# Patient Record
Sex: FEMALE | Race: WHITE | Employment: OTHER | URBAN - METROPOLITAN AREA
[De-identification: names, ages, dates, MRNs, and addresses within clinical notes are randomized per-mention and may not be internally consistent; named-entity substitution may affect disease eponyms.]

---

## 2017-02-15 PROBLEM — C82.13 FOLLICULAR LYMPHOMA GRADE II OF INTRA-ABDOMINAL LYMPH NODES (HCC): Status: ACTIVE | Noted: 2017-02-15

## 2017-08-12 PROBLEM — T17.208A FOREIGN BODY IN PHARYNX: Status: ACTIVE | Noted: 2017-08-12

## 2018-03-21 ENCOUNTER — HOSPITAL ENCOUNTER (OUTPATIENT)
Dept: INFUSION THERAPY | Age: 73
Discharge: HOME | End: 2018-03-21
Payer: COMMERCIAL

## 2018-03-21 ENCOUNTER — OFFICE VISIT (OUTPATIENT)
Dept: ONCOLOGY | Age: 73
End: 2018-03-21
Payer: COMMERCIAL

## 2018-03-21 VITALS
RESPIRATION RATE: 20 BRPM | DIASTOLIC BLOOD PRESSURE: 62 MMHG | HEART RATE: 61 BPM | SYSTOLIC BLOOD PRESSURE: 143 MMHG | HEIGHT: 69 IN | BODY MASS INDEX: 32.75 KG/M2 | WEIGHT: 221.1 LBS | TEMPERATURE: 98.5 F

## 2018-03-21 DIAGNOSIS — C82.09 GRADE I FOLLICULAR LYMPHOMA OF EXTRANODAL SITE EXCLUDING SPLEEN AND OTHER SOLID ORGANS (HCC): ICD-10-CM

## 2018-03-21 DIAGNOSIS — C82.13 FOLLICULAR LYMPHOMA GRADE II OF INTRA-ABDOMINAL LYMPH NODES (HCC): ICD-10-CM

## 2018-03-21 DIAGNOSIS — C82.13 FOLLICULAR LYMPHOMA GRADE II OF INTRA-ABDOMINAL LYMPH NODES (HCC): Primary | ICD-10-CM

## 2018-03-21 LAB
ALBUMIN SERPL-MCNC: 4.1 G/DL (ref 3.5–5.2)
ALP BLD-CCNC: 95 U/L (ref 35–104)
ALT SERPL-CCNC: 11 U/L (ref 0–32)
ANION GAP SERPL CALCULATED.3IONS-SCNC: 11 MMOL/L (ref 7–16)
AST SERPL-CCNC: 13 U/L (ref 0–31)
BASOPHILS ABSOLUTE: 0.03 E9/L (ref 0–0.2)
BASOPHILS RELATIVE PERCENT: 0.5 % (ref 0–2)
BILIRUB SERPL-MCNC: 0.4 MG/DL (ref 0–1.2)
BUN BLDV-MCNC: 20 MG/DL (ref 8–23)
CALCIUM SERPL-MCNC: 9.5 MG/DL (ref 8.6–10.2)
CHLORIDE BLD-SCNC: 100 MMOL/L (ref 98–107)
CO2: 28 MMOL/L (ref 22–29)
CREAT SERPL-MCNC: 1 MG/DL (ref 0.5–1)
EOSINOPHILS ABSOLUTE: 0.31 E9/L (ref 0.05–0.5)
EOSINOPHILS RELATIVE PERCENT: 5.6 % (ref 0–6)
GFR AFRICAN AMERICAN: >60
GFR NON-AFRICAN AMERICAN: 55 ML/MIN/1.73
GLUCOSE BLD-MCNC: 127 MG/DL (ref 74–109)
HCT VFR BLD CALC: 38.7 % (ref 34–48)
HEMOGLOBIN: 13.1 G/DL (ref 11.5–15.5)
LACTATE DEHYDROGENASE: 168 U/L (ref 135–214)
LYMPHOCYTES ABSOLUTE: 0.45 E9/L (ref 1.5–4)
LYMPHOCYTES RELATIVE PERCENT: 8.1 % (ref 20–42)
MCH RBC QN AUTO: 32.3 PG (ref 26–35)
MCHC RBC AUTO-ENTMCNC: 33.9 % (ref 32–34.5)
MCV RBC AUTO: 95.3 FL (ref 80–99.9)
MONOCYTES ABSOLUTE: 0.84 E9/L (ref 0.1–0.95)
MONOCYTES RELATIVE PERCENT: 14.7 % (ref 2–12)
NEUTROPHILS ABSOLUTE: 3.98 E9/L (ref 1.8–7.3)
NEUTROPHILS RELATIVE PERCENT: 71.1 % (ref 43–80)
PDW BLD-RTO: 13.4 FL (ref 11.5–15)
PLATELET # BLD: 189 E9/L (ref 130–450)
PMV BLD AUTO: 9 FL (ref 7–12)
POTASSIUM SERPL-SCNC: 4.2 MMOL/L (ref 3.5–5)
RBC # BLD: 4.06 E12/L (ref 3.5–5.5)
RBC # BLD: NORMAL 10*6/UL
SODIUM BLD-SCNC: 139 MMOL/L (ref 132–146)
TOTAL PROTEIN: 6.9 G/DL (ref 6.4–8.3)
WBC # BLD: 5.6 E9/L (ref 4.5–11.5)

## 2018-03-21 PROCEDURE — 80053 COMPREHEN METABOLIC PANEL: CPT

## 2018-03-21 PROCEDURE — 2580000003 HC RX 258: Performed by: INTERNAL MEDICINE

## 2018-03-21 PROCEDURE — 83615 LACTATE (LD) (LDH) ENZYME: CPT

## 2018-03-21 PROCEDURE — 96401 CHEMO ANTI-NEOPL SQ/IM: CPT

## 2018-03-21 PROCEDURE — 85025 COMPLETE CBC W/AUTO DIFF WBC: CPT

## 2018-03-21 PROCEDURE — 6360000002 HC RX W HCPCS: Performed by: INTERNAL MEDICINE

## 2018-03-21 RX ORDER — ACYCLOVIR 400 MG/1
400 TABLET ORAL 2 TIMES DAILY
Qty: 60 TABLET | Refills: 3 | Status: SHIPPED | OUTPATIENT
Start: 2018-03-21 | End: 2018-08-01 | Stop reason: SDUPTHER

## 2018-03-21 RX ORDER — SODIUM CHLORIDE 9 MG/ML
INJECTION, SOLUTION INTRAVENOUS CONTINUOUS
Status: CANCELLED | OUTPATIENT
Start: 2018-03-21

## 2018-03-21 RX ORDER — DIPHENHYDRAMINE HYDROCHLORIDE 50 MG/ML
50 INJECTION INTRAMUSCULAR; INTRAVENOUS ONCE
Status: CANCELLED | OUTPATIENT
Start: 2018-03-21 | End: 2018-03-21

## 2018-03-21 RX ORDER — SODIUM CHLORIDE 0.9 % (FLUSH) 0.9 %
10 SYRINGE (ML) INJECTION PRN
Status: CANCELLED | OUTPATIENT
Start: 2018-03-21

## 2018-03-21 RX ORDER — DIPHENHYDRAMINE HCL 25 MG
25 TABLET ORAL ONCE
Status: CANCELLED
Start: 2018-03-21 | End: 2018-03-21

## 2018-03-21 RX ORDER — 0.9 % SODIUM CHLORIDE 0.9 %
10 VIAL (ML) INJECTION ONCE
Status: CANCELLED | OUTPATIENT
Start: 2018-03-21 | End: 2018-03-21

## 2018-03-21 RX ORDER — ACETAMINOPHEN 325 MG/1
650 TABLET ORAL ONCE
Status: CANCELLED | OUTPATIENT
Start: 2018-03-21

## 2018-03-21 RX ORDER — SODIUM CHLORIDE 0.9 % (FLUSH) 0.9 %
10 SYRINGE (ML) INJECTION PRN
Status: DISCONTINUED | OUTPATIENT
Start: 2018-03-21 | End: 2018-03-22 | Stop reason: HOSPADM

## 2018-03-21 RX ORDER — HEPARIN SODIUM (PORCINE) LOCK FLUSH IV SOLN 100 UNIT/ML 100 UNIT/ML
500 SOLUTION INTRAVENOUS PRN
Status: CANCELLED | OUTPATIENT
Start: 2018-03-21

## 2018-03-21 RX ORDER — HEPARIN SODIUM (PORCINE) LOCK FLUSH IV SOLN 100 UNIT/ML 100 UNIT/ML
500 SOLUTION INTRAVENOUS PRN
Status: DISCONTINUED | OUTPATIENT
Start: 2018-03-21 | End: 2018-03-22 | Stop reason: HOSPADM

## 2018-03-21 RX ORDER — METHYLPREDNISOLONE SODIUM SUCCINATE 125 MG/2ML
125 INJECTION, POWDER, LYOPHILIZED, FOR SOLUTION INTRAMUSCULAR; INTRAVENOUS ONCE
Status: CANCELLED | OUTPATIENT
Start: 2018-03-21 | End: 2018-03-21

## 2018-03-21 RX ADMIN — Medication 10 ML: at 11:49

## 2018-03-21 RX ADMIN — Medication 500 UNITS: at 11:50

## 2018-03-21 RX ADMIN — RITUXIMAB AND HYALURONIDASE 11.7 ML: 120; 2000 INJECTION, SOLUTION SUBCUTANEOUS at 12:53

## 2018-03-21 NOTE — PROGRESS NOTES
Met with patient and her daughter during her follow up appointment with Dr. Ceci Mendosa for her Rituxan- Hycela treatment for her Lymphoma diagnosis. Patient is familiar with with me. States that she recently had right breast lumpectomy/SLNB surgery on 3/12/18 with Dr. Kiki Monroy. Right breast core biopsy was completed on 2/14/18 at [facility] as well. Patient states that she is scheduled for right breast re-excision on 3/28/18 due to positive margin per her postop appt with Dr. Kiki Monroy yesterday. Will call for records. Provided with written literature including \"Be A Survivor: Your guide to breast cancer treatment\", Patient Resource booklet:  Women's Cancer, Exercises after breast surgery, Information on Lymphedema, and [facility] Sister Support Group information. Provided with my contact information and instructed patient to call me with questions or concerns. Verbalizes understanding. Patient appreciative of visit and information. Will continue to follow for final treatment plans for secondary diagnosis. Spoke with Alessia Tello at Dr. Rupal Godoy regarding patient records. Requested Dr. Rupal Godoy office notes, Mammogram, right breast surgical pathology report, and hormone receptors. Provided office fax number. States that she will send to our office today. Dr. Ceci Mendosa updated. Nani Torres, MERLINW, RN, OCN  Oncology Nurse Navigator

## 2018-03-21 NOTE — PROGRESS NOTES
PORT FLUSH    Patient presents to clinic for Labs/OVPort Flush/tx today. Single  SQ port accessed per policy using 20 G .75 inch Burrell needle for good blood return. Aspirate for waste and specimen sent to lab. Site flushed easily with 10 mL NSS followed by 5 mL Heparin solution 100 units/ml rinse prior to de-access. Dry sterile dressing to area. Tolerated procedure well. Encouraged to schedule port flush every 4 weeks.

## 2018-04-11 ENCOUNTER — OFFICE VISIT (OUTPATIENT)
Dept: ONCOLOGY | Age: 73
End: 2018-04-11
Payer: COMMERCIAL

## 2018-04-11 ENCOUNTER — HOSPITAL ENCOUNTER (OUTPATIENT)
Dept: INFUSION THERAPY | Age: 73
Discharge: HOME | End: 2018-04-11
Payer: COMMERCIAL

## 2018-04-11 VITALS
SYSTOLIC BLOOD PRESSURE: 153 MMHG | WEIGHT: 221.4 LBS | HEIGHT: 69 IN | TEMPERATURE: 97.6 F | BODY MASS INDEX: 32.79 KG/M2 | HEART RATE: 68 BPM | DIASTOLIC BLOOD PRESSURE: 68 MMHG

## 2018-04-11 DIAGNOSIS — C82.13 FOLLICULAR LYMPHOMA GRADE II OF INTRA-ABDOMINAL LYMPH NODES (HCC): Primary | ICD-10-CM

## 2018-04-11 DIAGNOSIS — C82.09 GRADE I FOLLICULAR LYMPHOMA OF EXTRANODAL SITE EXCLUDING SPLEEN AND OTHER SOLID ORGANS (HCC): ICD-10-CM

## 2018-04-11 DIAGNOSIS — Z17.0 MALIGNANT NEOPLASM OF RIGHT BREAST, STAGE 1, ESTROGEN RECEPTOR POSITIVE (HCC): ICD-10-CM

## 2018-04-11 DIAGNOSIS — Z17.0 STAGE 1 BREAST CANCER, ER+, RIGHT (HCC): ICD-10-CM

## 2018-04-11 DIAGNOSIS — C50.911 STAGE 1 BREAST CANCER, ER+, RIGHT (HCC): ICD-10-CM

## 2018-04-11 DIAGNOSIS — C50.911 MALIGNANT NEOPLASM OF RIGHT BREAST, STAGE 1, ESTROGEN RECEPTOR POSITIVE (HCC): ICD-10-CM

## 2018-04-11 LAB
ALBUMIN SERPL-MCNC: 3.5 G/DL (ref 3.5–5.2)
ALP BLD-CCNC: 77 U/L (ref 35–104)
ALT SERPL-CCNC: 10 U/L (ref 0–32)
ANION GAP SERPL CALCULATED.3IONS-SCNC: 14 MMOL/L (ref 7–16)
AST SERPL-CCNC: 12 U/L (ref 0–31)
BASOPHILS ABSOLUTE: 0 E9/L (ref 0–0.2)
BASOPHILS RELATIVE PERCENT: 0.3 % (ref 0–2)
BILIRUB SERPL-MCNC: 0.4 MG/DL (ref 0–1.2)
BUN BLDV-MCNC: 17 MG/DL (ref 8–23)
CALCIUM SERPL-MCNC: 8.1 MG/DL (ref 8.6–10.2)
CHLORIDE BLD-SCNC: 106 MMOL/L (ref 98–107)
CO2: 21 MMOL/L (ref 22–29)
CREAT SERPL-MCNC: 0.8 MG/DL (ref 0.5–1)
EOSINOPHILS ABSOLUTE: 0.3 E9/L (ref 0.05–0.5)
EOSINOPHILS RELATIVE PERCENT: 5.1 % (ref 0–6)
GFR AFRICAN AMERICAN: >60
GFR NON-AFRICAN AMERICAN: >60 ML/MIN/1.73
GLUCOSE BLD-MCNC: 112 MG/DL (ref 74–109)
HCT VFR BLD CALC: 38.9 % (ref 34–48)
HEMOGLOBIN: 13.1 G/DL (ref 11.5–15.5)
LACTATE DEHYDROGENASE: 178 U/L (ref 135–214)
LYMPHOCYTES ABSOLUTE: 0.35 E9/L (ref 1.5–4)
LYMPHOCYTES RELATIVE PERCENT: 5.6 % (ref 20–42)
MCH RBC QN AUTO: 32.6 PG (ref 26–35)
MCHC RBC AUTO-ENTMCNC: 33.7 % (ref 32–34.5)
MCV RBC AUTO: 96.8 FL (ref 80–99.9)
MONOCYTES ABSOLUTE: 0.47 E9/L (ref 0.1–0.95)
MONOCYTES RELATIVE PERCENT: 8.1 % (ref 2–12)
NEUTROPHILS ABSOLUTE: 4.78 E9/L (ref 1.8–7.3)
NEUTROPHILS RELATIVE PERCENT: 81.3 % (ref 43–80)
PDW BLD-RTO: 13.2 FL (ref 11.5–15)
PLATELET # BLD: 190 E9/L (ref 130–450)
PMV BLD AUTO: 9.1 FL (ref 7–12)
POTASSIUM SERPL-SCNC: 3.6 MMOL/L (ref 3.5–5)
RBC # BLD: 4.02 E12/L (ref 3.5–5.5)
RBC # BLD: NORMAL 10*6/UL
SODIUM BLD-SCNC: 141 MMOL/L (ref 132–146)
TOTAL PROTEIN: 5.9 G/DL (ref 6.4–8.3)
WBC # BLD: 5.9 E9/L (ref 4.5–11.5)

## 2018-04-11 PROCEDURE — 85025 COMPLETE CBC W/AUTO DIFF WBC: CPT

## 2018-04-11 PROCEDURE — 2580000003 HC RX 258: Performed by: INTERNAL MEDICINE

## 2018-04-11 PROCEDURE — 83615 LACTATE (LD) (LDH) ENZYME: CPT

## 2018-04-11 PROCEDURE — 80053 COMPREHEN METABOLIC PANEL: CPT

## 2018-04-11 PROCEDURE — 6360000002 HC RX W HCPCS: Performed by: INTERNAL MEDICINE

## 2018-04-11 PROCEDURE — 99214 OFFICE O/P EST MOD 30 MIN: CPT

## 2018-04-11 RX ORDER — SODIUM CHLORIDE 0.9 % (FLUSH) 0.9 %
10 SYRINGE (ML) INJECTION PRN
Status: CANCELLED | OUTPATIENT
Start: 2018-04-11

## 2018-04-11 RX ORDER — HEPARIN SODIUM (PORCINE) LOCK FLUSH IV SOLN 100 UNIT/ML 100 UNIT/ML
500 SOLUTION INTRAVENOUS PRN
Status: CANCELLED | OUTPATIENT
Start: 2018-04-11

## 2018-04-11 RX ORDER — HEPARIN SODIUM (PORCINE) LOCK FLUSH IV SOLN 100 UNIT/ML 100 UNIT/ML
500 SOLUTION INTRAVENOUS PRN
Status: DISCONTINUED | OUTPATIENT
Start: 2018-04-11 | End: 2018-04-12 | Stop reason: HOSPADM

## 2018-04-11 RX ORDER — SODIUM CHLORIDE 0.9 % (FLUSH) 0.9 %
10 SYRINGE (ML) INJECTION PRN
Status: DISCONTINUED | OUTPATIENT
Start: 2018-04-11 | End: 2018-04-12 | Stop reason: HOSPADM

## 2018-04-11 RX ADMIN — Medication 500 UNITS: at 12:17

## 2018-04-11 RX ADMIN — Medication 10 ML: at 12:17

## 2018-04-11 NOTE — PROGRESS NOTES
PORT FLUSH    Patient presents to clinic for [facility] today. single  SQ port accessed per policy using 05I, . 75 inch Burrell needle for good blood return. Aspirate for waste and specimen sent to lab. Site flushed easily with 10 mL NSS followed by 5 mL Heparin solution 100 units/ml rinse prior to de-access. Dry sterile dressing to area. Tolerated procedure well. Encouraged to schedule port flush every 4 weeks.

## 2018-04-11 NOTE — PROGRESS NOTES
Met with patient briefly during her follow up appointment with Dr. Lizzy Tobin after her recent right breast re-excision with Dr. Drew Sanchez at [facility] on 3/28/18. Acquired surgical pathology report with final hormone receptors from [facility] prior and had scanned into patient's chart. Patient's surgical margins were negative and final receptors were ER+/ID+/HER-2/kylah -. Patient appears well and in good spirits. States that she is doing well postoperatively. Dr. Lizzy Tobin requested OncotypeDX testing ordered for patient's cancer recurrence score for final treatment recommendations for her breast cancer. Dr. Lizzy Tobin explained in detail to patient. Patient is agreement to proceed. Ordered OncotypeDX online and requested that the company acquire the pathology specimen from [facility]. Will follow for testing results prior to the patient's follow up appointment with Dr. Lizzy Tobin. GIA John, RN, OCN  Oncology Nurse Navigator

## 2018-05-02 ENCOUNTER — HOSPITAL ENCOUNTER (OUTPATIENT)
Dept: INFUSION THERAPY | Age: 73
Discharge: HOME | End: 2018-05-02
Payer: COMMERCIAL

## 2018-05-02 ENCOUNTER — OFFICE VISIT (OUTPATIENT)
Dept: ONCOLOGY | Age: 73
End: 2018-05-02
Payer: COMMERCIAL

## 2018-05-02 VITALS
SYSTOLIC BLOOD PRESSURE: 135 MMHG | WEIGHT: 221.1 LBS | HEART RATE: 74 BPM | DIASTOLIC BLOOD PRESSURE: 67 MMHG | HEIGHT: 69 IN | TEMPERATURE: 97.2 F | RESPIRATION RATE: 20 BRPM | BODY MASS INDEX: 32.75 KG/M2

## 2018-05-02 DIAGNOSIS — C82.13 FOLLICULAR LYMPHOMA GRADE II OF INTRA-ABDOMINAL LYMPH NODES (HCC): Primary | ICD-10-CM

## 2018-05-02 DIAGNOSIS — Z17.0 MALIGNANT NEOPLASM OF RIGHT BREAST, STAGE 1, ESTROGEN RECEPTOR POSITIVE (HCC): Primary | ICD-10-CM

## 2018-05-02 DIAGNOSIS — C82.09 GRADE I FOLLICULAR LYMPHOMA OF EXTRANODAL SITE EXCLUDING SPLEEN AND OTHER SOLID ORGANS (HCC): ICD-10-CM

## 2018-05-02 DIAGNOSIS — C50.911 MALIGNANT NEOPLASM OF RIGHT BREAST, STAGE 1, ESTROGEN RECEPTOR POSITIVE (HCC): Primary | ICD-10-CM

## 2018-05-02 DIAGNOSIS — Z17.0 STAGE 1 BREAST CANCER, ER+, RIGHT (HCC): ICD-10-CM

## 2018-05-02 DIAGNOSIS — C50.911 STAGE 1 BREAST CANCER, ER+, RIGHT (HCC): ICD-10-CM

## 2018-05-02 LAB
ALBUMIN SERPL-MCNC: 4.2 G/DL (ref 3.5–5.2)
ALP BLD-CCNC: 110 U/L (ref 35–104)
ALT SERPL-CCNC: 11 U/L (ref 0–32)
ANION GAP SERPL CALCULATED.3IONS-SCNC: 14 MMOL/L (ref 7–16)
AST SERPL-CCNC: 14 U/L (ref 0–31)
BASOPHILS ABSOLUTE: 0.02 E9/L (ref 0–0.2)
BASOPHILS RELATIVE PERCENT: 1.5 % (ref 0–2)
BILIRUB SERPL-MCNC: 0.5 MG/DL (ref 0–1.2)
BUN BLDV-MCNC: 17 MG/DL (ref 8–23)
CALCIUM SERPL-MCNC: 9.8 MG/DL (ref 8.6–10.2)
CHLORIDE BLD-SCNC: 97 MMOL/L (ref 98–107)
CO2: 26 MMOL/L (ref 22–29)
CREAT SERPL-MCNC: 1 MG/DL (ref 0.5–1)
EOSINOPHILS ABSOLUTE: 0.13 E9/L (ref 0.05–0.5)
EOSINOPHILS RELATIVE PERCENT: 8.2 % (ref 0–6)
GFR AFRICAN AMERICAN: >60
GFR NON-AFRICAN AMERICAN: 55 ML/MIN/1.73
GLUCOSE BLD-MCNC: 121 MG/DL (ref 74–109)
HCT VFR BLD CALC: 38.7 % (ref 34–48)
HEMOGLOBIN: 13.2 G/DL (ref 11.5–15.5)
LACTATE DEHYDROGENASE: 160 U/L (ref 135–214)
LYMPHOCYTES ABSOLUTE: 0.51 E9/L (ref 1.5–4)
LYMPHOCYTES RELATIVE PERCENT: 32.3 % (ref 20–42)
MCH RBC QN AUTO: 32.3 PG (ref 26–35)
MCHC RBC AUTO-ENTMCNC: 34.1 % (ref 32–34.5)
MCV RBC AUTO: 94.6 FL (ref 80–99.9)
MONOCYTES ABSOLUTE: 0.42 E9/L (ref 0.1–0.95)
MONOCYTES RELATIVE PERCENT: 26.2 % (ref 2–12)
NEUTROPHILS ABSOLUTE: 0.51 E9/L (ref 1.8–7.3)
NEUTROPHILS RELATIVE PERCENT: 31.8 % (ref 43–80)
PDW BLD-RTO: 12.5 FL (ref 11.5–15)
PLATELET # BLD: 185 E9/L (ref 130–450)
PMV BLD AUTO: 8.8 FL (ref 7–12)
POTASSIUM SERPL-SCNC: 4.5 MMOL/L (ref 3.5–5)
RBC # BLD: 4.09 E12/L (ref 3.5–5.5)
RBC # BLD: NORMAL 10*6/UL
SODIUM BLD-SCNC: 137 MMOL/L (ref 132–146)
TOTAL PROTEIN: 7.1 G/DL (ref 6.4–8.3)
WBC # BLD: 1.6 E9/L (ref 4.5–11.5)

## 2018-05-02 PROCEDURE — 85025 COMPLETE CBC W/AUTO DIFF WBC: CPT

## 2018-05-02 PROCEDURE — 99214 OFFICE O/P EST MOD 30 MIN: CPT

## 2018-05-02 PROCEDURE — 80053 COMPREHEN METABOLIC PANEL: CPT

## 2018-05-02 PROCEDURE — 2580000003 HC RX 258: Performed by: INTERNAL MEDICINE

## 2018-05-02 PROCEDURE — 83615 LACTATE (LD) (LDH) ENZYME: CPT

## 2018-05-02 PROCEDURE — 6360000002 HC RX W HCPCS: Performed by: INTERNAL MEDICINE

## 2018-05-02 RX ORDER — SODIUM CHLORIDE 0.9 % (FLUSH) 0.9 %
10 SYRINGE (ML) INJECTION PRN
Status: DISCONTINUED | OUTPATIENT
Start: 2018-05-02 | End: 2018-05-03 | Stop reason: HOSPADM

## 2018-05-02 RX ORDER — HEPARIN SODIUM (PORCINE) LOCK FLUSH IV SOLN 100 UNIT/ML 100 UNIT/ML
500 SOLUTION INTRAVENOUS PRN
Status: CANCELLED | OUTPATIENT
Start: 2018-05-02

## 2018-05-02 RX ORDER — SODIUM CHLORIDE 0.9 % (FLUSH) 0.9 %
10 SYRINGE (ML) INJECTION PRN
Status: CANCELLED | OUTPATIENT
Start: 2018-05-02

## 2018-05-02 RX ORDER — ANASTROZOLE 1 MG/1
1 TABLET ORAL DAILY
Qty: 90 TABLET | Refills: 1 | Status: SHIPPED | OUTPATIENT
Start: 2018-05-02 | End: 2018-11-20 | Stop reason: SDUPTHER

## 2018-05-02 RX ORDER — HEPARIN SODIUM (PORCINE) LOCK FLUSH IV SOLN 100 UNIT/ML 100 UNIT/ML
500 SOLUTION INTRAVENOUS PRN
Status: DISCONTINUED | OUTPATIENT
Start: 2018-05-02 | End: 2018-05-03 | Stop reason: HOSPADM

## 2018-05-02 RX ADMIN — Medication 500 UNITS: at 13:40

## 2018-05-02 RX ADMIN — Medication 10 ML: at 13:40

## 2018-05-03 DIAGNOSIS — C82.13 FOLLICULAR LYMPHOMA GRADE II OF INTRA-ABDOMINAL LYMPH NODES (HCC): ICD-10-CM

## 2018-05-03 DIAGNOSIS — Z17.0 MALIGNANT NEOPLASM OF RIGHT BREAST, STAGE 1, ESTROGEN RECEPTOR POSITIVE (HCC): Primary | ICD-10-CM

## 2018-05-03 DIAGNOSIS — C50.911 MALIGNANT NEOPLASM OF RIGHT BREAST, STAGE 1, ESTROGEN RECEPTOR POSITIVE (HCC): Primary | ICD-10-CM

## 2018-05-04 ENCOUNTER — HOSPITAL ENCOUNTER (OUTPATIENT)
Dept: INFUSION THERAPY | Age: 73
Discharge: HOME | End: 2018-05-04
Payer: COMMERCIAL

## 2018-05-04 DIAGNOSIS — C50.911 MALIGNANT NEOPLASM OF RIGHT BREAST, STAGE 1, ESTROGEN RECEPTOR POSITIVE (HCC): ICD-10-CM

## 2018-05-04 DIAGNOSIS — Z17.0 MALIGNANT NEOPLASM OF RIGHT BREAST, STAGE 1, ESTROGEN RECEPTOR POSITIVE (HCC): ICD-10-CM

## 2018-05-04 DIAGNOSIS — C82.13 FOLLICULAR LYMPHOMA GRADE II OF INTRA-ABDOMINAL LYMPH NODES (HCC): ICD-10-CM

## 2018-05-04 LAB
BASOPHILS ABSOLUTE: 0.03 E9/L (ref 0–0.2)
BASOPHILS RELATIVE PERCENT: 2 % (ref 0–2)
EOSINOPHILS ABSOLUTE: 0.23 E9/L (ref 0.05–0.5)
EOSINOPHILS RELATIVE PERCENT: 13.7 % (ref 0–6)
HCT VFR BLD CALC: 38.8 % (ref 34–48)
HEMOGLOBIN: 13 G/DL (ref 11.5–15.5)
LYMPHOCYTES ABSOLUTE: 0.43 E9/L (ref 1.5–4)
LYMPHOCYTES RELATIVE PERCENT: 25.4 % (ref 20–42)
MCH RBC QN AUTO: 32.2 PG (ref 26–35)
MCHC RBC AUTO-ENTMCNC: 33.5 % (ref 32–34.5)
MCV RBC AUTO: 96 FL (ref 80–99.9)
MONOCYTES ABSOLUTE: 0.44 E9/L (ref 0.1–0.95)
MONOCYTES RELATIVE PERCENT: 26.4 % (ref 2–12)
NEUTROPHILS ABSOLUTE: 0.56 E9/L (ref 1.8–7.3)
NEUTROPHILS RELATIVE PERCENT: 32.5 % (ref 43–80)
PDW BLD-RTO: 12.6 FL (ref 11.5–15)
PLATELET # BLD: 179 E9/L (ref 130–450)
PMV BLD AUTO: 8.7 FL (ref 7–12)
RBC # BLD: 4.04 E12/L (ref 3.5–5.5)
RBC # BLD: NORMAL 10*6/UL
WBC # BLD: 1.7 E9/L (ref 4.5–11.5)

## 2018-05-04 PROCEDURE — 85025 COMPLETE CBC W/AUTO DIFF WBC: CPT

## 2018-05-04 PROCEDURE — 36415 COLL VENOUS BLD VENIPUNCTURE: CPT

## 2018-05-11 ENCOUNTER — HOSPITAL ENCOUNTER (OUTPATIENT)
Dept: GENERAL RADIOLOGY | Age: 73
Discharge: HOME | End: 2018-05-13
Payer: COMMERCIAL

## 2018-05-11 DIAGNOSIS — C82.13 FOLLICULAR LYMPHOMA GRADE II OF INTRA-ABDOMINAL LYMPH NODES (HCC): ICD-10-CM

## 2018-05-11 DIAGNOSIS — C50.911 STAGE 1 BREAST CANCER, ER+, RIGHT (HCC): ICD-10-CM

## 2018-05-11 DIAGNOSIS — Z17.0 STAGE 1 BREAST CANCER, ER+, RIGHT (HCC): ICD-10-CM

## 2018-05-11 PROCEDURE — 77080 DXA BONE DENSITY AXIAL: CPT

## 2018-05-16 ENCOUNTER — HOSPITAL ENCOUNTER (OUTPATIENT)
Dept: RADIATION ONCOLOGY | Age: 73
Discharge: HOME | End: 2018-05-16
Payer: COMMERCIAL

## 2018-05-16 ENCOUNTER — OFFICE VISIT (OUTPATIENT)
Dept: ONCOLOGY | Age: 73
End: 2018-05-16
Payer: COMMERCIAL

## 2018-05-16 ENCOUNTER — HOSPITAL ENCOUNTER (OUTPATIENT)
Dept: INFUSION THERAPY | Age: 73
Discharge: HOME | End: 2018-05-16
Payer: COMMERCIAL

## 2018-05-16 VITALS
SYSTOLIC BLOOD PRESSURE: 155 MMHG | TEMPERATURE: 97.6 F | WEIGHT: 221.4 LBS | BODY MASS INDEX: 33.56 KG/M2 | DIASTOLIC BLOOD PRESSURE: 72 MMHG | HEIGHT: 68 IN | HEART RATE: 66 BPM | RESPIRATION RATE: 18 BRPM

## 2018-05-16 VITALS
DIASTOLIC BLOOD PRESSURE: 76 MMHG | SYSTOLIC BLOOD PRESSURE: 138 MMHG | HEART RATE: 61 BPM | RESPIRATION RATE: 18 BRPM | WEIGHT: 221.4 LBS | BODY MASS INDEX: 33.17 KG/M2 | TEMPERATURE: 98.2 F

## 2018-05-16 DIAGNOSIS — C82.13 FOLLICULAR LYMPHOMA GRADE II OF INTRA-ABDOMINAL LYMPH NODES (HCC): ICD-10-CM

## 2018-05-16 DIAGNOSIS — C82.13 FOLLICULAR LYMPHOMA GRADE II OF INTRA-ABDOMINAL LYMPH NODES (HCC): Primary | ICD-10-CM

## 2018-05-16 DIAGNOSIS — C50.911 STAGE 1 BREAST CANCER, ER+, RIGHT (HCC): ICD-10-CM

## 2018-05-16 DIAGNOSIS — Z17.0 MALIGNANT NEOPLASM OF RIGHT BREAST, STAGE 1, ESTROGEN RECEPTOR POSITIVE (HCC): Primary | ICD-10-CM

## 2018-05-16 DIAGNOSIS — C50.911 MALIGNANT NEOPLASM OF RIGHT BREAST, STAGE 1, ESTROGEN RECEPTOR POSITIVE (HCC): Primary | ICD-10-CM

## 2018-05-16 DIAGNOSIS — Z17.0 STAGE 1 BREAST CANCER, ER+, RIGHT (HCC): ICD-10-CM

## 2018-05-16 LAB
ALBUMIN SERPL-MCNC: 3.9 G/DL (ref 3.5–5.2)
ALP BLD-CCNC: 100 U/L (ref 35–104)
ALT SERPL-CCNC: 11 U/L (ref 0–32)
ANION GAP SERPL CALCULATED.3IONS-SCNC: 13 MMOL/L (ref 7–16)
AST SERPL-CCNC: 13 U/L (ref 0–31)
BASOPHILS ABSOLUTE: 0.03 E9/L (ref 0–0.2)
BASOPHILS RELATIVE PERCENT: 2 % (ref 0–2)
BILIRUB SERPL-MCNC: 0.4 MG/DL (ref 0–1.2)
BUN BLDV-MCNC: 17 MG/DL (ref 8–23)
CALCIUM SERPL-MCNC: 9.9 MG/DL (ref 8.6–10.2)
CHLORIDE BLD-SCNC: 102 MMOL/L (ref 98–107)
CO2: 25 MMOL/L (ref 22–29)
CREAT SERPL-MCNC: 1 MG/DL (ref 0.5–1)
EOSINOPHILS ABSOLUTE: 0.16 E9/L (ref 0.05–0.5)
EOSINOPHILS RELATIVE PERCENT: 12 % (ref 0–6)
GFR AFRICAN AMERICAN: >60
GFR NON-AFRICAN AMERICAN: 55 ML/MIN/1.73
GLUCOSE BLD-MCNC: 128 MG/DL (ref 74–109)
HCT VFR BLD CALC: 38.2 % (ref 34–48)
HEMOGLOBIN: 13 G/DL (ref 11.5–15.5)
LYMPHOCYTES ABSOLUTE: 0.35 E9/L (ref 1.5–4)
LYMPHOCYTES RELATIVE PERCENT: 27 % (ref 20–42)
MCH RBC QN AUTO: 32.2 PG (ref 26–35)
MCHC RBC AUTO-ENTMCNC: 34 % (ref 32–34.5)
MCV RBC AUTO: 94.6 FL (ref 80–99.9)
METAMYELOCYTES RELATIVE PERCENT: 1 % (ref 0–1)
MONOCYTES ABSOLUTE: 0.44 E9/L (ref 0.1–0.95)
MONOCYTES RELATIVE PERCENT: 34 % (ref 2–12)
NEUTROPHILS ABSOLUTE: 0.33 E9/L (ref 1.8–7.3)
NEUTROPHILS RELATIVE PERCENT: 24 % (ref 43–80)
PDW BLD-RTO: 12.3 FL (ref 11.5–15)
PLATELET # BLD: 205 E9/L (ref 130–450)
PMV BLD AUTO: 8.6 FL (ref 7–12)
POTASSIUM SERPL-SCNC: 4.3 MMOL/L (ref 3.5–5)
RBC # BLD: 4.04 E12/L (ref 3.5–5.5)
SODIUM BLD-SCNC: 140 MMOL/L (ref 132–146)
TOTAL PROTEIN: 7.1 G/DL (ref 6.4–8.3)
WBC # BLD: 1.3 E9/L (ref 4.5–11.5)

## 2018-05-16 PROCEDURE — 99205 OFFICE O/P NEW HI 60 MIN: CPT

## 2018-05-16 PROCEDURE — 99212 OFFICE O/P EST SF 10 MIN: CPT

## 2018-05-16 PROCEDURE — 99204 OFFICE O/P NEW MOD 45 MIN: CPT | Performed by: RADIOLOGY

## 2018-05-16 PROCEDURE — 80053 COMPREHEN METABOLIC PANEL: CPT

## 2018-05-16 PROCEDURE — 85025 COMPLETE CBC W/AUTO DIFF WBC: CPT

## 2018-05-16 PROCEDURE — 36415 COLL VENOUS BLD VENIPUNCTURE: CPT | Performed by: INTERNAL MEDICINE

## 2018-05-16 PROCEDURE — 36415 COLL VENOUS BLD VENIPUNCTURE: CPT

## 2018-05-16 RX ORDER — ACYCLOVIR 400 MG/1
400 TABLET ORAL 2 TIMES DAILY
COMMUNITY
End: 2019-06-12 | Stop reason: SDUPTHER

## 2018-05-16 RX ORDER — CIPROFLOXACIN 500 MG/1
500 TABLET, FILM COATED ORAL 2 TIMES DAILY
Qty: 14 TABLET | Refills: 0 | Status: SHIPPED | OUTPATIENT
Start: 2018-05-16 | End: 2018-05-23

## 2018-05-17 ENCOUNTER — TELEPHONE (OUTPATIENT)
Dept: INFUSION THERAPY | Age: 73
End: 2018-05-17

## 2018-05-17 ENCOUNTER — HOSPITAL ENCOUNTER (OUTPATIENT)
Dept: INFUSION THERAPY | Age: 73
Discharge: HOME | End: 2018-05-17
Payer: COMMERCIAL

## 2018-05-17 DIAGNOSIS — C82.13 FOLLICULAR LYMPHOMA GRADE II OF INTRA-ABDOMINAL LYMPH NODES (HCC): ICD-10-CM

## 2018-05-17 PROCEDURE — 96372 THER/PROPH/DIAG INJ SC/IM: CPT

## 2018-05-17 PROCEDURE — 6360000002 HC RX W HCPCS: Performed by: INTERNAL MEDICINE

## 2018-05-17 RX ORDER — SODIUM CHLORIDE 0.9 % (FLUSH) 0.9 %
10 SYRINGE (ML) INJECTION PRN
Status: CANCELLED | OUTPATIENT
Start: 2018-05-17

## 2018-05-17 RX ORDER — HEPARIN SODIUM (PORCINE) LOCK FLUSH IV SOLN 100 UNIT/ML 100 UNIT/ML
500 SOLUTION INTRAVENOUS PRN
Status: CANCELLED | OUTPATIENT
Start: 2018-05-17

## 2018-05-17 RX ADMIN — TBO-FILGRASTIM 480 MCG: 480 INJECTION, SOLUTION SUBCUTANEOUS at 15:09

## 2018-05-18 ENCOUNTER — HOSPITAL ENCOUNTER (OUTPATIENT)
Dept: INFUSION THERAPY | Age: 73
Discharge: HOME | End: 2018-05-18
Payer: COMMERCIAL

## 2018-05-18 DIAGNOSIS — C82.13 FOLLICULAR LYMPHOMA GRADE II OF INTRA-ABDOMINAL LYMPH NODES (HCC): ICD-10-CM

## 2018-05-18 PROCEDURE — 96372 THER/PROPH/DIAG INJ SC/IM: CPT

## 2018-05-18 PROCEDURE — 6360000002 HC RX W HCPCS: Performed by: INTERNAL MEDICINE

## 2018-05-18 RX ORDER — SODIUM CHLORIDE 0.9 % (FLUSH) 0.9 %
10 SYRINGE (ML) INJECTION PRN
Status: CANCELLED | OUTPATIENT
Start: 2018-05-18

## 2018-05-18 RX ORDER — HEPARIN SODIUM (PORCINE) LOCK FLUSH IV SOLN 100 UNIT/ML 100 UNIT/ML
500 SOLUTION INTRAVENOUS PRN
Status: CANCELLED | OUTPATIENT
Start: 2018-05-18

## 2018-05-18 RX ADMIN — TBO-FILGRASTIM 480 MCG: 480 INJECTION, SOLUTION SUBCUTANEOUS at 12:50

## 2018-05-19 ENCOUNTER — HOSPITAL ENCOUNTER (OUTPATIENT)
Dept: INFUSION THERAPY | Age: 73
Setting detail: INFUSION SERIES
Discharge: HOME | End: 2018-05-19
Payer: COMMERCIAL

## 2018-05-19 VITALS
SYSTOLIC BLOOD PRESSURE: 194 MMHG | TEMPERATURE: 97.9 F | RESPIRATION RATE: 14 BRPM | HEART RATE: 82 BPM | DIASTOLIC BLOOD PRESSURE: 78 MMHG

## 2018-05-19 DIAGNOSIS — C82.13 FOLLICULAR LYMPHOMA GRADE II OF INTRA-ABDOMINAL LYMPH NODES (HCC): ICD-10-CM

## 2018-05-19 PROCEDURE — 6360000002 HC RX W HCPCS: Performed by: INTERNAL MEDICINE

## 2018-05-19 PROCEDURE — 96372 THER/PROPH/DIAG INJ SC/IM: CPT

## 2018-05-19 RX ORDER — HEPARIN SODIUM (PORCINE) LOCK FLUSH IV SOLN 100 UNIT/ML 100 UNIT/ML
500 SOLUTION INTRAVENOUS PRN
Status: CANCELLED | OUTPATIENT
Start: 2018-05-19

## 2018-05-19 RX ORDER — SODIUM CHLORIDE 0.9 % (FLUSH) 0.9 %
10 SYRINGE (ML) INJECTION PRN
Status: CANCELLED | OUTPATIENT
Start: 2018-05-19

## 2018-05-19 RX ADMIN — TBO-FILGRASTIM 480 MCG: 480 INJECTION, SOLUTION SUBCUTANEOUS at 10:28

## 2018-05-19 ASSESSMENT — PAIN SCALES - GENERAL: PAINLEVEL_OUTOF10: 0

## 2018-05-23 ENCOUNTER — OFFICE VISIT (OUTPATIENT)
Dept: ONCOLOGY | Age: 73
End: 2018-05-23
Payer: COMMERCIAL

## 2018-05-23 ENCOUNTER — HOSPITAL ENCOUNTER (OUTPATIENT)
Dept: INFUSION THERAPY | Age: 73
Discharge: HOME | End: 2018-05-23
Payer: COMMERCIAL

## 2018-05-23 VITALS
HEIGHT: 68 IN | RESPIRATION RATE: 20 BRPM | HEART RATE: 66 BPM | WEIGHT: 219.8 LBS | DIASTOLIC BLOOD PRESSURE: 68 MMHG | BODY MASS INDEX: 33.31 KG/M2 | SYSTOLIC BLOOD PRESSURE: 141 MMHG | TEMPERATURE: 97.3 F

## 2018-05-23 DIAGNOSIS — C82.13 FOLLICULAR LYMPHOMA GRADE II OF INTRA-ABDOMINAL LYMPH NODES (HCC): Primary | ICD-10-CM

## 2018-05-23 DIAGNOSIS — C50.911 STAGE 1 BREAST CANCER, ER+, RIGHT (HCC): ICD-10-CM

## 2018-05-23 DIAGNOSIS — Z17.0 STAGE 1 BREAST CANCER, ER+, RIGHT (HCC): ICD-10-CM

## 2018-05-23 DIAGNOSIS — C82.13 FOLLICULAR LYMPHOMA GRADE II OF INTRA-ABDOMINAL LYMPH NODES (HCC): ICD-10-CM

## 2018-05-23 LAB
ALBUMIN SERPL-MCNC: 4.1 G/DL (ref 3.5–5.2)
ALP BLD-CCNC: 92 U/L (ref 35–104)
ALT SERPL-CCNC: 11 U/L (ref 0–32)
ANION GAP SERPL CALCULATED.3IONS-SCNC: 13 MMOL/L (ref 7–16)
AST SERPL-CCNC: 16 U/L (ref 0–31)
BASOPHILS ABSOLUTE: 0.02 E9/L (ref 0–0.2)
BASOPHILS RELATIVE PERCENT: 1 % (ref 0–2)
BILIRUB SERPL-MCNC: 0.4 MG/DL (ref 0–1.2)
BUN BLDV-MCNC: 19 MG/DL (ref 8–23)
CALCIUM SERPL-MCNC: 9.3 MG/DL (ref 8.6–10.2)
CHLORIDE BLD-SCNC: 101 MMOL/L (ref 98–107)
CO2: 25 MMOL/L (ref 22–29)
CREAT SERPL-MCNC: 1 MG/DL (ref 0.5–1)
EOSINOPHILS ABSOLUTE: 0.18 E9/L (ref 0.05–0.5)
EOSINOPHILS RELATIVE PERCENT: 12 % (ref 0–6)
GFR AFRICAN AMERICAN: >60
GFR NON-AFRICAN AMERICAN: 55 ML/MIN/1.73
GLUCOSE BLD-MCNC: 120 MG/DL (ref 74–109)
HCT VFR BLD CALC: 36.9 % (ref 34–48)
HEMOGLOBIN: 12.6 G/DL (ref 11.5–15.5)
LYMPHOCYTES ABSOLUTE: 0.44 E9/L (ref 1.5–4)
LYMPHOCYTES RELATIVE PERCENT: 29 % (ref 20–42)
MCH RBC QN AUTO: 31.8 PG (ref 26–35)
MCHC RBC AUTO-ENTMCNC: 34.1 % (ref 32–34.5)
MCV RBC AUTO: 93.2 FL (ref 80–99.9)
MONOCYTES ABSOLUTE: 0.7 E9/L (ref 0.1–0.95)
MONOCYTES RELATIVE PERCENT: 47 % (ref 2–12)
NEUTROPHILS ABSOLUTE: 0.17 E9/L (ref 1.8–7.3)
NEUTROPHILS RELATIVE PERCENT: 11 % (ref 43–80)
PDW BLD-RTO: 12.9 FL (ref 11.5–15)
PLATELET # BLD: 164 E9/L (ref 130–450)
PMV BLD AUTO: 8.7 FL (ref 7–12)
POTASSIUM SERPL-SCNC: 3.9 MMOL/L (ref 3.5–5)
RBC # BLD: 3.96 E12/L (ref 3.5–5.5)
RBC # BLD: NORMAL 10*6/UL
SODIUM BLD-SCNC: 139 MMOL/L (ref 132–146)
TOTAL PROTEIN: 6.6 G/DL (ref 6.4–8.3)
WBC # BLD: 1.5 E9/L (ref 4.5–11.5)

## 2018-05-23 PROCEDURE — 6360000002 HC RX W HCPCS: Performed by: INTERNAL MEDICINE

## 2018-05-23 PROCEDURE — 85025 COMPLETE CBC W/AUTO DIFF WBC: CPT

## 2018-05-23 PROCEDURE — 96372 THER/PROPH/DIAG INJ SC/IM: CPT

## 2018-05-23 PROCEDURE — 80053 COMPREHEN METABOLIC PANEL: CPT

## 2018-05-23 PROCEDURE — 99214 OFFICE O/P EST MOD 30 MIN: CPT | Performed by: INTERNAL MEDICINE

## 2018-05-23 RX ORDER — SODIUM CHLORIDE 0.9 % (FLUSH) 0.9 %
10 SYRINGE (ML) INJECTION PRN
Status: CANCELLED | OUTPATIENT
Start: 2018-05-23

## 2018-05-23 RX ORDER — HEPARIN SODIUM (PORCINE) LOCK FLUSH IV SOLN 100 UNIT/ML 100 UNIT/ML
500 SOLUTION INTRAVENOUS PRN
Status: CANCELLED | OUTPATIENT
Start: 2018-05-23

## 2018-05-23 RX ADMIN — TBO-FILGRASTIM 480 MCG: 480 INJECTION, SOLUTION SUBCUTANEOUS at 13:54

## 2018-05-24 ENCOUNTER — HOSPITAL ENCOUNTER (OUTPATIENT)
Dept: INFUSION THERAPY | Age: 73
Discharge: HOME | End: 2018-05-24
Payer: COMMERCIAL

## 2018-05-24 DIAGNOSIS — C82.13 FOLLICULAR LYMPHOMA GRADE II OF INTRA-ABDOMINAL LYMPH NODES (HCC): ICD-10-CM

## 2018-05-24 PROCEDURE — 77334 RADIATION TREATMENT AID(S): CPT

## 2018-05-24 PROCEDURE — 96372 THER/PROPH/DIAG INJ SC/IM: CPT

## 2018-05-24 PROCEDURE — 77290 THER RAD SIMULAJ FIELD CPLX: CPT

## 2018-05-24 PROCEDURE — 6360000002 HC RX W HCPCS: Performed by: INTERNAL MEDICINE

## 2018-05-24 RX ORDER — SODIUM CHLORIDE 0.9 % (FLUSH) 0.9 %
10 SYRINGE (ML) INJECTION PRN
Status: CANCELLED | OUTPATIENT
Start: 2018-05-24

## 2018-05-24 RX ORDER — HEPARIN SODIUM (PORCINE) LOCK FLUSH IV SOLN 100 UNIT/ML 100 UNIT/ML
500 SOLUTION INTRAVENOUS PRN
Status: CANCELLED | OUTPATIENT
Start: 2018-05-24

## 2018-05-24 RX ADMIN — TBO-FILGRASTIM 480 MCG: 480 INJECTION, SOLUTION SUBCUTANEOUS at 13:34

## 2018-05-25 ENCOUNTER — HOSPITAL ENCOUNTER (OUTPATIENT)
Dept: INFUSION THERAPY | Age: 73
Discharge: HOME | End: 2018-05-25
Payer: COMMERCIAL

## 2018-05-25 DIAGNOSIS — C82.13 FOLLICULAR LYMPHOMA GRADE II OF INTRA-ABDOMINAL LYMPH NODES (HCC): ICD-10-CM

## 2018-05-25 PROCEDURE — 77300 RADIATION THERAPY DOSE PLAN: CPT

## 2018-05-25 PROCEDURE — 96372 THER/PROPH/DIAG INJ SC/IM: CPT

## 2018-05-25 PROCEDURE — 77295 3-D RADIOTHERAPY PLAN: CPT

## 2018-05-25 PROCEDURE — 77334 RADIATION TREATMENT AID(S): CPT

## 2018-05-25 PROCEDURE — 6360000002 HC RX W HCPCS: Performed by: INTERNAL MEDICINE

## 2018-05-25 RX ORDER — HEPARIN SODIUM (PORCINE) LOCK FLUSH IV SOLN 100 UNIT/ML 100 UNIT/ML
500 SOLUTION INTRAVENOUS PRN
Status: CANCELLED | OUTPATIENT
Start: 2018-05-25

## 2018-05-25 RX ORDER — SODIUM CHLORIDE 0.9 % (FLUSH) 0.9 %
10 SYRINGE (ML) INJECTION PRN
Status: CANCELLED | OUTPATIENT
Start: 2018-05-25

## 2018-05-25 RX ADMIN — TBO-FILGRASTIM 480 MCG: 480 INJECTION, SOLUTION SUBCUTANEOUS at 13:50

## 2018-05-31 PROCEDURE — 77417 THER RADIOLOGY PORT IMAGE(S): CPT

## 2018-05-31 PROCEDURE — 77412 RADIATION TX DELIVERY LVL 3: CPT

## 2018-06-01 ENCOUNTER — HOSPITAL ENCOUNTER (OUTPATIENT)
Dept: RADIATION ONCOLOGY | Age: 73
Discharge: HOME | End: 2018-06-01
Payer: COMMERCIAL

## 2018-06-01 VITALS — DIASTOLIC BLOOD PRESSURE: 65 MMHG | SYSTOLIC BLOOD PRESSURE: 132 MMHG | BODY MASS INDEX: 33.42 KG/M2 | WEIGHT: 219.8 LBS

## 2018-06-01 DIAGNOSIS — C50.911 MALIGNANT NEOPLASM OF RIGHT BREAST, STAGE 1, ESTROGEN RECEPTOR POSITIVE (HCC): Primary | ICD-10-CM

## 2018-06-01 DIAGNOSIS — Z17.0 MALIGNANT NEOPLASM OF RIGHT BREAST, STAGE 1, ESTROGEN RECEPTOR POSITIVE (HCC): Primary | ICD-10-CM

## 2018-06-01 PROCEDURE — 99999 PR OFFICE/OUTPT VISIT,PROCEDURE ONLY: CPT | Performed by: RADIOLOGY

## 2018-06-01 PROCEDURE — 77412 RADIATION TX DELIVERY LVL 3: CPT

## 2018-06-01 RX ORDER — PHENOL 1.4 %
1 AEROSOL, SPRAY (ML) MUCOUS MEMBRANE DAILY
COMMUNITY
End: 2019-09-30 | Stop reason: ALTCHOICE

## 2018-06-04 PROCEDURE — 77412 RADIATION TX DELIVERY LVL 3: CPT

## 2018-06-05 ENCOUNTER — TELEPHONE (OUTPATIENT)
Dept: ONCOLOGY | Age: 73
End: 2018-06-05

## 2018-06-05 ENCOUNTER — HOSPITAL ENCOUNTER (OUTPATIENT)
Dept: RADIATION ONCOLOGY | Age: 73
Discharge: HOME | End: 2018-06-05
Payer: COMMERCIAL

## 2018-06-05 VITALS — DIASTOLIC BLOOD PRESSURE: 78 MMHG | BODY MASS INDEX: 33.6 KG/M2 | WEIGHT: 221 LBS | SYSTOLIC BLOOD PRESSURE: 142 MMHG

## 2018-06-05 DIAGNOSIS — C50.911 MALIGNANT NEOPLASM OF RIGHT BREAST, STAGE 1, ESTROGEN RECEPTOR POSITIVE (HCC): Primary | ICD-10-CM

## 2018-06-05 DIAGNOSIS — Z17.0 MALIGNANT NEOPLASM OF RIGHT BREAST, STAGE 1, ESTROGEN RECEPTOR POSITIVE (HCC): Primary | ICD-10-CM

## 2018-06-05 PROCEDURE — 77412 RADIATION TX DELIVERY LVL 3: CPT

## 2018-06-05 PROCEDURE — 99999 PR OFFICE/OUTPT VISIT,PROCEDURE ONLY: CPT | Performed by: RADIOLOGY

## 2018-06-06 ENCOUNTER — HOSPITAL ENCOUNTER (OUTPATIENT)
Dept: INFUSION THERAPY | Age: 73
Discharge: HOME | End: 2018-06-06
Payer: COMMERCIAL

## 2018-06-06 ENCOUNTER — OFFICE VISIT (OUTPATIENT)
Dept: ONCOLOGY | Age: 73
End: 2018-06-06
Payer: COMMERCIAL

## 2018-06-06 VITALS
WEIGHT: 221 LBS | HEART RATE: 70 BPM | TEMPERATURE: 97.5 F | HEIGHT: 69 IN | BODY MASS INDEX: 32.73 KG/M2 | DIASTOLIC BLOOD PRESSURE: 65 MMHG | RESPIRATION RATE: 18 BRPM | SYSTOLIC BLOOD PRESSURE: 147 MMHG

## 2018-06-06 VITALS — HEART RATE: 60 BPM | DIASTOLIC BLOOD PRESSURE: 62 MMHG | SYSTOLIC BLOOD PRESSURE: 142 MMHG | RESPIRATION RATE: 20 BRPM

## 2018-06-06 DIAGNOSIS — C82.09 GRADE I FOLLICULAR LYMPHOMA OF EXTRANODAL SITE EXCLUDING SPLEEN AND OTHER SOLID ORGANS (HCC): ICD-10-CM

## 2018-06-06 DIAGNOSIS — Z17.0 STAGE 1 BREAST CANCER, ER+, RIGHT (HCC): ICD-10-CM

## 2018-06-06 DIAGNOSIS — C50.911 STAGE 1 BREAST CANCER, ER+, RIGHT (HCC): ICD-10-CM

## 2018-06-06 DIAGNOSIS — C82.13 FOLLICULAR LYMPHOMA GRADE II OF INTRA-ABDOMINAL LYMPH NODES (HCC): Primary | ICD-10-CM

## 2018-06-06 DIAGNOSIS — C82.13 FOLLICULAR LYMPHOMA GRADE II OF INTRA-ABDOMINAL LYMPH NODES (HCC): ICD-10-CM

## 2018-06-06 LAB
ALBUMIN SERPL-MCNC: 4 G/DL (ref 3.5–5.2)
ALP BLD-CCNC: 97 U/L (ref 35–104)
ALT SERPL-CCNC: 15 U/L (ref 0–32)
ANION GAP SERPL CALCULATED.3IONS-SCNC: 13 MMOL/L (ref 7–16)
AST SERPL-CCNC: 15 U/L (ref 0–31)
BASOPHILS ABSOLUTE: 0.05 E9/L (ref 0–0.2)
BASOPHILS RELATIVE PERCENT: 1 % (ref 0–2)
BILIRUB SERPL-MCNC: 0.4 MG/DL (ref 0–1.2)
BUN BLDV-MCNC: 19 MG/DL (ref 8–23)
CALCIUM SERPL-MCNC: 9.5 MG/DL (ref 8.6–10.2)
CHLORIDE BLD-SCNC: 101 MMOL/L (ref 98–107)
CO2: 25 MMOL/L (ref 22–29)
CREAT SERPL-MCNC: 0.8 MG/DL (ref 0.5–1)
EOSINOPHILS ABSOLUTE: 0.16 E9/L (ref 0.05–0.5)
EOSINOPHILS RELATIVE PERCENT: 3.5 % (ref 0–6)
GFR AFRICAN AMERICAN: >60
GFR NON-AFRICAN AMERICAN: >60 ML/MIN/1.73
GLUCOSE BLD-MCNC: 126 MG/DL (ref 74–109)
HCT VFR BLD CALC: 38.6 % (ref 34–48)
HEMOGLOBIN: 12.9 G/DL (ref 11.5–15.5)
LYMPHOCYTES ABSOLUTE: 0.52 E9/L (ref 1.5–4)
LYMPHOCYTES RELATIVE PERCENT: 10.5 % (ref 20–42)
MCH RBC QN AUTO: 32.1 PG (ref 26–35)
MCHC RBC AUTO-ENTMCNC: 33.4 % (ref 32–34.5)
MCV RBC AUTO: 96 FL (ref 80–99.9)
MONOCYTES ABSOLUTE: 0.56 E9/L (ref 0.1–0.95)
MONOCYTES RELATIVE PERCENT: 12 % (ref 2–12)
NEUTROPHILS ABSOLUTE: 3.43 E9/L (ref 1.8–7.3)
NEUTROPHILS RELATIVE PERCENT: 73 % (ref 43–80)
PDW BLD-RTO: 13.3 FL (ref 11.5–15)
PLATELET # BLD: 225 E9/L (ref 130–450)
PMV BLD AUTO: 9.3 FL (ref 7–12)
POTASSIUM SERPL-SCNC: 4.8 MMOL/L (ref 3.5–5)
RBC # BLD: 4.02 E12/L (ref 3.5–5.5)
RBC # BLD: NORMAL 10*6/UL
SODIUM BLD-SCNC: 139 MMOL/L (ref 132–146)
TOTAL PROTEIN: 6.7 G/DL (ref 6.4–8.3)
WBC # BLD: 4.7 E9/L (ref 4.5–11.5)

## 2018-06-06 PROCEDURE — 36415 COLL VENOUS BLD VENIPUNCTURE: CPT | Performed by: INTERNAL MEDICINE

## 2018-06-06 PROCEDURE — 6360000002 HC RX W HCPCS: Performed by: INTERNAL MEDICINE

## 2018-06-06 PROCEDURE — 80053 COMPREHEN METABOLIC PANEL: CPT

## 2018-06-06 PROCEDURE — 77412 RADIATION TX DELIVERY LVL 3: CPT

## 2018-06-06 PROCEDURE — 96401 CHEMO ANTI-NEOPL SQ/IM: CPT

## 2018-06-06 PROCEDURE — 85025 COMPLETE CBC W/AUTO DIFF WBC: CPT

## 2018-06-06 PROCEDURE — C9467 INJ RITUXIMAB HYALURONIDASE: HCPCS | Performed by: INTERNAL MEDICINE

## 2018-06-06 PROCEDURE — 77336 RADIATION PHYSICS CONSULT: CPT

## 2018-06-06 RX ORDER — SODIUM CHLORIDE 0.9 % (FLUSH) 0.9 %
10 SYRINGE (ML) INJECTION PRN
Status: CANCELLED | OUTPATIENT
Start: 2018-06-06

## 2018-06-06 RX ORDER — SODIUM CHLORIDE 9 MG/ML
INJECTION, SOLUTION INTRAVENOUS CONTINUOUS
Status: CANCELLED | OUTPATIENT
Start: 2018-06-06

## 2018-06-06 RX ORDER — ACETAMINOPHEN 325 MG/1
650 TABLET ORAL ONCE
Status: CANCELLED | OUTPATIENT
Start: 2018-06-06

## 2018-06-06 RX ORDER — DIPHENHYDRAMINE HYDROCHLORIDE 50 MG/ML
50 INJECTION INTRAMUSCULAR; INTRAVENOUS ONCE
Status: CANCELLED | OUTPATIENT
Start: 2018-06-06 | End: 2018-06-06

## 2018-06-06 RX ORDER — HEPARIN SODIUM (PORCINE) LOCK FLUSH IV SOLN 100 UNIT/ML 100 UNIT/ML
500 SOLUTION INTRAVENOUS PRN
Status: CANCELLED | OUTPATIENT
Start: 2018-06-06

## 2018-06-06 RX ORDER — DIPHENHYDRAMINE HCL 25 MG
25 TABLET ORAL ONCE
Status: CANCELLED
Start: 2018-06-06 | End: 2018-06-06

## 2018-06-06 RX ORDER — METHYLPREDNISOLONE SODIUM SUCCINATE 125 MG/2ML
125 INJECTION, POWDER, LYOPHILIZED, FOR SOLUTION INTRAMUSCULAR; INTRAVENOUS ONCE
Status: CANCELLED | OUTPATIENT
Start: 2018-06-06 | End: 2018-06-06

## 2018-06-06 RX ORDER — 0.9 % SODIUM CHLORIDE 0.9 %
10 VIAL (ML) INJECTION ONCE
Status: CANCELLED | OUTPATIENT
Start: 2018-06-06 | End: 2018-06-06

## 2018-06-06 RX ORDER — EPINEPHRINE 1 MG/ML
0.3 INJECTION, SOLUTION, CONCENTRATE INTRAVENOUS PRN
Status: CANCELLED | OUTPATIENT
Start: 2018-06-06

## 2018-06-06 RX ADMIN — RITUXIMAB AND HYALURONIDASE 11.7 ML: 120; 2000 INJECTION, SOLUTION SUBCUTANEOUS at 14:00

## 2018-06-06 NOTE — PROGRESS NOTES
PORT FLUSH    Patient presents to clinic for [facility] today. Single  SQ port accessed per policy using #50, 1/5\" Burrell needle for good blood return. Site flushed easily with 10 mL NSS followed by 5 mL Heparin solution 100 units/ml rinse prior to de-access. Dry sterile dressing to area. Tolerated procedure well. Encouraged to schedule port flush every 4 weeks.

## 2018-06-07 ENCOUNTER — HOSPITAL ENCOUNTER (OUTPATIENT)
Dept: INFUSION THERAPY | Age: 73
Discharge: HOME | End: 2018-06-07
Payer: COMMERCIAL

## 2018-06-07 DIAGNOSIS — C82.09 GRADE I FOLLICULAR LYMPHOMA OF EXTRANODAL SITE EXCLUDING SPLEEN AND OTHER SOLID ORGANS (HCC): ICD-10-CM

## 2018-06-07 PROCEDURE — 77412 RADIATION TX DELIVERY LVL 3: CPT

## 2018-06-07 PROCEDURE — 96372 THER/PROPH/DIAG INJ SC/IM: CPT

## 2018-06-07 PROCEDURE — 6360000002 HC RX W HCPCS: Performed by: INTERNAL MEDICINE

## 2018-06-07 PROCEDURE — 77417 THER RADIOLOGY PORT IMAGE(S): CPT

## 2018-06-07 RX ADMIN — PEGFILGRASTIM 6 MG: 6 INJECTION SUBCUTANEOUS at 14:09

## 2018-06-08 PROCEDURE — 77412 RADIATION TX DELIVERY LVL 3: CPT

## 2018-06-11 PROCEDURE — 77412 RADIATION TX DELIVERY LVL 3: CPT

## 2018-06-12 ENCOUNTER — HOSPITAL ENCOUNTER (OUTPATIENT)
Dept: RADIATION ONCOLOGY | Age: 73
Discharge: HOME | End: 2018-06-12
Payer: COMMERCIAL

## 2018-06-12 VITALS — BODY MASS INDEX: 32.99 KG/M2 | DIASTOLIC BLOOD PRESSURE: 71 MMHG | SYSTOLIC BLOOD PRESSURE: 142 MMHG | WEIGHT: 220.2 LBS

## 2018-06-12 DIAGNOSIS — Z17.0 MALIGNANT NEOPLASM OF RIGHT BREAST, STAGE 1, ESTROGEN RECEPTOR POSITIVE (HCC): Primary | ICD-10-CM

## 2018-06-12 DIAGNOSIS — C50.911 MALIGNANT NEOPLASM OF RIGHT BREAST, STAGE 1, ESTROGEN RECEPTOR POSITIVE (HCC): Primary | ICD-10-CM

## 2018-06-12 PROCEDURE — 77412 RADIATION TX DELIVERY LVL 3: CPT

## 2018-06-12 PROCEDURE — 99999 PR OFFICE/OUTPT VISIT,PROCEDURE ONLY: CPT | Performed by: RADIOLOGY

## 2018-06-13 PROCEDURE — 77412 RADIATION TX DELIVERY LVL 3: CPT

## 2018-06-13 PROCEDURE — 77336 RADIATION PHYSICS CONSULT: CPT

## 2018-06-14 PROCEDURE — 77412 RADIATION TX DELIVERY LVL 3: CPT

## 2018-06-14 PROCEDURE — 77417 THER RADIOLOGY PORT IMAGE(S): CPT

## 2018-06-15 PROCEDURE — 77412 RADIATION TX DELIVERY LVL 3: CPT

## 2018-06-18 ENCOUNTER — TELEPHONE (OUTPATIENT)
Dept: RADIATION ONCOLOGY | Age: 73
End: 2018-06-18

## 2018-06-18 PROCEDURE — 77412 RADIATION TX DELIVERY LVL 3: CPT

## 2018-06-19 ENCOUNTER — HOSPITAL ENCOUNTER (OUTPATIENT)
Dept: RADIATION ONCOLOGY | Age: 73
Discharge: HOME | End: 2018-06-19
Payer: COMMERCIAL

## 2018-06-19 VITALS — BODY MASS INDEX: 33.17 KG/M2 | DIASTOLIC BLOOD PRESSURE: 70 MMHG | SYSTOLIC BLOOD PRESSURE: 110 MMHG | WEIGHT: 221.4 LBS

## 2018-06-19 DIAGNOSIS — Z17.0 MALIGNANT NEOPLASM OF RIGHT BREAST, STAGE 1, ESTROGEN RECEPTOR POSITIVE (HCC): Primary | ICD-10-CM

## 2018-06-19 DIAGNOSIS — C50.911 MALIGNANT NEOPLASM OF RIGHT BREAST, STAGE 1, ESTROGEN RECEPTOR POSITIVE (HCC): Primary | ICD-10-CM

## 2018-06-19 PROCEDURE — 99999 PR OFFICE/OUTPT VISIT,PROCEDURE ONLY: CPT | Performed by: RADIOLOGY

## 2018-06-19 PROCEDURE — 77412 RADIATION TX DELIVERY LVL 3: CPT

## 2018-06-20 PROCEDURE — 77336 RADIATION PHYSICS CONSULT: CPT

## 2018-06-20 PROCEDURE — 77412 RADIATION TX DELIVERY LVL 3: CPT

## 2018-06-21 PROCEDURE — 77412 RADIATION TX DELIVERY LVL 3: CPT

## 2018-07-19 ENCOUNTER — HOSPITAL ENCOUNTER (OUTPATIENT)
Dept: RADIATION ONCOLOGY | Age: 73
Discharge: HOME | End: 2018-07-19
Payer: COMMERCIAL

## 2018-07-19 ENCOUNTER — HOSPITAL ENCOUNTER (OUTPATIENT)
Dept: INFUSION THERAPY | Age: 73
Discharge: HOME | End: 2018-07-19
Payer: COMMERCIAL

## 2018-07-19 VITALS
HEART RATE: 60 BPM | WEIGHT: 218.8 LBS | SYSTOLIC BLOOD PRESSURE: 128 MMHG | BODY MASS INDEX: 32.78 KG/M2 | TEMPERATURE: 97.6 F | RESPIRATION RATE: 18 BRPM | DIASTOLIC BLOOD PRESSURE: 68 MMHG

## 2018-07-19 DIAGNOSIS — C50.911 MALIGNANT NEOPLASM OF RIGHT BREAST, STAGE 1, ESTROGEN RECEPTOR POSITIVE (HCC): Primary | ICD-10-CM

## 2018-07-19 DIAGNOSIS — Z17.0 MALIGNANT NEOPLASM OF RIGHT BREAST, STAGE 1, ESTROGEN RECEPTOR POSITIVE (HCC): Primary | ICD-10-CM

## 2018-07-19 DIAGNOSIS — C82.09 GRADE I FOLLICULAR LYMPHOMA OF EXTRANODAL SITE EXCLUDING SPLEEN AND OTHER SOLID ORGANS (HCC): ICD-10-CM

## 2018-07-19 PROCEDURE — 2580000003 HC RX 258: Performed by: INTERNAL MEDICINE

## 2018-07-19 PROCEDURE — 99999 PR OFFICE/OUTPT VISIT,PROCEDURE ONLY: CPT | Performed by: NURSE PRACTITIONER

## 2018-07-19 PROCEDURE — 96523 IRRIG DRUG DELIVERY DEVICE: CPT

## 2018-07-19 PROCEDURE — 6360000002 HC RX W HCPCS: Performed by: INTERNAL MEDICINE

## 2018-07-19 PROCEDURE — 99211 OFF/OP EST MAY X REQ PHY/QHP: CPT | Performed by: NURSE PRACTITIONER

## 2018-07-19 RX ORDER — HEPARIN SODIUM (PORCINE) LOCK FLUSH IV SOLN 100 UNIT/ML 100 UNIT/ML
500 SOLUTION INTRAVENOUS PRN
Status: DISCONTINUED | OUTPATIENT
Start: 2018-07-19 | End: 2018-07-20 | Stop reason: HOSPADM

## 2018-07-19 RX ORDER — SODIUM CHLORIDE 0.9 % (FLUSH) 0.9 %
10 SYRINGE (ML) INJECTION PRN
Status: DISCONTINUED | OUTPATIENT
Start: 2018-07-19 | End: 2018-07-20 | Stop reason: HOSPADM

## 2018-07-19 RX ORDER — SODIUM CHLORIDE 0.9 % (FLUSH) 0.9 %
10 SYRINGE (ML) INJECTION PRN
Status: CANCELLED | OUTPATIENT
Start: 2018-07-19

## 2018-07-19 RX ORDER — HEPARIN SODIUM (PORCINE) LOCK FLUSH IV SOLN 100 UNIT/ML 100 UNIT/ML
500 SOLUTION INTRAVENOUS PRN
Status: CANCELLED | OUTPATIENT
Start: 2018-07-19

## 2018-07-19 RX ADMIN — Medication 10 ML: at 13:43

## 2018-07-19 RX ADMIN — Medication 500 UNITS: at 13:43

## 2018-07-19 NOTE — PROGRESS NOTES
Priscilla Even  7/19/2018  3:17 PM      Vitals:    07/19/18 1516   BP: 128/68   Pulse: 60   Resp: 18   Temp: 97.6 °F (36.4 °C)    : Wt Readings from Last 1 Encounters:   07/19/18 218 lb 12.8 oz (99.2 kg)                Current Outpatient Prescriptions:     calcium carbonate 600 MG TABS tablet, Take 1 tablet by mouth daily, Disp: , Rfl:     vitamin D (CHOLECALCIFEROL) 1000 UNIT TABS tablet, Take 800 Units by mouth 2 times daily , Disp: , Rfl:     acyclovir (ZOVIRAX) 400 MG tablet, Take 400 mg by mouth 2 times daily, Disp: , Rfl:     anastrozole (ARIMIDEX) 1 MG tablet, Take 1 tablet by mouth daily, Disp: 90 tablet, Rfl: 1    B Complex Vitamins (VITAMIN B COMPLEX) TABS, Take by mouth daily, Disp: , Rfl:     aspirin 81 MG tablet, Take 81 mg by mouth daily, Disp: , Rfl:     amLODIPine (NORVASC) 5 MG tablet, Take 5 mg by mouth daily, Disp: , Rfl:     pioglitazone (ACTOS) 15 MG tablet, Take 15 mg by mouth nightly, Disp: , Rfl:     atorvastatin (LIPITOR) 40 MG tablet, Take 40 mg by mouth nightly, Disp: , Rfl:     levothyroxine (SYNTHROID) 50 MCG tablet, Take 50 mcg by mouth Daily, Disp: , Rfl:     metFORMIN (GLUCOPHAGE-XR) 500 MG extended release tablet, Take 500 mg by mouth daily (with breakfast), Disp: , Rfl:   No current facility-administered medications for this encounter. Facility-Administered Medications Ordered in Other Encounters:     sodium chloride flush 0.9 % injection 10 mL, 10 mL, Intercatheter, PRN, Val Raymond MD, 10 mL at 07/19/18 1343    heparin flush 100 UNIT/ML injection 500 Units, 500 Units, Intercatheter, PRN, Val Raymond MD, 500 Units at 07/19/18 1343      Patient is seen today in follow up 1 month for right breast 16/4256 cGy 5/31/18-6/21/18. States she no lasting side effects of radiation treatment. Follows up with Dr. Rene Caldwell for medical oncology. Still receiving treatment of Rituxan/Hycela. Doing well.                FALLS RISK SCREEN  Instructions:  Assess the patient and enter the appropriate indicators that are present for fall risk identification. Total the numbers entered and assign a fall risk score from Table 2.  Reassess patient at a minimum every 12 weeks or with status change. Assessment   Date  7/19/2018   1. Mental Ability: confusion/cognitively impaired 0 (3)   2. Elimination Issues: incontinence, frequency 0 (3)   3. Ambulatory: use of assistive devices (walker, cane, off-loading devices),        attached to equipment (IV pole, oxygen) 0 (2)   4.  Sensory Limitations: dizziness, vertigo, impaired vision 0 (3)   5. Age less than 65                 0 (0)   6. Age 72 or greater 1 (1)   7. Medication: diuretics, strong analgesics, hypnotics, sedatives,        antihypertensive agents 0 (3)   8. Falls:  recent history of falls within the last 3 months (not to include slipping or        tripping) 0 (7)   TOTAL 1  If score of 4 or greater was education given? No         TABLE 2   Risk Score Risk Level Plan of Care   0-3 Little or  No Risk 1. Provide assistance as indicated for ambulation activities  2. Reorient confused/cognitively impaired patient  3. Call-light/bell within patient's reach  4. Chair/bed in low position, stretcher/bed with siderails up except when performing patient care activities  5. Educate patient/family/caregiver on falls prevention  6.  Reassess in 12 weeks or with any noted change in patient condition which places them at a risk for a fall   4-6 Moderate Risk 1. Provide assistance as indicated for ambulation activities  2. Reorient confused/cognitively impaired patient  3. Call-light/bell within patient's reach  4. Chair/bed in low position, stretcher/bed with siderails up except when performing patient care activities  5. Educate patient/family/caregiver on falls prevention  6. Falls risk precaution (Yellow sticker Level II) placed on patient chart   7 or   Higher High Risk 1.   Place patient in easily observable treatment room  2. Patient attended at all times by family member or staff  3. Provide assistance as indicated for ambulation activities  4. Reorient confused/cognitively impaired patient  5. Call-light/bell within patient's reach  6. Chair/bed in low position, stretcher/bed with siderails up except when performing patient care activities  7. Educate patient/family/caregiver on falls prevention  8. Falls risk precaution (Yellow sticker Level III) placed on patient chart       NUTRITION RISK SCREEN    7/19/2018   Patient:  Torito Aaron  Sex:  female    NUTRITION RISK SCREEN  Instructions:  Assess the patient and enter the appropriate indicators that are present for nutrition risk identification. Total the numbers entered and assign a risk score. Follow the appropriate action for total score listed below. Assessment   Date  7/19/2018     1. Poor appetite?--a. One week or greater (1)                       b. One month or greater (2) 0        2. Unplanned wt loss?--a. Greater than 5# in 1 week(1)                                  b. Greater than 10# in 1 month (2)                                  c. Greater than 20# in 6 mos (1) 0        3. Diagnosis of Head or Neck Cancer? (2)   0    4. If yes, difficulty swallowing? (1) 0        5. Receiving nutrition via feeding tube or parenteral nutrition? (1) 0        6. If yes, report of problems with feeding tube? (1) 0      TOTAL 0         Score of 0-1: No action  Score 2 or greater:  1. For Non-Diabetic Patient: Recommend adding Ensure Complete 2xdaily and provide              patient with Ensure wellness bag with coupons; For Diabetic Patient, Recommend adding Glucerna Shake 2xdaily and provide patient with Glucerna Wellness bag with coupons  2.  Route to the dietitian via [address]

## 2018-07-19 NOTE — PROGRESS NOTES
PORT FLUSH    Patient presents to clinic for [facility] today. Single   SQ port accessed per policy using #08, 0/3\" Burrell needle for good blood return. Site flushed easily with 10 mL NSS followed by 5 mL Heparin solution 100 units/ml rinse prior to de-access. Dry sterile dressing to area. Tolerated procedure well. Encouraged to schedule port flush every 4 weeks.

## 2018-07-19 NOTE — PROGRESS NOTES
Met with patient during her follow up appointment with Jennifer Almendarez NP today. Patient completed her active treatment in June 2018 for her Stage 1B right breast cancer. States that she is doing well. Reports that her appetite is good and she is sleeping well. Upon inquiring, states that she is doing well with the Arimidex medication daily. Denies hot flashes or joint pain side effects. She is taking her calcium and vitamin d daily. Provided support and encouragement. Instructed patient in detail on her Cancer Treatment Summary and Survivorship Care Plan. Provided written copies of both. Aware that copies will be sent to her PCP as well. Provided written copies of Cancer Survivorship:  A Guide for Patients and Their Families from Patient Resource and Local Resources for Cancer Survivors. Patient aware of mammogram annually. She had her mediport flushed and her next [facility] treatment with Dr. Esmer Gar is scheduled for 8/1/2018. Instructed patient to call with any questions or concerns. Verbally agreed. Patient appreciative of visit and information. GIA Cochran, RN, OCN  Oncology Nurse Navigator

## 2018-07-19 NOTE — PROGRESS NOTES
Examination:   Vitals:    07/19/18 1516   BP: 128/68   Pulse: 60   Resp: 18   Temp: 97.6 °F (36.4 °C)       Wt Readings from Last 3 Encounters:   07/19/18 218 lb 12.8 oz (99.2 kg)   06/19/18 221 lb 6.4 oz (100.4 kg)   06/12/18 220 lb 3.2 oz (99.9 kg)       Alert and fully ambulatory. Pleasant and conversant. Irradiated skin shows mild hyperpigmentation. HR reg, rhythm reg. Lungs CTA. ASSESSMENT/PLAN:     Patient is doing well post-radiation completion. Skin care and sun care reviewed. Encouraged monthly self breast exams. Pt is following with Dr Zee Alvarez for medical oncology. States that she is doing well on Arimidex. Imaging per med onc. Next appt 8/1/18 with Dr Zee Alvarez. I discussed follow up plans with Adria Farley. At this time, I will see the patient back in 3 months for a post-radiation completion follow-up visit. Adria Farley is to follow up with other providers involved in their care as directed (including but not limited to Medical Oncology, Primary Care, Pulmonary, and Surgery). The patient was given our contact number in the event that if at any time they change their mind and would like to return to the clinic to see either myself or one of the Radiation Oncologists, they can simply call us and we would be happy to see them sooner. Thank you for involving us in the management of this extremely pleasant patient. More than 15 min was in direct contact with pt coordinating/giving care. >50% of the visit was spent in counseling the pt on the following: Follow up care    The nurses notes were reviewed and incorporated into this assessment and plan. Questions answered to apparent satisfaction.       Dorothy Nava, MSN, RN, APRN-CNP  Nurse Practitioner for Radiation Oncology    PHYSICIANS [facility]) [facility]: [phone] ([facility]: [phone])  [facility]) [facility]:  [phone] ([place]:  [phone])  [address] [place] [address]: [phone] (FAX:

## 2018-08-01 ENCOUNTER — OFFICE VISIT (OUTPATIENT)
Dept: ONCOLOGY | Age: 73
End: 2018-08-01
Payer: COMMERCIAL

## 2018-08-01 ENCOUNTER — HOSPITAL ENCOUNTER (OUTPATIENT)
Dept: INFUSION THERAPY | Age: 73
Discharge: HOME | End: 2018-08-01
Payer: COMMERCIAL

## 2018-08-01 VITALS
RESPIRATION RATE: 20 BRPM | TEMPERATURE: 97.5 F | WEIGHT: 221.9 LBS | SYSTOLIC BLOOD PRESSURE: 112 MMHG | DIASTOLIC BLOOD PRESSURE: 76 MMHG | BODY MASS INDEX: 32.87 KG/M2 | HEIGHT: 69 IN | HEART RATE: 70 BPM

## 2018-08-01 DIAGNOSIS — C82.13 FOLLICULAR LYMPHOMA GRADE II OF INTRA-ABDOMINAL LYMPH NODES (HCC): Primary | ICD-10-CM

## 2018-08-01 DIAGNOSIS — C82.13 FOLLICULAR LYMPHOMA GRADE II OF INTRA-ABDOMINAL LYMPH NODES (HCC): ICD-10-CM

## 2018-08-01 DIAGNOSIS — Z17.0 STAGE 1 BREAST CANCER, ER+, RIGHT (HCC): ICD-10-CM

## 2018-08-01 DIAGNOSIS — C50.911 STAGE 1 BREAST CANCER, ER+, RIGHT (HCC): ICD-10-CM

## 2018-08-01 DIAGNOSIS — C82.09 GRADE I FOLLICULAR LYMPHOMA OF EXTRANODAL SITE EXCLUDING SPLEEN AND OTHER SOLID ORGANS (HCC): ICD-10-CM

## 2018-08-01 LAB
ALBUMIN SERPL-MCNC: 4.1 G/DL (ref 3.5–5.2)
ALP BLD-CCNC: 83 U/L (ref 35–104)
ALT SERPL-CCNC: 11 U/L (ref 0–32)
ANION GAP SERPL CALCULATED.3IONS-SCNC: 11 MMOL/L (ref 7–16)
AST SERPL-CCNC: 14 U/L (ref 0–31)
BASOPHILS ABSOLUTE: 0.05 E9/L (ref 0–0.2)
BASOPHILS RELATIVE PERCENT: 0.9 % (ref 0–2)
BILIRUB SERPL-MCNC: 0.5 MG/DL (ref 0–1.2)
BUN BLDV-MCNC: 24 MG/DL (ref 8–23)
CALCIUM SERPL-MCNC: 9.7 MG/DL (ref 8.6–10.2)
CHLORIDE BLD-SCNC: 101 MMOL/L (ref 98–107)
CO2: 25 MMOL/L (ref 22–29)
CREAT SERPL-MCNC: 1 MG/DL (ref 0.5–1)
EOSINOPHILS ABSOLUTE: 0.1 E9/L (ref 0.05–0.5)
EOSINOPHILS RELATIVE PERCENT: 1.8 % (ref 0–6)
GFR AFRICAN AMERICAN: >60
GFR NON-AFRICAN AMERICAN: 55 ML/MIN/1.73
GLUCOSE BLD-MCNC: 131 MG/DL (ref 74–109)
HCT VFR BLD CALC: 37.5 % (ref 34–48)
HEMOGLOBIN: 13 G/DL (ref 11.5–15.5)
LACTATE DEHYDROGENASE: 173 U/L (ref 135–214)
LYMPHOCYTES ABSOLUTE: 0.43 E9/L (ref 1.5–4)
LYMPHOCYTES RELATIVE PERCENT: 8 % (ref 20–42)
MCH RBC QN AUTO: 32.7 PG (ref 26–35)
MCHC RBC AUTO-ENTMCNC: 34.7 % (ref 32–34.5)
MCV RBC AUTO: 94.2 FL (ref 80–99.9)
MONOCYTES ABSOLUTE: 0.43 E9/L (ref 0.1–0.95)
MONOCYTES RELATIVE PERCENT: 8 % (ref 2–12)
NEUTROPHILS ABSOLUTE: 4.37 E9/L (ref 1.8–7.3)
NEUTROPHILS RELATIVE PERCENT: 81.4 % (ref 43–80)
PDW BLD-RTO: 13.7 FL (ref 11.5–15)
PLATELET # BLD: 177 E9/L (ref 130–450)
PMV BLD AUTO: 9.1 FL (ref 7–12)
POTASSIUM SERPL-SCNC: 4.3 MMOL/L (ref 3.5–5)
RBC # BLD: 3.98 E12/L (ref 3.5–5.5)
RBC # BLD: NORMAL 10*6/UL
SODIUM BLD-SCNC: 137 MMOL/L (ref 132–146)
TOTAL PROTEIN: 6.7 G/DL (ref 6.4–8.3)
WBC # BLD: 5.4 E9/L (ref 4.5–11.5)

## 2018-08-01 PROCEDURE — 6370000000 HC RX 637 (ALT 250 FOR IP): Performed by: INTERNAL MEDICINE

## 2018-08-01 PROCEDURE — 80053 COMPREHEN METABOLIC PANEL: CPT

## 2018-08-01 PROCEDURE — 96377 APPLICATON ON-BODY INJECTOR: CPT

## 2018-08-01 PROCEDURE — 96401 CHEMO ANTI-NEOPL SQ/IM: CPT

## 2018-08-01 PROCEDURE — 6360000002 HC RX W HCPCS: Performed by: INTERNAL MEDICINE

## 2018-08-01 PROCEDURE — 83615 LACTATE (LD) (LDH) ENZYME: CPT

## 2018-08-01 PROCEDURE — C9467 INJ RITUXIMAB HYALURONIDASE: HCPCS | Performed by: INTERNAL MEDICINE

## 2018-08-01 PROCEDURE — 85025 COMPLETE CBC W/AUTO DIFF WBC: CPT

## 2018-08-01 RX ORDER — HEPARIN SODIUM (PORCINE) LOCK FLUSH IV SOLN 100 UNIT/ML 100 UNIT/ML
500 SOLUTION INTRAVENOUS PRN
Status: CANCELLED | OUTPATIENT
Start: 2018-08-01

## 2018-08-01 RX ORDER — ACYCLOVIR 400 MG/1
400 TABLET ORAL 2 TIMES DAILY
Qty: 180 TABLET | Refills: 0 | Status: SHIPPED | OUTPATIENT
Start: 2018-08-01 | End: 2018-10-25 | Stop reason: SDUPTHER

## 2018-08-01 RX ORDER — SODIUM CHLORIDE 9 MG/ML
INJECTION, SOLUTION INTRAVENOUS CONTINUOUS
Status: CANCELLED | OUTPATIENT
Start: 2018-08-01

## 2018-08-01 RX ORDER — SODIUM CHLORIDE 0.9 % (FLUSH) 0.9 %
10 SYRINGE (ML) INJECTION PRN
Status: CANCELLED | OUTPATIENT
Start: 2018-08-01

## 2018-08-01 RX ORDER — ACETAMINOPHEN 325 MG/1
650 TABLET ORAL ONCE
Status: COMPLETED | OUTPATIENT
Start: 2018-08-01 | End: 2018-08-01

## 2018-08-01 RX ORDER — METHYLPREDNISOLONE SODIUM SUCCINATE 125 MG/2ML
125 INJECTION, POWDER, LYOPHILIZED, FOR SOLUTION INTRAMUSCULAR; INTRAVENOUS ONCE
Status: CANCELLED | OUTPATIENT
Start: 2018-08-01 | End: 2018-08-01

## 2018-08-01 RX ORDER — DIPHENHYDRAMINE HYDROCHLORIDE 50 MG/ML
50 INJECTION INTRAMUSCULAR; INTRAVENOUS ONCE
Status: CANCELLED | OUTPATIENT
Start: 2018-08-01 | End: 2018-08-01

## 2018-08-01 RX ORDER — ACETAMINOPHEN 325 MG/1
650 TABLET ORAL ONCE
Status: CANCELLED | OUTPATIENT
Start: 2018-08-01

## 2018-08-01 RX ORDER — DIPHENHYDRAMINE HCL 25 MG
25 TABLET ORAL ONCE
Status: CANCELLED
Start: 2018-08-01 | End: 2018-08-01

## 2018-08-01 RX ORDER — DIPHENHYDRAMINE HCL 25 MG
25 TABLET ORAL ONCE
Status: COMPLETED | OUTPATIENT
Start: 2018-08-01 | End: 2018-08-01

## 2018-08-01 RX ORDER — 0.9 % SODIUM CHLORIDE 0.9 %
10 VIAL (ML) INJECTION ONCE
Status: CANCELLED | OUTPATIENT
Start: 2018-08-01 | End: 2018-08-01

## 2018-08-01 RX ADMIN — ACETAMINOPHEN 650 MG: 325 TABLET ORAL at 13:23

## 2018-08-01 RX ADMIN — DIPHENHYDRAMINE HCL 25 MG: 25 TABLET ORAL at 13:23

## 2018-08-01 RX ADMIN — RITUXIMAB AND HYALURONIDASE 11.7 ML: 120; 2000 INJECTION, SOLUTION SUBCUTANEOUS at 14:25

## 2018-08-01 RX ADMIN — PEGFILGRASTIM 6 MG: KIT SUBCUTANEOUS at 14:35

## 2018-08-01 ASSESSMENT — PAIN SCALES - GENERAL: PAINLEVEL_OUTOF10: 0

## 2018-08-01 NOTE — PROGRESS NOTES
MD   B Complex Vitamins (VITAMIN B COMPLEX) TABS Take by mouth daily    Historical Provider, MD   aspirin 81 MG tablet Take 81 mg by mouth daily    Historical Provider, MD   amLODIPine (NORVASC) 5 MG tablet Take 5 mg by mouth daily    Historical Provider, MD   pioglitazone (ACTOS) 15 MG tablet Take 15 mg by mouth nightly    Historical Provider, MD   atorvastatin (LIPITOR) 40 MG tablet Take 40 mg by mouth nightly    Historical Provider, MD   levothyroxine (SYNTHROID) 50 MCG tablet Take 50 mcg by mouth Daily    Historical Provider, MD   metFORMIN (GLUCOPHAGE-XR) 500 MG extended release tablet Take 500 mg by mouth daily (with breakfast)    Historical Provider, MD    Scheduled Meds:  Continuous Infusions:  PRN Meds:.        Recent Laboratory Data-     Lab Results   Component Value Date    WBC 5.4 08/01/2018    HGB 13.0 08/01/2018    HCT 37.5 08/01/2018    MCV 94.2 08/01/2018     08/01/2018    LYMPHOPCT 10.5 (L) 06/06/2018    RBC 3.98 08/01/2018    MCH 32.7 08/01/2018    MCHC 34.7 (H) 08/01/2018    RDW 13.7 08/01/2018    NEUTOPHILPCT 73.0 06/06/2018    MONOPCT 12.0 06/06/2018    BASOPCT 1.0 06/06/2018    NEUTROABS 3.43 06/06/2018    LYMPHSABS 0.52 (L) 06/06/2018    MONOSABS 0.56 06/06/2018    EOSABS 0.16 06/06/2018    BASOSABS 0.05 06/06/2018       Lab Results   Component Value Date     06/06/2018    K 4.8 06/06/2018     06/06/2018    CO2 25 06/06/2018    BUN 19 06/06/2018    CREATININE 0.8 06/06/2018    GLUCOSE 126 (H) 06/06/2018    CALCIUM 9.5 06/06/2018    PROT 6.7 06/06/2018    LABALBU 4.0 06/06/2018    BILITOT 0.4 06/06/2018    ALKPHOS 97 06/06/2018    AST 15 06/06/2018    ALT 15 06/06/2018    LABGLOM >60 06/06/2018    GFRAA >60 06/06/2018           Radiology-  No results found.         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 All potential side effects of chemotherapy were discussed  She tolerated cycle 1 of chemotherapy very well and had no evidence of tumor lysis syndrome        She completed Cycle #4 of Rituxan and Bendeka on 3/15/17       She was restaged 3/23/17 with a follow-up PET CT scan which showed essentially complete remission and resolution of her diffuse bulky lymphadenopathy as well as resolution of bilateral hydronephrosis. There was question of an ill-defined lesion at the level of the introitus and vagina but she has no GYN symptomatology whatsoever and she will follow with her gynecologist      She was then recommended maintenance Rituxan for 2 years.  Axel Keene  Received her 1st dose of maintenance Riruxan on 5/17/2017      The fact that her absolute lymphocyte count remains very low she will be maintained on VZV prophylaxis with acyclovir 400 mg twice a day.      Her spironolactone was discontinued because of her renal insufficiency         Her PET/CT scan was again unremarkable without any intrathoracic abnormality and no evidence of recurrent lymphadenopathy         2- Postmenopausal right  breast cancer stage I , T1c N1mi M0 with positive ER and DC receptors and nonamplified HER-2/kylah by fish. One out of 4 sentinel nodes had micrometastasis measuring only 2 mm  Clear surgical margins were achieved     It was explained to her that the mainstay of adjuvant therapy at her age would be an aromatase inhibitor.   Oncotype DX or mammaprint will

## 2018-08-01 NOTE — PROGRESS NOTES
PORT FLUSH    Patient presents to clinic for [facility] today. single  SQ port accessed per policy using 31N, . 75 inch Burrell needle for good blood return. Aspirate for waste and specimen sent to lab. Site flushed easily with 10 mL NSS followed by 5 mL Heparin solution 100 units/ml rinse prior to de-access. Dry sterile dressing to area. Tolerated procedure well. Encouraged to schedule port flush every 4 weeks.

## 2018-09-26 ENCOUNTER — OFFICE VISIT (OUTPATIENT)
Dept: ONCOLOGY | Age: 73
End: 2018-09-26
Payer: COMMERCIAL

## 2018-09-26 ENCOUNTER — HOSPITAL ENCOUNTER (OUTPATIENT)
Dept: INFUSION THERAPY | Age: 73
Discharge: HOME | End: 2018-09-26
Payer: COMMERCIAL

## 2018-09-26 VITALS
BODY MASS INDEX: 32.58 KG/M2 | TEMPERATURE: 97.2 F | WEIGHT: 220 LBS | RESPIRATION RATE: 20 BRPM | HEART RATE: 60 BPM | SYSTOLIC BLOOD PRESSURE: 124 MMHG | DIASTOLIC BLOOD PRESSURE: 62 MMHG | HEIGHT: 69 IN

## 2018-09-26 DIAGNOSIS — C50.911 STAGE 1 BREAST CANCER, ER+, RIGHT (HCC): ICD-10-CM

## 2018-09-26 DIAGNOSIS — Z17.0 STAGE 1 BREAST CANCER, ER+, RIGHT (HCC): ICD-10-CM

## 2018-09-26 DIAGNOSIS — C82.13 FOLLICULAR LYMPHOMA GRADE II OF INTRA-ABDOMINAL LYMPH NODES (HCC): Primary | ICD-10-CM

## 2018-09-26 DIAGNOSIS — C82.09 GRADE I FOLLICULAR LYMPHOMA OF EXTRANODAL SITE EXCLUDING SPLEEN AND OTHER SOLID ORGANS (HCC): ICD-10-CM

## 2018-09-26 DIAGNOSIS — C82.13 FOLLICULAR LYMPHOMA GRADE II OF INTRA-ABDOMINAL LYMPH NODES (HCC): ICD-10-CM

## 2018-09-26 LAB
ALBUMIN SERPL-MCNC: 4 G/DL (ref 3.5–5.2)
ALP BLD-CCNC: 83 U/L (ref 35–104)
ALT SERPL-CCNC: 10 U/L (ref 0–32)
ANION GAP SERPL CALCULATED.3IONS-SCNC: 12 MMOL/L (ref 7–16)
AST SERPL-CCNC: 12 U/L (ref 0–31)
BASOPHILS ABSOLUTE: 0.06 E9/L (ref 0–0.2)
BASOPHILS RELATIVE PERCENT: 0.9 % (ref 0–2)
BILIRUB SERPL-MCNC: 0.4 MG/DL (ref 0–1.2)
BUN BLDV-MCNC: 21 MG/DL (ref 8–23)
CALCIUM SERPL-MCNC: 9.8 MG/DL (ref 8.6–10.2)
CHLORIDE BLD-SCNC: 102 MMOL/L (ref 98–107)
CO2: 27 MMOL/L (ref 22–29)
CREAT SERPL-MCNC: 1.1 MG/DL (ref 0.5–1)
EOSINOPHILS ABSOLUTE: 0.17 E9/L (ref 0.05–0.5)
EOSINOPHILS RELATIVE PERCENT: 2.6 % (ref 0–6)
GFR AFRICAN AMERICAN: 59
GFR NON-AFRICAN AMERICAN: 49 ML/MIN/1.73
GLUCOSE BLD-MCNC: 116 MG/DL (ref 74–109)
HCT VFR BLD CALC: 36.8 % (ref 34–48)
HEMOGLOBIN: 12.5 G/DL (ref 11.5–15.5)
LACTATE DEHYDROGENASE: 162 U/L (ref 135–214)
LYMPHOCYTES ABSOLUTE: 0.47 E9/L (ref 1.5–4)
LYMPHOCYTES RELATIVE PERCENT: 7 % (ref 20–42)
MCH RBC QN AUTO: 32.1 PG (ref 26–35)
MCHC RBC AUTO-ENTMCNC: 34 % (ref 32–34.5)
MCV RBC AUTO: 94.6 FL (ref 80–99.9)
MONOCYTES ABSOLUTE: 0.67 E9/L (ref 0.1–0.95)
MONOCYTES RELATIVE PERCENT: 9.6 % (ref 2–12)
NEUTROPHILS ABSOLUTE: 5.36 E9/L (ref 1.8–7.3)
NEUTROPHILS RELATIVE PERCENT: 80 % (ref 43–80)
PDW BLD-RTO: 13 FL (ref 11.5–15)
PLATELET # BLD: 201 E9/L (ref 130–450)
PMV BLD AUTO: 9.1 FL (ref 7–12)
POTASSIUM SERPL-SCNC: 4 MMOL/L (ref 3.5–5)
RBC # BLD: 3.89 E12/L (ref 3.5–5.5)
RBC # BLD: NORMAL 10*6/UL
SODIUM BLD-SCNC: 141 MMOL/L (ref 132–146)
TOTAL PROTEIN: 6.7 G/DL (ref 6.4–8.3)
WBC # BLD: 6.7 E9/L (ref 4.5–11.5)

## 2018-09-26 PROCEDURE — 83615 LACTATE (LD) (LDH) ENZYME: CPT

## 2018-09-26 PROCEDURE — 6360000002 HC RX W HCPCS: Performed by: INTERNAL MEDICINE

## 2018-09-26 PROCEDURE — 96377 APPLICATON ON-BODY INJECTOR: CPT

## 2018-09-26 PROCEDURE — C9467 INJ RITUXIMAB HYALURONIDASE: HCPCS | Performed by: INTERNAL MEDICINE

## 2018-09-26 PROCEDURE — 80053 COMPREHEN METABOLIC PANEL: CPT

## 2018-09-26 PROCEDURE — 96401 CHEMO ANTI-NEOPL SQ/IM: CPT

## 2018-09-26 PROCEDURE — 85025 COMPLETE CBC W/AUTO DIFF WBC: CPT

## 2018-09-26 RX ORDER — METHYLPREDNISOLONE SODIUM SUCCINATE 125 MG/2ML
125 INJECTION, POWDER, LYOPHILIZED, FOR SOLUTION INTRAMUSCULAR; INTRAVENOUS ONCE
Status: CANCELLED | OUTPATIENT
Start: 2018-09-26 | End: 2018-09-26

## 2018-09-26 RX ORDER — 0.9 % SODIUM CHLORIDE 0.9 %
10 VIAL (ML) INJECTION ONCE
Status: CANCELLED | OUTPATIENT
Start: 2018-09-26 | End: 2018-09-26

## 2018-09-26 RX ORDER — DIPHENHYDRAMINE HCL 25 MG
25 TABLET ORAL ONCE
Status: CANCELLED
Start: 2018-09-26 | End: 2018-09-26

## 2018-09-26 RX ORDER — EPINEPHRINE 1 MG/ML
0.3 INJECTION, SOLUTION, CONCENTRATE INTRAVENOUS PRN
Status: CANCELLED | OUTPATIENT
Start: 2018-09-26

## 2018-09-26 RX ORDER — HEPARIN SODIUM (PORCINE) LOCK FLUSH IV SOLN 100 UNIT/ML 100 UNIT/ML
500 SOLUTION INTRAVENOUS PRN
Status: CANCELLED | OUTPATIENT
Start: 2018-09-26

## 2018-09-26 RX ORDER — SODIUM CHLORIDE 9 MG/ML
INJECTION, SOLUTION INTRAVENOUS CONTINUOUS
Status: CANCELLED | OUTPATIENT
Start: 2018-09-26

## 2018-09-26 RX ORDER — ACETAMINOPHEN 325 MG/1
650 TABLET ORAL ONCE
Status: CANCELLED | OUTPATIENT
Start: 2018-09-26

## 2018-09-26 RX ORDER — DIPHENHYDRAMINE HYDROCHLORIDE 50 MG/ML
50 INJECTION INTRAMUSCULAR; INTRAVENOUS ONCE
Status: CANCELLED | OUTPATIENT
Start: 2018-09-26 | End: 2018-09-26

## 2018-09-26 RX ORDER — SODIUM CHLORIDE 0.9 % (FLUSH) 0.9 %
10 SYRINGE (ML) INJECTION PRN
Status: CANCELLED | OUTPATIENT
Start: 2018-09-26

## 2018-09-26 RX ADMIN — RITUXIMAB AND HYALURONIDASE 11.7 ML: 120; 2000 INJECTION, SOLUTION SUBCUTANEOUS at 14:25

## 2018-09-26 RX ADMIN — PEGFILGRASTIM 6 MG: KIT SUBCUTANEOUS at 14:22

## 2018-09-26 NOTE — PROGRESS NOTES
[address] [facility]. [facility] [place]        Pt Name: Varun Ratliff: 1945  Date of evaluation: 2018  Primary Care Physician: Varun Ratliff MD  Reason for evaluation:   Chief Complaint   Patient presents with    Lymphoma    Follow-up        Subjective:  Here for  Maintenace Rituxan/Hycela and follow up.  Feels well today. She denies any constitutional B symptoms. OBJECTIVE:  VITALS:  height is 5' 8.5\" (1.74 m) and weight is 220 lb (99.8 kg). Her temporal temperature is 97.2 °F (36.2 °C). Her blood pressure is 124/62 and her pulse is 60. Her respiration is 20. Physical Exam:  Performance Status: 0  Well developed, well nourished female  Head and neck : PERRLA, EOMI . Sclera non icteric. Oropharynx : Clear  Neck: no JVD, bilateral cervical lymphadenopathy markedly decreased in size  Heart: Regular rate and regular rhythm  Lungs: Clear to auscultation   Abdomen: Soft, non-tender;no masses, no organomegaly  Extremities: No edema,no cyanosis, no clubbing. Neurologic:Cranial nerves grossly intact. Very mild weakness in the right upper and right lower extremity  Breasts:  Medi-port Left Subclavian      Medications  Prior to Admission medications    Medication Sig Start Date End Date Taking?  Authorizing Provider   acyclovir (ZOVIRAX) 400 MG tablet Take 1 tablet by mouth 2 times daily 8/1/18 10/30/18 Yes Rowena Schaumann, APRN - CNP   calcium carbonate 600 MG TABS tablet Take 1 tablet by mouth daily   Yes Historical Provider, MD   vitamin D (CHOLECALCIFEROL) 1000 UNIT TABS tablet Take 800 Units by mouth 2 times daily    Yes Historical Provider, MD   acyclovir (ZOVIRAX) 400 MG tablet Take 400 mg by mouth 2 times daily   Yes Historical Provider, MD   anastrozole (ARIMIDEX) 1 MG tablet Take 1 tablet by mouth daily 18  Yes Rafaela Goltz, MD   B Complex Vitamins (VITAMIN B COMPLEX) TABS Take by mouth daily   Yes Historical Provider, MD   aspirin 81 MG

## 2018-10-24 ENCOUNTER — TELEPHONE (OUTPATIENT)
Dept: RADIATION ONCOLOGY | Age: 73
End: 2018-10-24

## 2018-10-25 ENCOUNTER — HOSPITAL ENCOUNTER (OUTPATIENT)
Dept: RADIATION ONCOLOGY | Age: 73
Discharge: HOME | End: 2018-10-25
Payer: COMMERCIAL

## 2018-10-25 ENCOUNTER — HOSPITAL ENCOUNTER (OUTPATIENT)
Dept: INFUSION THERAPY | Age: 73
Discharge: HOME | End: 2018-10-25
Payer: COMMERCIAL

## 2018-10-25 VITALS
SYSTOLIC BLOOD PRESSURE: 118 MMHG | HEART RATE: 60 BPM | RESPIRATION RATE: 18 BRPM | DIASTOLIC BLOOD PRESSURE: 72 MMHG | TEMPERATURE: 97.9 F | WEIGHT: 219 LBS | BODY MASS INDEX: 32.81 KG/M2

## 2018-10-25 DIAGNOSIS — C82.13 FOLLICULAR LYMPHOMA GRADE II OF INTRA-ABDOMINAL LYMPH NODES (HCC): ICD-10-CM

## 2018-10-25 DIAGNOSIS — Z17.0 MALIGNANT NEOPLASM OF RIGHT BREAST, STAGE 1, ESTROGEN RECEPTOR POSITIVE (HCC): Primary | ICD-10-CM

## 2018-10-25 DIAGNOSIS — Z17.0 STAGE 1 BREAST CANCER, ER+, RIGHT (HCC): ICD-10-CM

## 2018-10-25 DIAGNOSIS — C82.09 GRADE I FOLLICULAR LYMPHOMA OF EXTRANODAL SITE EXCLUDING SPLEEN AND OTHER SOLID ORGANS (HCC): ICD-10-CM

## 2018-10-25 DIAGNOSIS — C50.911 STAGE 1 BREAST CANCER, ER+, RIGHT (HCC): ICD-10-CM

## 2018-10-25 DIAGNOSIS — C50.911 MALIGNANT NEOPLASM OF RIGHT BREAST, STAGE 1, ESTROGEN RECEPTOR POSITIVE (HCC): Primary | ICD-10-CM

## 2018-10-25 PROCEDURE — 2580000003 HC RX 258: Performed by: INTERNAL MEDICINE

## 2018-10-25 PROCEDURE — 99202 OFFICE O/P NEW SF 15 MIN: CPT

## 2018-10-25 PROCEDURE — 6360000002 HC RX W HCPCS: Performed by: INTERNAL MEDICINE

## 2018-10-25 PROCEDURE — 96523 IRRIG DRUG DELIVERY DEVICE: CPT

## 2018-10-25 PROCEDURE — 99212 OFFICE O/P EST SF 10 MIN: CPT

## 2018-10-25 PROCEDURE — 99212 OFFICE O/P EST SF 10 MIN: CPT | Performed by: NURSE PRACTITIONER

## 2018-10-25 RX ORDER — HEPARIN SODIUM (PORCINE) LOCK FLUSH IV SOLN 100 UNIT/ML 100 UNIT/ML
500 SOLUTION INTRAVENOUS PRN
Status: DISCONTINUED | OUTPATIENT
Start: 2018-10-25 | End: 2018-10-26 | Stop reason: HOSPADM

## 2018-10-25 RX ORDER — SODIUM CHLORIDE 0.9 % (FLUSH) 0.9 %
10 SYRINGE (ML) INJECTION PRN
Status: DISCONTINUED | OUTPATIENT
Start: 2018-10-25 | End: 2018-10-26 | Stop reason: HOSPADM

## 2018-10-25 RX ORDER — ACYCLOVIR 400 MG/1
400 TABLET ORAL 2 TIMES DAILY
Qty: 180 TABLET | Refills: 0 | Status: SHIPPED | OUTPATIENT
Start: 2018-10-25 | End: 2019-01-16 | Stop reason: SDUPTHER

## 2018-10-25 RX ORDER — HEPARIN SODIUM (PORCINE) LOCK FLUSH IV SOLN 100 UNIT/ML 100 UNIT/ML
500 SOLUTION INTRAVENOUS PRN
Status: CANCELLED | OUTPATIENT
Start: 2018-10-25

## 2018-10-25 RX ORDER — SODIUM CHLORIDE 0.9 % (FLUSH) 0.9 %
10 SYRINGE (ML) INJECTION PRN
Status: CANCELLED | OUTPATIENT
Start: 2018-10-25

## 2018-10-25 RX ADMIN — Medication 10 ML: at 14:15

## 2018-10-25 RX ADMIN — Medication 500 UNITS: at 14:15

## 2018-10-25 NOTE — PROGRESS NOTES
HYSTERECTOMY      KNEE ARTHROSCOPY      OTHER SURGICAL HISTORY      mediport insertion    OTHER SURGICAL HISTORY  08/12/2017    removal foreign body    UPPER GASTROINTESTINAL ENDOSCOPY           Social History     Social History    Marital status:      Spouse name: N/A    Number of children: 1    Years of education: N/A     Occupational History    Not on file. Social History Main Topics    Smoking status: Former Smoker     Quit date: 12/29/1994    Smokeless tobacco: Never Used    Alcohol use No    Drug use: No    Sexual activity: Not Currently     Partners: Male     Other Topics Concern    Not on file     Social History Narrative    No narrative on file         Family History   Problem Relation Age of Onset    Cancer Brother 39        brain    Cancer Paternal Aunt 72        uterine       Allergies:   Penicillins      Current Outpatient Prescriptions   Medication Sig Dispense Refill    acyclovir (ZOVIRAX) 400 MG tablet Take 1 tablet by mouth 2 times daily 180 tablet 0    calcium carbonate 600 MG TABS tablet Take 1 tablet by mouth daily      acyclovir (ZOVIRAX) 400 MG tablet Take 400 mg by mouth 2 times daily      anastrozole (ARIMIDEX) 1 MG tablet Take 1 tablet by mouth daily 90 tablet 1    B Complex Vitamins (VITAMIN B COMPLEX) TABS Take by mouth daily      aspirin 81 MG tablet Take 81 mg by mouth daily      amLODIPine (NORVASC) 5 MG tablet Take 5 mg by mouth daily      pioglitazone (ACTOS) 15 MG tablet Take 15 mg by mouth nightly      atorvastatin (LIPITOR) 40 MG tablet Take 40 mg by mouth nightly      levothyroxine (SYNTHROID) 50 MCG tablet Take 50 mcg by mouth Daily      metFORMIN (GLUCOPHAGE-XR) 500 MG extended release tablet Take 500 mg by mouth daily (with breakfast)      vitamin D (CHOLECALCIFEROL) 1000 UNIT TABS tablet Take 800 Units by mouth 2 times daily        No current facility-administered medications for this encounter.         REVIEW OF SYSTEMS:      Constitutional:  No fever chills or rigors. Eyes: No changes in vision, discharge, or pain  ENT: No Headaches, hearing loss or vertigo. No mouth sores or sore throat. No change in taste or smell. Cardiovascular: No chest discomfort, dyspnea on exertion, palpitations or loss of consciousness or phlebitis. Respiratory: Has no cough or wheezing, Has no sputum production. Has no hemoptysis, has no pleuritic pain. Gastrointestinal: No abdominal pain, appetite loss, blood in stools. No change in bowel habits. No hematemesis   Genitourinary: Patient acknowledges no dysuria, trouble voiding, or hematuria. No nocturia or increased frequency. Musculoskeletal: No gait disturbance, weakness or joint complaints. Integumentary: No rash or pruritis. Neurological: No headache, diplopia, change in muscle strength, numbness or tingling. No change in gait, balance, coordination, mood, affect, memory, mentation, behavior. Psychiatric: No anxiety, or depression. Endocrine: No temperature intolerance. No excessive thirst, fluid intake, or urination. No tremor. Hematologic/Lymphatic: No abnormal bruising or bleeding, blood clots or swollen lymph nodes. Allergic/Immunologic: No nasal congestion or hives. PHYSICAL EXAMINATION:   Vitals:    10/25/18 1328   BP: 118/72   Pulse: 60   Resp: 18   Temp: 97.9 °F (36.6 °C)   TempSrc: Oral   Weight: 219 lb (99.3 kg)     Constitutional: A well developed, well nourished 72 y.o. female who is alert, oriented, cooperative and in no apparent distress. Head was normocephalic and atraumatic. EENT: EOMI JAMES. MMM. No icterus. External canals are patent and no discharge was appreciated. Septum was midline, mucosa was without erythema, exudates or cobblestoning. No thrush was noted. Neck: Supple without thyromegaly. Trachea was midline. No carotid bruits. No stridor or subglottic wheeze.   Respiratory: Chest expansion was symmetrical. Breath sounds

## 2018-10-25 NOTE — PROGRESS NOTES
PORT FLUSH    Patient presents to clinic for [facility] today. left  SQ port accessed per policy using 20 B,.73 Burrell needle for good blood return. Site flushed easily with 10 mL NSS followed by 5 mL Heparin solution 100 units/ml rinse prior to de-access. Dry sterile dressing to area. Tolerated procedure well. Encouraged to schedule port flush every 4 weeks.

## 2018-10-25 NOTE — PROGRESS NOTES
Call-light/bell within patient's reach  6. Chair/bed in low position, stretcher/bed with siderails up except when performing patient care activities  7. Educate patient/family/caregiver on falls prevention  8. Falls risk precaution (Yellow sticker Level III) placed on patient chart       NUTRITION RISK SCREEN    10/25/2018   Patient:  Vazquez Son  Sex:  female    NUTRITION RISK SCREEN  Instructions:  Assess the patient and enter the appropriate indicators that are present for nutrition risk identification. Total the numbers entered and assign a risk score. Follow the appropriate action for total score listed below. Assessment   Date  10/25/2018     1. Poor appetite?--a. One week or greater (1)                       b. One month or greater (2) 0        2. Unplanned wt loss?--a. Greater than 5# in 1 week(1)                                  b. Greater than 10# in 1 month (2)                                  c. Greater than 20# in 6 mos (1) 0        3. Diagnosis of Head or Neck Cancer? (2)   0    4. If yes, difficulty swallowing? (1) 0        5. Receiving nutrition via feeding tube or parenteral nutrition? (1) 0        6. If yes, report of problems with feeding tube? (1) 0      TOTAL 0         Score of 0-1: No action  Score 2 or greater:  1. For Non-Diabetic Patient: Recommend adding Ensure Complete 2xdaily and provide              patient with Ensure wellness bag with coupons; For Diabetic Patient, Recommend adding Glucerna Shake 2xdaily and provide patient with Glucerna Wellness bag with coupons  2.  Route to the dietitian via [address]

## 2018-11-20 DIAGNOSIS — C82.13 FOLLICULAR LYMPHOMA GRADE II OF INTRA-ABDOMINAL LYMPH NODES (HCC): ICD-10-CM

## 2018-11-20 DIAGNOSIS — C50.911 STAGE 1 BREAST CANCER, ER+, RIGHT (HCC): ICD-10-CM

## 2018-11-20 DIAGNOSIS — Z17.0 STAGE 1 BREAST CANCER, ER+, RIGHT (HCC): ICD-10-CM

## 2018-11-20 RX ORDER — ANASTROZOLE 1 MG/1
1 TABLET ORAL DAILY
Qty: 90 TABLET | Refills: 1 | Status: SHIPPED | OUTPATIENT
Start: 2018-11-20 | End: 2018-11-21 | Stop reason: SDUPTHER

## 2018-11-21 ENCOUNTER — HOSPITAL ENCOUNTER (OUTPATIENT)
Dept: INFUSION THERAPY | Age: 73
Discharge: HOME | End: 2018-11-21
Payer: COMMERCIAL

## 2018-11-21 ENCOUNTER — OFFICE VISIT (OUTPATIENT)
Dept: ONCOLOGY | Age: 73
End: 2018-11-21
Payer: COMMERCIAL

## 2018-11-21 VITALS
WEIGHT: 218.4 LBS | TEMPERATURE: 97.6 F | SYSTOLIC BLOOD PRESSURE: 144 MMHG | BODY MASS INDEX: 33.1 KG/M2 | HEART RATE: 70 BPM | DIASTOLIC BLOOD PRESSURE: 57 MMHG | HEIGHT: 68 IN

## 2018-11-21 DIAGNOSIS — C82.13 FOLLICULAR LYMPHOMA GRADE II OF INTRA-ABDOMINAL LYMPH NODES (HCC): ICD-10-CM

## 2018-11-21 DIAGNOSIS — C50.911 STAGE 1 BREAST CANCER, ER+, RIGHT (HCC): ICD-10-CM

## 2018-11-21 DIAGNOSIS — C82.09 GRADE I FOLLICULAR LYMPHOMA OF EXTRANODAL SITE EXCLUDING SPLEEN AND OTHER SOLID ORGANS (HCC): ICD-10-CM

## 2018-11-21 DIAGNOSIS — Z17.0 STAGE 1 BREAST CANCER, ER+, RIGHT (HCC): ICD-10-CM

## 2018-11-21 LAB
ALBUMIN SERPL-MCNC: 4.3 G/DL (ref 3.5–5.2)
ALP BLD-CCNC: 83 U/L (ref 35–104)
ALT SERPL-CCNC: 11 U/L (ref 0–32)
ANION GAP SERPL CALCULATED.3IONS-SCNC: 12 MMOL/L (ref 7–16)
AST SERPL-CCNC: 14 U/L (ref 0–31)
BASOPHILS ABSOLUTE: 0.02 E9/L (ref 0–0.2)
BASOPHILS RELATIVE PERCENT: 0.3 % (ref 0–2)
BILIRUB SERPL-MCNC: 0.5 MG/DL (ref 0–1.2)
BUN BLDV-MCNC: 22 MG/DL (ref 8–23)
CALCIUM SERPL-MCNC: 9.9 MG/DL (ref 8.6–10.2)
CHLORIDE BLD-SCNC: 100 MMOL/L (ref 98–107)
CO2: 27 MMOL/L (ref 22–29)
CREAT SERPL-MCNC: 0.9 MG/DL (ref 0.5–1)
EOSINOPHILS ABSOLUTE: 0.11 E9/L (ref 0.05–0.5)
EOSINOPHILS RELATIVE PERCENT: 1.7 % (ref 0–6)
GFR AFRICAN AMERICAN: >60
GFR NON-AFRICAN AMERICAN: >60 ML/MIN/1.73
GLUCOSE BLD-MCNC: 121 MG/DL (ref 74–99)
HCT VFR BLD CALC: 39.1 % (ref 34–48)
HEMOGLOBIN: 13.4 G/DL (ref 11.5–15.5)
IMMATURE GRANULOCYTES #: 0.01 E9/L
IMMATURE GRANULOCYTES %: 0.2 % (ref 0–5)
LYMPHOCYTES ABSOLUTE: 0.45 E9/L (ref 1.5–4)
LYMPHOCYTES RELATIVE PERCENT: 6.9 % (ref 20–42)
MCH RBC QN AUTO: 32.7 PG (ref 26–35)
MCHC RBC AUTO-ENTMCNC: 34.3 % (ref 32–34.5)
MCV RBC AUTO: 95.4 FL (ref 80–99.9)
MONOCYTES ABSOLUTE: 0.97 E9/L (ref 0.1–0.95)
MONOCYTES RELATIVE PERCENT: 15 % (ref 2–12)
NEUTROPHILS ABSOLUTE: 4.92 E9/L (ref 1.8–7.3)
NEUTROPHILS RELATIVE PERCENT: 75.9 % (ref 43–80)
PDW BLD-RTO: 13.2 FL (ref 11.5–15)
PLATELET # BLD: 189 E9/L (ref 130–450)
PMV BLD AUTO: 8.7 FL (ref 7–12)
POTASSIUM SERPL-SCNC: 4.5 MMOL/L (ref 3.5–5)
RBC # BLD: 4.1 E12/L (ref 3.5–5.5)
SODIUM BLD-SCNC: 139 MMOL/L (ref 132–146)
TOTAL PROTEIN: 6.9 G/DL (ref 6.4–8.3)
WBC # BLD: 6.5 E9/L (ref 4.5–11.5)

## 2018-11-21 PROCEDURE — C9467 INJ RITUXIMAB HYALURONIDASE: HCPCS | Performed by: INTERNAL MEDICINE

## 2018-11-21 PROCEDURE — 96401 CHEMO ANTI-NEOPL SQ/IM: CPT

## 2018-11-21 PROCEDURE — 85025 COMPLETE CBC W/AUTO DIFF WBC: CPT

## 2018-11-21 PROCEDURE — 96377 APPLICATON ON-BODY INJECTOR: CPT

## 2018-11-21 PROCEDURE — 80053 COMPREHEN METABOLIC PANEL: CPT

## 2018-11-21 PROCEDURE — 6360000002 HC RX W HCPCS: Performed by: INTERNAL MEDICINE

## 2018-11-21 RX ORDER — ACETAMINOPHEN 325 MG/1
650 TABLET ORAL ONCE
Status: CANCELLED | OUTPATIENT
Start: 2018-11-21

## 2018-11-21 RX ORDER — EPINEPHRINE 1 MG/ML
0.3 INJECTION, SOLUTION, CONCENTRATE INTRAVENOUS PRN
Status: CANCELLED | OUTPATIENT
Start: 2018-11-21

## 2018-11-21 RX ORDER — DIPHENHYDRAMINE HCL 25 MG
25 TABLET ORAL ONCE
Status: CANCELLED
Start: 2018-11-21 | End: 2018-11-21

## 2018-11-21 RX ORDER — SODIUM CHLORIDE 9 MG/ML
INJECTION, SOLUTION INTRAVENOUS CONTINUOUS
Status: CANCELLED | OUTPATIENT
Start: 2018-11-21

## 2018-11-21 RX ORDER — 0.9 % SODIUM CHLORIDE 0.9 %
10 VIAL (ML) INJECTION ONCE
Status: CANCELLED | OUTPATIENT
Start: 2018-11-21 | End: 2018-11-21

## 2018-11-21 RX ORDER — ANASTROZOLE 1 MG/1
1 TABLET ORAL DAILY
Qty: 90 TABLET | Refills: 1 | Status: SHIPPED | OUTPATIENT
Start: 2018-11-21 | End: 2019-06-12 | Stop reason: SDUPTHER

## 2018-11-21 RX ORDER — DIPHENHYDRAMINE HYDROCHLORIDE 50 MG/ML
50 INJECTION INTRAMUSCULAR; INTRAVENOUS ONCE
Status: CANCELLED | OUTPATIENT
Start: 2018-11-21 | End: 2018-11-21

## 2018-11-21 RX ORDER — METHYLPREDNISOLONE SODIUM SUCCINATE 125 MG/2ML
125 INJECTION, POWDER, LYOPHILIZED, FOR SOLUTION INTRAMUSCULAR; INTRAVENOUS ONCE
Status: CANCELLED | OUTPATIENT
Start: 2018-11-21 | End: 2018-11-21

## 2018-11-21 RX ORDER — SODIUM CHLORIDE 0.9 % (FLUSH) 0.9 %
10 SYRINGE (ML) INJECTION PRN
Status: CANCELLED | OUTPATIENT
Start: 2018-11-21

## 2018-11-21 RX ORDER — HEPARIN SODIUM (PORCINE) LOCK FLUSH IV SOLN 100 UNIT/ML 100 UNIT/ML
500 SOLUTION INTRAVENOUS PRN
Status: CANCELLED | OUTPATIENT
Start: 2018-11-21

## 2018-11-21 RX ADMIN — RITUXIMAB AND HYALURONIDASE 11.7 ML: 120; 2000 INJECTION, SOLUTION SUBCUTANEOUS at 13:47

## 2018-11-21 RX ADMIN — PEGFILGRASTIM 6 MG: KIT SUBCUTANEOUS at 13:56

## 2018-11-21 NOTE — PROGRESS NOTES
PORT FLUSH    Patient presents to clinic for Labs/Port Flush/OV/Treatment today. Left chest  SQ port accessed per policy using 97U 9.30XRXK Burrell needle for good blood return. Aspirate for waste and specimen sent to lab. Site flushed easily with 10 mL NSS followed by 5 mL Heparin solution 100 units/ml rinse prior to de-access. Dry sterile dressing to area. Tolerated procedure well. Encouraged to schedule port flush every 4 weeks.

## 2019-01-16 ENCOUNTER — TELEPHONE (OUTPATIENT)
Dept: INFUSION THERAPY | Age: 74
End: 2019-01-16

## 2019-01-16 ENCOUNTER — OFFICE VISIT (OUTPATIENT)
Dept: ONCOLOGY | Age: 74
End: 2019-01-16
Payer: COMMERCIAL

## 2019-01-16 ENCOUNTER — HOSPITAL ENCOUNTER (OUTPATIENT)
Dept: INFUSION THERAPY | Age: 74
Discharge: HOME | End: 2019-01-16
Payer: COMMERCIAL

## 2019-01-16 VITALS
HEIGHT: 68 IN | BODY MASS INDEX: 33.49 KG/M2 | SYSTOLIC BLOOD PRESSURE: 124 MMHG | HEART RATE: 60 BPM | TEMPERATURE: 97.1 F | RESPIRATION RATE: 20 BRPM | WEIGHT: 221 LBS | DIASTOLIC BLOOD PRESSURE: 62 MMHG

## 2019-01-16 VITALS
DIASTOLIC BLOOD PRESSURE: 78 MMHG | HEART RATE: 62 BPM | RESPIRATION RATE: 20 BRPM | TEMPERATURE: 97.7 F | SYSTOLIC BLOOD PRESSURE: 130 MMHG

## 2019-01-16 DIAGNOSIS — C82.09 GRADE I FOLLICULAR LYMPHOMA OF EXTRANODAL SITE EXCLUDING SPLEEN AND OTHER SOLID ORGANS (HCC): ICD-10-CM

## 2019-01-16 DIAGNOSIS — C82.13 FOLLICULAR LYMPHOMA GRADE II OF INTRA-ABDOMINAL LYMPH NODES (HCC): ICD-10-CM

## 2019-01-16 DIAGNOSIS — C50.911 STAGE 1 BREAST CANCER, ER+, RIGHT (HCC): ICD-10-CM

## 2019-01-16 DIAGNOSIS — C82.13 FOLLICULAR LYMPHOMA GRADE II OF INTRA-ABDOMINAL LYMPH NODES (HCC): Primary | ICD-10-CM

## 2019-01-16 DIAGNOSIS — Z17.0 STAGE 1 BREAST CANCER, ER+, RIGHT (HCC): ICD-10-CM

## 2019-01-16 LAB
ALBUMIN SERPL-MCNC: 4.3 G/DL (ref 3.5–5.2)
ALP BLD-CCNC: 85 U/L (ref 35–104)
ALT SERPL-CCNC: 10 U/L (ref 0–32)
ANION GAP SERPL CALCULATED.3IONS-SCNC: 12 MMOL/L (ref 7–16)
AST SERPL-CCNC: 13 U/L (ref 0–31)
BASOPHILS ABSOLUTE: 0 E9/L (ref 0–0.2)
BASOPHILS RELATIVE PERCENT: 0.3 % (ref 0–2)
BILIRUB SERPL-MCNC: 0.5 MG/DL (ref 0–1.2)
BUN BLDV-MCNC: 17 MG/DL (ref 8–23)
CALCIUM SERPL-MCNC: 9.7 MG/DL (ref 8.6–10.2)
CHLORIDE BLD-SCNC: 100 MMOL/L (ref 98–107)
CO2: 27 MMOL/L (ref 22–29)
CREAT SERPL-MCNC: 1 MG/DL (ref 0.5–1)
EOSINOPHILS ABSOLUTE: 0.3 E9/L (ref 0.05–0.5)
EOSINOPHILS RELATIVE PERCENT: 4.3 % (ref 0–6)
GFR AFRICAN AMERICAN: >60
GFR NON-AFRICAN AMERICAN: 55 ML/MIN/1.73
GLUCOSE BLD-MCNC: 122 MG/DL (ref 74–99)
HCT VFR BLD CALC: 39.3 % (ref 34–48)
HEMOGLOBIN: 13.3 G/DL (ref 11.5–15.5)
LACTATE DEHYDROGENASE: 156 U/L (ref 135–214)
LYMPHOCYTES ABSOLUTE: 0.35 E9/L (ref 1.5–4)
LYMPHOCYTES RELATIVE PERCENT: 5.2 % (ref 20–42)
MCH RBC QN AUTO: 32.4 PG (ref 26–35)
MCHC RBC AUTO-ENTMCNC: 33.8 % (ref 32–34.5)
MCV RBC AUTO: 95.9 FL (ref 80–99.9)
MONOCYTES ABSOLUTE: 0.48 E9/L (ref 0.1–0.95)
MONOCYTES RELATIVE PERCENT: 7 % (ref 2–12)
NEUTROPHILS ABSOLUTE: 5.8 E9/L (ref 1.8–7.3)
NEUTROPHILS RELATIVE PERCENT: 83.5 % (ref 43–80)
PDW BLD-RTO: 13 FL (ref 11.5–15)
PLATELET # BLD: 209 E9/L (ref 130–450)
PMV BLD AUTO: 8.5 FL (ref 7–12)
POTASSIUM SERPL-SCNC: 4.3 MMOL/L (ref 3.5–5)
RBC # BLD: 4.1 E12/L (ref 3.5–5.5)
RBC # BLD: NORMAL 10*6/UL
SODIUM BLD-SCNC: 139 MMOL/L (ref 132–146)
TOTAL PROTEIN: 7 G/DL (ref 6.4–8.3)
WBC # BLD: 6.9 E9/L (ref 4.5–11.5)

## 2019-01-16 PROCEDURE — 96377 APPLICATON ON-BODY INJECTOR: CPT

## 2019-01-16 PROCEDURE — 96401 CHEMO ANTI-NEOPL SQ/IM: CPT

## 2019-01-16 PROCEDURE — 83615 LACTATE (LD) (LDH) ENZYME: CPT

## 2019-01-16 PROCEDURE — 85025 COMPLETE CBC W/AUTO DIFF WBC: CPT

## 2019-01-16 PROCEDURE — 6360000002 HC RX W HCPCS: Performed by: INTERNAL MEDICINE

## 2019-01-16 PROCEDURE — 80053 COMPREHEN METABOLIC PANEL: CPT

## 2019-01-16 RX ORDER — METHYLPREDNISOLONE SODIUM SUCCINATE 125 MG/2ML
125 INJECTION, POWDER, LYOPHILIZED, FOR SOLUTION INTRAMUSCULAR; INTRAVENOUS ONCE
Status: CANCELLED | OUTPATIENT
Start: 2019-01-16 | End: 2019-01-16

## 2019-01-16 RX ORDER — SODIUM CHLORIDE 9 MG/ML
INJECTION, SOLUTION INTRAVENOUS CONTINUOUS
Status: CANCELLED | OUTPATIENT
Start: 2019-01-16

## 2019-01-16 RX ORDER — ACETAMINOPHEN 325 MG/1
650 TABLET ORAL ONCE
Status: CANCELLED | OUTPATIENT
Start: 2019-01-16

## 2019-01-16 RX ORDER — ACYCLOVIR 400 MG/1
400 TABLET ORAL 2 TIMES DAILY
Qty: 180 TABLET | Refills: 0 | Status: SHIPPED | OUTPATIENT
Start: 2019-01-16 | End: 2019-04-16

## 2019-01-16 RX ORDER — DIPHENHYDRAMINE HCL 25 MG
25 TABLET ORAL ONCE
Status: CANCELLED
Start: 2019-01-16 | End: 2019-01-16

## 2019-01-16 RX ORDER — DIPHENHYDRAMINE HYDROCHLORIDE 50 MG/ML
50 INJECTION INTRAMUSCULAR; INTRAVENOUS ONCE
Status: CANCELLED | OUTPATIENT
Start: 2019-01-16 | End: 2019-01-16

## 2019-01-16 RX ORDER — EPINEPHRINE 1 MG/ML
0.3 INJECTION, SOLUTION, CONCENTRATE INTRAVENOUS PRN
Status: CANCELLED | OUTPATIENT
Start: 2019-01-16

## 2019-01-16 RX ORDER — SODIUM CHLORIDE 0.9 % (FLUSH) 0.9 %
10 SYRINGE (ML) INJECTION PRN
Status: CANCELLED | OUTPATIENT
Start: 2019-01-16

## 2019-01-16 RX ORDER — HEPARIN SODIUM (PORCINE) LOCK FLUSH IV SOLN 100 UNIT/ML 100 UNIT/ML
500 SOLUTION INTRAVENOUS PRN
Status: CANCELLED | OUTPATIENT
Start: 2019-01-16

## 2019-01-16 RX ORDER — 0.9 % SODIUM CHLORIDE 0.9 %
10 VIAL (ML) INJECTION ONCE
Status: CANCELLED | OUTPATIENT
Start: 2019-01-16 | End: 2019-01-16

## 2019-01-16 RX ADMIN — PEGFILGRASTIM 6 MG: KIT SUBCUTANEOUS at 12:42

## 2019-01-16 RX ADMIN — RITUXIMAB AND HYALURONIDASE 11.7 ML: 120; 2000 INJECTION, SOLUTION SUBCUTANEOUS at 12:35

## 2019-03-04 ENCOUNTER — HOSPITAL ENCOUNTER (OUTPATIENT)
Dept: CT IMAGING | Age: 74
Discharge: HOME | End: 2019-03-04
Payer: COMMERCIAL

## 2019-03-04 DIAGNOSIS — Z17.0 STAGE 1 BREAST CANCER, ER+, RIGHT (HCC): ICD-10-CM

## 2019-03-04 DIAGNOSIS — C82.13 FOLLICULAR LYMPHOMA GRADE II OF INTRA-ABDOMINAL LYMPH NODES (HCC): ICD-10-CM

## 2019-03-04 DIAGNOSIS — C50.911 STAGE 1 BREAST CANCER, ER+, RIGHT (HCC): ICD-10-CM

## 2019-03-04 LAB
ALBUMIN SERPL-MCNC: 4.6 G/DL (ref 3.5–5.2)
ALP BLD-CCNC: 103 U/L (ref 35–104)
ALT SERPL-CCNC: 13 U/L (ref 0–32)
ANION GAP SERPL CALCULATED.3IONS-SCNC: 15 MMOL/L (ref 7–16)
AST SERPL-CCNC: 14 U/L (ref 0–31)
BASOPHILS ABSOLUTE: 0.04 E9/L (ref 0–0.2)
BASOPHILS RELATIVE PERCENT: 0.6 % (ref 0–2)
BILIRUB SERPL-MCNC: 0.4 MG/DL (ref 0–1.2)
BUN BLDV-MCNC: 20 MG/DL (ref 8–23)
CALCIUM SERPL-MCNC: 10.2 MG/DL (ref 8.6–10.2)
CHLORIDE BLD-SCNC: 102 MMOL/L (ref 98–107)
CO2: 24 MMOL/L (ref 22–29)
CREAT SERPL-MCNC: 0.9 MG/DL (ref 0.5–1)
EOSINOPHILS ABSOLUTE: 0.27 E9/L (ref 0.05–0.5)
EOSINOPHILS RELATIVE PERCENT: 3.8 % (ref 0–6)
GFR AFRICAN AMERICAN: >60
GFR NON-AFRICAN AMERICAN: >60 ML/MIN/1.73
GLUCOSE BLD-MCNC: 119 MG/DL (ref 74–99)
HCT VFR BLD CALC: 42.1 % (ref 34–48)
HEMOGLOBIN: 14.2 G/DL (ref 11.5–15.5)
IMMATURE GRANULOCYTES #: 0.02 E9/L
IMMATURE GRANULOCYTES %: 0.3 % (ref 0–5)
LYMPHOCYTES ABSOLUTE: 0.7 E9/L (ref 1.5–4)
LYMPHOCYTES RELATIVE PERCENT: 9.9 % (ref 20–42)
MCH RBC QN AUTO: 32.3 PG (ref 26–35)
MCHC RBC AUTO-ENTMCNC: 33.7 % (ref 32–34.5)
MCV RBC AUTO: 95.7 FL (ref 80–99.9)
MONOCYTES ABSOLUTE: 1.1 E9/L (ref 0.1–0.95)
MONOCYTES RELATIVE PERCENT: 15.5 % (ref 2–12)
NEUTROPHILS ABSOLUTE: 4.95 E9/L (ref 1.8–7.3)
NEUTROPHILS RELATIVE PERCENT: 69.9 % (ref 43–80)
PDW BLD-RTO: 13.1 FL (ref 11.5–15)
PLATELET # BLD: 206 E9/L (ref 130–450)
PMV BLD AUTO: 8.7 FL (ref 7–12)
POTASSIUM SERPL-SCNC: 4.6 MMOL/L (ref 3.5–5)
RBC # BLD: 4.4 E12/L (ref 3.5–5.5)
SODIUM BLD-SCNC: 141 MMOL/L (ref 132–146)
TOTAL PROTEIN: 7.4 G/DL (ref 6.4–8.3)
WBC # BLD: 7.1 E9/L (ref 4.5–11.5)

## 2019-03-04 PROCEDURE — 80053 COMPREHEN METABOLIC PANEL: CPT

## 2019-03-04 PROCEDURE — 36415 COLL VENOUS BLD VENIPUNCTURE: CPT

## 2019-03-04 PROCEDURE — 85025 COMPLETE CBC W/AUTO DIFF WBC: CPT

## 2019-03-06 ENCOUNTER — HOSPITAL ENCOUNTER (OUTPATIENT)
Dept: CT IMAGING | Age: 74
Discharge: HOME | End: 2019-03-06
Payer: COMMERCIAL

## 2019-03-06 ENCOUNTER — HOSPITAL ENCOUNTER (OUTPATIENT)
Dept: INFUSION THERAPY | Age: 74
Setting detail: INFUSION SERIES
Discharge: HOME | End: 2019-03-06
Payer: COMMERCIAL

## 2019-03-06 DIAGNOSIS — N18.30 CHRONIC KIDNEY DISEASE (CKD), STAGE III (MODERATE) (HCC): ICD-10-CM

## 2019-03-06 PROCEDURE — 96360 HYDRATION IV INFUSION INIT: CPT

## 2019-03-06 PROCEDURE — 6360000002 HC RX W HCPCS: Performed by: NURSE PRACTITIONER

## 2019-03-06 PROCEDURE — 2580000003 HC RX 258: Performed by: NURSE PRACTITIONER

## 2019-03-06 PROCEDURE — 6360000004 HC RX CONTRAST MEDICATION: Performed by: RADIOLOGY

## 2019-03-06 PROCEDURE — 96361 HYDRATE IV INFUSION ADD-ON: CPT

## 2019-03-06 PROCEDURE — 2580000003 HC RX 258: Performed by: INTERNAL MEDICINE

## 2019-03-06 PROCEDURE — 74177 CT ABD & PELVIS W/CONTRAST: CPT

## 2019-03-06 RX ORDER — HEPARIN SODIUM (PORCINE) LOCK FLUSH IV SOLN 100 UNIT/ML 100 UNIT/ML
500 SOLUTION INTRAVENOUS PRN
Status: DISCONTINUED | OUTPATIENT
Start: 2019-03-06 | End: 2019-03-07 | Stop reason: HOSPADM

## 2019-03-06 RX ORDER — SODIUM CHLORIDE 9 MG/ML
INJECTION, SOLUTION INTRAVENOUS CONTINUOUS
Status: DISCONTINUED | OUTPATIENT
Start: 2019-03-06 | End: 2019-03-07 | Stop reason: HOSPADM

## 2019-03-06 RX ORDER — SODIUM CHLORIDE 0.9 % (FLUSH) 0.9 %
10 SYRINGE (ML) INJECTION PRN
Status: DISCONTINUED | OUTPATIENT
Start: 2019-03-06 | End: 2019-03-07 | Stop reason: HOSPADM

## 2019-03-06 RX ADMIN — SODIUM CHLORIDE: 9 INJECTION, SOLUTION INTRAVENOUS at 08:07

## 2019-03-06 RX ADMIN — Medication 10 ML: at 08:07

## 2019-03-06 RX ADMIN — Medication 10 ML: at 11:11

## 2019-03-06 RX ADMIN — IOPAMIDOL 80 ML: 755 INJECTION, SOLUTION INTRAVENOUS at 09:53

## 2019-03-06 RX ADMIN — Medication 500 UNITS: at 11:11

## 2019-03-13 ENCOUNTER — OFFICE VISIT (OUTPATIENT)
Dept: ONCOLOGY | Age: 74
End: 2019-03-13
Payer: COMMERCIAL

## 2019-03-13 ENCOUNTER — HOSPITAL ENCOUNTER (OUTPATIENT)
Dept: INFUSION THERAPY | Age: 74
Discharge: HOME | End: 2019-03-13
Payer: COMMERCIAL

## 2019-03-13 VITALS
HEIGHT: 68 IN | HEART RATE: 54 BPM | TEMPERATURE: 97.3 F | SYSTOLIC BLOOD PRESSURE: 120 MMHG | DIASTOLIC BLOOD PRESSURE: 70 MMHG | WEIGHT: 222.5 LBS | BODY MASS INDEX: 33.72 KG/M2

## 2019-03-13 DIAGNOSIS — C50.911 STAGE 1 BREAST CANCER, ER+, RIGHT (HCC): ICD-10-CM

## 2019-03-13 DIAGNOSIS — C82.09 GRADE I FOLLICULAR LYMPHOMA OF EXTRANODAL SITE EXCLUDING SPLEEN AND OTHER SOLID ORGANS (HCC): Primary | ICD-10-CM

## 2019-03-13 DIAGNOSIS — C82.13 FOLLICULAR LYMPHOMA GRADE II OF INTRA-ABDOMINAL LYMPH NODES (HCC): Primary | ICD-10-CM

## 2019-03-13 DIAGNOSIS — Z17.0 STAGE 1 BREAST CANCER, ER+, RIGHT (HCC): ICD-10-CM

## 2019-03-13 PROCEDURE — 6360000002 HC RX W HCPCS: Performed by: INTERNAL MEDICINE

## 2019-03-13 PROCEDURE — 96377 APPLICATON ON-BODY INJECTOR: CPT

## 2019-03-13 PROCEDURE — 96401 CHEMO ANTI-NEOPL SQ/IM: CPT

## 2019-03-13 RX ORDER — HEPARIN SODIUM (PORCINE) LOCK FLUSH IV SOLN 100 UNIT/ML 100 UNIT/ML
500 SOLUTION INTRAVENOUS PRN
Status: CANCELLED | OUTPATIENT
Start: 2019-03-13

## 2019-03-13 RX ORDER — 0.9 % SODIUM CHLORIDE 0.9 %
10 VIAL (ML) INJECTION ONCE
Status: CANCELLED | OUTPATIENT
Start: 2019-03-13 | End: 2019-03-13

## 2019-03-13 RX ORDER — SODIUM CHLORIDE 9 MG/ML
INJECTION, SOLUTION INTRAVENOUS CONTINUOUS
Status: CANCELLED | OUTPATIENT
Start: 2019-03-13

## 2019-03-13 RX ORDER — METHYLPREDNISOLONE SODIUM SUCCINATE 125 MG/2ML
125 INJECTION, POWDER, LYOPHILIZED, FOR SOLUTION INTRAMUSCULAR; INTRAVENOUS ONCE
Status: CANCELLED | OUTPATIENT
Start: 2019-03-13 | End: 2019-03-13

## 2019-03-13 RX ORDER — EPINEPHRINE 1 MG/ML
0.3 INJECTION, SOLUTION, CONCENTRATE INTRAVENOUS PRN
Status: CANCELLED | OUTPATIENT
Start: 2019-03-13

## 2019-03-13 RX ORDER — DIPHENHYDRAMINE HCL 25 MG
25 TABLET ORAL ONCE
Status: CANCELLED
Start: 2019-03-13 | End: 2019-03-13

## 2019-03-13 RX ORDER — SODIUM CHLORIDE 0.9 % (FLUSH) 0.9 %
10 SYRINGE (ML) INJECTION PRN
Status: CANCELLED | OUTPATIENT
Start: 2019-03-13

## 2019-03-13 RX ORDER — DIPHENHYDRAMINE HYDROCHLORIDE 50 MG/ML
50 INJECTION INTRAMUSCULAR; INTRAVENOUS ONCE
Status: CANCELLED | OUTPATIENT
Start: 2019-03-13 | End: 2019-03-13

## 2019-03-13 RX ORDER — ACETAMINOPHEN 325 MG/1
650 TABLET ORAL ONCE
Status: CANCELLED | OUTPATIENT
Start: 2019-03-13

## 2019-03-13 RX ADMIN — PEGFILGRASTIM 6 MG: KIT SUBCUTANEOUS at 13:35

## 2019-03-13 RX ADMIN — RITUXIMAB AND HYALURONIDASE 11.7 ML: 120; 2000 INJECTION, SOLUTION SUBCUTANEOUS at 13:27

## 2019-04-11 ENCOUNTER — HOSPITAL ENCOUNTER (OUTPATIENT)
Dept: INFUSION THERAPY | Age: 74
Discharge: HOME | End: 2019-04-11
Payer: COMMERCIAL

## 2019-04-11 DIAGNOSIS — C82.09 GRADE I FOLLICULAR LYMPHOMA OF EXTRANODAL SITE EXCLUDING SPLEEN AND OTHER SOLID ORGANS (HCC): Primary | ICD-10-CM

## 2019-04-11 PROCEDURE — 2580000003 HC RX 258: Performed by: INTERNAL MEDICINE

## 2019-04-11 PROCEDURE — 6360000002 HC RX W HCPCS: Performed by: INTERNAL MEDICINE

## 2019-04-11 PROCEDURE — 96523 IRRIG DRUG DELIVERY DEVICE: CPT

## 2019-04-11 RX ORDER — SODIUM CHLORIDE 0.9 % (FLUSH) 0.9 %
10 SYRINGE (ML) INJECTION PRN
Status: CANCELLED | OUTPATIENT
Start: 2019-04-11

## 2019-04-11 RX ORDER — SODIUM CHLORIDE 0.9 % (FLUSH) 0.9 %
10 SYRINGE (ML) INJECTION PRN
Status: DISCONTINUED | OUTPATIENT
Start: 2019-04-11 | End: 2019-04-12 | Stop reason: HOSPADM

## 2019-04-11 RX ORDER — HEPARIN SODIUM (PORCINE) LOCK FLUSH IV SOLN 100 UNIT/ML 100 UNIT/ML
500 SOLUTION INTRAVENOUS PRN
Status: DISCONTINUED | OUTPATIENT
Start: 2019-04-11 | End: 2019-04-12 | Stop reason: HOSPADM

## 2019-04-11 RX ORDER — HEPARIN SODIUM (PORCINE) LOCK FLUSH IV SOLN 100 UNIT/ML 100 UNIT/ML
500 SOLUTION INTRAVENOUS PRN
Status: CANCELLED | OUTPATIENT
Start: 2019-04-11

## 2019-04-11 RX ADMIN — Medication 500 UNITS: at 14:07

## 2019-04-11 RX ADMIN — Medication 10 ML: at 14:07

## 2019-04-11 NOTE — PROGRESS NOTES
Patient presents to clinic for [facility] today. Left chest single  SQ port accessed per policy using 36M 0.86ABVV Burrell needle for good blood return. Site flushed easily with 10 mL NSS followed by 5 mL Heparin solution 100 units/ml rinse prior to de-access. Dry sterile dressing to area. Tolerated procedure well. Encouraged to schedule port flush every 4 weeks.

## 2019-05-02 ENCOUNTER — HOSPITAL ENCOUNTER (OUTPATIENT)
Dept: INFUSION THERAPY | Age: 74
Discharge: HOME | End: 2019-05-02
Payer: COMMERCIAL

## 2019-05-02 ENCOUNTER — HOSPITAL ENCOUNTER (OUTPATIENT)
Dept: RADIATION ONCOLOGY | Age: 74
Discharge: HOME | End: 2019-05-02
Payer: COMMERCIAL

## 2019-05-02 VITALS
BODY MASS INDEX: 33.63 KG/M2 | TEMPERATURE: 98.4 F | HEART RATE: 77 BPM | DIASTOLIC BLOOD PRESSURE: 71 MMHG | SYSTOLIC BLOOD PRESSURE: 135 MMHG | RESPIRATION RATE: 18 BRPM | WEIGHT: 221.2 LBS

## 2019-05-02 DIAGNOSIS — Z17.0 MALIGNANT NEOPLASM OF RIGHT BREAST, STAGE 1, ESTROGEN RECEPTOR POSITIVE (HCC): Primary | ICD-10-CM

## 2019-05-02 DIAGNOSIS — C50.911 MALIGNANT NEOPLASM OF RIGHT BREAST, STAGE 1, ESTROGEN RECEPTOR POSITIVE (HCC): Primary | ICD-10-CM

## 2019-05-02 DIAGNOSIS — C82.09 GRADE I FOLLICULAR LYMPHOMA OF EXTRANODAL SITE EXCLUDING SPLEEN AND OTHER SOLID ORGANS (HCC): Primary | ICD-10-CM

## 2019-05-02 PROCEDURE — 99213 OFFICE O/P EST LOW 20 MIN: CPT | Performed by: NURSE PRACTITIONER

## 2019-05-02 PROCEDURE — 6360000002 HC RX W HCPCS

## 2019-05-02 PROCEDURE — 99212 OFFICE O/P EST SF 10 MIN: CPT

## 2019-05-02 PROCEDURE — 2580000003 HC RX 258: Performed by: INTERNAL MEDICINE

## 2019-05-02 PROCEDURE — 96523 IRRIG DRUG DELIVERY DEVICE: CPT

## 2019-05-02 RX ORDER — HEPARIN SODIUM (PORCINE) LOCK FLUSH IV SOLN 100 UNIT/ML 100 UNIT/ML
500 SOLUTION INTRAVENOUS PRN
Status: CANCELLED | OUTPATIENT
Start: 2019-05-02

## 2019-05-02 RX ORDER — SODIUM CHLORIDE 0.9 % (FLUSH) 0.9 %
10 SYRINGE (ML) INJECTION PRN
Status: CANCELLED | OUTPATIENT
Start: 2019-05-02

## 2019-05-02 RX ORDER — SODIUM CHLORIDE 0.9 % (FLUSH) 0.9 %
10 SYRINGE (ML) INJECTION PRN
Status: DISCONTINUED | OUTPATIENT
Start: 2019-05-02 | End: 2019-05-03 | Stop reason: HOSPADM

## 2019-05-02 RX ORDER — HEPARIN SODIUM (PORCINE) LOCK FLUSH IV SOLN 100 UNIT/ML 100 UNIT/ML
500 SOLUTION INTRAVENOUS PRN
Status: DISCONTINUED | OUTPATIENT
Start: 2019-05-02 | End: 2019-05-03 | Stop reason: HOSPADM

## 2019-05-02 RX ORDER — HEPARIN SODIUM (PORCINE) LOCK FLUSH IV SOLN 100 UNIT/ML 100 UNIT/ML
SOLUTION INTRAVENOUS
Status: COMPLETED
Start: 2019-05-02 | End: 2019-05-02

## 2019-05-02 RX ADMIN — Medication 500 UNITS: at 13:40

## 2019-05-02 RX ADMIN — Medication 10 ML: at 13:40

## 2019-05-02 RX ADMIN — HEPARIN SODIUM (PORCINE) LOCK FLUSH IV SOLN 100 UNIT/ML 500 UNITS: 100 SOLUTION at 13:40

## 2019-05-02 NOTE — PROGRESS NOTES
Radiation Oncology   Follow Up Note        5/2/2019    David Nicole  Vitals:    05/02/19 1306   BP: 135/71   Pulse: 77   Resp: 18   Temp: 98.4 °F (36.9 °C)     Wt Readings from Last 1 Encounters:   05/02/19 221 lb 3.2 oz (100.3 kg)         Bryant Delgado MD,      CC: Patient is here for follow up for post-radiation completion. HPI:  David Nicole is a pleasant 69 y.o. female with a diagnosis of Invasive carcinoma, mixed, right breast, status post conservative surgery.  T1c N1mic M0-IB. The patient underwent a course of radiation therapy to the right breast, which was completed on 6/21/18. She completed 4256 cGy in 16 fractions    States that she is doing well. Energy is OK and feels it's the same since prior to radiation. She denies any lumps, bumps, or discharge from the nipple.     Pt is following with Dr Becky Shine for medical oncology. States that she is doing well on Arimidex. Denies any hot flashes, new joint aches pain, or mood changes. Next appt 6/12/19. Mammogram completed 1/24/19, see results below. Patient also follows for hx of B-cell lymphoma. Denies B-symptoms today.       Past Medical History:   Diagnosis Date    Cancer [facility])     Cerebral artery occlusion with cerebral infarction ([facility] [address]     22 years  rt arm leg weakness    Chronic kidney disease     Diabetes mellitus ([facility] [address]     Disease of blood and blood forming organ     Diverticulitis     Hyperlipidemia     Hypertension     Hypothyroidism     Lymphoma (HCC)     nonhogkins    Osteoarthritis     Prolonged emergence from general anesthesia     Thyroid disease          Past Surgical History:   Procedure Laterality Date    ABDOMEN SURGERY      BACK SURGERY      neck fusion bone graft    BREAST SURGERY      benign  several    COLONOSCOPY      ENDOSCOPY, COLON, DIAGNOSTIC      FRACTURE SURGERY      elbow radius    HYSTERECTOMY      KNEE ARTHROSCOPY      OTHER SURGICAL HISTORY      mediport insertion    OTHER SURGICAL HISTORY  2017    removal foreign body    UPPER GASTROINTESTINAL ENDOSCOPY           Social History     Socioeconomic History    Marital status:       Spouse name: Not on file    Number of children: 1    Years of education: Not on file    Highest education level: Not on file   Occupational History    Not on file   Social Needs    Financial resource strain: Not on file    Food insecurity:     Worry: Not on file     Inability: Not on file    Transportation needs:     Medical: Not on file     Non-medical: Not on file   Tobacco Use    Smoking status: Former Smoker     Last attempt to quit: 1994     Years since quittin.3    Smokeless tobacco: Never Used   Substance and Sexual Activity    Alcohol use: No    Drug use: No    Sexual activity: Not Currently     Partners: Male   Lifestyle    Physical activity:     Days per week: Not on file     Minutes per session: Not on file    Stress: Not on file   Relationships    Social connections:     Talks on phone: Not on file     Gets together: Not on file     Attends [employer] service: Not on file     Active member of club or organization: Not on file     Attends meetings of clubs or organizations: Not on file     Relationship status: Not on file    Intimate partner violence:     Fear of current or ex partner: Not on file     Emotionally abused: Not on file     Physically abused: Not on file     Forced sexual activity: Not on file   Other Topics Concern    Not on file   Social History Narrative    Not on file         Family History   Problem Relation Age of Onset    Cancer Brother 39        brain    Cancer Paternal Aunt 72        uterine       Allergies:   Penicillins      Current Outpatient Medications   Medication Sig Dispense Refill    Dextromethorphan-Guaifenesin (CORICIDIN HBP CONGESTION/COUGH PO) Take by mouth      anastrozole (ARIMIDEX) 1 MG tablet Take 1 tablet by mouth daily 90 tablet 1    calcium carbonate 600 MG TABS tablet swollen lymph nodes. Allergic/Immunologic: No nasal congestion or hives. PHYSICAL EXAMINATION:   Vitals:    05/02/19 1306   BP: 135/71   Pulse: 77   Resp: 18   Temp: 98.4 °F (36.9 °C)   TempSrc: Oral   Weight: 221 lb 3.2 oz (100.3 kg)     Constitutional: A well developed, well nourished 69 y.o. female who is alert, oriented, cooperative and in no apparent distress. Head was normocephalic and atraumatic. EENT: EOMI JAMES. MMM. No icterus. Neck: Trachea was midline. Respiratory: Chest expansion was symmetrical. Breath sounds bilaterally were clear to auscultation. No wheezes, rhonchi or rales. No intercostal retraction or use of accessory muscles. Cardiovascular: Regular without murmur, clicks, gallops or rubs. Abdomen: Obese, rounded and soft without organomegaly. No rebound, guarding or  rigidity. Lymphatic: No lymphadenopathy. Musculoskeletal: Ambulates without assistance. Normal curvature of the spine. No gross muscle weakness. Muscle size, tone and strength are normal. No involuntary movements. Extremities:  No lower extremity edema, ulcerations, tenderness, varicosities or erythema. Coordination appears adequate. Sensory function appears intact. Skin:  Warm and dry. Examination of color, turgor and pigmentation was relatively normal. No bruises or skin rashes. Old surgical scars are noted. Neurological/Psychiatric: General behavior, level of consciousness, thought content is normal. The patient's emotional status is normal.  Cranial nerves II-XII are grossly intact. Breasts: Left breast normal without mass, skin or nipple changes or axillary nodes. Surgical scars noted to right breast, otherwise normal without mass, skin or nipple changes or axillary nodes. IMAGING:    MAMMOGRAM, 1/24/19:              ASSESSMENT/PLAN:  Patient is doing well post-radiation completion. Encouraged monthly self breast exams. Reviewed symptoms of disease recurrence.  Skin care and sun care reviewed. Cont to follow with Dr Marcela Armijo for medical oncology. States that she is doing well on Arimidex. Next appt 6/12/19. Mammogram completed 1/24/19, no evidence of disease. Patient also follows for hx of B-cell lymphoma. Denies B-symptoms today. Imaging per med onc. I discussed follow up plans with Corrine Suggs. At this time, Dr eJnny Preciado will see the patient back for a 6 month post radiation completion follow up visit. Instructed to follow up with other providers involved in their care as directed (including but not limited to Medical Oncology, Primary Care, Pulmonary, and Surgery). The patient was given our contact number in the event that if at any time they change their mind and would like to return to the clinic to see either myself or one of the Radiation Oncologists, they can simply call us and we would be happy to see them. Thank you for involving us in the management of this extremely pleasant patient. More than 25 min was in direct contact with pt coordinating/giving care. >50% of the visit was spent in counseling the pt on the following: Follow up care    The nurses notes were reviewed and incorporated into this assessment and plan.           Sincerely,    Charity Jj, MSN, RN, APRN-CNP  Nurse Practitioner for Radiation Oncology    PHYSICIANS [facility]) [facility]: [phone] (NPF: [phone])  [facility]) [facility]:  [phone] (QBV:  [phone])  [address] [place] [address]: [phone] ([facility]: [phone])

## 2019-05-02 NOTE — PROGRESS NOTES
Patient presents to clinic for [facility] today. 20 SQ port accessed per policy using 2.51 Burrell needle for good blood return. Site flushed easily with 10 mL NSS followed by 5 mL Heparin solution 100 units/ml rinse prior to de-access. Dry sterile dressing to area. Tolerated procedure well. Encouraged to schedule port flush every 4 weeks.

## 2019-05-02 NOTE — PROGRESS NOTES
Anita Shape  5/2/2019  1:07 PM      Vitals:    05/02/19 1306   BP: 135/71   Pulse: 77   Resp: 18   Temp: 98.4 °F (36.9 °C)    : Wt Readings from Last 3 Encounters:   05/02/19 221 lb 3.2 oz (100.3 kg)   03/13/19 222 lb 8 oz (100.9 kg)   01/16/19 221 lb (100.2 kg)                Current Outpatient Medications:     Dextromethorphan-Guaifenesin (CORICIDIN HBP CONGESTION/COUGH PO), Take by mouth, Disp: , Rfl:     anastrozole (ARIMIDEX) 1 MG tablet, Take 1 tablet by mouth daily, Disp: 90 tablet, Rfl: 1    calcium carbonate 600 MG TABS tablet, Take 1 tablet by mouth daily, Disp: , Rfl:     vitamin D (CHOLECALCIFEROL) 1000 UNIT TABS tablet, Take 800 Units by mouth 2 times daily , Disp: , Rfl:     acyclovir (ZOVIRAX) 400 MG tablet, Take 400 mg by mouth 2 times daily, Disp: , Rfl:     B Complex Vitamins (VITAMIN B COMPLEX) TABS, Take by mouth daily, Disp: , Rfl:     aspirin 81 MG tablet, Take 81 mg by mouth daily, Disp: , Rfl:     amLODIPine (NORVASC) 5 MG tablet, Take 5 mg by mouth daily, Disp: , Rfl:     pioglitazone (ACTOS) 15 MG tablet, Take 15 mg by mouth nightly, Disp: , Rfl:     atorvastatin (LIPITOR) 40 MG tablet, Take 40 mg by mouth nightly, Disp: , Rfl:     levothyroxine (SYNTHROID) 50 MCG tablet, Take 50 mcg by mouth Daily, Disp: , Rfl:     metFORMIN (GLUCOPHAGE-XR) 500 MG extended release tablet, Take 500 mg by mouth daily (with breakfast), Disp: , Rfl:       Patient is seen today in follow up for right breast. Treatment 5/31/18 to 6/21/18 16/4256. Patient follows with Dr Chato Shukla for medical oncology, on Arimidex, receiving rituxan hycela treatment complete 3/13/19 to return to clinic in 3 months. Last mammogram 1/24/19 revealed no new changes. FALLS RISK SCREENING ASSESSMENT    Instructions:  Assess the patient and enter the appropriate indicators that are present for fall risk identification.    Total the numbers entered and assign a fall risk score from Table 2.  Reassess patient at a minimum every 12 weeks or with status change. Assessment   Date  5/2/2019     1. Mental Ability: confusion/cognitively impaired No - 0       2. Elimination Issues: incontinence, frequency No - 0       3. Ambulatory: use of assistive devices (walker, cane, off-loading devices), attached to equipment (IV pole, oxygen) No - 0     4. Sensory Limitations: dizziness, vertigo, impaired vision Yes-3     5. Age 72 years or greater - 1       10. Medication: diuretics, strong analgesics, hypnotics, sedatives, antihypertensive agents   No - 0   7. Falls:  recent history of falls within the last 3 months (not to include slipping or tripping)   No - 0   TOTAL 4    If score of 4 or greater was education given? Patient refused       TABLE 2   Risk Score Risk Level Plan of Care   0-3 Little or  No Risk 1. Provide assistance as indicated for ambulation activities  2. Reorient confused/cognitively impaired patient  3. Call-light/bell within patient's reach  4. Chair/bed in low position, stretcher/bed with siderails up except when performing patient care activities  5. Educate patient/family/caregiver on falls prevention  6.  Reassess in 12 weeks or with any noted change in patient condition which places them at a risk for a fall   4-6 Moderate Risk 1. Provide assistance as indicated for ambulation activities  2. Reorient confused/cognitively impaired patient  3. Call-light/bell within patient's reach  4. Chair/bed in low position, stretcher/bed with siderails up except when performing patient care activities  5. Educate patient/family/caregiver on falls prevention  6. Falls risk precaution (Yellow sticker Level II) placed on patient chart   7 or   Higher High Risk 1. Place patient in easily observable treatment room  2. Patient attended at all times by family member or staff  3. Provide assistance as indicated for ambulation activities  4. Reorient confused/cognitively impaired patient  5.   Call-light/bell within patient's reach  6. Chair/bed in low position, stretcher/bed with siderails up except when performing patient care activities  7. Educate patient/family/caregiver on falls prevention  8. Falls risk precaution (Yellow sticker Level III) placed on patient chart           MALNUTRITION RISK SCREENING ASSESSMENT    5/2/2019   Patient:  Diana Pizano  Sex:  female    Instructions:  Assess the patient and enter the appropriate indicators that are present for nutrition risk identification. Total the numbers entered and assign a risk score. Follow the appropriate action for total score listed below. Assessment   Date  5/2/2019     1. Have you lost weight without trying? 0- No     2. Have you been eating poorly because of a decreased appetite? 0- No   3. Do you have a diagnosis of head and neck cancer?       0- No                                                                                    TOTAL 0          Score of 0-1: No action  Score 2 or greater:  · For Non-Diabetic Patient: Recommend adding Ensure Complete 2 x daily and provide patient with Ensure wellness bag with coupons  · For Diabetic Patient: Recommend adding Glucerna Shake 2 x daily and provide patient with Glucerna Wellness bag with coupons  · [facility]

## 2019-06-11 NOTE — PROGRESS NOTES
[address] [facility]. [facility] [place]        Pt Name: Leticia Long: 1945  Date of evaluation: 6/11/2019  Primary Care Physician: Leticia Long MD  Reason for evaluation:   Chief Complaint   Patient presents with    Lymphoma    Follow-up        Subjective: [facility] for follow up.  Feels well today. Completed 12 Cycles of  Maintenance Rituxan/Hycela on 3/13/19. She denies any constitutional B symptoms  On anastrozole along with calcium and vitamin D supplements. OBJECTIVE:  VITALS:  height is 5' 8\" (1.727 m) and weight is 215 lb (97.5 kg). Her oral temperature is 98.4 °F (36.9 °C). Her blood pressure is 126/67 and her pulse is 67. Her respiration is 20. Physical Exam:  Performance Status: 0  Well developed, well nourished female  Head and neck: PERRLA, EOMI. Sclera non icteric. Oropharynx: Clear  Neck: no JVD,  bilateral cervical lymphadenopathy markedly decreased in size  Heart: Regular rate and regular rhythm. No murmur. Lungs: Clear to auscultation. Abdomen: Soft, non-tender;no masses, no organomegaly. Extremities: No edema,no cyanosis, no clubbing. Neurologic: Cranial nerves grossly intact. Very mild weakness in the right upper and right lower extremity. Medi-port:  Left Subclavian      Medications  Prior to Admission medications    Medication Sig Start Date End Date Taking?  Authorizing Provider   Dextromethorphan-Guaifenesin (CORICIDIN HBP CONGESTION/COUGH PO) Take by mouth    Historical Provider, MD   anastrozole (ARIMIDEX) 1 MG tablet Take 1 tablet by mouth daily 11/21/18   Matthew Epstein MD   calcium carbonate 600 MG TABS tablet Take 1 tablet by mouth daily    Historical Provider, MD   vitamin D (CHOLECALCIFEROL) 1000 UNIT TABS tablet Take 800 Units by mouth 2 times daily     Historical Provider, MD   acyclovir (ZOVIRAX) 400 MG tablet Take 400 mg by mouth 2 times daily    Historical Provider, MD   B Complex Vitamins (VITAMIN B COMPLEX) TABS Take by mouth daily    Historical Provider, MD   aspirin 81 MG tablet Take 81 mg by mouth daily    Historical Provider, MD   amLODIPine (NORVASC) 5 MG tablet Take 5 mg by mouth daily    Historical Provider, MD   pioglitazone (ACTOS) 15 MG tablet Take 15 mg by mouth nightly    Historical Provider, MD   atorvastatin (LIPITOR) 40 MG tablet Take 40 mg by mouth nightly    Historical Provider, MD   levothyroxine (SYNTHROID) 50 MCG tablet Take 50 mcg by mouth Daily    Historical Provider, MD   metFORMIN (GLUCOPHAGE-XR) 500 MG extended release tablet Take 500 mg by mouth daily (with breakfast)    Historical Provider, MD    Scheduled Meds:  Continuous Infusions:  PRN Meds:.        Recent Laboratory Data-     Lab Results   Component Value Date    WBC 7.1 03/04/2019    HGB 14.2 03/04/2019    HCT 42.1 03/04/2019    MCV 95.7 03/04/2019     03/04/2019    LYMPHOPCT 9.9 (L) 03/04/2019    RBC 4.40 03/04/2019    MCH 32.3 03/04/2019    MCHC 33.7 03/04/2019    RDW 13.1 03/04/2019    NEUTOPHILPCT 69.9 03/04/2019    MONOPCT 15.5 (H) 03/04/2019    BASOPCT 0.6 03/04/2019    NEUTROABS 4.95 03/04/2019    LYMPHSABS 0.70 (L) 03/04/2019    MONOSABS 1.10 (H) 03/04/2019    EOSABS 0.27 03/04/2019    BASOSABS 0.04 03/04/2019       Lab Results   Component Value Date     03/04/2019    K 4.6 03/04/2019     03/04/2019    CO2 24 03/04/2019    BUN 20 03/04/2019    CREATININE 0.9 03/04/2019    GLUCOSE 119 (H) 03/04/2019    CALCIUM 10.2 03/04/2019    PROT 7.4 03/04/2019    LABALBU 4.6 03/04/2019    BILITOT 0.4 03/04/2019    ALKPHOS 103 03/04/2019    AST 14 03/04/2019    ALT 13 03/04/2019    LABGLOM >60 03/04/2019    GFRAA >60 03/04/2019       Radiology-  CT ABDOMEN AND PELVIS:  3/6/19  1. No evidence of active lymphoma. 2. No acute findings. 3. 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 IMPRESSION:  1. No evidence of active lymphoma. 2. No acute findings.    3. Stable right adrenal lesion compatible with an sentinel nodes had micrometastasis measuring only 2 mm  Clear surgical margins were achieved     It was explained to her that the mainstay of adjuvant therapy at her age would be an aromatase inhibitor. Oncotype DX or mammaprint will be done to determine if she has any significant benefit from chemo which I doubt     Requested  for Oncotype or mammaprint. Was submitted to pathology department at [address] [place] [address].   Oncotype DX  RS 17---Low risk for recurrence. She will be recommended adjuvant aromatase inhibitor to be followed by adjuvant local radiation therapy to the right breast     Radiation oncology consult was obtained. She was initiated on anastrozole 1 mg daily along with calcium and vitamin D supplements on 11/21/18.     Bone density was done on 5/11/18     Received Cycle #6 Maintenance Rituxan/Hycela on 3/212/18     WBC is 1.3;  ANC is 0.33 on 5/16/18     She received  Granix 480 µg subcu daily for 3 days:  5//17/18; 5/18/18; and 5/19/18. She was  initiated on empiric Cipro 500 mg BID x 7 days.      Her Rituxan was postponed  till recovery of her [address]     Her WBC count remained low with low ANC and she required additional Granix, 480 mcg x 3 days, 5/23/18, 5/24/18, and 5/25/18.  Her maintenance Rituxan was postponed another week    Her ANC is adequate today.     Received Cycle #7 Maintenance Rituxan/Hycela on 6/6/18,  followed by Neulasta prophylaxis     Received Cycle #8 Maintenance Rituxan/Hycela on 8/1/18  followed by Neulasta prophylaxis.      Reviewed her 5/11/18 Osteopenia noted of femoral necks with T scores of  -1.7 and -0.7. She is presently takes 600 mg Calcium and 800 units of Vit D BID.     She will be scheduled in October for mammogram at [facility] on 9/26/18.   Received Cycle #10 Maintenance Rituxan/Hycela on 11/21/18  Received Cycle #11 Maintenance Rituxan/Hycela on 1/16/19  Received Cycle #12 Maintenance Rituxan/Hycela on 3/13/19      PLAN:  Completed  12 cycles Maintenance Rituxan/Hycela on 3/13/19  Her last mammogram done in January 2019 at [facility] was negative  He is cleared to receive her Shingrix vaccine in September. She will be maintained on anastrozole along with calcium and vitamin D supplements for her breast cancer and will have follow-up mammogram in January 2020      Grace Zuñiga M.D., F.A.C.P.   Electronically signed 6/11/2019 at 4:34 PM

## 2019-06-12 ENCOUNTER — HOSPITAL ENCOUNTER (OUTPATIENT)
Dept: INFUSION THERAPY | Age: 74
Discharge: HOME | End: 2019-06-12
Payer: COMMERCIAL

## 2019-06-12 ENCOUNTER — OFFICE VISIT (OUTPATIENT)
Dept: ONCOLOGY | Age: 74
End: 2019-06-12
Payer: COMMERCIAL

## 2019-06-12 VITALS
DIASTOLIC BLOOD PRESSURE: 67 MMHG | HEART RATE: 67 BPM | RESPIRATION RATE: 20 BRPM | HEIGHT: 68 IN | WEIGHT: 215 LBS | TEMPERATURE: 98.4 F | SYSTOLIC BLOOD PRESSURE: 126 MMHG | BODY MASS INDEX: 32.58 KG/M2

## 2019-06-12 DIAGNOSIS — C82.13 FOLLICULAR LYMPHOMA GRADE II OF INTRA-ABDOMINAL LYMPH NODES (HCC): Primary | ICD-10-CM

## 2019-06-12 DIAGNOSIS — C50.911 STAGE 1 BREAST CANCER, ER+, RIGHT (HCC): ICD-10-CM

## 2019-06-12 DIAGNOSIS — Z17.0 STAGE 1 BREAST CANCER, ER+, RIGHT (HCC): ICD-10-CM

## 2019-06-12 DIAGNOSIS — C82.09 GRADE I FOLLICULAR LYMPHOMA OF EXTRANODAL SITE EXCLUDING SPLEEN AND OTHER SOLID ORGANS (HCC): ICD-10-CM

## 2019-06-12 LAB
ALBUMIN SERPL-MCNC: 4 G/DL (ref 3.5–5.2)
ALP BLD-CCNC: 108 U/L (ref 35–104)
ALT SERPL-CCNC: 9 U/L (ref 0–32)
ANION GAP SERPL CALCULATED.3IONS-SCNC: 13 MMOL/L (ref 7–16)
AST SERPL-CCNC: 11 U/L (ref 0–31)
BASOPHILS ABSOLUTE: 0.04 E9/L (ref 0–0.2)
BASOPHILS RELATIVE PERCENT: 0.5 % (ref 0–2)
BILIRUB SERPL-MCNC: 0.4 MG/DL (ref 0–1.2)
BUN BLDV-MCNC: 18 MG/DL (ref 8–23)
CALCIUM SERPL-MCNC: 10.3 MG/DL (ref 8.6–10.2)
CHLORIDE BLD-SCNC: 98 MMOL/L (ref 98–107)
CO2: 26 MMOL/L (ref 22–29)
CREAT SERPL-MCNC: 1 MG/DL (ref 0.5–1)
EOSINOPHILS ABSOLUTE: 0.4 E9/L (ref 0.05–0.5)
EOSINOPHILS RELATIVE PERCENT: 5.1 % (ref 0–6)
GFR AFRICAN AMERICAN: >60
GFR NON-AFRICAN AMERICAN: 54 ML/MIN/1.73
GLUCOSE BLD-MCNC: 121 MG/DL (ref 74–99)
HCT VFR BLD CALC: 40.3 % (ref 34–48)
HEMOGLOBIN: 13.5 G/DL (ref 11.5–15.5)
IMMATURE GRANULOCYTES #: 0.03 E9/L
IMMATURE GRANULOCYTES %: 0.4 % (ref 0–5)
LACTATE DEHYDROGENASE: 165 U/L (ref 135–214)
LYMPHOCYTES ABSOLUTE: 0.7 E9/L (ref 1.5–4)
LYMPHOCYTES RELATIVE PERCENT: 8.9 % (ref 20–42)
MCH RBC QN AUTO: 31.3 PG (ref 26–35)
MCHC RBC AUTO-ENTMCNC: 33.5 % (ref 32–34.5)
MCV RBC AUTO: 93.5 FL (ref 80–99.9)
MONOCYTES ABSOLUTE: 1.03 E9/L (ref 0.1–0.95)
MONOCYTES RELATIVE PERCENT: 13.1 % (ref 2–12)
NEUTROPHILS ABSOLUTE: 5.69 E9/L (ref 1.8–7.3)
NEUTROPHILS RELATIVE PERCENT: 72 % (ref 43–80)
PDW BLD-RTO: 13 FL (ref 11.5–15)
PLATELET # BLD: 244 E9/L (ref 130–450)
PMV BLD AUTO: 8.9 FL (ref 7–12)
POTASSIUM SERPL-SCNC: 4.6 MMOL/L (ref 3.5–5)
RBC # BLD: 4.31 E12/L (ref 3.5–5.5)
SODIUM BLD-SCNC: 137 MMOL/L (ref 132–146)
TOTAL PROTEIN: 7.2 G/DL (ref 6.4–8.3)
WBC # BLD: 7.9 E9/L (ref 4.5–11.5)

## 2019-06-12 PROCEDURE — 99214 OFFICE O/P EST MOD 30 MIN: CPT

## 2019-06-12 PROCEDURE — 99212 OFFICE O/P EST SF 10 MIN: CPT | Performed by: INTERNAL MEDICINE

## 2019-06-12 PROCEDURE — 83615 LACTATE (LD) (LDH) ENZYME: CPT

## 2019-06-12 PROCEDURE — 85025 COMPLETE CBC W/AUTO DIFF WBC: CPT

## 2019-06-12 PROCEDURE — 2580000003 HC RX 258: Performed by: INTERNAL MEDICINE

## 2019-06-12 PROCEDURE — 80053 COMPREHEN METABOLIC PANEL: CPT

## 2019-06-12 PROCEDURE — 6360000002 HC RX W HCPCS: Performed by: INTERNAL MEDICINE

## 2019-06-12 RX ORDER — HEPARIN SODIUM (PORCINE) LOCK FLUSH IV SOLN 100 UNIT/ML 100 UNIT/ML
500 SOLUTION INTRAVENOUS PRN
Status: DISCONTINUED | OUTPATIENT
Start: 2019-06-12 | End: 2019-06-13 | Stop reason: HOSPADM

## 2019-06-12 RX ORDER — SODIUM CHLORIDE 0.9 % (FLUSH) 0.9 %
10 SYRINGE (ML) INJECTION PRN
Status: CANCELLED | OUTPATIENT
Start: 2019-06-12

## 2019-06-12 RX ORDER — ACYCLOVIR 400 MG/1
400 TABLET ORAL 2 TIMES DAILY
Qty: 60 TABLET | Refills: 3 | Status: CANCELLED | OUTPATIENT
Start: 2019-06-12

## 2019-06-12 RX ORDER — HEPARIN SODIUM (PORCINE) LOCK FLUSH IV SOLN 100 UNIT/ML 100 UNIT/ML
500 SOLUTION INTRAVENOUS PRN
Status: CANCELLED | OUTPATIENT
Start: 2019-06-12

## 2019-06-12 RX ORDER — MELOXICAM 15 MG/1
15 TABLET ORAL DAILY
COMMUNITY
End: 2019-12-06

## 2019-06-12 RX ORDER — ACYCLOVIR 400 MG/1
400 TABLET ORAL 2 TIMES DAILY
Qty: 60 TABLET | Refills: 2 | Status: SHIPPED | OUTPATIENT
Start: 2019-06-12 | End: 2019-12-06

## 2019-06-12 RX ORDER — ANASTROZOLE 1 MG/1
1 TABLET ORAL DAILY
Qty: 90 TABLET | Refills: 1 | Status: SHIPPED | OUTPATIENT
Start: 2019-06-12 | End: 2019-10-14 | Stop reason: SDUPTHER

## 2019-06-12 RX ORDER — SODIUM CHLORIDE 0.9 % (FLUSH) 0.9 %
10 SYRINGE (ML) INJECTION PRN
Status: DISCONTINUED | OUTPATIENT
Start: 2019-06-12 | End: 2019-06-13 | Stop reason: HOSPADM

## 2019-06-12 RX ADMIN — Medication 10 ML: at 13:00

## 2019-06-12 RX ADMIN — Medication 500 UNITS: at 13:00

## 2019-06-12 NOTE — PROGRESS NOTES
Patient presents to clinic for labs today. Right chest single  SQ port accessed per policy using 76C 7.23PMNB Burrell needle for good blood return. Aspirate for waste and specimen sent to lab. Site flushed easily with 10 mL NSS followed by 5 mL Heparin solution 100 units/ml rinse prior to de-access. Dry sterile dressing to area. Tolerated procedure well. Encouraged to schedule port flush every 4 weeks.

## 2019-09-23 ENCOUNTER — PREP FOR PROCEDURE (OUTPATIENT)
Dept: PULMONOLOGY | Age: 74
End: 2019-09-23

## 2019-09-23 DIAGNOSIS — Z01.818 PREOP TESTING: Primary | ICD-10-CM

## 2019-09-23 DIAGNOSIS — R91.8 LUNG NODULE, MULTIPLE: ICD-10-CM

## 2019-09-23 RX ORDER — SODIUM CHLORIDE 0.9 % (FLUSH) 0.9 %
10 SYRINGE (ML) INJECTION PRN
Status: CANCELLED | OUTPATIENT
Start: 2019-09-23

## 2019-09-23 RX ORDER — SODIUM CHLORIDE 0.9 % (FLUSH) 0.9 %
10 SYRINGE (ML) INJECTION EVERY 12 HOURS SCHEDULED
Status: CANCELLED | OUTPATIENT
Start: 2019-09-23

## 2019-10-01 ENCOUNTER — ANESTHESIA (OUTPATIENT)
Dept: ENDOSCOPY | Age: 74
End: 2019-10-01
Payer: COMMERCIAL

## 2019-10-01 ENCOUNTER — HOSPITAL ENCOUNTER (OUTPATIENT)
Age: 74
Setting detail: OUTPATIENT SURGERY
Discharge: HOME | End: 2019-10-01
Attending: INTERNAL MEDICINE | Admitting: INTERNAL MEDICINE
Payer: COMMERCIAL

## 2019-10-01 ENCOUNTER — ANESTHESIA EVENT (OUTPATIENT)
Dept: ENDOSCOPY | Age: 74
End: 2019-10-01
Payer: COMMERCIAL

## 2019-10-01 VITALS
DIASTOLIC BLOOD PRESSURE: 70 MMHG | WEIGHT: 217 LBS | TEMPERATURE: 98 F | BODY MASS INDEX: 32.89 KG/M2 | RESPIRATION RATE: 20 BRPM | HEIGHT: 68 IN | OXYGEN SATURATION: 96 % | HEART RATE: 62 BPM | SYSTOLIC BLOOD PRESSURE: 160 MMHG

## 2019-10-01 VITALS
OXYGEN SATURATION: 97 % | SYSTOLIC BLOOD PRESSURE: 145 MMHG | DIASTOLIC BLOOD PRESSURE: 69 MMHG | RESPIRATION RATE: 15 BRPM

## 2019-10-01 LAB
ANION GAP SERPL CALCULATED.3IONS-SCNC: 11 MMOL/L (ref 7–16)
APTT: 39 SEC (ref 24.5–35.1)
BUN BLDV-MCNC: 21 MG/DL (ref 8–23)
CALCIUM SERPL-MCNC: 10.1 MG/DL (ref 8.6–10.2)
CHLORIDE BLD-SCNC: 105 MMOL/L (ref 98–107)
CO2: 22 MMOL/L (ref 22–29)
CREAT SERPL-MCNC: 1.1 MG/DL (ref 0.5–1)
GFR AFRICAN AMERICAN: 59
GFR NON-AFRICAN AMERICAN: 49 ML/MIN/1.73
GLUCOSE BLD-MCNC: 129 MG/DL (ref 74–99)
INR BLD: 1
POTASSIUM REFLEX MAGNESIUM: 4.4 MMOL/L (ref 3.5–5)
PROTHROMBIN TIME: 11 SEC (ref 9.3–12.4)
SODIUM BLD-SCNC: 138 MMOL/L (ref 132–146)

## 2019-10-01 PROCEDURE — 87015 SPECIMEN INFECT AGNT CONCNTJ: CPT

## 2019-10-01 PROCEDURE — 3609010800 HC BRONCHOSCOPY ALVEOLAR LAVAGE: Performed by: INTERNAL MEDICINE

## 2019-10-01 PROCEDURE — 2500000003 HC RX 250 WO HCPCS: Performed by: INTERNAL MEDICINE

## 2019-10-01 PROCEDURE — 80048 BASIC METABOLIC PNL TOTAL CA: CPT

## 2019-10-01 PROCEDURE — 87184 SC STD DISK METHOD PER PLATE: CPT

## 2019-10-01 PROCEDURE — 87070 CULTURE OTHR SPECIMN AEROBIC: CPT

## 2019-10-01 PROCEDURE — 94640 AIRWAY INHALATION TREATMENT: CPT

## 2019-10-01 PROCEDURE — 6370000000 HC RX 637 (ALT 250 FOR IP): Performed by: INTERNAL MEDICINE

## 2019-10-01 PROCEDURE — 85730 THROMBOPLASTIN TIME PARTIAL: CPT

## 2019-10-01 PROCEDURE — 3700000000 HC ANESTHESIA ATTENDED CARE: Performed by: INTERNAL MEDICINE

## 2019-10-01 PROCEDURE — 88112 CYTOPATH CELL ENHANCE TECH: CPT

## 2019-10-01 PROCEDURE — 87102 FUNGUS ISOLATION CULTURE: CPT

## 2019-10-01 PROCEDURE — 6360000002 HC RX W HCPCS: Performed by: NURSE ANESTHETIST, CERTIFIED REGISTERED

## 2019-10-01 PROCEDURE — 7100000000 HC PACU RECOVERY - FIRST 15 MIN: Performed by: INTERNAL MEDICINE

## 2019-10-01 PROCEDURE — 87205 SMEAR GRAM STAIN: CPT

## 2019-10-01 PROCEDURE — 2500000003 HC RX 250 WO HCPCS: Performed by: ANESTHESIOLOGY

## 2019-10-01 PROCEDURE — 87116 MYCOBACTERIA CULTURE: CPT

## 2019-10-01 PROCEDURE — 85610 PROTHROMBIN TIME: CPT

## 2019-10-01 PROCEDURE — 2709999900 HC NON-CHARGEABLE SUPPLY: Performed by: INTERNAL MEDICINE

## 2019-10-01 PROCEDURE — 87206 SMEAR FLUORESCENT/ACID STAI: CPT

## 2019-10-01 PROCEDURE — 2580000003 HC RX 258: Performed by: ANESTHESIOLOGY

## 2019-10-01 PROCEDURE — 36415 COLL VENOUS BLD VENIPUNCTURE: CPT

## 2019-10-01 PROCEDURE — 7100000001 HC PACU RECOVERY - ADDTL 15 MIN: Performed by: INTERNAL MEDICINE

## 2019-10-01 PROCEDURE — 3700000001 HC ADD 15 MINUTES (ANESTHESIA): Performed by: INTERNAL MEDICINE

## 2019-10-01 PROCEDURE — 87077 CULTURE AEROBIC IDENTIFY: CPT

## 2019-10-01 PROCEDURE — 7100000011 HC PHASE II RECOVERY - ADDTL 15 MIN: Performed by: INTERNAL MEDICINE

## 2019-10-01 PROCEDURE — 7100000010 HC PHASE II RECOVERY - FIRST 15 MIN: Performed by: INTERNAL MEDICINE

## 2019-10-01 RX ORDER — LIDOCAINE HYDROCHLORIDE 10 MG/ML
5 INJECTION, SOLUTION EPIDURAL; INFILTRATION; INTRACAUDAL; PERINEURAL ONCE
Status: COMPLETED | OUTPATIENT
Start: 2019-10-01 | End: 2019-10-01

## 2019-10-01 RX ORDER — IPRATROPIUM BROMIDE AND ALBUTEROL SULFATE 2.5; .5 MG/3ML; MG/3ML
SOLUTION RESPIRATORY (INHALATION) PRN
Status: DISCONTINUED | OUTPATIENT
Start: 2019-10-01 | End: 2019-10-01 | Stop reason: ALTCHOICE

## 2019-10-01 RX ORDER — LIDOCAINE HYDROCHLORIDE 10 MG/ML
INJECTION, SOLUTION INFILTRATION; PERINEURAL PRN
Status: DISCONTINUED | OUTPATIENT
Start: 2019-10-01 | End: 2019-10-01 | Stop reason: ALTCHOICE

## 2019-10-01 RX ORDER — SODIUM CHLORIDE 0.9 % (FLUSH) 0.9 %
10 SYRINGE (ML) INJECTION EVERY 12 HOURS SCHEDULED
Status: DISCONTINUED | OUTPATIENT
Start: 2019-10-01 | End: 2019-10-01 | Stop reason: HOSPADM

## 2019-10-01 RX ORDER — PROPOFOL 10 MG/ML
INJECTION, EMULSION INTRAVENOUS CONTINUOUS PRN
Status: DISCONTINUED | OUTPATIENT
Start: 2019-10-01 | End: 2019-10-01 | Stop reason: SDUPTHER

## 2019-10-01 RX ORDER — SODIUM CHLORIDE 0.9 % (FLUSH) 0.9 %
10 SYRINGE (ML) INJECTION PRN
Status: DISCONTINUED | OUTPATIENT
Start: 2019-10-01 | End: 2019-10-01 | Stop reason: HOSPADM

## 2019-10-01 RX ORDER — SODIUM CHLORIDE 9 MG/ML
INJECTION, SOLUTION INTRAVENOUS CONTINUOUS
Status: DISCONTINUED | OUTPATIENT
Start: 2019-10-01 | End: 2019-10-01 | Stop reason: HOSPADM

## 2019-10-01 RX ADMIN — PROPOFOL 100 MCG/KG/MIN: 10 INJECTION, EMULSION INTRAVENOUS at 07:15

## 2019-10-01 RX ADMIN — LIDOCAINE HYDROCHLORIDE 5 ML: 10 INJECTION, SOLUTION EPIDURAL; INFILTRATION; INTRACAUDAL at 08:13

## 2019-10-01 RX ADMIN — SODIUM CHLORIDE: 9 INJECTION, SOLUTION INTRAVENOUS at 06:11

## 2019-10-01 RX ADMIN — SODIUM CHLORIDE: 9 INJECTION, SOLUTION INTRAVENOUS at 07:08

## 2019-10-01 ASSESSMENT — PAIN SCALES - GENERAL
PAINLEVEL_OUTOF10: 0

## 2019-10-01 ASSESSMENT — PAIN - FUNCTIONAL ASSESSMENT: PAIN_FUNCTIONAL_ASSESSMENT: 0-10

## 2019-10-02 LAB
GRAM STAIN ORDERABLE: NORMAL

## 2019-10-03 LAB
AFB SMEAR: NORMAL
CULTURE, RESPIRATORY: NORMAL
CULTURE, RESPIRATORY: NORMAL
SMEAR, RESPIRATORY: NORMAL
SMEAR, RESPIRATORY: NORMAL

## 2019-10-04 LAB
CULTURE, RESPIRATORY: ABNORMAL
CULTURE, RESPIRATORY: ABNORMAL
ORGANISM: ABNORMAL
SMEAR, RESPIRATORY: ABNORMAL

## 2019-10-14 ENCOUNTER — HOSPITAL ENCOUNTER (OUTPATIENT)
Dept: INFUSION THERAPY | Age: 74
Discharge: HOME | End: 2019-10-14
Payer: COMMERCIAL

## 2019-10-14 ENCOUNTER — OFFICE VISIT (OUTPATIENT)
Dept: ONCOLOGY | Age: 74
End: 2019-10-14
Payer: COMMERCIAL

## 2019-10-14 VITALS
BODY MASS INDEX: 32.05 KG/M2 | WEIGHT: 211.5 LBS | SYSTOLIC BLOOD PRESSURE: 146 MMHG | TEMPERATURE: 97.1 F | HEART RATE: 72 BPM | OXYGEN SATURATION: 98 % | DIASTOLIC BLOOD PRESSURE: 79 MMHG | HEIGHT: 68 IN

## 2019-10-14 DIAGNOSIS — Z17.0 STAGE 1 BREAST CANCER, ER+, RIGHT (HCC): ICD-10-CM

## 2019-10-14 DIAGNOSIS — C82.13 FOLLICULAR LYMPHOMA GRADE II OF INTRA-ABDOMINAL LYMPH NODES (HCC): Primary | ICD-10-CM

## 2019-10-14 DIAGNOSIS — C82.09 GRADE I FOLLICULAR LYMPHOMA OF EXTRANODAL SITE EXCLUDING SPLEEN AND OTHER SOLID ORGANS (HCC): ICD-10-CM

## 2019-10-14 DIAGNOSIS — C50.911 STAGE 1 BREAST CANCER, ER+, RIGHT (HCC): ICD-10-CM

## 2019-10-14 LAB
ALBUMIN SERPL-MCNC: 4.2 G/DL (ref 3.5–5.2)
ALP BLD-CCNC: 97 U/L (ref 35–104)
ALT SERPL-CCNC: 10 U/L (ref 0–32)
ANION GAP SERPL CALCULATED.3IONS-SCNC: 12 MMOL/L (ref 7–16)
AST SERPL-CCNC: 15 U/L (ref 0–31)
BASOPHILS ABSOLUTE: 0.04 E9/L (ref 0–0.2)
BASOPHILS RELATIVE PERCENT: 0.6 % (ref 0–2)
BILIRUB SERPL-MCNC: 0.4 MG/DL (ref 0–1.2)
BUN BLDV-MCNC: 21 MG/DL (ref 8–23)
CALCIUM SERPL-MCNC: 10.1 MG/DL (ref 8.6–10.2)
CHLORIDE BLD-SCNC: 101 MMOL/L (ref 98–107)
CO2: 25 MMOL/L (ref 22–29)
CREAT SERPL-MCNC: 1.2 MG/DL (ref 0.5–1)
EOSINOPHILS ABSOLUTE: 0.19 E9/L (ref 0.05–0.5)
EOSINOPHILS RELATIVE PERCENT: 3.1 % (ref 0–6)
GFR AFRICAN AMERICAN: 53
GFR NON-AFRICAN AMERICAN: 44 ML/MIN/1.73
GLUCOSE BLD-MCNC: 135 MG/DL (ref 74–99)
HCT VFR BLD CALC: 41.4 % (ref 34–48)
HEMOGLOBIN: 13.7 G/DL (ref 11.5–15.5)
IMMATURE GRANULOCYTES #: 0.02 E9/L
IMMATURE GRANULOCYTES %: 0.3 % (ref 0–5)
LACTATE DEHYDROGENASE: 177 U/L (ref 135–214)
LYMPHOCYTES ABSOLUTE: 0.7 E9/L (ref 1.5–4)
LYMPHOCYTES RELATIVE PERCENT: 11.4 % (ref 20–42)
MCH RBC QN AUTO: 30.9 PG (ref 26–35)
MCHC RBC AUTO-ENTMCNC: 33.1 % (ref 32–34.5)
MCV RBC AUTO: 93.2 FL (ref 80–99.9)
MONOCYTES ABSOLUTE: 0.76 E9/L (ref 0.1–0.95)
MONOCYTES RELATIVE PERCENT: 12.3 % (ref 2–12)
NEUTROPHILS ABSOLUTE: 4.45 E9/L (ref 1.8–7.3)
NEUTROPHILS RELATIVE PERCENT: 72.3 % (ref 43–80)
PDW BLD-RTO: 12.6 FL (ref 11.5–15)
PLATELET # BLD: 272 E9/L (ref 130–450)
PMV BLD AUTO: 8.7 FL (ref 7–12)
POTASSIUM SERPL-SCNC: 4.5 MMOL/L (ref 3.5–5)
RBC # BLD: 4.44 E12/L (ref 3.5–5.5)
SODIUM BLD-SCNC: 138 MMOL/L (ref 132–146)
TOTAL PROTEIN: 7.4 G/DL (ref 6.4–8.3)
WBC # BLD: 6.2 E9/L (ref 4.5–11.5)

## 2019-10-14 PROCEDURE — 99212 OFFICE O/P EST SF 10 MIN: CPT | Performed by: INTERNAL MEDICINE

## 2019-10-14 PROCEDURE — 99214 OFFICE O/P EST MOD 30 MIN: CPT

## 2019-10-14 PROCEDURE — 80053 COMPREHEN METABOLIC PANEL: CPT

## 2019-10-14 PROCEDURE — 83615 LACTATE (LD) (LDH) ENZYME: CPT

## 2019-10-14 PROCEDURE — 85025 COMPLETE CBC W/AUTO DIFF WBC: CPT

## 2019-10-14 PROCEDURE — 6360000002 HC RX W HCPCS: Performed by: INTERNAL MEDICINE

## 2019-10-14 PROCEDURE — 2580000003 HC RX 258: Performed by: INTERNAL MEDICINE

## 2019-10-14 RX ORDER — ANASTROZOLE 1 MG/1
1 TABLET ORAL DAILY
Qty: 90 TABLET | Refills: 1 | Status: SHIPPED
Start: 2019-10-14 | End: 2020-06-08 | Stop reason: SDUPTHER

## 2019-10-14 RX ORDER — HEPARIN SODIUM (PORCINE) LOCK FLUSH IV SOLN 100 UNIT/ML 100 UNIT/ML
500 SOLUTION INTRAVENOUS PRN
Status: DISCONTINUED | OUTPATIENT
Start: 2019-10-14 | End: 2019-10-15 | Stop reason: HOSPADM

## 2019-10-14 RX ORDER — HEPARIN SODIUM (PORCINE) LOCK FLUSH IV SOLN 100 UNIT/ML 100 UNIT/ML
500 SOLUTION INTRAVENOUS PRN
Status: CANCELLED | OUTPATIENT
Start: 2019-10-14

## 2019-10-14 RX ORDER — SODIUM CHLORIDE 0.9 % (FLUSH) 0.9 %
10 SYRINGE (ML) INJECTION PRN
Status: DISCONTINUED | OUTPATIENT
Start: 2019-10-14 | End: 2019-10-15 | Stop reason: HOSPADM

## 2019-10-14 RX ORDER — SULFAMETHOXAZOLE AND TRIMETHOPRIM 800; 160 MG/1; MG/1
800 TABLET ORAL 2 TIMES DAILY
COMMUNITY
Start: 2019-10-10 | End: 2019-12-06

## 2019-10-14 RX ORDER — SODIUM CHLORIDE 0.9 % (FLUSH) 0.9 %
10 SYRINGE (ML) INJECTION PRN
Status: CANCELLED | OUTPATIENT
Start: 2019-10-14

## 2019-10-14 RX ADMIN — Medication 10 ML: at 11:41

## 2019-10-14 RX ADMIN — Medication 500 UNITS: at 11:41

## 2019-10-14 NOTE — PROGRESS NOTES
Patient presents to clinic for office visit/labs/port flush today. Left chest single  SQ port accessed per policy using 42P 5.69FRDG Burrell needle for good blood return. Specimen sent to lab. Site flushed easily with 10 mL NSS followed by 5 mL Heparin solution 100 units/ml rinse prior to de-access. Dry sterile dressing to area. Tolerated procedure well. Encouraged to schedule port flush every 4 weeks.

## 2019-11-04 LAB
FUNGUS (MYCOLOGY) CULTURE: ABNORMAL
FUNGUS (MYCOLOGY) CULTURE: NORMAL
FUNGUS (MYCOLOGY) CULTURE: NORMAL
FUNGUS STAIN: ABNORMAL
FUNGUS STAIN: NORMAL
FUNGUS STAIN: NORMAL
ORGANISM: ABNORMAL

## 2019-11-19 LAB
AFB CULTURE (MYCOBACTERIA): NORMAL
AFB CULTURE (MYCOBACTERIA): NORMAL
AFB SMEAR: NORMAL
AFB SMEAR: NORMAL

## 2019-12-06 ENCOUNTER — HOSPITAL ENCOUNTER (OUTPATIENT)
Dept: INFUSION THERAPY | Age: 74
Discharge: HOME | End: 2019-12-06
Payer: COMMERCIAL

## 2019-12-06 ENCOUNTER — HOSPITAL ENCOUNTER (OUTPATIENT)
Dept: RADIATION ONCOLOGY | Age: 74
Discharge: HOME | End: 2019-12-06
Payer: COMMERCIAL

## 2019-12-06 VITALS
TEMPERATURE: 97.7 F | DIASTOLIC BLOOD PRESSURE: 80 MMHG | HEART RATE: 62 BPM | SYSTOLIC BLOOD PRESSURE: 140 MMHG | WEIGHT: 217.8 LBS | RESPIRATION RATE: 18 BRPM | BODY MASS INDEX: 33.12 KG/M2

## 2019-12-06 DIAGNOSIS — Z17.0 MALIGNANT NEOPLASM OF RIGHT BREAST, STAGE 1, ESTROGEN RECEPTOR POSITIVE (HCC): Primary | ICD-10-CM

## 2019-12-06 DIAGNOSIS — C82.09 GRADE I FOLLICULAR LYMPHOMA OF EXTRANODAL SITE EXCLUDING SPLEEN AND OTHER SOLID ORGANS (HCC): Primary | ICD-10-CM

## 2019-12-06 DIAGNOSIS — C50.911 MALIGNANT NEOPLASM OF RIGHT BREAST, STAGE 1, ESTROGEN RECEPTOR POSITIVE (HCC): Primary | ICD-10-CM

## 2019-12-06 PROCEDURE — 99212 OFFICE O/P EST SF 10 MIN: CPT

## 2019-12-06 PROCEDURE — 2580000003 HC RX 258: Performed by: INTERNAL MEDICINE

## 2019-12-06 PROCEDURE — 99213 OFFICE O/P EST LOW 20 MIN: CPT | Performed by: NURSE PRACTITIONER

## 2019-12-06 PROCEDURE — 6360000002 HC RX W HCPCS

## 2019-12-06 PROCEDURE — 96523 IRRIG DRUG DELIVERY DEVICE: CPT

## 2019-12-06 RX ORDER — HEPARIN SODIUM (PORCINE) LOCK FLUSH IV SOLN 100 UNIT/ML 100 UNIT/ML
500 SOLUTION INTRAVENOUS PRN
Status: CANCELLED | OUTPATIENT
Start: 2019-12-06

## 2019-12-06 RX ORDER — HEPARIN SODIUM (PORCINE) LOCK FLUSH IV SOLN 100 UNIT/ML 100 UNIT/ML
500 SOLUTION INTRAVENOUS PRN
Status: DISCONTINUED | OUTPATIENT
Start: 2019-12-06 | End: 2019-12-07 | Stop reason: HOSPADM

## 2019-12-06 RX ORDER — HEPARIN SODIUM (PORCINE) LOCK FLUSH IV SOLN 100 UNIT/ML 100 UNIT/ML
SOLUTION INTRAVENOUS
Status: COMPLETED
Start: 2019-12-06 | End: 2019-12-06

## 2019-12-06 RX ORDER — SODIUM CHLORIDE 0.9 % (FLUSH) 0.9 %
10 SYRINGE (ML) INJECTION PRN
Status: DISCONTINUED | OUTPATIENT
Start: 2019-12-06 | End: 2019-12-07 | Stop reason: HOSPADM

## 2019-12-06 RX ORDER — SODIUM CHLORIDE 0.9 % (FLUSH) 0.9 %
10 SYRINGE (ML) INJECTION PRN
Status: CANCELLED | OUTPATIENT
Start: 2019-12-06

## 2019-12-06 RX ADMIN — Medication 500 UNITS: at 13:43

## 2019-12-06 RX ADMIN — HEPARIN SODIUM (PORCINE) LOCK FLUSH IV SOLN 100 UNIT/ML 500 UNITS: 100 SOLUTION at 13:43

## 2019-12-06 RX ADMIN — Medication 10 ML: at 13:42

## 2019-12-06 ASSESSMENT — PAIN SCALES - GENERAL: PAINLEVEL_OUTOF10: 2

## 2019-12-06 ASSESSMENT — PAIN DESCRIPTION - FREQUENCY: FREQUENCY: INTERMITTENT

## 2019-12-06 ASSESSMENT — PAIN DESCRIPTION - LOCATION: LOCATION: TOE (COMMENT WHICH ONE)

## 2019-12-06 ASSESSMENT — PAIN DESCRIPTION - PAIN TYPE: TYPE: ACUTE PAIN

## 2019-12-06 ASSESSMENT — PAIN DESCRIPTION - ORIENTATION: ORIENTATION: RIGHT

## 2019-12-06 NOTE — PROGRESS NOTES
Patient presents to clinic for [facility] today. 20  SQ port accessed per policy using 8.57 Burrell needle for good blood return. Site flushed easily with 10 mL NSS followed by 5 mL Heparin solution 100 units/ml rinse prior to de-access. Dry sterile dressing to area. Tolerated procedure well. Encouraged to schedule port flush every 4 weeks.

## 2019-12-09 ENCOUNTER — TELEPHONE (OUTPATIENT)
Dept: RADIATION ONCOLOGY | Age: 74
End: 2019-12-09

## 2020-01-21 NOTE — PROGRESS NOTES
PORT FLUSH    Patient presents to clinic for [facility] today. single  SQ port accessed per policy using 77W, . 75 inch Burrell needle for good blood return. Aspirate for waste and specimen sent to lab. Site flushed easily with 10 mL NSS followed by 5 mL Heparin solution 100 units/ml rinse prior to de-access. Dry sterile dressing to area. Tolerated procedure well. Encouraged to schedule port flush every 4 weeks. Patient

## 2020-01-27 ENCOUNTER — HOSPITAL ENCOUNTER (OUTPATIENT)
Dept: GENERAL RADIOLOGY | Age: 75
Discharge: HOME | End: 2020-01-29
Payer: MEDICARE

## 2020-01-27 PROCEDURE — 77063 BREAST TOMOSYNTHESIS BI: CPT

## 2020-02-19 ENCOUNTER — OFFICE VISIT (OUTPATIENT)
Dept: ONCOLOGY | Age: 75
End: 2020-02-19
Payer: MEDICARE

## 2020-02-19 ENCOUNTER — HOSPITAL ENCOUNTER (OUTPATIENT)
Dept: INFUSION THERAPY | Age: 75
Discharge: HOME | End: 2020-02-19
Payer: MEDICARE

## 2020-02-19 VITALS
HEIGHT: 68 IN | TEMPERATURE: 97.9 F | BODY MASS INDEX: 33.96 KG/M2 | WEIGHT: 224.1 LBS | SYSTOLIC BLOOD PRESSURE: 131 MMHG | HEART RATE: 65 BPM | DIASTOLIC BLOOD PRESSURE: 65 MMHG

## 2020-02-19 DIAGNOSIS — C50.911 STAGE 1 BREAST CANCER, ER+, RIGHT (HCC): ICD-10-CM

## 2020-02-19 DIAGNOSIS — C82.13 FOLLICULAR LYMPHOMA GRADE II OF INTRA-ABDOMINAL LYMPH NODES (HCC): Primary | ICD-10-CM

## 2020-02-19 DIAGNOSIS — C82.09 GRADE I FOLLICULAR LYMPHOMA OF EXTRANODAL SITE EXCLUDING SPLEEN AND OTHER SOLID ORGANS (HCC): ICD-10-CM

## 2020-02-19 DIAGNOSIS — Z17.0 STAGE 1 BREAST CANCER, ER+, RIGHT (HCC): ICD-10-CM

## 2020-02-19 LAB
ALBUMIN SERPL-MCNC: 4.4 G/DL (ref 3.5–5.2)
ALP BLD-CCNC: 97 U/L (ref 35–104)
ALT SERPL-CCNC: 13 U/L (ref 0–32)
ANION GAP SERPL CALCULATED.3IONS-SCNC: 13 MMOL/L (ref 7–16)
AST SERPL-CCNC: 17 U/L (ref 0–31)
BASOPHILS ABSOLUTE: 0.02 E9/L (ref 0–0.2)
BASOPHILS RELATIVE PERCENT: 0.4 % (ref 0–2)
BILIRUB SERPL-MCNC: 0.5 MG/DL (ref 0–1.2)
BUN BLDV-MCNC: 18 MG/DL (ref 8–23)
CALCIUM SERPL-MCNC: 10.3 MG/DL (ref 8.6–10.2)
CHLORIDE BLD-SCNC: 98 MMOL/L (ref 98–107)
CO2: 25 MMOL/L (ref 22–29)
CREAT SERPL-MCNC: 1 MG/DL (ref 0.5–1)
EOSINOPHILS ABSOLUTE: 0.15 E9/L (ref 0.05–0.5)
EOSINOPHILS RELATIVE PERCENT: 3 % (ref 0–6)
GFR AFRICAN AMERICAN: >60
GFR NON-AFRICAN AMERICAN: 54 ML/MIN/1.73
GLUCOSE BLD-MCNC: 131 MG/DL (ref 74–99)
HCT VFR BLD CALC: 39.9 % (ref 34–48)
HEMOGLOBIN: 13.6 G/DL (ref 11.5–15.5)
IMMATURE GRANULOCYTES #: 0.02 E9/L
IMMATURE GRANULOCYTES %: 0.4 % (ref 0–5)
LYMPHOCYTES ABSOLUTE: 0.77 E9/L (ref 1.5–4)
LYMPHOCYTES RELATIVE PERCENT: 15.3 % (ref 20–42)
MCH RBC QN AUTO: 31.2 PG (ref 26–35)
MCHC RBC AUTO-ENTMCNC: 34.1 % (ref 32–34.5)
MCV RBC AUTO: 91.5 FL (ref 80–99.9)
MONOCYTES ABSOLUTE: 0.87 E9/L (ref 0.1–0.95)
MONOCYTES RELATIVE PERCENT: 17.3 % (ref 2–12)
NEUTROPHILS ABSOLUTE: 3.21 E9/L (ref 1.8–7.3)
NEUTROPHILS RELATIVE PERCENT: 63.6 % (ref 43–80)
PDW BLD-RTO: 12.9 FL (ref 11.5–15)
PLATELET # BLD: 186 E9/L (ref 130–450)
PMV BLD AUTO: 9 FL (ref 7–12)
POTASSIUM SERPL-SCNC: 4.4 MMOL/L (ref 3.5–5)
RBC # BLD: 4.36 E12/L (ref 3.5–5.5)
SODIUM BLD-SCNC: 136 MMOL/L (ref 132–146)
TOTAL PROTEIN: 7.2 G/DL (ref 6.4–8.3)
WBC # BLD: 5 E9/L (ref 4.5–11.5)

## 2020-02-19 PROCEDURE — 80053 COMPREHEN METABOLIC PANEL: CPT

## 2020-02-19 PROCEDURE — 99214 OFFICE O/P EST MOD 30 MIN: CPT

## 2020-02-19 PROCEDURE — 85025 COMPLETE CBC W/AUTO DIFF WBC: CPT

## 2020-02-19 PROCEDURE — 6360000002 HC RX W HCPCS: Performed by: INTERNAL MEDICINE

## 2020-02-19 PROCEDURE — 2580000003 HC RX 258: Performed by: INTERNAL MEDICINE

## 2020-02-19 RX ORDER — HEPARIN SODIUM (PORCINE) LOCK FLUSH IV SOLN 100 UNIT/ML 100 UNIT/ML
500 SOLUTION INTRAVENOUS PRN
Status: CANCELLED | OUTPATIENT
Start: 2020-02-19

## 2020-02-19 RX ORDER — HEPARIN SODIUM (PORCINE) LOCK FLUSH IV SOLN 100 UNIT/ML 100 UNIT/ML
500 SOLUTION INTRAVENOUS PRN
Status: DISCONTINUED | OUTPATIENT
Start: 2020-02-19 | End: 2020-02-20 | Stop reason: HOSPADM

## 2020-02-19 RX ORDER — SODIUM CHLORIDE 0.9 % (FLUSH) 0.9 %
10 SYRINGE (ML) INJECTION PRN
Status: CANCELLED | OUTPATIENT
Start: 2020-02-19

## 2020-02-19 RX ORDER — SODIUM CHLORIDE 0.9 % (FLUSH) 0.9 %
10 SYRINGE (ML) INJECTION PRN
Status: DISCONTINUED | OUTPATIENT
Start: 2020-02-19 | End: 2020-02-20 | Stop reason: HOSPADM

## 2020-02-19 RX ADMIN — SODIUM CHLORIDE, PRESERVATIVE FREE 10 ML: 5 INJECTION INTRAVENOUS at 13:35

## 2020-02-19 RX ADMIN — Medication 500 UNITS: at 13:35

## 2020-02-19 NOTE — PROGRESS NOTES
daily   Yes Historical Provider, MD   pioglitazone (ACTOS) 15 MG tablet Take 15 mg by mouth nightly   Yes Historical Provider, MD   atorvastatin (LIPITOR) 40 MG tablet Take 40 mg by mouth nightly   Yes Historical Provider, MD   levothyroxine (SYNTHROID) 50 MCG tablet Take 75 mcg by mouth Daily    Yes Historical Provider, MD   metFORMIN (GLUCOPHAGE-XR) 500 MG extended release tablet Take 500 mg by mouth daily (with breakfast)   Yes Historical Provider, MD    Scheduled Meds:  Continuous Infusions:  PRN Meds:.        Recent Laboratory Data-     Lab Results   Component Value Date    WBC 5.0 02/19/2020    HGB 13.6 02/19/2020    HCT 39.9 02/19/2020    MCV 91.5 02/19/2020     02/19/2020    LYMPHOPCT 15.3 (L) 02/19/2020    RBC 4.36 02/19/2020    MCH 31.2 02/19/2020    MCHC 34.1 02/19/2020    RDW 12.9 02/19/2020    NEUTOPHILPCT 63.6 02/19/2020    MONOPCT 17.3 (H) 02/19/2020    BASOPCT 0.4 02/19/2020    NEUTROABS 3.21 02/19/2020    LYMPHSABS 0.77 (L) 02/19/2020    MONOSABS 0.87 02/19/2020    EOSABS 0.15 02/19/2020    BASOSABS 0.02 02/19/2020       Lab Results   Component Value Date     10/14/2019    K 4.5 10/14/2019     10/14/2019    CO2 25 10/14/2019    BUN 21 10/14/2019    CREATININE 1.2 (H) 10/14/2019    GLUCOSE 135 (H) 10/14/2019    CALCIUM 10.1 10/14/2019    PROT 7.4 10/14/2019    LABALBU 4.2 10/14/2019    BILITOT 0.4 10/14/2019    ALKPHOS 97 10/14/2019    AST 15 10/14/2019    ALT 10 10/14/2019    LABGLOM 44 10/14/2019    GFRAA 53 10/14/2019         Radiology-  BILATERAL SCREENING MAMMOGRAM:  1/27/2020  IMPRESSION:   No mammographic evidence of malignancy.       Screening mammogram in 1 year is recommended.       =======================================   BI-RADS Category 1:  Negative   =======================================             ASSESSMENT/PLAN :  A  70year-old woman with bulky intra-abdominal lymphoma with extensive retroperitoneal and mesenteric lymphadenopathy with bilateral hydronephrosis surgical margins were achieved     It was explained to her that the mainstay of adjuvant therapy at her age would be an aromatase inhibitor. Oncotype DX or mammaprint will be done to determine if she has any significant benefit from chemo which I doubt     Requested  for Oncotype or mammaprint. Was submitted to pathology department at [facility].   Oncotype DX  RS 17---Low risk for recurrence. She will be recommended adjuvant aromatase inhibitor to be followed by adjuvant local radiation therapy to the right breast     Radiation oncology consult was obtained. She was initiated on anastrozole 1 mg daily along with calcium and vitamin D supplements on 11/21/18.     Bone density was done on 5/11/18           PLAN:  Her last mammogram done on January 27, 2020 was negative      She will be maintained on anastrozole along with calcium and vitamin D supplements for her breast cancer   Her recent mammogram in February 2020 was negative        Chalo Wall. Philip Jeronimo M.D., F.A.C.P.   Electronically signed 2/19/2020 at 2:09 PM

## 2020-02-19 NOTE — PROGRESS NOTES
Patient presents to clinic for office visit/labs/port flush today. Left chest single  SQ port accessed per policy using 46W 7.80IKPT Burrell needle for good blood return. Specimen sent to lab. Site flushed easily with 10 mL NSS followed by 5 mL Heparin solution 100 units/ml rinse prior to de-access. Dry sterile dressing to area. Tolerated procedure well. Encouraged to schedule port flush every 4 weeks.

## 2020-04-06 ENCOUNTER — TELEPHONE (OUTPATIENT)
Dept: INFUSION THERAPY | Age: 75
End: 2020-04-06

## 2020-04-09 ENCOUNTER — HOSPITAL ENCOUNTER (OUTPATIENT)
Dept: INFUSION THERAPY | Age: 75
Discharge: HOME | End: 2020-04-09
Payer: MEDICARE

## 2020-04-09 DIAGNOSIS — Z17.0 STAGE 1 BREAST CANCER, ER+, RIGHT (HCC): Primary | ICD-10-CM

## 2020-04-09 DIAGNOSIS — C82.13 FOLLICULAR LYMPHOMA GRADE II OF INTRA-ABDOMINAL LYMPH NODES (HCC): ICD-10-CM

## 2020-04-09 DIAGNOSIS — C82.09 GRADE I FOLLICULAR LYMPHOMA OF EXTRANODAL SITE EXCLUDING SPLEEN AND OTHER SOLID ORGANS (HCC): ICD-10-CM

## 2020-04-09 DIAGNOSIS — C50.911 STAGE 1 BREAST CANCER, ER+, RIGHT (HCC): Primary | ICD-10-CM

## 2020-04-09 PROCEDURE — 6360000002 HC RX W HCPCS

## 2020-04-09 PROCEDURE — 2580000003 HC RX 258: Performed by: INTERNAL MEDICINE

## 2020-04-09 PROCEDURE — 96523 IRRIG DRUG DELIVERY DEVICE: CPT

## 2020-04-09 RX ORDER — HEPARIN SODIUM (PORCINE) LOCK FLUSH IV SOLN 100 UNIT/ML 100 UNIT/ML
500 SOLUTION INTRAVENOUS PRN
Status: CANCELLED | OUTPATIENT
Start: 2020-04-09

## 2020-04-09 RX ORDER — SODIUM CHLORIDE 0.9 % (FLUSH) 0.9 %
10 SYRINGE (ML) INJECTION PRN
Status: DISCONTINUED | OUTPATIENT
Start: 2020-04-09 | End: 2020-04-10 | Stop reason: HOSPADM

## 2020-04-09 RX ORDER — HEPARIN SODIUM (PORCINE) LOCK FLUSH IV SOLN 100 UNIT/ML 100 UNIT/ML
500 SOLUTION INTRAVENOUS PRN
Status: DISCONTINUED | OUTPATIENT
Start: 2020-04-09 | End: 2020-04-10 | Stop reason: HOSPADM

## 2020-04-09 RX ORDER — SODIUM CHLORIDE 0.9 % (FLUSH) 0.9 %
10 SYRINGE (ML) INJECTION PRN
Status: CANCELLED | OUTPATIENT
Start: 2020-04-09

## 2020-04-09 RX ORDER — HEPARIN SODIUM (PORCINE) LOCK FLUSH IV SOLN 100 UNIT/ML 100 UNIT/ML
SOLUTION INTRAVENOUS
Status: COMPLETED
Start: 2020-04-09 | End: 2020-04-09

## 2020-04-09 RX ADMIN — HEPARIN SODIUM (PORCINE) LOCK FLUSH IV SOLN 100 UNIT/ML 500 UNITS: 100 SOLUTION at 14:36

## 2020-04-09 RX ADMIN — SODIUM CHLORIDE, PRESERVATIVE FREE 10 ML: 5 INJECTION INTRAVENOUS at 14:37

## 2020-04-09 RX ADMIN — Medication 500 UNITS: at 14:36

## 2020-04-09 NOTE — PROGRESS NOTES
PORT FLUSH    Patient presents to clinic for [facility] today. single  SQ port accessed per policy using 38F, . 75 inch Burrell needle for good blood return. .  Site flushed easily with 10 mL NSS followed by 5 mL Heparin solution 100 units/ml rinse prior to de-access. Dry sterile dressing to area. Tolerated procedure well. Encouraged to schedule port flush every 4 weeks.

## 2020-05-12 NOTE — PROGRESS NOTES
[address] [facility]. [facility] [place]        Pt Name: Pj Delong: 1945  Date of evaluation: 5/13/2020  Primary Care Physician: Pj Delong MD  Reason for evaluation:   Chief Complaint   Patient presents with    Breast Cancer     Right  ER+    Follow-Up from Hospital          Subjective: [facility] for follow up.  Feels well today. Patient was recently hospitalized at [facility] at the end of April with weakness and and dehydration as well as febrile illness. She stated her WBC count was very low. She was seen by hematology Dr. Tianna Kirkpatrick and underwent a bone marrow biopsy and the flow cytometry is available and shows less than 3% blasts and was otherwise nonspecific. Bone marrow morphology report not available. Apparently she stated that she might have been tested for COVID-19 but does not know the results. She denies any constitutional B symptoms. She is back on anastrozole along with calcium and vitamin D supplements. Tolerating anastrozole well      OBJECTIVE:  VITALS:  height is 5' 8\" (1.727 m) and weight is 224 lb 8 oz (101.8 kg). Her temporal temperature is 97.3 °F (36.3 °C). Her blood pressure is 132/59 (abnormal) and her pulse is 70. Her oxygen saturation is 99%. Physical Exam:  Performance Status: 0  Well developed, well nourished female  Head and neck: PERRLA, EOMI. Sclera non icteric. Oropharynx: Clear  Neck: no JVD,  bilateral cervical lymphadenopathy markedly decreased in size  Heart: Regular rate and regular rhythm. No murmur. Lungs: Clear to auscultation. Abdomen: Soft, non-tender;no masses, no organomegaly. Extremities: No edema,no cyanosis, no clubbing. Neurologic: Cranial nerves grossly intact. Very mild weakness in the right upper and right lower extremity. Medi-port:  Left Subclavian      Medications  Prior to Admission medications    Medication Sig Start Date End Date Taking?  Authorizing Provider   Umeclidinium AST 17 02/19/2020    ALT 13 02/19/2020    LABGLOM 54 02/19/2020    GFRAA >60 02/19/2020         Radiology-  BILATERAL SCREENING MAMMOGRAM:  1/27/2020  IMPRESSION:   No mammographic evidence of malignancy.       Screening mammogram in 1 year is recommended.       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 All potential side effects of chemotherapy were discussed  She tolerated cycle 1 of chemotherapy very well and had no evidence of tumor lysis syndrome        She completed Cycle #4 of Rituxan and Bendeka on 3/15/17       She was restaged 3/23/17 with a follow-up PET CT scan which showed essentially complete remission and resolution of her diffuse bulky lymphadenopathy as well as resolution of bilateral hydronephrosis.  There was question of an ill-defined lesion at the level of the introitus and vagina but she has no GYN symptomatology whatsoever and she will follow with her gynecologist      She was then recommended maintenance Rituxan for 2 years.  Fazal Lyn  Received her 1st dose of maintenance Riruxan on 5/17/2017      The fact that her absolute lymphocyte count remains very low she will be maintained on VZV prophylaxis with acyclovir 400 mg twice a day.      Her spironolactone was discontinued because of her renal insufficiency         Her PET/CT scan was again unremarkable without any intrathoracic abnormality and no evidence of recurrent lymphadenopathy      She completed Cycle #12 Maintenance Rituxan/Hycela on 3/13/19    She was cleared to receive her Shingrix vaccine in September 2- Postmenopausal right  breast cancer stage I , T1c N1mi M0 with positive ER and ND receptors and nonamplified HER-2/kylah by fish. One out of 4 sentinel nodes had micrometastasis measuring only 2 mm  Clear surgical margins were achieved     It was explained to her that the mainstay of adjuvant therapy at her age would be an aromatase inhibitor. Oncotype DX or mammaprint will be done to determine if she has any significant benefit from chemo which I doubt     Requested  for Oncotype or mammaprint. Was submitted to pathology department at [address] [place] [address].   Oncotype DX  RS 17---Low risk for recurrence. She will be recommended adjuvant aromatase inhibitor to be followed by adjuvant local radiation therapy to the right breast     Radiation oncology consult was obtained. She was initiated on anastrozole 1 mg daily along with calcium and vitamin D supplements on 11/21/18.     Bone density was done on 5/11/18     Her last mammogram done on January 27, 2020 was negative    She will be maintained on anastrozole along with calcium and vitamin D supplements for her breast cancer  Her recent mammogram in February 2020 was negative      PLAN:  Recently hospitalized at [facility] with weakness and abnormal WBC counts. She underwent a Bone Marrow Biopsy and Aspiration on May 6, 2020. It was nonspecific with less than 3% blasts.     CBC done today is completely in the normal range and this is suggestive that her neutropenia was related to bone marrow suppression by a systemic viral illness which she has recovered from    Her discharge summary from [address] [place] [address] will be retrieved and reviewed and her COVID-19 tests will be rechecked with consideration for either another swab or the source COVID-19 antibody test.      Gavin Bland M.D., F.A.C.P.   Electronically signed 5/13/2020 at 9:27 AM

## 2020-05-13 ENCOUNTER — OFFICE VISIT (OUTPATIENT)
Dept: ONCOLOGY | Age: 75
End: 2020-05-13
Payer: MEDICARE

## 2020-05-13 ENCOUNTER — HOSPITAL ENCOUNTER (OUTPATIENT)
Dept: INFUSION THERAPY | Age: 75
Discharge: HOME | End: 2020-05-13
Payer: MEDICARE

## 2020-05-13 VITALS
HEART RATE: 70 BPM | OXYGEN SATURATION: 99 % | WEIGHT: 224.5 LBS | TEMPERATURE: 97.3 F | HEIGHT: 68 IN | DIASTOLIC BLOOD PRESSURE: 59 MMHG | SYSTOLIC BLOOD PRESSURE: 132 MMHG | BODY MASS INDEX: 34.02 KG/M2

## 2020-05-13 DIAGNOSIS — C82.09 GRADE I FOLLICULAR LYMPHOMA OF EXTRANODAL SITE EXCLUDING SPLEEN AND OTHER SOLID ORGANS (HCC): Primary | ICD-10-CM

## 2020-05-13 LAB
ALBUMIN SERPL-MCNC: 4.4 G/DL (ref 3.5–5.2)
ALP BLD-CCNC: 91 U/L (ref 35–104)
ALT SERPL-CCNC: 14 U/L (ref 0–32)
ANION GAP SERPL CALCULATED.3IONS-SCNC: 15 MMOL/L (ref 7–16)
AST SERPL-CCNC: 15 U/L (ref 0–31)
BASOPHILS ABSOLUTE: 0.1 E9/L (ref 0–0.2)
BASOPHILS RELATIVE PERCENT: 1.8 % (ref 0–2)
BILIRUB SERPL-MCNC: 0.5 MG/DL (ref 0–1.2)
BUN BLDV-MCNC: 18 MG/DL (ref 8–23)
CALCIUM SERPL-MCNC: 10.4 MG/DL (ref 8.6–10.2)
CHLORIDE BLD-SCNC: 98 MMOL/L (ref 98–107)
CO2: 26 MMOL/L (ref 22–29)
CREAT SERPL-MCNC: 1 MG/DL (ref 0.5–1)
EOSINOPHILS ABSOLUTE: 0.24 E9/L (ref 0.05–0.5)
EOSINOPHILS RELATIVE PERCENT: 4.2 % (ref 0–6)
GFR AFRICAN AMERICAN: >60
GFR NON-AFRICAN AMERICAN: 54 ML/MIN/1.73
GLUCOSE BLD-MCNC: 160 MG/DL (ref 74–99)
HCT VFR BLD CALC: 41.7 % (ref 34–48)
HEMOGLOBIN: 13.8 G/DL (ref 11.5–15.5)
IMMATURE GRANULOCYTES #: 0.12 E9/L
IMMATURE GRANULOCYTES %: 2.1 % (ref 0–5)
LACTATE DEHYDROGENASE: 187 U/L (ref 135–214)
LYMPHOCYTES ABSOLUTE: 1.05 E9/L (ref 1.5–4)
LYMPHOCYTES RELATIVE PERCENT: 18.5 % (ref 20–42)
MCH RBC QN AUTO: 29.9 PG (ref 26–35)
MCHC RBC AUTO-ENTMCNC: 33.1 % (ref 32–34.5)
MCV RBC AUTO: 90.3 FL (ref 80–99.9)
MONOCYTES ABSOLUTE: 0.97 E9/L (ref 0.1–0.95)
MONOCYTES RELATIVE PERCENT: 17.1 % (ref 2–12)
NEUTROPHILS ABSOLUTE: 3.19 E9/L (ref 1.8–7.3)
NEUTROPHILS RELATIVE PERCENT: 56.3 % (ref 43–80)
PDW BLD-RTO: 12.8 FL (ref 11.5–15)
PLATELET # BLD: 263 E9/L (ref 130–450)
PMV BLD AUTO: 9.2 FL (ref 7–12)
POTASSIUM SERPL-SCNC: 4.2 MMOL/L (ref 3.5–5)
RBC # BLD: 4.62 E12/L (ref 3.5–5.5)
SODIUM BLD-SCNC: 139 MMOL/L (ref 132–146)
TOTAL PROTEIN: 7 G/DL (ref 6.4–8.3)
WBC # BLD: 5.7 E9/L (ref 4.5–11.5)

## 2020-05-13 PROCEDURE — 99214 OFFICE O/P EST MOD 30 MIN: CPT

## 2020-05-13 PROCEDURE — 83615 LACTATE (LD) (LDH) ENZYME: CPT

## 2020-05-13 PROCEDURE — 80053 COMPREHEN METABOLIC PANEL: CPT

## 2020-05-13 PROCEDURE — 2580000003 HC RX 258: Performed by: NURSE PRACTITIONER

## 2020-05-13 PROCEDURE — 85025 COMPLETE CBC W/AUTO DIFF WBC: CPT

## 2020-05-13 PROCEDURE — 6360000002 HC RX W HCPCS

## 2020-05-13 RX ORDER — SODIUM CHLORIDE 0.9 % (FLUSH) 0.9 %
10 SYRINGE (ML) INJECTION PRN
Status: DISCONTINUED | OUTPATIENT
Start: 2020-05-13 | End: 2020-05-14 | Stop reason: HOSPADM

## 2020-05-13 RX ORDER — HEPARIN SODIUM (PORCINE) LOCK FLUSH IV SOLN 100 UNIT/ML 100 UNIT/ML
SOLUTION INTRAVENOUS
Status: COMPLETED
Start: 2020-05-13 | End: 2020-05-13

## 2020-05-13 RX ORDER — HEPARIN SODIUM (PORCINE) LOCK FLUSH IV SOLN 100 UNIT/ML 100 UNIT/ML
500 SOLUTION INTRAVENOUS PRN
Status: DISCONTINUED | OUTPATIENT
Start: 2020-05-13 | End: 2020-05-14 | Stop reason: HOSPADM

## 2020-05-13 RX ADMIN — HEPARIN SODIUM (PORCINE) LOCK FLUSH IV SOLN 100 UNIT/ML 500 UNITS: 100 SOLUTION at 09:15

## 2020-05-13 RX ADMIN — SODIUM CHLORIDE, PRESERVATIVE FREE 10 ML: 5 INJECTION INTRAVENOUS at 09:15

## 2020-05-13 RX ADMIN — Medication 500 UNITS: at 09:15

## 2020-05-13 NOTE — PROGRESS NOTES
Patient presents to clinic for office visit/labs/port flush today. 20  SQ port accessed per policy using 7.37 Burrell needle for good blood return. Specimen sent to lab. Site flushed easily with 10 mL NSS followed by 5 mL Heparin solution 100 units/ml rinse prior to de-access. Dry sterile dressing to area. Tolerated procedure well. Encouraged to schedule port flush every 4 weeks.

## 2020-05-21 ENCOUNTER — HOSPITAL ENCOUNTER (OUTPATIENT)
Age: 75
Discharge: HOME | End: 2020-05-23
Payer: MEDICARE

## 2020-05-21 LAB
ALBUMIN SERPL-MCNC: 4.7 G/DL (ref 3.5–5.2)
ALP BLD-CCNC: 90 U/L (ref 35–104)
ALT SERPL-CCNC: 11 U/L (ref 0–32)
ANION GAP SERPL CALCULATED.3IONS-SCNC: 20 MMOL/L (ref 7–16)
AST SERPL-CCNC: 22 U/L (ref 0–31)
BASOPHILS ABSOLUTE: 0.05 E9/L (ref 0–0.2)
BASOPHILS RELATIVE PERCENT: 0.7 % (ref 0–2)
BILIRUB SERPL-MCNC: 0.5 MG/DL (ref 0–1.2)
BUN BLDV-MCNC: 20 MG/DL (ref 8–23)
C-REACTIVE PROTEIN: 0.1 MG/DL (ref 0–0.4)
CALCIUM SERPL-MCNC: 10.6 MG/DL (ref 8.6–10.2)
CHLORIDE BLD-SCNC: 102 MMOL/L (ref 98–107)
CO2: 24 MMOL/L (ref 22–29)
CREAT SERPL-MCNC: 0.9 MG/DL (ref 0.5–1)
EOSINOPHILS ABSOLUTE: 0.34 E9/L (ref 0.05–0.5)
EOSINOPHILS RELATIVE PERCENT: 4.8 % (ref 0–6)
GFR AFRICAN AMERICAN: >60
GFR NON-AFRICAN AMERICAN: >60 ML/MIN/1.73
GLUCOSE BLD-MCNC: 129 MG/DL (ref 74–99)
HCT VFR BLD CALC: 45.9 % (ref 34–48)
HEMOGLOBIN: 14.7 G/DL (ref 11.5–15.5)
IMMATURE GRANULOCYTES #: 0.02 E9/L
IMMATURE GRANULOCYTES %: 0.3 % (ref 0–5)
LYMPHOCYTES ABSOLUTE: 1.01 E9/L (ref 1.5–4)
LYMPHOCYTES RELATIVE PERCENT: 14.4 % (ref 20–42)
MCH RBC QN AUTO: 29.2 PG (ref 26–35)
MCHC RBC AUTO-ENTMCNC: 32 % (ref 32–34.5)
MCV RBC AUTO: 91.3 FL (ref 80–99.9)
MONOCYTES ABSOLUTE: 0.77 E9/L (ref 0.1–0.95)
MONOCYTES RELATIVE PERCENT: 11 % (ref 2–12)
NEUTROPHILS ABSOLUTE: 4.84 E9/L (ref 1.8–7.3)
NEUTROPHILS RELATIVE PERCENT: 68.8 % (ref 43–80)
PDW BLD-RTO: 13 FL (ref 11.5–15)
PLATELET # BLD: 209 E9/L (ref 130–450)
PMV BLD AUTO: 10.5 FL (ref 7–12)
POTASSIUM SERPL-SCNC: 4.8 MMOL/L (ref 3.5–5)
RBC # BLD: 5.03 E12/L (ref 3.5–5.5)
SODIUM BLD-SCNC: 146 MMOL/L (ref 132–146)
TOTAL PROTEIN: 7.5 G/DL (ref 6.4–8.3)
WBC # BLD: 7 E9/L (ref 4.5–11.5)

## 2020-05-21 PROCEDURE — 86140 C-REACTIVE PROTEIN: CPT

## 2020-05-21 PROCEDURE — 85651 RBC SED RATE NONAUTOMATED: CPT

## 2020-05-21 PROCEDURE — 36415 COLL VENOUS BLD VENIPUNCTURE: CPT

## 2020-05-21 PROCEDURE — 80053 COMPREHEN METABOLIC PANEL: CPT

## 2020-05-21 PROCEDURE — 85025 COMPLETE CBC W/AUTO DIFF WBC: CPT

## 2020-05-22 LAB — SEDIMENTATION RATE, ERYTHROCYTE: 13 MM/HR (ref 0–20)

## 2020-06-08 RX ORDER — ANASTROZOLE 1 MG/1
1 TABLET ORAL DAILY
Qty: 90 TABLET | Refills: 1 | Status: SHIPPED | OUTPATIENT
Start: 2020-06-08 | End: 2020-12-16 | Stop reason: SDUPTHER

## 2020-06-09 ENCOUNTER — HOSPITAL ENCOUNTER (OUTPATIENT)
Dept: CT IMAGING | Age: 75
Discharge: HOME | End: 2020-06-09
Payer: MEDICARE

## 2020-06-09 PROCEDURE — 74176 CT ABD & PELVIS W/O CONTRAST: CPT

## 2020-06-16 NOTE — PROGRESS NOTES
06/17/2020    ALKPHOS 87 06/17/2020    AST 16 06/17/2020    ALT 13 06/17/2020    LABGLOM >60 06/17/2020    GFRAA >60 06/17/2020         Radiology-  BILATERAL SCREENING MAMMOGRAM:  1/27/2020  IMPRESSION:   No mammographic evidence of malignancy.       Screening mammogram in 1 year is recommended.       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 All potential side effects of chemotherapy were discussed  She tolerated cycle 1 of chemotherapy very well and had no evidence of tumor lysis syndrome        She completed Cycle #4 of Rituxan and Bendeka on 3/15/17       She was restaged 3/23/17 with a follow-up PET CT scan which showed essentially complete remission and resolution of her diffuse bulky lymphadenopathy as well as resolution of bilateral hydronephrosis.  There was question of an ill-defined lesion at the level of the introitus and vagina but she has no GYN symptomatology whatsoever and she will follow with her gynecologist      She was then recommended maintenance Rituxan for 2 years.  Elmira Villegas  Received her 1st dose of maintenance Rituxan on 5/17/2017      The fact that her absolute lymphocyte count remains very low she will be maintained on VZV

## 2020-06-17 ENCOUNTER — HOSPITAL ENCOUNTER (OUTPATIENT)
Dept: INFUSION THERAPY | Age: 75
Discharge: HOME | End: 2020-06-17
Payer: MEDICARE

## 2020-06-17 ENCOUNTER — OFFICE VISIT (OUTPATIENT)
Dept: ONCOLOGY | Age: 75
End: 2020-06-17
Payer: MEDICARE

## 2020-06-17 VITALS
HEART RATE: 63 BPM | HEIGHT: 68 IN | DIASTOLIC BLOOD PRESSURE: 72 MMHG | BODY MASS INDEX: 33.8 KG/M2 | SYSTOLIC BLOOD PRESSURE: 144 MMHG | OXYGEN SATURATION: 98 % | TEMPERATURE: 97.5 F | WEIGHT: 223 LBS

## 2020-06-17 DIAGNOSIS — C82.09 GRADE I FOLLICULAR LYMPHOMA OF EXTRANODAL SITE EXCLUDING SPLEEN AND OTHER SOLID ORGANS (HCC): ICD-10-CM

## 2020-06-17 DIAGNOSIS — Z17.0 STAGE 1 BREAST CANCER, ER+, RIGHT (HCC): ICD-10-CM

## 2020-06-17 DIAGNOSIS — C50.911 STAGE 1 BREAST CANCER, ER+, RIGHT (HCC): ICD-10-CM

## 2020-06-17 DIAGNOSIS — Z17.0 MALIGNANT NEOPLASM OF RIGHT BREAST, STAGE 1, ESTROGEN RECEPTOR POSITIVE (HCC): ICD-10-CM

## 2020-06-17 DIAGNOSIS — C82.13 FOLLICULAR LYMPHOMA GRADE II OF INTRA-ABDOMINAL LYMPH NODES (HCC): Primary | ICD-10-CM

## 2020-06-17 DIAGNOSIS — C50.911 MALIGNANT NEOPLASM OF RIGHT BREAST, STAGE 1, ESTROGEN RECEPTOR POSITIVE (HCC): ICD-10-CM

## 2020-06-17 DIAGNOSIS — Z20.822 CLOSE EXPOSURE TO COVID-19 VIRUS: ICD-10-CM

## 2020-06-17 LAB
ALBUMIN SERPL-MCNC: 4.4 G/DL (ref 3.5–5.2)
ALP BLD-CCNC: 87 U/L (ref 35–104)
ALT SERPL-CCNC: 13 U/L (ref 0–32)
ANION GAP SERPL CALCULATED.3IONS-SCNC: 15 MMOL/L (ref 7–16)
AST SERPL-CCNC: 16 U/L (ref 0–31)
BASOPHILS ABSOLUTE: 0.03 E9/L (ref 0–0.2)
BASOPHILS RELATIVE PERCENT: 0.5 % (ref 0–2)
BILIRUB SERPL-MCNC: 0.4 MG/DL (ref 0–1.2)
BUN BLDV-MCNC: 20 MG/DL (ref 8–23)
CALCIUM SERPL-MCNC: 10.1 MG/DL (ref 8.6–10.2)
CHLORIDE BLD-SCNC: 101 MMOL/L (ref 98–107)
CO2: 25 MMOL/L (ref 22–29)
CREAT SERPL-MCNC: 0.9 MG/DL (ref 0.5–1)
EOSINOPHILS ABSOLUTE: 0.2 E9/L (ref 0.05–0.5)
EOSINOPHILS RELATIVE PERCENT: 3.5 % (ref 0–6)
GFR AFRICAN AMERICAN: >60
GFR NON-AFRICAN AMERICAN: >60 ML/MIN/1.73
GLUCOSE BLD-MCNC: 135 MG/DL (ref 74–99)
HCT VFR BLD CALC: 40.5 % (ref 34–48)
HEMOGLOBIN: 13.5 G/DL (ref 11.5–15.5)
IMMATURE GRANULOCYTES #: 0.02 E9/L
IMMATURE GRANULOCYTES %: 0.3 % (ref 0–5)
LACTATE DEHYDROGENASE: 161 U/L (ref 135–214)
LYMPHOCYTES ABSOLUTE: 0.95 E9/L (ref 1.5–4)
LYMPHOCYTES RELATIVE PERCENT: 16.5 % (ref 20–42)
MCH RBC QN AUTO: 30.3 PG (ref 26–35)
MCHC RBC AUTO-ENTMCNC: 33.3 % (ref 32–34.5)
MCV RBC AUTO: 91 FL (ref 80–99.9)
MONOCYTES ABSOLUTE: 0.76 E9/L (ref 0.1–0.95)
MONOCYTES RELATIVE PERCENT: 13.2 % (ref 2–12)
NEUTROPHILS ABSOLUTE: 3.8 E9/L (ref 1.8–7.3)
NEUTROPHILS RELATIVE PERCENT: 66 % (ref 43–80)
PDW BLD-RTO: 13.9 FL (ref 11.5–15)
PLATELET # BLD: 203 E9/L (ref 130–450)
PMV BLD AUTO: 9.1 FL (ref 7–12)
POTASSIUM SERPL-SCNC: 4.1 MMOL/L (ref 3.5–5)
RBC # BLD: 4.45 E12/L (ref 3.5–5.5)
SODIUM BLD-SCNC: 141 MMOL/L (ref 132–146)
TOTAL PROTEIN: 7.1 G/DL (ref 6.4–8.3)
WBC # BLD: 5.8 E9/L (ref 4.5–11.5)

## 2020-06-17 PROCEDURE — 2580000003 HC RX 258: Performed by: INTERNAL MEDICINE

## 2020-06-17 PROCEDURE — 6360000002 HC RX W HCPCS: Performed by: INTERNAL MEDICINE

## 2020-06-17 PROCEDURE — 36415 COLL VENOUS BLD VENIPUNCTURE: CPT

## 2020-06-17 PROCEDURE — 85025 COMPLETE CBC W/AUTO DIFF WBC: CPT

## 2020-06-17 PROCEDURE — 99214 OFFICE O/P EST MOD 30 MIN: CPT

## 2020-06-17 PROCEDURE — 86769 SARS-COV-2 COVID-19 ANTIBODY: CPT

## 2020-06-17 PROCEDURE — 83615 LACTATE (LD) (LDH) ENZYME: CPT

## 2020-06-17 PROCEDURE — 80053 COMPREHEN METABOLIC PANEL: CPT

## 2020-06-17 RX ORDER — SODIUM CHLORIDE 0.9 % (FLUSH) 0.9 %
10 SYRINGE (ML) INJECTION PRN
Status: DISCONTINUED | OUTPATIENT
Start: 2020-06-17 | End: 2020-06-18 | Stop reason: HOSPADM

## 2020-06-17 RX ORDER — SODIUM CHLORIDE 0.9 % (FLUSH) 0.9 %
20 SYRINGE (ML) INJECTION PRN
Status: CANCELLED | OUTPATIENT
Start: 2020-06-17

## 2020-06-17 RX ORDER — HEPARIN SODIUM (PORCINE) LOCK FLUSH IV SOLN 100 UNIT/ML 100 UNIT/ML
500 SOLUTION INTRAVENOUS PRN
Status: CANCELLED | OUTPATIENT
Start: 2020-06-17

## 2020-06-17 RX ORDER — HEPARIN SODIUM (PORCINE) LOCK FLUSH IV SOLN 100 UNIT/ML 100 UNIT/ML
500 SOLUTION INTRAVENOUS PRN
Status: DISCONTINUED | OUTPATIENT
Start: 2020-06-17 | End: 2020-06-18 | Stop reason: HOSPADM

## 2020-06-17 RX ORDER — SODIUM CHLORIDE 0.9 % (FLUSH) 0.9 %
20 SYRINGE (ML) INJECTION PRN
Status: DISCONTINUED | OUTPATIENT
Start: 2020-06-17 | End: 2020-06-17

## 2020-06-17 RX ORDER — SODIUM CHLORIDE 0.9 % (FLUSH) 0.9 %
10 SYRINGE (ML) INJECTION PRN
Status: CANCELLED | OUTPATIENT
Start: 2020-06-17

## 2020-06-17 RX ADMIN — Medication 10 ML: at 10:47

## 2020-06-17 RX ADMIN — SODIUM CHLORIDE, PRESERVATIVE FREE 500 UNITS: 5 INJECTION INTRAVENOUS at 10:48

## 2020-06-17 RX ADMIN — Medication 20 ML: at 10:48

## 2020-06-19 LAB — SARS-COV-2, IGG: NEGATIVE

## 2020-09-15 ENCOUNTER — HOSPITAL ENCOUNTER (OUTPATIENT)
Dept: INFUSION THERAPY | Age: 75
Discharge: HOME | End: 2020-09-15
Payer: MEDICARE

## 2020-09-15 DIAGNOSIS — C82.13 FOLLICULAR LYMPHOMA GRADE II OF INTRA-ABDOMINAL LYMPH NODES (HCC): ICD-10-CM

## 2020-09-15 DIAGNOSIS — Z17.0 MALIGNANT NEOPLASM OF RIGHT BREAST, STAGE 1, ESTROGEN RECEPTOR POSITIVE (HCC): Primary | ICD-10-CM

## 2020-09-15 DIAGNOSIS — C50.911 STAGE 1 BREAST CANCER, ER+, RIGHT (HCC): ICD-10-CM

## 2020-09-15 DIAGNOSIS — C50.911 MALIGNANT NEOPLASM OF RIGHT BREAST, STAGE 1, ESTROGEN RECEPTOR POSITIVE (HCC): Primary | ICD-10-CM

## 2020-09-15 DIAGNOSIS — Z17.0 STAGE 1 BREAST CANCER, ER+, RIGHT (HCC): ICD-10-CM

## 2020-09-15 DIAGNOSIS — C82.09 GRADE I FOLLICULAR LYMPHOMA OF EXTRANODAL SITE EXCLUDING SPLEEN AND OTHER SOLID ORGANS (HCC): ICD-10-CM

## 2020-09-15 LAB
ALBUMIN SERPL-MCNC: 4.4 G/DL (ref 3.5–5.2)
ALP BLD-CCNC: 88 U/L (ref 35–104)
ALT SERPL-CCNC: 16 U/L (ref 0–32)
ANION GAP SERPL CALCULATED.3IONS-SCNC: 14 MMOL/L (ref 7–16)
AST SERPL-CCNC: 17 U/L (ref 0–31)
BASOPHILS ABSOLUTE: 0.02 E9/L (ref 0–0.2)
BASOPHILS RELATIVE PERCENT: 0.4 % (ref 0–2)
BILIRUB SERPL-MCNC: 0.4 MG/DL (ref 0–1.2)
BUN BLDV-MCNC: 21 MG/DL (ref 8–23)
CALCIUM SERPL-MCNC: 10.4 MG/DL (ref 8.6–10.2)
CHLORIDE BLD-SCNC: 100 MMOL/L (ref 98–107)
CO2: 28 MMOL/L (ref 22–29)
CREAT SERPL-MCNC: 1.6 MG/DL (ref 0.5–1)
EOSINOPHILS ABSOLUTE: 0.11 E9/L (ref 0.05–0.5)
EOSINOPHILS RELATIVE PERCENT: 2.1 % (ref 0–6)
GFR AFRICAN AMERICAN: 38
GFR NON-AFRICAN AMERICAN: 32 ML/MIN/1.73
GLUCOSE BLD-MCNC: 163 MG/DL (ref 74–99)
HCT VFR BLD CALC: 41.2 % (ref 34–48)
HEMOGLOBIN: 13.8 G/DL (ref 11.5–15.5)
IMMATURE GRANULOCYTES #: 0.01 E9/L
IMMATURE GRANULOCYTES %: 0.2 % (ref 0–5)
LACTATE DEHYDROGENASE: 180 U/L (ref 135–214)
LYMPHOCYTES ABSOLUTE: 0.71 E9/L (ref 1.5–4)
LYMPHOCYTES RELATIVE PERCENT: 13.7 % (ref 20–42)
MCH RBC QN AUTO: 30.6 PG (ref 26–35)
MCHC RBC AUTO-ENTMCNC: 33.5 % (ref 32–34.5)
MCV RBC AUTO: 91.4 FL (ref 80–99.9)
MONOCYTES ABSOLUTE: 0.63 E9/L (ref 0.1–0.95)
MONOCYTES RELATIVE PERCENT: 12.1 % (ref 2–12)
NEUTROPHILS ABSOLUTE: 3.72 E9/L (ref 1.8–7.3)
NEUTROPHILS RELATIVE PERCENT: 71.5 % (ref 43–80)
PDW BLD-RTO: 13.6 FL (ref 11.5–15)
PLATELET # BLD: 203 E9/L (ref 130–450)
PMV BLD AUTO: 9.2 FL (ref 7–12)
POTASSIUM SERPL-SCNC: 4.1 MMOL/L (ref 3.5–5)
RBC # BLD: 4.51 E12/L (ref 3.5–5.5)
SODIUM BLD-SCNC: 142 MMOL/L (ref 132–146)
TOTAL PROTEIN: 7 G/DL (ref 6.4–8.3)
WBC # BLD: 5.2 E9/L (ref 4.5–11.5)

## 2020-09-15 PROCEDURE — 80053 COMPREHEN METABOLIC PANEL: CPT

## 2020-09-15 PROCEDURE — 6360000002 HC RX W HCPCS: Performed by: INTERNAL MEDICINE

## 2020-09-15 PROCEDURE — 2580000003 HC RX 258: Performed by: INTERNAL MEDICINE

## 2020-09-15 PROCEDURE — 36591 DRAW BLOOD OFF VENOUS DEVICE: CPT

## 2020-09-15 PROCEDURE — 85025 COMPLETE CBC W/AUTO DIFF WBC: CPT

## 2020-09-15 PROCEDURE — 83615 LACTATE (LD) (LDH) ENZYME: CPT

## 2020-09-15 RX ORDER — SODIUM CHLORIDE 0.9 % (FLUSH) 0.9 %
20 SYRINGE (ML) INJECTION PRN
Status: CANCELLED | OUTPATIENT
Start: 2020-09-15

## 2020-09-15 RX ORDER — HEPARIN SODIUM (PORCINE) LOCK FLUSH IV SOLN 100 UNIT/ML 100 UNIT/ML
500 SOLUTION INTRAVENOUS PRN
Status: DISCONTINUED | OUTPATIENT
Start: 2020-09-15 | End: 2020-09-16 | Stop reason: HOSPADM

## 2020-09-15 RX ORDER — SODIUM CHLORIDE 0.9 % (FLUSH) 0.9 %
10 SYRINGE (ML) INJECTION PRN
Status: CANCELLED | OUTPATIENT
Start: 2020-09-15

## 2020-09-15 RX ORDER — HEPARIN SODIUM (PORCINE) LOCK FLUSH IV SOLN 100 UNIT/ML 100 UNIT/ML
500 SOLUTION INTRAVENOUS PRN
Status: CANCELLED | OUTPATIENT
Start: 2020-09-15

## 2020-09-15 RX ORDER — SODIUM CHLORIDE 0.9 % (FLUSH) 0.9 %
10 SYRINGE (ML) INJECTION PRN
Status: DISCONTINUED | OUTPATIENT
Start: 2020-09-15 | End: 2020-09-16 | Stop reason: HOSPADM

## 2020-09-15 RX ORDER — SODIUM CHLORIDE 0.9 % (FLUSH) 0.9 %
20 SYRINGE (ML) INJECTION PRN
Status: DISCONTINUED | OUTPATIENT
Start: 2020-09-15 | End: 2020-09-15

## 2020-09-15 RX ADMIN — SODIUM CHLORIDE, PRESERVATIVE FREE 30 ML: 5 INJECTION INTRAVENOUS at 14:50

## 2020-09-15 RX ADMIN — Medication 500 UNITS: at 14:50

## 2020-09-15 NOTE — PROGRESS NOTES
PORT FLUSH    Patient presents to clinic for [facility] today. left  SQ port accessed per policy using #59 .51\" Burrell needle for good blood return. Aspirate for waste and specimen sent to lab. Site flushed easily with 10 mL NSS followed by 5 mL Heparin solution 100 units/ml rinse prior to de-access. Dry sterile dressing to area. Tolerated procedure well. Encouraged to schedule port flush every 4 weeks.

## 2020-09-16 ENCOUNTER — OFFICE VISIT (OUTPATIENT)
Dept: ONCOLOGY | Age: 75
End: 2020-09-16
Payer: MEDICARE

## 2020-09-16 VITALS
SYSTOLIC BLOOD PRESSURE: 131 MMHG | HEIGHT: 68 IN | BODY MASS INDEX: 33.49 KG/M2 | TEMPERATURE: 97.9 F | DIASTOLIC BLOOD PRESSURE: 68 MMHG | HEART RATE: 69 BPM | WEIGHT: 221 LBS | OXYGEN SATURATION: 99 %

## 2020-09-16 PROCEDURE — 99212 OFFICE O/P EST SF 10 MIN: CPT | Performed by: INTERNAL MEDICINE

## 2020-09-16 NOTE — PROGRESS NOTES
Labs from today were faxed to Dr. Gara Boas office. I called the office and they did receive the lab work. I also told them that Dr. Dontae Stiles  wanted [facility] to be seen for a  follow-up visit because he feels that metformin was causing her creatinine to be elevated. Aguirre will talk to Dr. Kathya Johnson and set up the appointment for [facility].

## 2020-09-16 NOTE — PROGRESS NOTES
Authorizing Provider   anastrozole (ARIMIDEX) 1 MG tablet Take 1 tablet by mouth daily 6/8/20   Maggie Lubin, APRN - CNP   Umeclidinium Bromide (INCRUSE ELLIPTA) 62.5 MCG/INH AEPB Inhale 1 puff into the lungs daily    Historical Provider, MD   Calcium Carb-Cholecalciferol (CALCIUM 600+D) 600-800 MG-UNIT TABS Take by mouth daily    Historical Provider, MD   B Complex Vitamins (VITAMIN B COMPLEX) TABS Take by mouth daily    Historical Provider, MD   aspirin 81 MG tablet Take 81 mg by mouth daily    Historical Provider, MD   amLODIPine (NORVASC) 5 MG tablet Take 5 mg by mouth daily    Historical Provider, MD   pioglitazone (ACTOS) 15 MG tablet Take 15 mg by mouth nightly    Historical Provider, MD   atorvastatin (LIPITOR) 40 MG tablet Take 40 mg by mouth nightly    Historical Provider, MD   levothyroxine (SYNTHROID) 50 MCG tablet Take 75 mcg by mouth Daily     Historical Provider, MD   metFORMIN (GLUCOPHAGE-XR) 500 MG extended release tablet Take 500 mg by mouth 2 times daily     Historical Provider, MD    Scheduled Meds:  Continuous Infusions:  PRN Meds:.        Recent Laboratory Data-     Lab Results   Component Value Date    WBC 5.2 09/15/2020    HGB 13.8 09/15/2020    HCT 41.2 09/15/2020    MCV 91.4 09/15/2020     09/15/2020    LYMPHOPCT 13.7 (L) 09/15/2020    RBC 4.51 09/15/2020    MCH 30.6 09/15/2020    MCHC 33.5 09/15/2020    RDW 13.6 09/15/2020    NEUTOPHILPCT 71.5 09/15/2020    MONOPCT 12.1 (H) 09/15/2020    BASOPCT 0.4 09/15/2020    NEUTROABS 3.72 09/15/2020    LYMPHSABS 0.71 (L) 09/15/2020    MONOSABS 0.63 09/15/2020    EOSABS 0.11 09/15/2020    BASOSABS 0.02 09/15/2020       Lab Results   Component Value Date     09/15/2020    K 4.1 09/15/2020     09/15/2020    CO2 28 09/15/2020    BUN 21 09/15/2020    CREATININE 1.6 (H) 09/15/2020    GLUCOSE 163 (H) 09/15/2020    CALCIUM 10.4 (H) 09/15/2020    PROT 7.0 09/15/2020    LABALBU 4.4 09/15/2020    BILITOT 0.4 09/15/2020    ALKPHOS 88 09/15/2020    AST 17 09/15/2020    ALT 16 09/15/2020    LABGLOM 32 09/15/2020    GFRAA 38 09/15/2020         Radiology-    CT ABDOMEN AND PELVIS: 6/09/2020  1.  No CT evidence of an acute intra-abdominal or intrapelvic process. 2.  No findings to suggest acute appendicitis; no ureter calculus or    hydronephrosis. 3.  Diverticulosis coli without CT evidence of acute diverticulitis. 4. Unchanged benign right adrenal adenoma requiring no additional evaluation    or follow-up. BILATERAL SCREENING MAMMOGRAM:  1/27/2020  IMPRESSION:   No mammographic evidence of malignancy.       Screening mammogram in 1 year is recommended.       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 All potential side effects of chemotherapy were discussed  She tolerated cycle 1 of chemotherapy very well and had no evidence of tumor lysis syndrome        She completed Cycle #4 of Rituxan and Bendeka on 3/15/17       She was restaged 3/23/17 with a follow-up PET CT scan which showed essentially complete remission and resolution of her diffuse bulky lymphadenopathy as well as resolution of bilateral hydronephrosis.  There was question of an ill-defined lesion at the level of the introitus and vagina but she has no GYN symptomatology whatsoever and she will follow with her gynecologist      She was then recommended maintenance Rituxan for 2 years.  Deanna Bean  Received her 1st dose of maintenance Rituxan on 5/17/2017      The fact that her absolute lymphocyte count remains very low she will be maintained on VZV prophylaxis with acyclovir 400 mg twice a day.      Her spironolactone was discontinued because of her renal insufficiency         Her PET/CT scan was again unremarkable without any intrathoracic abnormality and no evidence of recurrent lymphadenopathy      She completed Cycle #12 Maintenance Rituxan/Hycela on 3/13/19    She was cleared to receive her Shingrix vaccine in September 2- Postmenopausal right  breast cancer stage I , T1c N1mi M0 with positive ER and CO receptors and nonamplified HER-2/kylah by fish. One out of 4 sentinel nodes had micrometastasis measuring only 2 mm  Clear surgical margins were achieved     It was explained to her that the mainstay of adjuvant therapy at her age would be an aromatase inhibitor. Oncotype DX or mammaprint will be done to determine if she has any significant benefit from chemo which I doubt     Requested  for Oncotype or mammaprint. Was submitted to pathology department at [facility].   Oncotype DX  RS 17---Low risk for recurrence. She will be recommended adjuvant aromatase inhibitor to be followed by adjuvant local radiation therapy to the right breast     Radiation oncology consult was obtained.   She was initiated on anastrozole 1 mg daily along with calcium and vitamin D supplements on 11/21/18.     Bone density was done on 5/11/18     Her last mammogram done on January 27, 2020 was negative    She will be maintained on anastrozole along with calcium and vitamin D supplements for her breast cancer  Her recent mammogram in February 2020 was negative    Recently hospitalized at [facility] with weakness and abnormal WBC counts. She underwent a Bone Marrow Biopsy and Aspiration on May 6, 2020. It was nonspecific with less than 3% blasts. CBC done today is completely in the normal range and this is suggestive that her neutropenia was related to bone marrow suppression by a systemic viral illness which she has recovered from    Her discharge summary from [address] [place] [address] will be retrieved and reviewed and her COVID-19 tests will be rechecked with consideration for either another swab or the  COVID-19 antibody test.  Her repeat CBC today is normal with WBC count of 5.8, hemoglobin of 13.5 with normal MCV and normal platelet count. Her differential shows slight lymphopenia. She has been reassured. To continue anastrozole along with calcium and vitamin D supplements. She will have a follow-up mammogram in January 2021  Her CT can of abdomen and pelvis done in June 2020 was negative with incidental diverticulosis. It was done without contrast    9/16/2020  Her recent CMP shows acute renal insufficiency and her GFR is down from normal range to 32 mL/min and her serum creatinine is up to 1.6  Needs to follow up with. Dr Vaughan Phi due to elevated creatinine especially that she is on metformin  She will have follow-up mammogram in February 2021. Jerman Palm. Josh Gusman M.D., F.A.C.P.   Electronically signed 9/16/2020 at 12:23 PM

## 2020-11-12 ENCOUNTER — HOSPITAL ENCOUNTER (OUTPATIENT)
Dept: INFUSION THERAPY | Age: 75
Discharge: HOME | End: 2020-11-12
Payer: MEDICARE

## 2020-11-12 DIAGNOSIS — C50.911 MALIGNANT NEOPLASM OF RIGHT BREAST, STAGE 1, ESTROGEN RECEPTOR POSITIVE (HCC): Primary | ICD-10-CM

## 2020-11-12 DIAGNOSIS — C82.13 FOLLICULAR LYMPHOMA GRADE II OF INTRA-ABDOMINAL LYMPH NODES (HCC): ICD-10-CM

## 2020-11-12 DIAGNOSIS — Z17.0 MALIGNANT NEOPLASM OF RIGHT BREAST, STAGE 1, ESTROGEN RECEPTOR POSITIVE (HCC): Primary | ICD-10-CM

## 2020-11-12 DIAGNOSIS — C82.09 GRADE I FOLLICULAR LYMPHOMA OF EXTRANODAL SITE EXCLUDING SPLEEN AND OTHER SOLID ORGANS (HCC): ICD-10-CM

## 2020-11-12 PROCEDURE — 2580000003 HC RX 258: Performed by: INTERNAL MEDICINE

## 2020-11-12 PROCEDURE — 6360000002 HC RX W HCPCS

## 2020-11-12 PROCEDURE — 96523 IRRIG DRUG DELIVERY DEVICE: CPT

## 2020-11-12 RX ORDER — HEPARIN SODIUM (PORCINE) LOCK FLUSH IV SOLN 100 UNIT/ML 100 UNIT/ML
SOLUTION INTRAVENOUS
Status: COMPLETED
Start: 2020-11-12 | End: 2020-11-12

## 2020-11-12 RX ORDER — SODIUM CHLORIDE 0.9 % (FLUSH) 0.9 %
10 SYRINGE (ML) INJECTION PRN
Status: CANCELLED | OUTPATIENT
Start: 2020-11-12

## 2020-11-12 RX ORDER — HEPARIN SODIUM (PORCINE) LOCK FLUSH IV SOLN 100 UNIT/ML 100 UNIT/ML
500 SOLUTION INTRAVENOUS PRN
Status: CANCELLED | OUTPATIENT
Start: 2020-11-12

## 2020-11-12 RX ORDER — SODIUM CHLORIDE 0.9 % (FLUSH) 0.9 %
10 SYRINGE (ML) INJECTION PRN
Status: DISCONTINUED | OUTPATIENT
Start: 2020-11-12 | End: 2020-11-13 | Stop reason: HOSPADM

## 2020-11-12 RX ORDER — SODIUM CHLORIDE 0.9 % (FLUSH) 0.9 %
20 SYRINGE (ML) INJECTION PRN
Status: CANCELLED | OUTPATIENT
Start: 2020-11-12

## 2020-11-12 RX ORDER — HEPARIN SODIUM (PORCINE) LOCK FLUSH IV SOLN 100 UNIT/ML 100 UNIT/ML
500 SOLUTION INTRAVENOUS PRN
Status: DISCONTINUED | OUTPATIENT
Start: 2020-11-12 | End: 2020-11-13 | Stop reason: HOSPADM

## 2020-11-12 RX ADMIN — SODIUM CHLORIDE, PRESERVATIVE FREE 10 ML: 5 INJECTION INTRAVENOUS at 14:45

## 2020-11-12 RX ADMIN — Medication 500 UNITS: at 14:46

## 2020-11-12 RX ADMIN — HEPARIN SODIUM (PORCINE) LOCK FLUSH IV SOLN 100 UNIT/ML 500 UNITS: 100 SOLUTION at 14:46

## 2020-11-12 NOTE — PROGRESS NOTES
Patient presents to clinic for [facility][note 3] today. Left chest single  SQ port accessed per policy using 13V 2.67NGMN Burrell needle for good blood return. Site flushed easily with 10 mL NSS followed by 5 mL Heparin solution 100 units/ml rinse prior to de-access. Dry sterile dressing to area. Tolerated procedure well. Encouraged to schedule port flush every 4 weeks.

## 2020-12-09 DIAGNOSIS — C82.13 FOLLICULAR LYMPHOMA GRADE II OF INTRA-ABDOMINAL LYMPH NODES (HCC): ICD-10-CM

## 2020-12-09 DIAGNOSIS — C50.911 STAGE 1 BREAST CANCER, ER+, RIGHT (HCC): ICD-10-CM

## 2020-12-09 DIAGNOSIS — Z17.0 STAGE 1 BREAST CANCER, ER+, RIGHT (HCC): ICD-10-CM

## 2020-12-09 RX ORDER — ANASTROZOLE 1 MG/1
1 TABLET ORAL DAILY
Qty: 90 TABLET | Refills: 1 | Status: CANCELLED | OUTPATIENT
Start: 2020-12-09

## 2020-12-16 RX ORDER — ANASTROZOLE 1 MG/1
1 TABLET ORAL DAILY
Qty: 90 TABLET | Refills: 1 | Status: SHIPPED
Start: 2020-12-16 | End: 2021-05-12 | Stop reason: SDUPTHER

## 2021-01-12 ENCOUNTER — HOSPITAL ENCOUNTER (OUTPATIENT)
Dept: INFUSION THERAPY | Age: 76
Discharge: HOME | End: 2021-01-12
Payer: MEDICARE

## 2021-01-12 DIAGNOSIS — C82.09 GRADE I FOLLICULAR LYMPHOMA OF EXTRANODAL SITE EXCLUDING SPLEEN AND OTHER SOLID ORGANS (HCC): ICD-10-CM

## 2021-01-12 DIAGNOSIS — C50.911 STAGE 1 BREAST CANCER, ER+, RIGHT (HCC): Primary | ICD-10-CM

## 2021-01-12 DIAGNOSIS — C50.911 MALIGNANT NEOPLASM OF RIGHT BREAST, STAGE 1, ESTROGEN RECEPTOR POSITIVE (HCC): ICD-10-CM

## 2021-01-12 DIAGNOSIS — Z17.0 MALIGNANT NEOPLASM OF RIGHT BREAST, STAGE 1, ESTROGEN RECEPTOR POSITIVE (HCC): ICD-10-CM

## 2021-01-12 DIAGNOSIS — C82.13 FOLLICULAR LYMPHOMA GRADE II OF INTRA-ABDOMINAL LYMPH NODES (HCC): ICD-10-CM

## 2021-01-12 DIAGNOSIS — Z17.0 STAGE 1 BREAST CANCER, ER+, RIGHT (HCC): Primary | ICD-10-CM

## 2021-01-12 LAB
ALBUMIN SERPL-MCNC: 4.2 G/DL (ref 3.5–5.2)
ALP BLD-CCNC: 96 U/L (ref 35–104)
ALT SERPL-CCNC: 13 U/L (ref 0–32)
ANION GAP SERPL CALCULATED.3IONS-SCNC: 12 MMOL/L (ref 7–16)
AST SERPL-CCNC: 16 U/L (ref 0–31)
BASOPHILS ABSOLUTE: 0.02 E9/L (ref 0–0.2)
BASOPHILS RELATIVE PERCENT: 0.3 % (ref 0–2)
BILIRUB SERPL-MCNC: 0.4 MG/DL (ref 0–1.2)
BUN BLDV-MCNC: 23 MG/DL (ref 8–23)
CALCIUM SERPL-MCNC: 10 MG/DL (ref 8.6–10.2)
CHLORIDE BLD-SCNC: 100 MMOL/L (ref 98–107)
CO2: 25 MMOL/L (ref 22–29)
CREAT SERPL-MCNC: 1 MG/DL (ref 0.5–1)
EOSINOPHILS ABSOLUTE: 0.19 E9/L (ref 0.05–0.5)
EOSINOPHILS RELATIVE PERCENT: 3.1 % (ref 0–6)
GFR AFRICAN AMERICAN: >60
GFR NON-AFRICAN AMERICAN: 54 ML/MIN/1.73
GLUCOSE BLD-MCNC: 116 MG/DL (ref 74–99)
HCT VFR BLD CALC: 42 % (ref 34–48)
HEMOGLOBIN: 14 G/DL (ref 11.5–15.5)
IMMATURE GRANULOCYTES #: 0.02 E9/L
IMMATURE GRANULOCYTES %: 0.3 % (ref 0–5)
LYMPHOCYTES ABSOLUTE: 1.03 E9/L (ref 1.5–4)
LYMPHOCYTES RELATIVE PERCENT: 16.7 % (ref 20–42)
MCH RBC QN AUTO: 30.6 PG (ref 26–35)
MCHC RBC AUTO-ENTMCNC: 33.3 % (ref 32–34.5)
MCV RBC AUTO: 91.7 FL (ref 80–99.9)
MONOCYTES ABSOLUTE: 0.91 E9/L (ref 0.1–0.95)
MONOCYTES RELATIVE PERCENT: 14.7 % (ref 2–12)
NEUTROPHILS ABSOLUTE: 4 E9/L (ref 1.8–7.3)
NEUTROPHILS RELATIVE PERCENT: 64.9 % (ref 43–80)
PDW BLD-RTO: 13.2 FL (ref 11.5–15)
PLATELET # BLD: 213 E9/L (ref 130–450)
PMV BLD AUTO: 9 FL (ref 7–12)
POTASSIUM SERPL-SCNC: 4.1 MMOL/L (ref 3.5–5)
RBC # BLD: 4.58 E12/L (ref 3.5–5.5)
SODIUM BLD-SCNC: 137 MMOL/L (ref 132–146)
TOTAL PROTEIN: 7 G/DL (ref 6.4–8.3)
WBC # BLD: 6.2 E9/L (ref 4.5–11.5)

## 2021-01-12 PROCEDURE — 36591 DRAW BLOOD OFF VENOUS DEVICE: CPT

## 2021-01-12 PROCEDURE — 2580000003 HC RX 258: Performed by: INTERNAL MEDICINE

## 2021-01-12 PROCEDURE — 85025 COMPLETE CBC W/AUTO DIFF WBC: CPT

## 2021-01-12 PROCEDURE — 80053 COMPREHEN METABOLIC PANEL: CPT

## 2021-01-12 PROCEDURE — 6360000002 HC RX W HCPCS

## 2021-01-12 RX ORDER — SODIUM CHLORIDE 0.9 % (FLUSH) 0.9 %
10 SYRINGE (ML) INJECTION PRN
Status: CANCELLED | OUTPATIENT
Start: 2021-01-12

## 2021-01-12 RX ORDER — SODIUM CHLORIDE 0.9 % (FLUSH) 0.9 %
10 SYRINGE (ML) INJECTION PRN
Status: DISCONTINUED | OUTPATIENT
Start: 2021-01-12 | End: 2021-01-13 | Stop reason: HOSPADM

## 2021-01-12 RX ORDER — HEPARIN SODIUM (PORCINE) LOCK FLUSH IV SOLN 100 UNIT/ML 100 UNIT/ML
500 SOLUTION INTRAVENOUS PRN
Status: CANCELLED | OUTPATIENT
Start: 2021-01-12

## 2021-01-12 RX ORDER — SODIUM CHLORIDE 0.9 % (FLUSH) 0.9 %
20 SYRINGE (ML) INJECTION PRN
Status: CANCELLED | OUTPATIENT
Start: 2021-01-12

## 2021-01-12 RX ORDER — HEPARIN SODIUM (PORCINE) LOCK FLUSH IV SOLN 100 UNIT/ML 100 UNIT/ML
500 SOLUTION INTRAVENOUS PRN
Status: DISCONTINUED | OUTPATIENT
Start: 2021-01-12 | End: 2021-01-13 | Stop reason: HOSPADM

## 2021-01-12 RX ORDER — HEPARIN SODIUM (PORCINE) LOCK FLUSH IV SOLN 100 UNIT/ML 100 UNIT/ML
SOLUTION INTRAVENOUS
Status: COMPLETED
Start: 2021-01-12 | End: 2021-01-12

## 2021-01-12 RX ADMIN — Medication 500 UNITS: at 13:00

## 2021-01-12 RX ADMIN — SODIUM CHLORIDE, PRESERVATIVE FREE 10 ML: 5 INJECTION INTRAVENOUS at 13:00

## 2021-01-12 RX ADMIN — HEPARIN SODIUM (PORCINE) LOCK FLUSH IV SOLN 100 UNIT/ML 500 UNITS: 100 SOLUTION at 13:00

## 2021-01-12 NOTE — PROGRESS NOTES
Patient presents to clinic for labs today. Left chest single  SQ port accessed per policy using 07G 9.29MVLI Burrell needle for good blood return. Aspirate for waste and specimen sent to lab. Site flushed easily with 10 mL NSS followed by 5 mL Heparin solution 100 units/ml rinse prior to de-access. Dry sterile dressing to area. Tolerated procedure well. Encouraged to schedule port flush every 4 weeks.

## 2021-01-13 ENCOUNTER — OFFICE VISIT (OUTPATIENT)
Dept: ONCOLOGY | Age: 76
End: 2021-01-13
Payer: MEDICARE

## 2021-01-13 VITALS
TEMPERATURE: 97 F | SYSTOLIC BLOOD PRESSURE: 173 MMHG | HEART RATE: 68 BPM | DIASTOLIC BLOOD PRESSURE: 81 MMHG | BODY MASS INDEX: 33.24 KG/M2 | HEIGHT: 68 IN | WEIGHT: 219.3 LBS

## 2021-01-13 DIAGNOSIS — C50.911 STAGE 1 BREAST CANCER, ER+, RIGHT (HCC): Primary | ICD-10-CM

## 2021-01-13 DIAGNOSIS — Z17.0 STAGE 1 BREAST CANCER, ER+, RIGHT (HCC): Primary | ICD-10-CM

## 2021-01-13 PROCEDURE — 99213 OFFICE O/P EST LOW 20 MIN: CPT

## 2021-01-13 NOTE — PROGRESS NOTES
[address] [facility]. Rj Buchanan        Pt Name: Jackeline Feliciano: 1945  Date of evaluation: 1/13/2021  Primary Care Physician: Jackeline Feliciano MD  Reason for evaluation:   Chief Complaint   Patient presents with    Breast Cancer     Right  ER+    Follow-up          Subjective: [facility] for follow up.  Feels tired today. No constitutional B symptoms. PAST HISTORY-  Patient was recently hospitalized at [facility] at the end of April with weakness and and dehydration as well as febrile illness. She stated her WBC count was very low. She was seen by hematology Dr. Logan Pitts and underwent a bone marrow biopsy and the flow cytometry is available and shows less than 3% blasts and was otherwise nonspecific. Bone marrow morphology report not available. Apparently she stated that she might have been tested for COVID-19 but does not know the results. She denies any constitutional B symptoms. She is back on anastrozole along with calcium and vitamin D supplements. Tolerating anastrozole well      SARS-CoV-2, IgG Negative  Negative Final 06/17/2020 12:30 PM [facility]           OBJECTIVE:  VITALS:  height is 5' 8\" (1.727 m) and weight is 219 lb 4.8 oz (99.5 kg). Her temporal temperature is 97 °F (36.1 °C). Her blood pressure is 173/81 (abnormal) and her pulse is 68. Physical Exam:  Performance Status: 0  Well developed, well nourished female  Head and neck: PERRLA, EOMI. Sclera non icteric. Oropharynx: Clear  Neck: no JVD,  bilateral cervical lymphadenopathy markedly decreased in size  Heart: Regular rate and regular rhythm. No murmur. Lungs: Clear to auscultation. Abdomen: Soft, non-tender;no masses, no organomegaly. Extremities: No edema,no cyanosis, no clubbing. Neurologic: Cranial nerves grossly intact. Very mild weakness in the right upper and right lower extremity.   Medi-port:  Left Subclavian          Medications  Prior to Admission medications    Medication Sig Start Date End Date Taking?  Authorizing Provider   anastrozole (ARIMIDEX) 1 MG tablet Take 1 tablet by mouth daily 12/16/20   Nakul Callahan, APRN - CNP   Umeclidinium Bromide (INCRUSE ELLIPTA) 62.5 MCG/INH AEPB Inhale 1 puff into the lungs daily    Historical Provider, MD   Calcium Carb-Cholecalciferol (CALCIUM 600+D) 600-800 MG-UNIT TABS Take by mouth daily    Historical Provider, MD   B Complex Vitamins (VITAMIN B COMPLEX) TABS Take by mouth daily    Historical Provider, MD   aspirin 81 MG tablet Take 81 mg by mouth daily    Historical Provider, MD   amLODIPine (NORVASC) 5 MG tablet Take 5 mg by mouth daily    Historical Provider, MD   pioglitazone (ACTOS) 15 MG tablet Take 15 mg by mouth nightly    Historical Provider, MD   atorvastatin (LIPITOR) 40 MG tablet Take 40 mg by mouth nightly    Historical Provider, MD   levothyroxine (SYNTHROID) 50 MCG tablet Take 75 mcg by mouth Daily     Historical Provider, MD   metFORMIN (GLUCOPHAGE-XR) 500 MG extended release tablet Take 500 mg by mouth 2 times daily     Historical Provider, MD    Scheduled Meds:  Continuous Infusions:  PRN Meds:.        Recent Laboratory Data-     Lab Results   Component Value Date    WBC 6.2 01/12/2021    HGB 14.0 01/12/2021    HCT 42.0 01/12/2021    MCV 91.7 01/12/2021     01/12/2021    LYMPHOPCT 16.7 (L) 01/12/2021    RBC 4.58 01/12/2021    MCH 30.6 01/12/2021    MCHC 33.3 01/12/2021    RDW 13.2 01/12/2021    NEUTOPHILPCT 64.9 01/12/2021    MONOPCT 14.7 (H) 01/12/2021    BASOPCT 0.3 01/12/2021    NEUTROABS 4.00 01/12/2021    LYMPHSABS 1.03 (L) 01/12/2021    MONOSABS 0.91 01/12/2021    EOSABS 0.19 01/12/2021    BASOSABS 0.02 01/12/2021       Lab Results   Component Value Date     01/12/2021    K 4.1 01/12/2021     01/12/2021    CO2 25 01/12/2021    BUN 23 01/12/2021    CREATININE 1.0 01/12/2021    GLUCOSE 116 (H) 01/12/2021    CALCIUM 10.0 01/12/2021    PROT 7.0 01/12/2021    LABALBU 4.2 01/12/2021    BILITOT 0.4 01/12/2021    ALKPHOS 96 01/12/2021    AST 16 01/12/2021    ALT 13 01/12/2021    LABGLOM 54 01/12/2021    GFRAA >60 01/12/2021         Radiology-    CT ABDOMEN AND PELVIS: 6/09/2020  1.  No CT evidence of an acute intra-abdominal or intrapelvic process. 2.  No findings to suggest acute appendicitis; no ureter calculus or    hydronephrosis. 3.  Diverticulosis coli without CT evidence of acute diverticulitis. 4. Unchanged benign right adrenal adenoma requiring no additional evaluation    or follow-up. BILATERAL SCREENING MAMMOGRAM:  1/27/2020  IMPRESSION:   No mammographic evidence of malignancy.       Screening mammogram in 1 year is recommended.       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   All potential side effects of chemotherapy were discussed  She tolerated cycle 1 of chemotherapy very well and had no evidence of tumor lysis syndrome        She completed Cycle #4 of Rituxan and Bendeka on 3/15/17       She was restaged 3/23/17 with a follow-up PET CT scan which showed essentially complete remission and resolution of her diffuse bulky lymphadenopathy as well as resolution of bilateral hospitalized at [facility] with weakness and abnormal WBC counts. She underwent a Bone Marrow Biopsy and Aspiration on May 6, 2020. It was nonspecific with less than 3% blasts. CBC done today is completely in the normal range and this is suggestive that her neutropenia was related to bone marrow suppression by a systemic viral illness which she has recovered from    Her discharge summary from [facility] will be retrieved and reviewed and her COVID-19 tests will be rechecked with consideration for either another swab or the  COVID-19 antibody test.  Her repeat CBC today is normal with WBC count of 5.8, hemoglobin of 13.5 with normal MCV and normal platelet count. Her differential shows slight lymphopenia. She has been reassured. To continue anastrozole along with calcium and vitamin D supplements. She will have a follow-up mammogram in January 2021  Her CT can of abdomen and pelvis done in June 2020 was negative with incidental diverticulosis. It was done without contrast    9/16/2020  Her recent CMP shows acute renal insufficiency and her GFR is down from normal range to 32 mL/min and her serum creatinine is up to 1.6  Needs to follow up with. Dr Karin Flores due to elevated creatinine especially that she is on metformin        1/13/2021  Renal function has markedly improved since last visit. Her diabetes is well controlled. She is tolerating anastrozole well and is compliant with calcium and vitamin D supplements. She has no B symptoms and her lymphoma is in remission  She will have follow-up mammogram in February 2021. Maritza Harrison. Abi Pennington M.D., F.A.C.P.   Electronically signed 1/13/2021 at 2:56 PM

## 2021-04-08 DIAGNOSIS — Z12.31 SCREENING MAMMOGRAM FOR HIGH-RISK PATIENT: Primary | ICD-10-CM

## 2021-04-08 DIAGNOSIS — C50.911 STAGE 1 BREAST CANCER, ER+, RIGHT (HCC): ICD-10-CM

## 2021-04-08 DIAGNOSIS — Z17.0 STAGE 1 BREAST CANCER, ER+, RIGHT (HCC): ICD-10-CM

## 2021-04-13 ENCOUNTER — HOSPITAL ENCOUNTER (OUTPATIENT)
Dept: GENERAL RADIOLOGY | Age: 76
Discharge: HOME | End: 2021-04-15
Payer: MEDICARE

## 2021-04-13 DIAGNOSIS — Z12.31 SCREENING MAMMOGRAM FOR HIGH-RISK PATIENT: ICD-10-CM

## 2021-04-13 DIAGNOSIS — Z17.0 STAGE 1 BREAST CANCER, ER+, RIGHT (HCC): ICD-10-CM

## 2021-04-13 DIAGNOSIS — C50.911 STAGE 1 BREAST CANCER, ER+, RIGHT (HCC): ICD-10-CM

## 2021-04-13 PROCEDURE — 77063 BREAST TOMOSYNTHESIS BI: CPT

## 2021-05-10 ENCOUNTER — HOSPITAL ENCOUNTER (OUTPATIENT)
Age: 76
Discharge: HOME | End: 2021-05-10
Payer: MEDICARE

## 2021-05-10 DIAGNOSIS — C50.911 STAGE 1 BREAST CANCER, ER+, RIGHT (HCC): ICD-10-CM

## 2021-05-10 DIAGNOSIS — Z17.0 STAGE 1 BREAST CANCER, ER+, RIGHT (HCC): ICD-10-CM

## 2021-05-10 LAB
ALBUMIN SERPL-MCNC: 4.7 G/DL (ref 3.5–5.2)
ALP BLD-CCNC: 101 U/L (ref 35–104)
ALT SERPL-CCNC: 13 U/L (ref 0–32)
ANION GAP SERPL CALCULATED.3IONS-SCNC: 15 MMOL/L (ref 7–16)
AST SERPL-CCNC: 18 U/L (ref 0–31)
BASOPHILS ABSOLUTE: 0.04 E9/L (ref 0–0.2)
BASOPHILS RELATIVE PERCENT: 0.6 % (ref 0–2)
BILIRUB SERPL-MCNC: 0.4 MG/DL (ref 0–1.2)
BUN BLDV-MCNC: 28 MG/DL (ref 6–23)
CALCIUM SERPL-MCNC: 10 MG/DL (ref 8.6–10.2)
CHLORIDE BLD-SCNC: 100 MMOL/L (ref 98–107)
CO2: 23 MMOL/L (ref 22–29)
CREAT SERPL-MCNC: 0.9 MG/DL (ref 0.5–1)
EOSINOPHILS ABSOLUTE: 0.18 E9/L (ref 0.05–0.5)
EOSINOPHILS RELATIVE PERCENT: 2.7 % (ref 0–6)
GFR AFRICAN AMERICAN: >60
GFR NON-AFRICAN AMERICAN: >60 ML/MIN/1.73
GLUCOSE BLD-MCNC: 168 MG/DL (ref 74–99)
HCT VFR BLD CALC: 44.3 % (ref 34–48)
HEMOGLOBIN: 14.6 G/DL (ref 11.5–15.5)
IMMATURE GRANULOCYTES #: 0.04 E9/L
IMMATURE GRANULOCYTES %: 0.6 % (ref 0–5)
LACTATE DEHYDROGENASE: 227 U/L (ref 135–214)
LYMPHOCYTES ABSOLUTE: 1.2 E9/L (ref 1.5–4)
LYMPHOCYTES RELATIVE PERCENT: 18.1 % (ref 20–42)
MCH RBC QN AUTO: 30.5 PG (ref 26–35)
MCHC RBC AUTO-ENTMCNC: 33 % (ref 32–34.5)
MCV RBC AUTO: 92.5 FL (ref 80–99.9)
MONOCYTES ABSOLUTE: 0.83 E9/L (ref 0.1–0.95)
MONOCYTES RELATIVE PERCENT: 12.5 % (ref 2–12)
NEUTROPHILS ABSOLUTE: 4.35 E9/L (ref 1.8–7.3)
NEUTROPHILS RELATIVE PERCENT: 65.5 % (ref 43–80)
PDW BLD-RTO: 12.9 FL (ref 11.5–15)
PLATELET # BLD: 193 E9/L (ref 130–450)
PMV BLD AUTO: 9.3 FL (ref 7–12)
POTASSIUM SERPL-SCNC: 4.6 MMOL/L (ref 3.5–5)
RBC # BLD: 4.79 E12/L (ref 3.5–5.5)
SODIUM BLD-SCNC: 138 MMOL/L (ref 132–146)
TOTAL PROTEIN: 7.2 G/DL (ref 6.4–8.3)
WBC # BLD: 6.6 E9/L (ref 4.5–11.5)

## 2021-05-10 PROCEDURE — 80053 COMPREHEN METABOLIC PANEL: CPT

## 2021-05-10 PROCEDURE — 36415 COLL VENOUS BLD VENIPUNCTURE: CPT

## 2021-05-10 PROCEDURE — 83615 LACTATE (LD) (LDH) ENZYME: CPT

## 2021-05-10 PROCEDURE — 85025 COMPLETE CBC W/AUTO DIFF WBC: CPT

## 2021-05-11 NOTE — PROGRESS NOTES
[address] [facility]. [facility]        Pt Name: Vivian Mcgowan: 1945  Date of evaluation: 5/12/2021  Primary Care Physician: Vivian Mcgowan MD  Reason for evaluation:   Chief Complaint   Patient presents with    Breast Cancer     Right  ER+    Lymphoma    Follow-up     4  month          Subjective:  Here for results of Mammogram and  follow up.  Feels tired today. No constitutional B symptoms. Completed 2 nd dose of Covid vaccine        PAST HISTORY-  Patient was recently hospitalized at [facility] at the end of April with weakness and and dehydration as well as febrile illness. She stated her WBC count was very low. She was seen by hematology Dr. Lisa Washington and underwent a bone marrow biopsy and the flow cytometry is available and shows less than 3% blasts and was otherwise nonspecific. Bone marrow morphology report not available. Apparently she stated that she might have been tested for COVID-19 but does not know the results. She denies any constitutional B symptoms. She is back on anastrozole along with calcium and vitamin D supplements. Tolerating anastrozole well            OBJECTIVE:  VITALS:  height is 5' 8.5\" (1.74 m) and weight is 225 lb 12.8 oz (102.4 kg). Her temperature is 97.7 °F (36.5 °C). Her blood pressure is 158/69 (abnormal) and her pulse is 73. Her respiration is 69 (abnormal) and oxygen saturation is 99%. Physical Exam:  Performance Status: 0  Well developed, well nourished female  Head and neck: PERRLA, EOMI. Sclera non icteric. Oropharynx: Clear  Neck: no JVD,  bilateral cervical lymphadenopathy markedly decreased in size  Heart: Regular rate and regular rhythm. No murmur. Lungs: Clear to auscultation. Abdomen: Soft, non-tender;no masses, no organomegaly. Extremities: No edema,no cyanosis, no clubbing. Neurologic: Cranial nerves grossly intact.  Very mild weakness in the right upper and right lower 05/10/2021     05/10/2021    CO2 23 05/10/2021    BUN 28 (H) 05/10/2021    CREATININE 0.9 05/10/2021    GLUCOSE 168 (H) 05/10/2021    CALCIUM 10.0 05/10/2021    PROT 7.2 05/10/2021    LABALBU 4.7 05/10/2021    BILITOT 0.4 05/10/2021    ALKPHOS 101 05/10/2021    AST 18 05/10/2021    ALT 13 05/10/2021    LABGLOM >60 05/10/2021    GFRAA >60 05/10/2021         Radiology-    BILATERAL SCREENING MAMMOGRAM: 4/13/2021  There is some postsurgical changes and skin thickening of the right breast.   This is unchanged.  There are no suspicious appearing masses or   calcifications in either breast.       BIRADS:   BIRADS - CATEGORY 2       Benign Findings.  Normal interval follow-up is recommended in 12 months.       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 All potential side effects of chemotherapy were discussed  She tolerated cycle 1 of chemotherapy very well and had no evidence of tumor lysis syndrome        She completed Cycle #4 of Rituxan and Bendeka on 3/15/17       She was restaged 3/23/17 with a follow-up PET CT scan which showed essentially complete remission and resolution of her diffuse bulky lymphadenopathy as well as resolution of bilateral hydronephrosis.  There was question of an ill-defined lesion at the level of the introitus and vagina but she has no GYN symptomatology whatsoever and she will follow with her gynecologist      She was then recommended maintenance Rituxan for 2 years.  Callie Wells  Received her 1st dose of maintenance Rituxan on 5/17/2017      The fact that her absolute lymphocyte count remains very low she will be maintained on VZV prophylaxis with acyclovir 400 mg twice a day.      Her spironolactone was discontinued because of her renal insufficiency         Her PET/CT scan was again unremarkable without any intrathoracic abnormality and no evidence of recurrent lymphadenopathy      She completed Cycle #12 Maintenance Rituxan/Hycela on 3/13/19    She was cleared to receive her Shingrix vaccine in September 2- Postmenopausal right  breast cancer stage I , T1c N1mi M0 with positive ER and NH receptors and nonamplified HER-2/kylah by fish. One out of 4 sentinel nodes had micrometastasis measuring only 2 mm  Clear surgical margins were achieved     It was explained to her that the mainstay of adjuvant therapy at her age would be an aromatase inhibitor. Oncotype DX or mammaprint will be done to determine if she has any significant benefit from chemo which I doubt     Requested  for Oncotype or mammaprint. Was submitted to pathology department at [facility].   Oncotype DX  RS 17---Low risk for recurrence. She will be recommended adjuvant aromatase inhibitor to be followed by adjuvant local radiation therapy to the right breast     Radiation oncology consult was obtained. She was initiated on anastrozole 1 mg daily along with calcium and vitamin D supplements on 11/21/18.     Bone density was done on 5/11/18     Her last mammogram done on January 27, 2020 was negative    She will be maintained on anastrozole along with calcium and vitamin D supplements for her breast cancer  Her recent mammogram in February 2020 was negative    Recently hospitalized at [facility] with weakness and abnormal WBC counts. She underwent a Bone Marrow Biopsy and Aspiration on May 6, 2020. It was nonspecific with less than 3% blasts. CBC done today is completely in the normal range and this is suggestive that her neutropenia was related to bone marrow suppression by a systemic viral illness which she has recovered from    Her discharge summary from [address] [place] [address] will be retrieved and reviewed and her COVID-19 tests will be rechecked with consideration for either another swab or the  COVID-19 antibody test.  Her repeat CBC today is normal with WBC count of 5.8, hemoglobin of 13.5 with normal MCV and normal platelet count. Her differential shows slight lymphopenia. She has been reassured. To continue anastrozole along with calcium and vitamin D supplements. She will have a follow-up mammogram in January 2021  Her CT can of abdomen and pelvis done in June 2020 was negative with incidental diverticulosis. It was done without contrast    9/16/2020  Her recent CMP shows acute renal insufficiency and her GFR is down from normal range to 32 mL/min and her serum creatinine is up to 1.6  Needs to follow up with. Dr Margarito Burns due to elevated creatinine especially that she is on metformin        1/13/2021  Renal function has markedly improved since last visit. Her diabetes is well controlled. She is tolerating anastrozole well and is compliant with calcium and vitamin D supplements. She has no B symptoms and her lymphoma is in remission  She will have follow-up mammogram in February 2021.      5/12/2021          Ivan Smith M.D., F.A.C.P.   Electronically signed 5/12/2021 at 2:06 PM

## 2021-05-12 ENCOUNTER — OFFICE VISIT (OUTPATIENT)
Dept: ONCOLOGY | Age: 76
End: 2021-05-12
Payer: MEDICARE

## 2021-05-12 VITALS
HEIGHT: 69 IN | TEMPERATURE: 97.7 F | DIASTOLIC BLOOD PRESSURE: 69 MMHG | WEIGHT: 225.8 LBS | HEART RATE: 73 BPM | BODY MASS INDEX: 33.44 KG/M2 | SYSTOLIC BLOOD PRESSURE: 158 MMHG | RESPIRATION RATE: 69 BRPM | OXYGEN SATURATION: 99 %

## 2021-05-12 DIAGNOSIS — C82.13 FOLLICULAR LYMPHOMA GRADE II OF INTRA-ABDOMINAL LYMPH NODES (HCC): ICD-10-CM

## 2021-05-12 DIAGNOSIS — C50.911 STAGE 1 BREAST CANCER, ER+, RIGHT (HCC): Primary | ICD-10-CM

## 2021-05-12 DIAGNOSIS — Z17.0 STAGE 1 BREAST CANCER, ER+, RIGHT (HCC): Primary | ICD-10-CM

## 2021-05-12 PROCEDURE — 99214 OFFICE O/P EST MOD 30 MIN: CPT

## 2021-05-12 RX ORDER — MELOXICAM 15 MG/1
15 TABLET ORAL DAILY
COMMUNITY
End: 2022-03-14 | Stop reason: ALTCHOICE

## 2021-05-12 RX ORDER — ANASTROZOLE 1 MG/1
1 TABLET ORAL DAILY
Qty: 90 TABLET | Refills: 1 | Status: SHIPPED
Start: 2021-05-12 | End: 2021-09-13 | Stop reason: SDUPTHER

## 2021-06-22 ENCOUNTER — HOSPITAL ENCOUNTER (OUTPATIENT)
Dept: INFUSION THERAPY | Age: 76
Discharge: HOME | End: 2021-06-22
Payer: MEDICARE

## 2021-06-22 DIAGNOSIS — Z17.0 MALIGNANT NEOPLASM OF RIGHT BREAST, STAGE 1, ESTROGEN RECEPTOR POSITIVE (HCC): Primary | ICD-10-CM

## 2021-06-22 DIAGNOSIS — C82.13 FOLLICULAR LYMPHOMA GRADE II OF INTRA-ABDOMINAL LYMPH NODES (HCC): ICD-10-CM

## 2021-06-22 DIAGNOSIS — C82.09 GRADE I FOLLICULAR LYMPHOMA OF EXTRANODAL SITE EXCLUDING SPLEEN AND OTHER SOLID ORGANS (HCC): ICD-10-CM

## 2021-06-22 DIAGNOSIS — C50.911 MALIGNANT NEOPLASM OF RIGHT BREAST, STAGE 1, ESTROGEN RECEPTOR POSITIVE (HCC): Primary | ICD-10-CM

## 2021-06-22 PROCEDURE — 2580000003 HC RX 258: Performed by: INTERNAL MEDICINE

## 2021-06-22 PROCEDURE — 96523 IRRIG DRUG DELIVERY DEVICE: CPT

## 2021-06-22 PROCEDURE — 6360000002 HC RX W HCPCS: Performed by: INTERNAL MEDICINE

## 2021-06-22 RX ORDER — SODIUM CHLORIDE 0.9 % (FLUSH) 0.9 %
10 SYRINGE (ML) INJECTION PRN
Status: CANCELLED | OUTPATIENT
Start: 2021-06-22

## 2021-06-22 RX ORDER — SODIUM CHLORIDE 0.9 % (FLUSH) 0.9 %
20 SYRINGE (ML) INJECTION PRN
Status: CANCELLED | OUTPATIENT
Start: 2021-06-22

## 2021-06-22 RX ORDER — SODIUM CHLORIDE 0.9 % (FLUSH) 0.9 %
10 SYRINGE (ML) INJECTION PRN
Status: DISCONTINUED | OUTPATIENT
Start: 2021-06-22 | End: 2021-06-23 | Stop reason: HOSPADM

## 2021-06-22 RX ORDER — HEPARIN SODIUM (PORCINE) LOCK FLUSH IV SOLN 100 UNIT/ML 100 UNIT/ML
500 SOLUTION INTRAVENOUS PRN
Status: DISCONTINUED | OUTPATIENT
Start: 2021-06-22 | End: 2021-06-23 | Stop reason: HOSPADM

## 2021-06-22 RX ORDER — HEPARIN SODIUM (PORCINE) LOCK FLUSH IV SOLN 100 UNIT/ML 100 UNIT/ML
500 SOLUTION INTRAVENOUS PRN
Status: CANCELLED | OUTPATIENT
Start: 2021-06-22

## 2021-06-22 RX ADMIN — Medication 500 UNITS: at 14:02

## 2021-06-22 RX ADMIN — SODIUM CHLORIDE, PRESERVATIVE FREE 10 ML: 5 INJECTION INTRAVENOUS at 14:02

## 2021-06-22 NOTE — PROGRESS NOTES
Patient presents to clinic for [facility] today. Single  SQ port accessed per policy using 20 G .75 inch Burrell needle for good blood return. Site flushed easily with 10 mL NSS followed by 5 mL Heparin solution 100 units/ml rinse prior to de-access. Dry sterile dressing to area. Tolerated procedure well. Encouraged to schedule port flush every 4 weeks.

## 2021-08-03 ENCOUNTER — HOSPITAL ENCOUNTER (OUTPATIENT)
Dept: INFUSION THERAPY | Age: 76
Discharge: HOME | End: 2021-08-03
Payer: MEDICARE

## 2021-08-03 DIAGNOSIS — Z17.0 MALIGNANT NEOPLASM OF RIGHT BREAST, STAGE 1, ESTROGEN RECEPTOR POSITIVE (HCC): Primary | ICD-10-CM

## 2021-08-03 DIAGNOSIS — C82.09 GRADE I FOLLICULAR LYMPHOMA OF EXTRANODAL SITE EXCLUDING SPLEEN AND OTHER SOLID ORGANS (HCC): ICD-10-CM

## 2021-08-03 DIAGNOSIS — C50.911 MALIGNANT NEOPLASM OF RIGHT BREAST, STAGE 1, ESTROGEN RECEPTOR POSITIVE (HCC): Primary | ICD-10-CM

## 2021-08-03 DIAGNOSIS — C82.13 FOLLICULAR LYMPHOMA GRADE II OF INTRA-ABDOMINAL LYMPH NODES (HCC): ICD-10-CM

## 2021-08-03 PROCEDURE — 6360000002 HC RX W HCPCS: Performed by: INTERNAL MEDICINE

## 2021-08-03 PROCEDURE — 96523 IRRIG DRUG DELIVERY DEVICE: CPT

## 2021-08-03 PROCEDURE — 2580000003 HC RX 258: Performed by: INTERNAL MEDICINE

## 2021-08-03 RX ORDER — SODIUM CHLORIDE 0.9 % (FLUSH) 0.9 %
10 SYRINGE (ML) INJECTION PRN
Status: DISCONTINUED | OUTPATIENT
Start: 2021-08-03 | End: 2021-08-04 | Stop reason: HOSPADM

## 2021-08-03 RX ORDER — SODIUM CHLORIDE 0.9 % (FLUSH) 0.9 %
20 SYRINGE (ML) INJECTION PRN
Status: CANCELLED | OUTPATIENT
Start: 2021-08-03

## 2021-08-03 RX ORDER — SODIUM CHLORIDE 0.9 % (FLUSH) 0.9 %
10 SYRINGE (ML) INJECTION PRN
Status: CANCELLED | OUTPATIENT
Start: 2021-08-03

## 2021-08-03 RX ORDER — HEPARIN SODIUM (PORCINE) LOCK FLUSH IV SOLN 100 UNIT/ML 100 UNIT/ML
500 SOLUTION INTRAVENOUS PRN
Status: CANCELLED | OUTPATIENT
Start: 2021-08-03

## 2021-08-03 RX ORDER — HEPARIN SODIUM (PORCINE) LOCK FLUSH IV SOLN 100 UNIT/ML 100 UNIT/ML
500 SOLUTION INTRAVENOUS PRN
Status: DISCONTINUED | OUTPATIENT
Start: 2021-08-03 | End: 2021-08-04 | Stop reason: HOSPADM

## 2021-08-03 RX ADMIN — SODIUM CHLORIDE, PRESERVATIVE FREE 10 ML: 5 INJECTION INTRAVENOUS at 14:22

## 2021-08-03 RX ADMIN — Medication 500 UNITS: at 14:22

## 2021-08-03 NOTE — PROGRESS NOTES
Patient presents to clinic for [facility] today. Left chest  SQ port accessed per policy using 82C .00UD  Burrell needle for good blood return. Site flushed easily with 10 mL NSS followed by 5 mL Heparin solution 100 units/ml rinse prior to de-access. Dry sterile dressing to area. Tolerated procedure well. Encouraged to schedule port flush every 4 weeks.

## 2021-09-10 ENCOUNTER — HOSPITAL ENCOUNTER (OUTPATIENT)
Dept: INFUSION THERAPY | Age: 76
Discharge: HOME | End: 2021-09-10
Payer: MEDICARE

## 2021-09-10 DIAGNOSIS — C82.13 FOLLICULAR LYMPHOMA GRADE II OF INTRA-ABDOMINAL LYMPH NODES (HCC): ICD-10-CM

## 2021-09-10 DIAGNOSIS — C82.09 GRADE I FOLLICULAR LYMPHOMA OF EXTRANODAL SITE EXCLUDING SPLEEN AND OTHER SOLID ORGANS (HCC): ICD-10-CM

## 2021-09-10 DIAGNOSIS — C50.911 STAGE 1 BREAST CANCER, ER+, RIGHT (HCC): Primary | ICD-10-CM

## 2021-09-10 DIAGNOSIS — Z17.0 STAGE 1 BREAST CANCER, ER+, RIGHT (HCC): Primary | ICD-10-CM

## 2021-09-10 DIAGNOSIS — C50.911 MALIGNANT NEOPLASM OF RIGHT BREAST, STAGE 1, ESTROGEN RECEPTOR POSITIVE (HCC): ICD-10-CM

## 2021-09-10 DIAGNOSIS — Z17.0 MALIGNANT NEOPLASM OF RIGHT BREAST, STAGE 1, ESTROGEN RECEPTOR POSITIVE (HCC): ICD-10-CM

## 2021-09-10 LAB
ALBUMIN SERPL-MCNC: 4.2 G/DL (ref 3.5–5.2)
ALP BLD-CCNC: 96 U/L (ref 35–104)
ALT SERPL-CCNC: 13 U/L (ref 0–32)
ANION GAP SERPL CALCULATED.3IONS-SCNC: 11 MMOL/L (ref 7–16)
AST SERPL-CCNC: 15 U/L (ref 0–31)
BASOPHILS ABSOLUTE: 0.01 E9/L (ref 0–0.2)
BASOPHILS RELATIVE PERCENT: 0.2 % (ref 0–2)
BILIRUB SERPL-MCNC: 0.5 MG/DL (ref 0–1.2)
BUN BLDV-MCNC: 19 MG/DL (ref 6–23)
CALCIUM SERPL-MCNC: 9.7 MG/DL (ref 8.6–10.2)
CHLORIDE BLD-SCNC: 103 MMOL/L (ref 98–107)
CO2: 25 MMOL/L (ref 22–29)
CREAT SERPL-MCNC: 0.8 MG/DL (ref 0.5–1)
EOSINOPHILS ABSOLUTE: 0.18 E9/L (ref 0.05–0.5)
EOSINOPHILS RELATIVE PERCENT: 3.9 % (ref 0–6)
GFR AFRICAN AMERICAN: >60
GFR NON-AFRICAN AMERICAN: >60 ML/MIN/1.73
GLUCOSE BLD-MCNC: 138 MG/DL (ref 74–99)
HCT VFR BLD CALC: 41.6 % (ref 34–48)
HEMOGLOBIN: 14 G/DL (ref 11.5–15.5)
IMMATURE GRANULOCYTES #: 0.04 E9/L
IMMATURE GRANULOCYTES %: 0.9 % (ref 0–5)
LYMPHOCYTES ABSOLUTE: 0.77 E9/L (ref 1.5–4)
LYMPHOCYTES RELATIVE PERCENT: 16.7 % (ref 20–42)
MCH RBC QN AUTO: 30.8 PG (ref 26–35)
MCHC RBC AUTO-ENTMCNC: 33.7 % (ref 32–34.5)
MCV RBC AUTO: 91.4 FL (ref 80–99.9)
MONOCYTES ABSOLUTE: 0.73 E9/L (ref 0.1–0.95)
MONOCYTES RELATIVE PERCENT: 15.8 % (ref 2–12)
NEUTROPHILS ABSOLUTE: 2.88 E9/L (ref 1.8–7.3)
NEUTROPHILS RELATIVE PERCENT: 62.5 % (ref 43–80)
PDW BLD-RTO: 12.8 FL (ref 11.5–15)
PLATELET # BLD: 183 E9/L (ref 130–450)
PMV BLD AUTO: 9.1 FL (ref 7–12)
POTASSIUM SERPL-SCNC: 4.2 MMOL/L (ref 3.5–5)
RBC # BLD: 4.55 E12/L (ref 3.5–5.5)
SODIUM BLD-SCNC: 139 MMOL/L (ref 132–146)
TOTAL PROTEIN: 6.7 G/DL (ref 6.4–8.3)
WBC # BLD: 4.6 E9/L (ref 4.5–11.5)

## 2021-09-10 PROCEDURE — 36591 DRAW BLOOD OFF VENOUS DEVICE: CPT

## 2021-09-10 PROCEDURE — 85025 COMPLETE CBC W/AUTO DIFF WBC: CPT

## 2021-09-10 PROCEDURE — 2580000003 HC RX 258: Performed by: INTERNAL MEDICINE

## 2021-09-10 PROCEDURE — 6360000002 HC RX W HCPCS: Performed by: INTERNAL MEDICINE

## 2021-09-10 PROCEDURE — 80053 COMPREHEN METABOLIC PANEL: CPT

## 2021-09-10 RX ORDER — HEPARIN SODIUM (PORCINE) LOCK FLUSH IV SOLN 100 UNIT/ML 100 UNIT/ML
500 SOLUTION INTRAVENOUS PRN
Status: CANCELLED | OUTPATIENT
Start: 2021-09-10

## 2021-09-10 RX ORDER — SODIUM CHLORIDE 0.9 % (FLUSH) 0.9 %
10 SYRINGE (ML) INJECTION PRN
Status: DISCONTINUED | OUTPATIENT
Start: 2021-09-10 | End: 2021-09-11 | Stop reason: HOSPADM

## 2021-09-10 RX ORDER — SODIUM CHLORIDE 0.9 % (FLUSH) 0.9 %
20 SYRINGE (ML) INJECTION PRN
Status: CANCELLED | OUTPATIENT
Start: 2021-09-10

## 2021-09-10 RX ORDER — SODIUM CHLORIDE 0.9 % (FLUSH) 0.9 %
10 SYRINGE (ML) INJECTION PRN
Status: CANCELLED | OUTPATIENT
Start: 2021-09-10

## 2021-09-10 RX ORDER — HEPARIN SODIUM (PORCINE) LOCK FLUSH IV SOLN 100 UNIT/ML 100 UNIT/ML
500 SOLUTION INTRAVENOUS PRN
Status: DISCONTINUED | OUTPATIENT
Start: 2021-09-10 | End: 2021-09-11 | Stop reason: HOSPADM

## 2021-09-10 RX ADMIN — Medication 500 UNITS: at 10:28

## 2021-09-10 RX ADMIN — SODIUM CHLORIDE, PRESERVATIVE FREE 10 ML: 5 INJECTION INTRAVENOUS at 10:28

## 2021-09-10 NOTE — PROGRESS NOTES
Patient presents to clinic for labs today. Left chest  SQ port accessed per policy using 19J .81WS  Burrell needle for good blood return. Aspirate for waste and specimen sent to lab. Site flushed easily with 10 mL NSS followed by 5 mL Heparin solution 100 units/ml rinse prior to de-access. Dry sterile dressing to area. Tolerated procedure well. Encouraged to schedule port flush every 4 weeks.

## 2021-09-13 ENCOUNTER — OFFICE VISIT (OUTPATIENT)
Dept: ONCOLOGY | Age: 76
End: 2021-09-13
Payer: MEDICARE

## 2021-09-13 VITALS
OXYGEN SATURATION: 95 % | TEMPERATURE: 97.3 F | SYSTOLIC BLOOD PRESSURE: 178 MMHG | DIASTOLIC BLOOD PRESSURE: 78 MMHG | BODY MASS INDEX: 32.38 KG/M2 | HEIGHT: 69 IN | HEART RATE: 61 BPM | WEIGHT: 218.6 LBS

## 2021-09-13 DIAGNOSIS — C82.13 FOLLICULAR LYMPHOMA GRADE II OF INTRA-ABDOMINAL LYMPH NODES (HCC): ICD-10-CM

## 2021-09-13 DIAGNOSIS — Z17.0 STAGE 1 BREAST CANCER, ER+, RIGHT (HCC): Primary | ICD-10-CM

## 2021-09-13 DIAGNOSIS — C50.911 STAGE 1 BREAST CANCER, ER+, RIGHT (HCC): Primary | ICD-10-CM

## 2021-09-13 PROCEDURE — 99212 OFFICE O/P EST SF 10 MIN: CPT

## 2021-09-13 RX ORDER — ANASTROZOLE 1 MG/1
1 TABLET ORAL DAILY
Qty: 90 TABLET | Refills: 1 | Status: SHIPPED
Start: 2021-09-13 | End: 2022-03-14 | Stop reason: SDUPTHER

## 2021-10-25 ENCOUNTER — APPOINTMENT (OUTPATIENT)
Dept: INFUSION THERAPY | Age: 76
End: 2021-10-25
Payer: MEDICARE

## 2021-10-28 ENCOUNTER — HOSPITAL ENCOUNTER (OUTPATIENT)
Dept: INFUSION THERAPY | Age: 76
Discharge: HOME | End: 2021-10-28
Payer: MEDICARE

## 2021-10-28 DIAGNOSIS — C82.09 GRADE I FOLLICULAR LYMPHOMA OF EXTRANODAL SITE EXCLUDING SPLEEN AND OTHER SOLID ORGANS (HCC): ICD-10-CM

## 2021-10-28 DIAGNOSIS — C82.13 FOLLICULAR LYMPHOMA GRADE II OF INTRA-ABDOMINAL LYMPH NODES (HCC): ICD-10-CM

## 2021-10-28 DIAGNOSIS — C50.911 MALIGNANT NEOPLASM OF RIGHT BREAST, STAGE 1, ESTROGEN RECEPTOR POSITIVE (HCC): Primary | ICD-10-CM

## 2021-10-28 DIAGNOSIS — Z17.0 MALIGNANT NEOPLASM OF RIGHT BREAST, STAGE 1, ESTROGEN RECEPTOR POSITIVE (HCC): Primary | ICD-10-CM

## 2021-10-28 PROCEDURE — 2580000003 HC RX 258: Performed by: INTERNAL MEDICINE

## 2021-10-28 PROCEDURE — 6360000002 HC RX W HCPCS: Performed by: INTERNAL MEDICINE

## 2021-10-28 PROCEDURE — 96523 IRRIG DRUG DELIVERY DEVICE: CPT

## 2021-10-28 RX ORDER — SODIUM CHLORIDE 0.9 % (FLUSH) 0.9 %
20 SYRINGE (ML) INJECTION PRN
Status: CANCELLED | OUTPATIENT
Start: 2021-10-28

## 2021-10-28 RX ORDER — HEPARIN SODIUM (PORCINE) LOCK FLUSH IV SOLN 100 UNIT/ML 100 UNIT/ML
500 SOLUTION INTRAVENOUS PRN
Status: DISCONTINUED | OUTPATIENT
Start: 2021-10-28 | End: 2021-10-29 | Stop reason: HOSPADM

## 2021-10-28 RX ORDER — SODIUM CHLORIDE 0.9 % (FLUSH) 0.9 %
10 SYRINGE (ML) INJECTION PRN
Status: DISCONTINUED | OUTPATIENT
Start: 2021-10-28 | End: 2021-10-29 | Stop reason: HOSPADM

## 2021-10-28 RX ORDER — HEPARIN SODIUM (PORCINE) LOCK FLUSH IV SOLN 100 UNIT/ML 100 UNIT/ML
500 SOLUTION INTRAVENOUS PRN
Status: CANCELLED | OUTPATIENT
Start: 2021-10-28

## 2021-10-28 RX ORDER — SODIUM CHLORIDE 0.9 % (FLUSH) 0.9 %
10 SYRINGE (ML) INJECTION PRN
Status: CANCELLED | OUTPATIENT
Start: 2021-10-28

## 2021-10-28 RX ADMIN — SODIUM CHLORIDE, PRESERVATIVE FREE 10 ML: 5 INJECTION INTRAVENOUS at 14:41

## 2021-10-28 RX ADMIN — Medication 500 UNITS: at 14:41

## 2021-10-28 NOTE — PROGRESS NOTES
Patient presents to clinic for [facility] today. R  SQ port accessed per policy using 20 G 7.67\" Burrell needle for good blood return. Site flushed easily with 10 mL NSS followed by 5 mL Heparin solution 100 units/ml rinse prior to de-access. Dry sterile dressing to area. Tolerated procedure well. Encouraged to schedule port flush every 4 weeks.

## 2022-01-18 ENCOUNTER — HOSPITAL ENCOUNTER (OUTPATIENT)
Dept: INFUSION THERAPY | Age: 77
Discharge: HOME | End: 2022-01-18
Payer: MEDICARE

## 2022-01-18 DIAGNOSIS — C82.13 FOLLICULAR LYMPHOMA GRADE II OF INTRA-ABDOMINAL LYMPH NODES (HCC): ICD-10-CM

## 2022-01-18 DIAGNOSIS — C50.911 MALIGNANT NEOPLASM OF RIGHT BREAST, STAGE 1, ESTROGEN RECEPTOR POSITIVE (HCC): Primary | ICD-10-CM

## 2022-01-18 DIAGNOSIS — Z17.0 MALIGNANT NEOPLASM OF RIGHT BREAST, STAGE 1, ESTROGEN RECEPTOR POSITIVE (HCC): Primary | ICD-10-CM

## 2022-01-18 DIAGNOSIS — C82.09 GRADE I FOLLICULAR LYMPHOMA OF EXTRANODAL SITE EXCLUDING SPLEEN AND OTHER SOLID ORGANS (HCC): ICD-10-CM

## 2022-01-18 PROCEDURE — 96523 IRRIG DRUG DELIVERY DEVICE: CPT

## 2022-01-18 PROCEDURE — 6360000002 HC RX W HCPCS: Performed by: INTERNAL MEDICINE

## 2022-01-18 PROCEDURE — 2580000003 HC RX 258: Performed by: INTERNAL MEDICINE

## 2022-01-18 RX ORDER — HEPARIN SODIUM (PORCINE) LOCK FLUSH IV SOLN 100 UNIT/ML 100 UNIT/ML
500 SOLUTION INTRAVENOUS PRN
Status: DISCONTINUED | OUTPATIENT
Start: 2022-01-18 | End: 2022-01-19 | Stop reason: HOSPADM

## 2022-01-18 RX ORDER — SODIUM CHLORIDE 0.9 % (FLUSH) 0.9 %
10 SYRINGE (ML) INJECTION PRN
Status: DISCONTINUED | OUTPATIENT
Start: 2022-01-18 | End: 2022-01-19 | Stop reason: HOSPADM

## 2022-01-18 RX ORDER — SODIUM CHLORIDE 0.9 % (FLUSH) 0.9 %
20 SYRINGE (ML) INJECTION PRN
Status: CANCELLED | OUTPATIENT
Start: 2022-01-18

## 2022-01-18 RX ORDER — HEPARIN SODIUM (PORCINE) LOCK FLUSH IV SOLN 100 UNIT/ML 100 UNIT/ML
500 SOLUTION INTRAVENOUS PRN
Status: CANCELLED | OUTPATIENT
Start: 2022-01-18

## 2022-01-18 RX ORDER — SODIUM CHLORIDE 0.9 % (FLUSH) 0.9 %
10 SYRINGE (ML) INJECTION PRN
Status: CANCELLED | OUTPATIENT
Start: 2022-01-18

## 2022-01-18 RX ADMIN — SODIUM CHLORIDE, PRESERVATIVE FREE 10 ML: 5 INJECTION INTRAVENOUS at 14:46

## 2022-01-18 RX ADMIN — Medication 500 UNITS: at 14:46

## 2022-01-18 NOTE — PROGRESS NOTES
Patient presents to clinic for [facility] today. L chest  SQ port accessed per policy using 20 G, 7.28 inch Burrell needle for good blood return. Site flushed easily with 10 mL NSS followed by 5 mL Heparin solution 100 units/ml rinse prior to de-access. Dry sterile dressing to area. Tolerated procedure well. Encouraged to schedule port flush every 4 weeks.

## 2022-03-11 ENCOUNTER — HOSPITAL ENCOUNTER (OUTPATIENT)
Dept: INFUSION THERAPY | Age: 77
Discharge: HOME | End: 2022-03-11
Payer: MEDICARE

## 2022-03-11 DIAGNOSIS — C82.13 FOLLICULAR LYMPHOMA GRADE II OF INTRA-ABDOMINAL LYMPH NODES (HCC): ICD-10-CM

## 2022-03-11 DIAGNOSIS — Z17.0 MALIGNANT NEOPLASM OF RIGHT BREAST, STAGE 1, ESTROGEN RECEPTOR POSITIVE (HCC): ICD-10-CM

## 2022-03-11 DIAGNOSIS — Z17.0 STAGE 1 BREAST CANCER, ER+, RIGHT (HCC): Primary | ICD-10-CM

## 2022-03-11 DIAGNOSIS — C50.911 STAGE 1 BREAST CANCER, ER+, RIGHT (HCC): Primary | ICD-10-CM

## 2022-03-11 DIAGNOSIS — C50.911 MALIGNANT NEOPLASM OF RIGHT BREAST, STAGE 1, ESTROGEN RECEPTOR POSITIVE (HCC): ICD-10-CM

## 2022-03-11 DIAGNOSIS — C82.09 GRADE I FOLLICULAR LYMPHOMA OF EXTRANODAL SITE EXCLUDING SPLEEN AND OTHER SOLID ORGANS (HCC): ICD-10-CM

## 2022-03-11 LAB
ALBUMIN SERPL-MCNC: 4.5 G/DL (ref 3.5–5.2)
ALP BLD-CCNC: 100 U/L (ref 35–104)
ALT SERPL-CCNC: 14 U/L (ref 0–32)
ANION GAP SERPL CALCULATED.3IONS-SCNC: 13 MMOL/L (ref 7–16)
AST SERPL-CCNC: 16 U/L (ref 0–31)
BASOPHILS ABSOLUTE: 0.03 E9/L (ref 0–0.2)
BASOPHILS RELATIVE PERCENT: 0.5 % (ref 0–2)
BILIRUB SERPL-MCNC: 0.4 MG/DL (ref 0–1.2)
BUN BLDV-MCNC: 15 MG/DL (ref 6–23)
CALCIUM SERPL-MCNC: 9.9 MG/DL (ref 8.6–10.2)
CHLORIDE BLD-SCNC: 101 MMOL/L (ref 98–107)
CO2: 25 MMOL/L (ref 22–29)
CREAT SERPL-MCNC: 0.7 MG/DL (ref 0.5–1)
EOSINOPHILS ABSOLUTE: 0.2 E9/L (ref 0.05–0.5)
EOSINOPHILS RELATIVE PERCENT: 3.6 % (ref 0–6)
GFR AFRICAN AMERICAN: >60
GFR NON-AFRICAN AMERICAN: >60 ML/MIN/1.73
GLUCOSE BLD-MCNC: 142 MG/DL (ref 74–99)
HCT VFR BLD CALC: 44.2 % (ref 34–48)
HEMOGLOBIN: 14.5 G/DL (ref 11.5–15.5)
IMMATURE GRANULOCYTES #: 0.01 E9/L
IMMATURE GRANULOCYTES %: 0.2 % (ref 0–5)
LYMPHOCYTES ABSOLUTE: 1.06 E9/L (ref 1.5–4)
LYMPHOCYTES RELATIVE PERCENT: 19.2 % (ref 20–42)
MCH RBC QN AUTO: 29.8 PG (ref 26–35)
MCHC RBC AUTO-ENTMCNC: 32.8 % (ref 32–34.5)
MCV RBC AUTO: 90.9 FL (ref 80–99.9)
MONOCYTES ABSOLUTE: 0.73 E9/L (ref 0.1–0.95)
MONOCYTES RELATIVE PERCENT: 13.2 % (ref 2–12)
NEUTROPHILS ABSOLUTE: 3.48 E9/L (ref 1.8–7.3)
NEUTROPHILS RELATIVE PERCENT: 63.3 % (ref 43–80)
PDW BLD-RTO: 14.1 FL (ref 11.5–15)
PLATELET # BLD: 226 E9/L (ref 130–450)
PMV BLD AUTO: 9.3 FL (ref 7–12)
POTASSIUM SERPL-SCNC: 4 MMOL/L (ref 3.5–5)
RBC # BLD: 4.86 E12/L (ref 3.5–5.5)
SODIUM BLD-SCNC: 139 MMOL/L (ref 132–146)
TOTAL PROTEIN: 7 G/DL (ref 6.4–8.3)
WBC # BLD: 5.5 E9/L (ref 4.5–11.5)

## 2022-03-11 PROCEDURE — 36591 DRAW BLOOD OFF VENOUS DEVICE: CPT

## 2022-03-11 PROCEDURE — 85025 COMPLETE CBC W/AUTO DIFF WBC: CPT

## 2022-03-11 PROCEDURE — 80053 COMPREHEN METABOLIC PANEL: CPT

## 2022-03-11 PROCEDURE — 2580000003 HC RX 258: Performed by: INTERNAL MEDICINE

## 2022-03-11 PROCEDURE — 6360000002 HC RX W HCPCS: Performed by: INTERNAL MEDICINE

## 2022-03-11 RX ORDER — SODIUM CHLORIDE 0.9 % (FLUSH) 0.9 %
10 SYRINGE (ML) INJECTION PRN
Status: CANCELLED | OUTPATIENT
Start: 2022-03-11

## 2022-03-11 RX ORDER — HEPARIN SODIUM (PORCINE) LOCK FLUSH IV SOLN 100 UNIT/ML 100 UNIT/ML
500 SOLUTION INTRAVENOUS PRN
Status: DISCONTINUED | OUTPATIENT
Start: 2022-03-11 | End: 2022-03-12 | Stop reason: HOSPADM

## 2022-03-11 RX ORDER — SODIUM CHLORIDE 0.9 % (FLUSH) 0.9 %
10 SYRINGE (ML) INJECTION PRN
Status: DISCONTINUED | OUTPATIENT
Start: 2022-03-11 | End: 2022-03-12 | Stop reason: HOSPADM

## 2022-03-11 RX ORDER — HEPARIN SODIUM (PORCINE) LOCK FLUSH IV SOLN 100 UNIT/ML 100 UNIT/ML
500 SOLUTION INTRAVENOUS PRN
Status: CANCELLED | OUTPATIENT
Start: 2022-03-11

## 2022-03-11 RX ORDER — SODIUM CHLORIDE 0.9 % (FLUSH) 0.9 %
20 SYRINGE (ML) INJECTION PRN
Status: CANCELLED | OUTPATIENT
Start: 2022-03-11

## 2022-03-11 RX ADMIN — Medication 500 UNITS: at 11:04

## 2022-03-11 RX ADMIN — SODIUM CHLORIDE, PRESERVATIVE FREE 10 ML: 5 INJECTION INTRAVENOUS at 11:04

## 2022-03-11 NOTE — PROGRESS NOTES
PORT FLUSH    Patient presents to clinic for [facility] today. single  SQ port accessed per policy using 32W, . 75 inch Burrell needle for good blood return. Aspirate for waste and specimen sent to lab. Site flushed easily with 10 mL NSS followed by 5 mL Heparin solution 100 units/ml rinse prior to de-access. Dry sterile dressing to area. Tolerated procedure well. Encouraged to schedule port flush every 4 weeks.

## 2022-03-14 ENCOUNTER — OFFICE VISIT (OUTPATIENT)
Dept: ONCOLOGY | Age: 77
End: 2022-03-14
Payer: MEDICARE

## 2022-03-14 VITALS
TEMPERATURE: 97.3 F | OXYGEN SATURATION: 97 % | BODY MASS INDEX: 31 KG/M2 | HEIGHT: 69 IN | DIASTOLIC BLOOD PRESSURE: 81 MMHG | SYSTOLIC BLOOD PRESSURE: 168 MMHG | WEIGHT: 209.3 LBS | HEART RATE: 89 BPM

## 2022-03-14 DIAGNOSIS — C50.911 STAGE 1 BREAST CANCER, ER+, RIGHT (HCC): ICD-10-CM

## 2022-03-14 DIAGNOSIS — Z17.0 STAGE 1 BREAST CANCER, ER+, RIGHT (HCC): ICD-10-CM

## 2022-03-14 DIAGNOSIS — C82.13 FOLLICULAR LYMPHOMA GRADE II OF INTRA-ABDOMINAL LYMPH NODES (HCC): Primary | ICD-10-CM

## 2022-03-14 PROCEDURE — 99213 OFFICE O/P EST LOW 20 MIN: CPT | Performed by: INTERNAL MEDICINE

## 2022-03-14 RX ORDER — ANASTROZOLE 1 MG/1
1 TABLET ORAL DAILY
Qty: 90 TABLET | Refills: 1 | Status: SHIPPED
Start: 2022-03-14 | End: 2022-10-05 | Stop reason: SDUPTHER

## 2022-03-14 NOTE — PROGRESS NOTES
[address] [facility]. [facility]        Pt Name: Erica Dates: 1945  Date of evaluation: 3/14/2022  Primary Care Physician: Erica Jaimes MD  Reason for evaluation:   Chief Complaint   Patient presents with    Breast Cancer     Right  ER+    Lymphoma    Follow-up          Subjective: [facility] for  follow up.  Feels tired today. No constitutional B symptoms. PAST HISTORY-  Patient was recently hospitalized at [facility] at the end of April 2021 with weakness and and dehydration as well as febrile illness. She stated her WBC count was very low. She was seen by hematology Dr. Edyta Matias and underwent a bone marrow biopsy and the flow cytometry is available and shows less than 3% blasts and was otherwise nonspecific. Bone marrow morphology report not available. Apparently she stated that she might have been tested for COVID-19 but does not know the results. She denies any constitutional B symptoms. She is back on anastrozole along with calcium and vitamin D supplements. Tolerating anastrozole well            OBJECTIVE:  VITALS:  vitals were not taken for this visit. Physical Exam:  Performance Status: 0  Well developed, well nourished female  Head and neck: PERRLA, EOMI. Sclera non icteric. Oropharynx: Clear  Neck: no JVD,  bilateral cervical lymphadenopathy markedly decreased in size  Heart: Regular rate and regular rhythm. No murmur. Lungs: Clear to auscultation. Abdomen: Soft, non-tender;no masses, no organomegaly. Extremities: No edema,no cyanosis, no clubbing. Neurologic: Cranial nerves grossly intact. Very mild weakness in the right upper and right lower extremity. Medi-port:  Left Subclavian          Medications  Prior to Admission medications    Medication Sig Start Date End Date Taking?  Authorizing Provider   anastrozole (ARIMIDEX) 1 MG tablet Take 1 tablet by mouth daily 9/13/21   Bronwyn Santiago MD   meloxicam (MOBIC) 15 MG tablet Take 15 mg by mouth daily    Historical Provider, MD   Umeclidinium Bromide (INCRUSE ELLIPTA) 62.5 MCG/INH AEPB Inhale 1 puff into the lungs daily    Historical Provider, MD   Calcium Carb-Cholecalciferol (CALCIUM 600+D) 600-800 MG-UNIT TABS Take by mouth daily    Historical Provider, MD   B Complex Vitamins (VITAMIN B COMPLEX) TABS Take by mouth daily    Historical Provider, MD   aspirin 81 MG tablet Take 81 mg by mouth daily    Historical Provider, MD   amLODIPine (NORVASC) 5 MG tablet Take 5 mg by mouth daily    Historical Provider, MD   pioglitazone (ACTOS) 15 MG tablet Take 15 mg by mouth nightly    Historical Provider, MD   atorvastatin (LIPITOR) 40 MG tablet Take 40 mg by mouth nightly    Historical Provider, MD   levothyroxine (SYNTHROID) 50 MCG tablet Take 75 mcg by mouth Daily     Historical Provider, MD   metFORMIN (GLUCOPHAGE-XR) 500 MG extended release tablet Take 500 mg by mouth 2 times daily     Historical Provider, MD    Scheduled Meds:  Continuous Infusions:  PRN Meds:.        Recent Laboratory Data-     Lab Results   Component Value Date    WBC 5.5 03/11/2022    HGB 14.5 03/11/2022    HCT 44.2 03/11/2022    MCV 90.9 03/11/2022     03/11/2022    LYMPHOPCT 19.2 (L) 03/11/2022    RBC 4.86 03/11/2022    MCH 29.8 03/11/2022    MCHC 32.8 03/11/2022    RDW 14.1 03/11/2022    NEUTOPHILPCT 63.3 03/11/2022    MONOPCT 13.2 (H) 03/11/2022    BASOPCT 0.5 03/11/2022    NEUTROABS 3.48 03/11/2022    LYMPHSABS 1.06 (L) 03/11/2022    MONOSABS 0.73 03/11/2022    EOSABS 0.20 03/11/2022    BASOSABS 0.03 03/11/2022       Lab Results   Component Value Date     03/11/2022    K 4.0 03/11/2022     03/11/2022    CO2 25 03/11/2022    BUN 15 03/11/2022    CREATININE 0.7 03/11/2022    GLUCOSE 142 (H) 03/11/2022    CALCIUM 9.9 03/11/2022    PROT 7.0 03/11/2022    LABALBU 4.5 03/11/2022    BILITOT 0.4 03/11/2022    ALKPHOS 100 03/11/2022    AST 16 03/11/2022    ALT 14 03/11/2022    LABGLOM >60 03/11/2022    GFRAA >60 03/11/2022         Radiology-    BILATERAL SCREENING MAMMOGRAM: 4/13/2021  There is some postsurgical changes and skin thickening of the right breast.   This is unchanged.  There are no suspicious appearing masses or   calcifications in either breast.       BIRADS:   BIRADS - CATEGORY 2       Benign Findings.  Normal interval follow-up is recommended in 12 months.       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 All potential side effects of chemotherapy were discussed  She tolerated cycle 1 of chemotherapy very well and had no evidence of tumor lysis syndrome        She completed Cycle #4 of Rituxan and Bendeka on 3/15/17       She was restaged 3/23/17 with a follow-up PET CT scan which showed essentially complete remission and resolution of her diffuse bulky lymphadenopathy as well as resolution of bilateral hydronephrosis.  There was question of an ill-defined lesion at the level of the introitus and vagina but she has no GYN symptomatology whatsoever and she will follow with her gynecologist      She was then recommended maintenance Rituxan for 2 years.  Joycelyn Pierre  Received her 1st dose of maintenance Rituxan on 5/17/2017      The fact that her absolute lymphocyte count remains very low she will be maintained on VZV prophylaxis with acyclovir 400 mg twice a day.      Her spironolactone was discontinued because of her renal insufficiency         Her PET/CT scan was again unremarkable without any intrathoracic abnormality and no evidence of recurrent lymphadenopathy      She completed Cycle #12 Maintenance Rituxan/Hycela on 3/13/19    She was cleared to receive her Shingrix vaccine in September 2- Postmenopausal right  breast cancer stage I , T1c N1mi M0 with positive ER and OH receptors and nonamplified HER-2/kylah by fish. One out of 4 sentinel nodes had micrometastasis measuring only 2 mm  Clear surgical margins were achieved     It was explained to her that the mainstay of adjuvant therapy at her age would be an aromatase inhibitor. Oncotype DX or mammaprint will be done to determine if she has any significant benefit from chemo which I doubt     Requested  for Oncotype or mammaprint. Was submitted to pathology department at [facility].   Oncotype DX  RS 17---Low risk for recurrence. She will be recommended adjuvant aromatase inhibitor to be followed by adjuvant local radiation therapy to the right breast     Radiation oncology consult was obtained. She was initiated on anastrozole 1 mg daily along with calcium and vitamin D supplements on 11/21/18.     Bone density was done on 5/11/18     Her last mammogram done on January 27, 2020 was negative    She will be maintained on anastrozole along with calcium and vitamin D supplements for her breast cancer  Her recent mammogram in February 2020 was negative    Recently hospitalized at [facility] with weakness and abnormal WBC counts. She underwent a Bone Marrow Biopsy and Aspiration on May 6, 2020. It was nonspecific with less than 3% blasts.     CBC done today is completely in the normal range and this is suggestive that her neutropenia was related to bone marrow suppression by a systemic viral illness which she has recovered from    Her discharge summary from [facility] will be retrieved and reviewed and her COVID-19 tests will be rechecked with consideration for either another swab or the  COVID-19 antibody test.  Her repeat CBC today is normal with WBC count of 5.8, hemoglobin of 13.5 with normal MCV and normal platelet count. Her differential shows slight lymphopenia. She has been reassured. To continue anastrozole along with calcium and vitamin D supplements. She will have a follow-up mammogram in January 2021  Her CT can of abdomen and pelvis done in June 2020 was negative with incidental diverticulosis. It was done without contrast    9/16/2020  Her recent CMP shows acute renal insufficiency and her GFR is down from normal range to 32 mL/min and her serum creatinine is up to 1.6  Needs to follow up with. Dr Live Tovar due to elevated creatinine especially that she is on metformin        1/13/2021  Renal function has markedly improved since last visit. Her diabetes is well controlled. She is tolerating anastrozole well and is compliant with calcium and vitamin D supplements. She has no B symptoms and her lymphoma is in remission  She will have follow-up mammogram in February 2021.      5/12/2021  Her diabetes is well controlled. She is tolerating anastrozole well and is compliant with calcium and vitamin D supplements. She has no B symptoms and her lymphoma is in remission  Mammogram 4/2021 was Negative. She will have follow-up mammogram in April 2022 9/13/2021  Tolerating anastrozole well. She continues on calcium and vitamin D supplements. She has no constitutional B symptoms and her lymphoma remains in remission. Last mammogram done in April 2021 was negative. Diabetes is well controlled. Continues on meloxicam for DJD.   She will complete anastrozole in December 2023.      3/14/2022    Main complaint is arthritic pains mostly in her knees and she ambulates with the assistance of a walker  No constitutional B symptoms  Tolerating anastrozole well and has been compliant with her calcium and vitamin D supplements  Physical exam negative    She will have follow-up mammogram as well as DEXA scan in April 2022    Benjamin Yap M.D., F.A.C.P.   Electronically signed 3/14/2022 at 12:01 PM

## 2022-04-15 NOTE — PROGRESS NOTES
[address] [facility]        Pt Name: Costa Palacios: 1945  Date of evaluation: 9/10/2021  Primary Care Physician: Costa Palacios MD  Reason for evaluation:   Chief Complaint   Patient presents with    Breast Cancer     Right ER+    Follow-up     4 month          Subjective:  Here for results of Mammogram and  follow up.  Feels tired today. No constitutional B symptoms. PAST HISTORY-  Patient was recently hospitalized at [facility] at the end of April with weakness and and dehydration as well as febrile illness. She stated her WBC count was very low. She was seen by hematology Dr. Amanda Yan and underwent a bone marrow biopsy and the flow cytometry is available and shows less than 3% blasts and was otherwise nonspecific. Bone marrow morphology report not available. Apparently she stated that she might have been tested for COVID-19 but does not know the results. She denies any constitutional B symptoms. She is back on anastrozole along with calcium and vitamin D supplements. Tolerating anastrozole well            OBJECTIVE:  VITALS:  vitals were not taken for this visit. Physical Exam:  Performance Status: 0  Well developed, well nourished female  Head and neck: PERRLA, EOMI. Sclera non icteric. Oropharynx: Clear  Neck: no JVD,  bilateral cervical lymphadenopathy markedly decreased in size  Heart: Regular rate and regular rhythm. No murmur. Lungs: Clear to auscultation. Abdomen: Soft, non-tender;no masses, no organomegaly. Extremities: No edema,no cyanosis, no clubbing. Neurologic: Cranial nerves grossly intact. Very mild weakness in the right upper and right lower extremity. Medi-port:  Left Subclavian          Medications  Prior to Admission medications    Medication Sig Start Date End Date Taking?  Authorizing Provider   meloxicam (MOBIC) 15 MG tablet Take 15 mg by mouth daily    Historical Provider, MD anastrozole (ARIMIDEX) 1 MG tablet Take 1 tablet by mouth daily 5/12/21   Xiomara Blevins MD   Umeclidinium Bromide (INCRUSE ELLIPTA) 62.5 MCG/INH AEPB Inhale 1 puff into the lungs daily    Historical Provider, MD   Calcium Carb-Cholecalciferol (CALCIUM 600+D) 600-800 MG-UNIT TABS Take by mouth daily    Historical Provider, MD   B Complex Vitamins (VITAMIN B COMPLEX) TABS Take by mouth daily    Historical Provider, MD   aspirin 81 MG tablet Take 81 mg by mouth daily    Historical Provider, MD   amLODIPine (NORVASC) 5 MG tablet Take 5 mg by mouth daily    Historical Provider, MD   pioglitazone (ACTOS) 15 MG tablet Take 15 mg by mouth nightly    Historical Provider, MD   atorvastatin (LIPITOR) 40 MG tablet Take 40 mg by mouth nightly    Historical Provider, MD   levothyroxine (SYNTHROID) 50 MCG tablet Take 75 mcg by mouth Daily     Historical Provider, MD   metFORMIN (GLUCOPHAGE-XR) 500 MG extended release tablet Take 500 mg by mouth 2 times daily     Historical Provider, MD    Scheduled Meds:  Continuous Infusions:  PRN Meds:.        Recent Laboratory Data-     Lab Results   Component Value Date    WBC 6.6 05/10/2021    HGB 14.6 05/10/2021    HCT 44.3 05/10/2021    MCV 92.5 05/10/2021     05/10/2021    LYMPHOPCT 18.1 (L) 05/10/2021    RBC 4.79 05/10/2021    MCH 30.5 05/10/2021    MCHC 33.0 05/10/2021    RDW 12.9 05/10/2021    NEUTOPHILPCT 65.5 05/10/2021    MONOPCT 12.5 (H) 05/10/2021    BASOPCT 0.6 05/10/2021    NEUTROABS 4.35 05/10/2021    LYMPHSABS 1.20 (L) 05/10/2021    MONOSABS 0.83 05/10/2021    EOSABS 0.18 05/10/2021    BASOSABS 0.04 05/10/2021       Lab Results   Component Value Date     05/10/2021    K 4.6 05/10/2021     05/10/2021    CO2 23 05/10/2021    BUN 28 (H) 05/10/2021    CREATININE 0.9 05/10/2021    GLUCOSE 168 (H) 05/10/2021    CALCIUM 10.0 05/10/2021    PROT 7.2 05/10/2021    LABALBU 4.7 05/10/2021    BILITOT 0.4 05/10/2021    ALKPHOS 101 05/10/2021    AST 18 05/10/2021 ALT 13 05/10/2021    LABGLOM >60 05/10/2021    GFRAA >60 05/10/2021         Radiology-    BILATERAL SCREENING MAMMOGRAM: 4/13/2021  There is some postsurgical changes and skin thickening of the right breast.   This is unchanged.  There are no suspicious appearing masses or   calcifications in either breast.       BIRADS:   BIRADS - CATEGORY 2       Benign Findings.  Normal interval follow-up is recommended in 12 months.       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 All potential side effects of chemotherapy were discussed  She tolerated cycle 1 of chemotherapy very well and had no evidence of tumor lysis syndrome        She completed Cycle #4 of Rituxan and Bendeka on 3/15/17       She was restaged 3/23/17 with a follow-up PET CT scan which showed essentially complete remission and resolution of her diffuse bulky lymphadenopathy as well as resolution of bilateral hydronephrosis.  There was question of an ill-defined lesion at the level of the introitus and vagina but she has no GYN symptomatology whatsoever and she will follow with her gynecologist      She was then recommended maintenance Rituxan for 2 years.  Karina Holguin  Received her 1st dose of maintenance Rituxan on 5/17/2017      The fact that her absolute lymphocyte count remains very low she will be maintained on VZV prophylaxis with acyclovir 400 mg twice a day.      Her spironolactone was discontinued because of her renal insufficiency         Her PET/CT scan was again unremarkable without any intrathoracic abnormality and no evidence of recurrent lymphadenopathy      She completed Cycle #12 Maintenance Rituxan/Hycela on 3/13/19    She was cleared to receive her Shingrix vaccine in September 2- Postmenopausal right  breast cancer stage I , T1c N1mi M0 with positive ER and OK receptors and nonamplified HER-2/kylah by fish. One out of 4 sentinel nodes had micrometastasis measuring only 2 mm  Clear surgical margins were achieved     It was explained to her that the mainstay of adjuvant therapy at her age would be an aromatase inhibitor. Oncotype DX or mammaprint will be done to determine if she has any significant benefit from chemo which I doubt     Requested  for Oncotype or mammaprint. Was submitted to pathology department at [facility].   Oncotype DX  RS 17---Low risk for recurrence. She will be recommended adjuvant aromatase inhibitor to be followed by adjuvant local radiation therapy to the right breast     Radiation oncology consult was obtained. She was initiated on anastrozole 1 mg daily along with calcium and vitamin D supplements on 11/21/18.     Bone density was done on 5/11/18     Her last mammogram done on January 27, 2020 was negative    She will be maintained on anastrozole along with calcium and vitamin D supplements for her breast cancer  Her recent mammogram in February 2020 was negative    Recently hospitalized at [facility] with weakness and abnormal WBC counts. She underwent a Bone Marrow Biopsy and Aspiration on May 6, 2020. It was nonspecific with less than 3% blasts.     CBC done today is completely in the normal range and this is suggestive that her neutropenia was related to bone marrow suppression by a systemic viral illness which she has recovered from    Her discharge summary from [facility] will be retrieved and reviewed and her COVID-19 tests will be rechecked with consideration for either another swab or the  COVID-19 antibody test.  Her repeat CBC today is normal with WBC count of 5.8, hemoglobin of 13.5 with normal MCV and normal platelet count. Her differential shows slight lymphopenia. She has been reassured. To continue anastrozole along with calcium and vitamin D supplements. She will have a follow-up mammogram in January 2021  Her CT can of abdomen and pelvis done in June 2020 was negative with incidental diverticulosis. It was done without contrast    9/16/2020  Her recent CMP shows acute renal insufficiency and her GFR is down from normal range to 32 mL/min and her serum creatinine is up to 1.6  Needs to follow up with. Dr Ivet Miner due to elevated creatinine especially that she is on metformin        1/13/2021  Renal function has markedly improved since last visit. Her diabetes is well controlled. She is tolerating anastrozole well and is compliant with calcium and vitamin D supplements. She has no B symptoms and her lymphoma is in remission  She will have follow-up mammogram in February 2021.      5/12/2021  Her diabetes is well controlled. She is tolerating anastrozole well and is compliant with calcium and vitamin D supplements. She has no B symptoms and her lymphoma is in remission  Mammogram 4/2021 was Negative. She will have follow-up mammogram in April 2022 9/13/2021  Tolerating anastrozole well. She continues on calcium and vitamin D supplements. She has no constitutional B symptoms and her lymphoma remains in remission. Last mammogram done in April 2021 was negative. Diabetes is well controlled. Continues on meloxicam for DJD. She will complete anastrozole in December 2023        Ruddy Rizzo. Gurpreet Cristobal M.D., F.A.C.P.   Electronically signed 9/10/2021 at 9:33 AM Yes

## 2022-05-03 DIAGNOSIS — Z17.0 MALIGNANT NEOPLASM OF RIGHT BREAST, STAGE 1, ESTROGEN RECEPTOR POSITIVE (HCC): ICD-10-CM

## 2022-05-03 DIAGNOSIS — Z12.31 SCREENING MAMMOGRAM FOR HIGH-RISK PATIENT: Primary | ICD-10-CM

## 2022-05-03 DIAGNOSIS — C50.911 MALIGNANT NEOPLASM OF RIGHT BREAST, STAGE 1, ESTROGEN RECEPTOR POSITIVE (HCC): ICD-10-CM

## 2022-05-16 DIAGNOSIS — C50.911 MALIGNANT NEOPLASM OF RIGHT BREAST, STAGE 1, ESTROGEN RECEPTOR POSITIVE (HCC): ICD-10-CM

## 2022-05-16 DIAGNOSIS — Z17.0 MALIGNANT NEOPLASM OF RIGHT BREAST, STAGE 1, ESTROGEN RECEPTOR POSITIVE (HCC): ICD-10-CM

## 2022-05-16 DIAGNOSIS — Z79.811 USE OF AROMATASE INHIBITORS: Primary | ICD-10-CM

## 2022-06-03 ENCOUNTER — HOSPITAL ENCOUNTER (OUTPATIENT)
Dept: GENERAL RADIOLOGY | Age: 77
Discharge: HOME | End: 2022-06-05
Payer: MEDICARE

## 2022-06-03 DIAGNOSIS — Z17.0 MALIGNANT NEOPLASM OF RIGHT BREAST, STAGE 1, ESTROGEN RECEPTOR POSITIVE (HCC): ICD-10-CM

## 2022-06-03 DIAGNOSIS — C50.911 MALIGNANT NEOPLASM OF RIGHT BREAST, STAGE 1, ESTROGEN RECEPTOR POSITIVE (HCC): ICD-10-CM

## 2022-06-03 DIAGNOSIS — Z79.811 USE OF AROMATASE INHIBITORS: ICD-10-CM

## 2022-06-03 DIAGNOSIS — Z12.31 SCREENING MAMMOGRAM FOR HIGH-RISK PATIENT: ICD-10-CM

## 2022-06-03 PROCEDURE — 77067 SCR MAMMO BI INCL CAD: CPT

## 2022-06-03 PROCEDURE — 77080 DXA BONE DENSITY AXIAL: CPT

## 2022-06-09 ENCOUNTER — HOSPITAL ENCOUNTER (OUTPATIENT)
Dept: INFUSION THERAPY | Age: 77
Discharge: HOME | End: 2022-06-09

## 2022-06-09 DIAGNOSIS — C50.911 STAGE 1 BREAST CANCER, ER+, RIGHT (HCC): ICD-10-CM

## 2022-06-09 DIAGNOSIS — Z17.0 STAGE 1 BREAST CANCER, ER+, RIGHT (HCC): ICD-10-CM

## 2022-06-09 DIAGNOSIS — C82.13 FOLLICULAR LYMPHOMA GRADE II OF INTRA-ABDOMINAL LYMPH NODES (HCC): ICD-10-CM

## 2022-06-10 ENCOUNTER — HOSPITAL ENCOUNTER (OUTPATIENT)
Dept: INFUSION THERAPY | Age: 77
Discharge: HOME | End: 2022-06-10
Payer: MEDICARE

## 2022-06-10 DIAGNOSIS — Z17.0 MALIGNANT NEOPLASM OF RIGHT BREAST, STAGE 1, ESTROGEN RECEPTOR POSITIVE (HCC): Primary | ICD-10-CM

## 2022-06-10 DIAGNOSIS — C82.13 FOLLICULAR LYMPHOMA GRADE II OF INTRA-ABDOMINAL LYMPH NODES (HCC): ICD-10-CM

## 2022-06-10 DIAGNOSIS — C50.911 MALIGNANT NEOPLASM OF RIGHT BREAST, STAGE 1, ESTROGEN RECEPTOR POSITIVE (HCC): Primary | ICD-10-CM

## 2022-06-10 DIAGNOSIS — C82.09 GRADE I FOLLICULAR LYMPHOMA OF EXTRANODAL SITE EXCLUDING SPLEEN AND OTHER SOLID ORGANS (HCC): ICD-10-CM

## 2022-06-10 PROCEDURE — 2580000003 HC RX 258: Performed by: INTERNAL MEDICINE

## 2022-06-10 PROCEDURE — 96523 IRRIG DRUG DELIVERY DEVICE: CPT

## 2022-06-10 PROCEDURE — 6360000002 HC RX W HCPCS: Performed by: INTERNAL MEDICINE

## 2022-06-10 RX ORDER — HEPARIN SODIUM (PORCINE) LOCK FLUSH IV SOLN 100 UNIT/ML 100 UNIT/ML
500 SOLUTION INTRAVENOUS PRN
Status: DISCONTINUED | OUTPATIENT
Start: 2022-06-10 | End: 2022-06-11 | Stop reason: HOSPADM

## 2022-06-10 RX ORDER — SODIUM CHLORIDE 0.9 % (FLUSH) 0.9 %
10 SYRINGE (ML) INJECTION PRN
Status: CANCELLED | OUTPATIENT
Start: 2022-06-10

## 2022-06-10 RX ORDER — HEPARIN SODIUM (PORCINE) LOCK FLUSH IV SOLN 100 UNIT/ML 100 UNIT/ML
500 SOLUTION INTRAVENOUS PRN
Status: CANCELLED | OUTPATIENT
Start: 2022-06-10

## 2022-06-10 RX ORDER — SODIUM CHLORIDE 0.9 % (FLUSH) 0.9 %
10 SYRINGE (ML) INJECTION PRN
Status: DISCONTINUED | OUTPATIENT
Start: 2022-06-10 | End: 2022-06-11 | Stop reason: HOSPADM

## 2022-06-10 RX ORDER — SODIUM CHLORIDE 0.9 % (FLUSH) 0.9 %
20 SYRINGE (ML) INJECTION PRN
Status: CANCELLED | OUTPATIENT
Start: 2022-06-10

## 2022-06-10 RX ADMIN — Medication 500 UNITS: at 10:36

## 2022-06-10 RX ADMIN — SODIUM CHLORIDE, PRESERVATIVE FREE 10 ML: 5 INJECTION INTRAVENOUS at 10:36

## 2022-06-22 NOTE — PROGRESS NOTES
Ministerio at Dr. Bjorn Espino office notified of need for medical clearance, H&P, orders and HGBA1C not on chart.

## 2022-06-24 ENCOUNTER — HOSPITAL ENCOUNTER (OUTPATIENT)
Dept: PREADMISSION TESTING | Age: 77
Discharge: HOME | End: 2022-06-24
Payer: MEDICARE

## 2022-06-24 ENCOUNTER — ANESTHESIA EVENT (OUTPATIENT)
Dept: OPERATING ROOM | Age: 77
End: 2022-06-24
Payer: MEDICARE

## 2022-06-24 VITALS
HEIGHT: 69 IN | OXYGEN SATURATION: 99 % | TEMPERATURE: 97 F | DIASTOLIC BLOOD PRESSURE: 71 MMHG | BODY MASS INDEX: 30.81 KG/M2 | HEART RATE: 74 BPM | SYSTOLIC BLOOD PRESSURE: 157 MMHG | WEIGHT: 208 LBS | RESPIRATION RATE: 12 BRPM

## 2022-06-24 LAB — HBA1C MFR BLD: 6.4 % (ref 4–5.6)

## 2022-06-24 PROCEDURE — 83036 HEMOGLOBIN GLYCOSYLATED A1C: CPT

## 2022-06-24 PROCEDURE — 87081 CULTURE SCREEN ONLY: CPT

## 2022-06-24 PROCEDURE — 36415 COLL VENOUS BLD VENIPUNCTURE: CPT

## 2022-06-24 RX ORDER — LIDOCAINE HYDROCHLORIDE 10 MG/ML
10 INJECTION, SOLUTION INFILTRATION; PERINEURAL
Status: CANCELLED | OUTPATIENT
Start: 2022-06-24 | End: 2022-06-24

## 2022-06-24 RX ORDER — FENTANYL CITRATE 50 UG/ML
100 INJECTION, SOLUTION INTRAMUSCULAR; INTRAVENOUS ONCE
Status: CANCELLED | OUTPATIENT
Start: 2022-06-24 | End: 2022-06-24

## 2022-06-24 RX ORDER — DEXAMETHASONE SODIUM PHOSPHATE 10 MG/ML
4 INJECTION, SOLUTION INTRAMUSCULAR; INTRAVENOUS ONCE
Status: CANCELLED | OUTPATIENT
Start: 2022-06-24 | End: 2022-06-24

## 2022-06-24 RX ORDER — ROPIVACAINE HYDROCHLORIDE 5 MG/ML
30 INJECTION, SOLUTION EPIDURAL; INFILTRATION; PERINEURAL
Status: CANCELLED | OUTPATIENT
Start: 2022-06-24 | End: 2022-06-24

## 2022-06-24 RX ORDER — MIDAZOLAM HYDROCHLORIDE 2 MG/2ML
1 INJECTION, SOLUTION INTRAMUSCULAR; INTRAVENOUS EVERY 5 MIN PRN
Status: CANCELLED | OUTPATIENT
Start: 2022-06-24

## 2022-06-24 ASSESSMENT — KOOS JR
KOOS JR TOTAL INTERVAL SCORE: 54.84
STRAIGHTENING KNEE FULLY: 2
GOING UP OR DOWN STAIRS: 2
RISING FROM SITTING: 2
TWISING OR PIVOTING ON KNEE: 3
HOW SEVERE IS YOUR KNEE STIFFNESS AFTER FIRST WAKING IN MORNING: 2
BENDING TO THE FLOOR TO PICK UP OBJECT: 2
HOW SEVERE IS YOUR KNEE STIFFNESS AFTER FIRST WAKING IN MORNING: 1
STANDING UPRIGHT: 0

## 2022-06-24 ASSESSMENT — PAIN DESCRIPTION - PAIN TYPE: TYPE: CHRONIC PAIN

## 2022-06-24 ASSESSMENT — PAIN DESCRIPTION - FREQUENCY: FREQUENCY: CONTINUOUS

## 2022-06-24 ASSESSMENT — PAIN - FUNCTIONAL ASSESSMENT: PAIN_FUNCTIONAL_ASSESSMENT: PREVENTS OR INTERFERES WITH MANY ACTIVE NOT PASSIVE ACTIVITIES

## 2022-06-24 ASSESSMENT — PAIN DESCRIPTION - ONSET: ONSET: ON-GOING

## 2022-06-24 ASSESSMENT — PAIN DESCRIPTION - ORIENTATION: ORIENTATION: RIGHT

## 2022-06-24 ASSESSMENT — PAIN DESCRIPTION - LOCATION: LOCATION: KNEE

## 2022-06-24 ASSESSMENT — PAIN SCALES - GENERAL: PAINLEVEL_OUTOF10: 6

## 2022-06-24 ASSESSMENT — PAIN DESCRIPTION - DESCRIPTORS: DESCRIPTORS: ACHING;DULL

## 2022-06-24 NOTE — ANESTHESIA PRE PROCEDURE
Department of Anesthesiology  Preprocedure Note       Name:  Houston Perez   Age:  78 y.o.  :  1945                                          MRN:  [id number]         Date:  2022      Surgeon: Francisco Rasmussen):  Tee Juarez MD    Procedure: ANNEMARIE ROBOTIC ASSISTED RIGHT KNEE TOTAL ARTHROPLASTY    +++RAFAEL+++   ++PNB++ (Right )    Medications prior to admission:   Prior to Admission medications    Medication Sig Start Date End Date Taking?  Authorizing Provider   anastrozole (ARIMIDEX) 1 MG tablet Take 1 tablet by mouth daily 3/14/22   Paulette Avila MD   Umeclidinium Bromide (INCRUSE ELLIPTA) 62.5 MCG/INH AEPB Inhale 1 puff into the lungs daily    Historical Provider, MD   Calcium Carb-Cholecalciferol (CALCIUM 600+D) 600-800 MG-UNIT TABS Take by mouth daily    Historical Provider, MD   B Complex Vitamins (VITAMIN B COMPLEX) TABS Take by mouth daily    Historical Provider, MD   aspirin 81 MG tablet Take 81 mg by mouth daily    Historical Provider, MD   amLODIPine (NORVASC) 5 MG tablet Take 5 mg by mouth daily    Historical Provider, MD   pioglitazone (ACTOS) 15 MG tablet Take 15 mg by mouth nightly    Historical Provider, MD   atorvastatin (LIPITOR) 40 MG tablet Take 40 mg by mouth nightly    Historical Provider, MD   levothyroxine (SYNTHROID) 50 MCG tablet Take 75 mcg by mouth Daily     Historical Provider, MD   metFORMIN (GLUCOPHAGE-XR) 500 MG extended release tablet Take 500 mg by mouth 2 times daily     Historical Provider, MD       Current medications:    Current Outpatient Medications   Medication Sig Dispense Refill    anastrozole (ARIMIDEX) 1 MG tablet Take 1 tablet by mouth daily 90 tablet 1    Umeclidinium Bromide (INCRUSE ELLIPTA) 62.5 MCG/INH AEPB Inhale 1 puff into the lungs daily      Calcium Carb-Cholecalciferol (CALCIUM 600+D) 600-800 MG-UNIT TABS Take by mouth daily      B Complex Vitamins (VITAMIN B COMPLEX) TABS Take by mouth daily      aspirin 81 MG tablet Take 81 mg by mouth daily  amLODIPine (NORVASC) 5 MG tablet Take 5 mg by mouth daily      pioglitazone (ACTOS) 15 MG tablet Take 15 mg by mouth nightly      atorvastatin (LIPITOR) 40 MG tablet Take 40 mg by mouth nightly      levothyroxine (SYNTHROID) 50 MCG tablet Take 75 mcg by mouth Daily       metFORMIN (GLUCOPHAGE-XR) 500 MG extended release tablet Take 500 mg by mouth 2 times daily        No current facility-administered medications for this visit. 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 (HCC) C82.13    Foreign body in pharynx T17.208A    Malignant neoplasm of right breast, stage 1, estrogen receptor positive ([facility] C50.911, Z17.0       Past Medical History:        Diagnosis Date    Cancer ([address]     Cerebral artery occlusion with cerebral infarction ([address]     22 years  rt arm leg weakness    Chronic kidney disease     Diabetes mellitus ([address]     Disease of blood and blood forming organ     Diverticulitis     Hyperlipidemia     Hypertension     Hypothyroidism     Lymphoma ([address]     nonhogkins    Osteoarthritis     Prolonged emergence from general anesthesia     Thyroid disease        Past Surgical History:        Procedure Laterality Date    ABDOMEN SURGERY      BACK SURGERY      neck fusion bone graft    BREAST SURGERY      benign  several    BRONCHOSCOPY N/A 10/1/2019    BRONCHOSCOPY ALVEOLAR LAVAGE WITH BRUSHINGS performed by Moe Dickson MD at [address], [facility], DIAGNOSTIC      FRACTURE SURGERY      elbow radius    HYSTERECTOMY (CERVIX STATUS UNKNOWN)      KNEE ARTHROSCOPY      OTHER SURGICAL HISTORY      mediport insertion    OTHER SURGICAL HISTORY  08/12/2017    removal foreign body    UPPER GASTROINTESTINAL ENDOSCOPY         Social History:    Social History     Tobacco Use    Smoking status: Former Smoker     Quit date: 1994     Years since quittin.5    Smokeless tobacco: Never Used   Substance Use Topics    Alcohol use: No                                Counseling given: Not Answered      Vital Signs (Current): There were no vitals filed for this visit. BP Readings from Last 3 Encounters:   22 (!) 168/81   21 (!) 178/78   21 (!) 158/69       NPO Status:  greater than 8 hours                                                                               BMI:   Wt Readings from Last 3 Encounters:   22 209 lb 4.8 oz (94.9 kg)   21 218 lb 9.6 oz (99.2 kg)   21 225 lb 12.8 oz (102.4 kg)     There is no height or weight on file to calculate BMI.    CBC:   Lab Results   Component Value Date    WBC 5.5 2022    RBC 4.86 2022    HGB 14.5 2022    HCT 44.2 2022    MCV 90.9 2022    RDW 14.1 2022     2022       CMP:   Lab Results   Component Value Date     2022    K 4.0 2022    K 4.4 10/01/2019     2022    CO2 25 2022    BUN 15 2022    CREATININE 0.7 2022    GFRAA >60 2022    LABGLOM >60 2022    GLUCOSE 142 2022    PROT 7.0 2022    CALCIUM 9.9 2022    BILITOT 0.4 2022    ALKPHOS 100 2022    AST 16 2022    ALT 14 2022       POC Tests: No results for input(s): POCGLU, POCNA, POCK, POCCL, POCBUN, POCHEMO, POCHCT in the last 72 hours.     Coags:   Lab Results   Component Value Date    PROTIME 11.0 10/01/2019    INR 1.0 10/01/2019    APTT 39.0 10/01/2019       HCG (If Applicable): No results found for: PREGTESTUR, PREGSERUM, HCG, HCGQUANT     ABGs: No results found for: PHART, PO2ART, HBI2BOE, SNC6XJZ, BEART, Z5AHLUQP     Type & Screen (If Applicable):  No results found for: LABABO, [address]    Anesthesia Evaluation  Patient summary reviewed and Nursing notes reviewed no history of anesthetic complications:   Airway: Mallampati: II  TM distance: >3 FB   Neck ROM: full  Mouth opening: > = 3 FB   Dental:    (+) edentulous      Pulmonary: breath sounds clear to auscultation                            ROS comment: Former Smoker   Cardiovascular:    (+) hypertension:, hyperlipidemia        Rhythm: regular  Rate: normal           Beta Blocker:  Not on Beta Blocker         Neuro/Psych:   (+) CVA (R hemiparesis): residual symptoms,             GI/Hepatic/Renal:   (+) renal disease: CRI,           Endo/Other:    (+) DiabetesType II DM, , hypothyroidism: arthritis: OA., malignancy/cancer (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 Abdominal:             Vascular: negative vascular ROS. Other Findings:             Anesthesia Plan      regional and spinal     ASA 3     (Pt agrees to SAB, GA back up, ACB)  Induction: intravenous. MIPS: Postoperative opioids intended and Prophylactic antiemetics administered. Anesthetic plan and risks discussed with patient. Plan discussed with CRNA. Aston Guidry MD   6/24/2022      Patient will need to be re-evaluated prior to surgery by DOS anesthesiologist.    Aston Guidry MD           6/24/2022        9:32 AM     DOS STAFF ADDENDUM:    Pt seen and examined, physical exam updated, chart reviewed including anesthesia, drug and allergy history. H&P reviewed. No interval changes to history or physical examination (unless noted above). NPO status confirmed. Anesthetic plan, risks, benefits, alternatives discussed with patient. Patient verbalized an understanding and agrees to proceed.      Nasim Nicole DO  Staff Anesthesiologist  9:00 AM

## 2022-06-24 NOTE — PROGRESS NOTES
[facility] PRE-ADMISSION TESTING INSTRUCTIONS    The Preadmission Testing patient is instructed accordingly using the following criteria (check applicable):    ARRIVAL INSTRUCTIONS:  [x] Parking the day of Surgery is located in the Main Entrance lot. Upon entering the door, make an immediate right to the surgery reception desk    [x] Bring photo ID and insurance card    [] Bring in a copy of Living will or Durable Power of  papers. [x] Please be sure to arrange for responsible adult to provide transportation to and from the hospital    [x] Please arrange for responsible adult to be with you for the 24 hour period post procedure due to having anesthesia    [x] If you awake am of surgery not feeling well or have temperature >100 please call [phone]    GENERAL INSTRUCTIONS:    [x] Nothing by mouth after midnight, including gum, candy, mints or water    [x] You may brush your teeth, but do not swallow any water    [x] Take medications as instructed with 1-2 oz of water    [x] Stop herbal supplements and vitamins 5 days prior to procedure    [x] Follow preop dosing of blood thinners per physician instructions    [] Take 1/2 dose of evening insulin, but no insulin after midnight    [x] No oral diabetic medications after midnight    [x] If diabetic and have low blood sugar or feel symptomatic, take 1-2oz apple juice only    [] Bring inhalers day of surgery    [] Bring C-PAP/ Bi-Pap day of surgery    [] Bring urine specimen day of surgery    [] Shower or bath with soap, lather and rinse well, AM of Surgery, no lotion, powders or creams to surgical site    [] Follow bowel prep as instructed per surgeon    [x] No tobacco products within 24 hours of surgery     [x] No alcohol or illegal drug use within 24 hours of surgery.     [x] Jewelry, body piercing's, eyeglasses, contact lenses and dentures are not permitted into surgery (bring cases)      [x] Please do not wear any nail polish, make up or hair products on the day of surgery    [x] You can expect a call the business day prior to procedure to notify you if your arrival time changes    [x] If you receive a survey after surgery we would greatly appreciate your comments    [] Parent/guardian of a minor must accompany their child and remain on the premises  the entire time they are under our care     [] Pediatric patients may bring favorite toy, blanket or comfort item with them    [] A caregiver or family member must remain with the patient during their stay if they are mentally handicapped, have dementia, disoriented or unable to use a call light or would be a safety concern if left unattended    [x] Please notify surgeon if you develop any illness between now and time of surgery (cold, cough, sore throat, fever, nausea, vomiting) or any signs of infections  including skin, wounds, and dental.    [x]  The Outpatient Pharmacy is available to fill your prescription here on your day of surgery, ask your preop nurse for details    [] Other instructions    EDUCATIONAL MATERIALS PROVIDED:    [x] PAT Preoperative Education Packet/Booklet     [] Medication List    [x] Transfusion bracelet applied with instructions    [x] Shower with soap, lather and rinse well, and use CHG wipes provided the evening before surgery as instructed    [x] Incentive spirometer with instructions

## 2022-06-25 LAB — MRSA CULTURE ONLY: NORMAL

## 2022-06-27 ASSESSMENT — KOOS JR
BENDING TO THE FLOOR TO PICK UP OBJECT: 2
TWISING OR PIVOTING ON KNEE: 2
GOING UP OR DOWN STAIRS: 2
STRAIGHTENING KNEE FULLY: 3
RISING FROM SITTING: 2
STANDING UPRIGHT: 1

## 2022-06-27 ASSESSMENT — PROMIS GLOBAL HEALTH SCALE
IN GENERAL, PLEASE RATE HOW WELL YOU CARRY OUT YOUR USUAL SOCIAL ACTIVITIES (INCLUDES ACTIVITIES AT HOME, AT WORK, AND IN YOUR COMMUNITY, AND RESPONSIBILITIES AS A PARENT, CHILD, SPOUSE, EMPLOYEE, FRIEND, ETC) [ON A SCALE OF 1 (POOR) TO 5 (EXCELLENT)]?: 3
IN THE PAST 7 DAYS, HOW OFTEN HAVE YOU BEEN BOTHERED BY EMOTIONAL PROBLEMS, SUCH AS FEELING ANXIOUS, DEPRESSED, OR IRRITABLE [ON A SCALE FROM 1 (NEVER) TO 5 (ALWAYS)]?: 2
IN THE PAST 7 DAYS, HOW WOULD YOU RATE YOUR PAIN ON AVERAGE [ON A SCALE FROM 0 (NO PAIN) TO 10 (WORST IMAGINABLE PAIN)]?: 6
SUM OF RESPONSES TO QUESTIONS 3, 6, 7, & 8: 14
IN GENERAL, HOW WOULD YOU RATE YOUR MENTAL HEALTH, INCLUDING YOUR MOOD AND YOUR ABILITY TO THINK [ON A SCALE OF 1 (POOR) TO 5 (EXCELLENT)]?: 3
IN THE PAST 7 DAYS, HOW WOULD YOU RATE YOUR FATIGUE ON AVERAGE [ON A SCALE FROM 1 (NONE) TO 5 (VERY SEVERE)]?: 4
TO WHAT EXTENT ARE YOU ABLE TO CARRY OUT YOUR EVERYDAY PHYSICAL ACTIVITIES SUCH AS WALKING, CLIMBING STAIRS, CARRYING GROCERIES, OR MOVING A CHAIR [ON A SCALE OF 1 (NOT AT ALL) TO 5 (COMPLETELY)]?: 2
IN GENERAL, WOULD YOU SAY YOUR QUALITY OF LIFE IS...[ON A SCALE OF 1 (POOR) TO 5 (EXCELLENT)]: 2
WHO IS THE PERSON COMPLETING THE PROMIS V1.1 SURVEY?: 0
IN GENERAL, WOULD YOU SAY YOUR HEALTH IS...[ON A SCALE OF 1 (POOR) TO 5 (EXCELLENT)]: 3
SUM OF RESPONSES TO QUESTIONS 2, 4, 5, & 10: 11
IN GENERAL, HOW WOULD YOU RATE YOUR SATISFACTION WITH YOUR SOCIAL ACTIVITIES AND RELATIONSHIPS [ON A SCALE OF 1 (POOR) TO 5 (EXCELLENT)]?: 4
IN GENERAL, HOW WOULD YOU RATE YOUR PHYSICAL HEALTH [ON A SCALE OF 1 (POOR) TO 5 (EXCELLENT)]?: 2
HOW IS THE PROMIS V1.1 BEING ADMINISTERED?: 2

## 2022-06-27 NOTE — PROGRESS NOTES
I met with Rebekah Duane for an orthopaedic education class. We discussed information regarding pre, intra, post-op, discharge, and LOS expectations. I provided ortho education materials. I encouraged the patient to call with any questions or concerns.

## 2022-06-28 PROCEDURE — 36415 COLL VENOUS BLD VENIPUNCTURE: CPT

## 2022-06-28 NOTE — H&P
History and Physical  K. Shereen Mensah MD      Chief Complaint       Stacy Ferreira, a 78year old female, is seen today for a right knee pain and weakness for a duration of  about 10 years. Patient rates her pain as 8/10. Patient describes pain as being continuous that is achy, sore, grinds,   and is worse all day. She arrived today using a walker. Stacy Ferreira was not seen by another provider for this condition. Pain: anterior knee, lateral knee  Symptoms: swelling, instability, night pain  Previous Treatment: cortisone injection, Hyaluronic acid injection, NSAIDS, physical therapy, home exercise program, brace, walker  Additional Comments: Stacy Ferreira is a 78Years Old Female who presents to the office for evaluation of right knee pain. She has pain for the past 10 years that has progressively gotten worse. She has had cortisone injections, HA injections, bracing and  knee arthroscopy in the past.  She takes tylenol and ibuprofen for pain. Past medical history is significant for CVA in 1994 with right side weakness, diabetes mellitus type 2 with hgb A1c around 6, history of breast cancer  on ASA 81 mg. She has been using a walker for the past month.       Physical Exam   General Appearance:   Awake, alert, oriented x3  Well developed, well nourished  No acute distress  Eyes appear Normal    Respiratory:       Respiratory effort: non-labored    Cardiovascular:   Edema: absent      Varicosities: absent    Lymphatic/Skin:       Skin Appearance: Normal              Right Knee Exam   Skin Exam:   skin intact  Alignment:   valgus  ROM:   5-110°   Tenderness:   anterior knee, medial joint line  Laxity with varus/valgus stress:   <5 degrees    Left Knee Exam   Skin Exam:   skin intact  Effusion:   none  Alignment:   neutral  ROM:   0-115°   Tenderness:   none  Laxity with varus/valgus stress:   <5 degrees  Gait:   antalgic, assistive device  Comments:   Sensation intact to light touch L1-S1  TA/EHL/GS intact  Palpable DP, W/WP  Calf soft, non tender     Other Testing   X-Ray Findings January 27, 2022 Xray 4 views right knee were obtained at [facility] and reviewed in the office today. They show severe tricompartmental degenerative changes with bone on bone in the lateral and patellofemoral joint, peripheral osteophytes, subchondral sclerosis and valgus alignment. Past Medical History - reviewed  Arthritis,  Bruise easily,  Cancer,  Diabetes,  High blood pressure,  Kidney disease,  Stroke,  Thyroid disease    Family History - reviewed  Alcoholism Criss Hairston Brother    Surgical History - reviewed  Arthroscopic knee right,  Spinal fusion neck    Social History - reviewed  Hand dominance: left  Occupation: Retired    Risk Factors - reviewed  Patient had a flu shot in the last 12 months? yes  The patient is 72 years or older and has had a pneumonia vaccine: no  Patient has an implant metal  Where are the metal implants located? Neck fused  Tobacco status: former smoker  Alcohol use: no  Does patient exercise? no  In the past 12 months, have you fallen? yes        Current Medications (including meds started today):   OneTouch Delica Plus Lancet 33 gauge 33 gauge (lancets) TEST TWICE DAILY  Incruse Ellipta 62.5 mcg/actuation blister with device (umeclidinium)   amlodipine 5 mg tablet (amlodipine)   levothyroxine 88 mcg tablet (levothyroxine)   anastrozole 1 mg tablet (anastrozole)   metformin 500 mg tablet extended release 24 hr (metformin)   pioglitazone 15 mg tablet (pioglitazone)   atorvastatin 40 mg tablet (atorvastatin)   Aspirin Low Dose 81 mg tablet,delayed release (DR/EC) (aspirin)       Current Allergies (reviewed this update):  * PENICILLIN - UNKNOWN (Critical)        Vital Signs   Weight: 209.99 lbs. (95.25 kg.)  Height: 67 in.    (170.18 cm.)  Pulse Rate: 82  Blood Pressure: 135/76 mm Hg  Body Mass Index: 32.89  Body Surface Area: 2.06 m2      Review of Systems   General: Positive for NONE. Eyes: Positive for NONE.    ENT: Positive for NONE. Cardiovascular: Positive for NONE. Respiratory: Positive for cough. Gastrointestinal: Positive for NONE. Genitourinary: Positive for urinary frequency. Musculoskeletal: Positive for joint pain,arthritis,loss of strength. Skin: Positive for NONE. Neurologic: Positive for poor balance. Psychiatric: Positive for NONE. Heme/Lymphatic: Positive for NONE. Allergic/Immunologic: Positive for NONE. The remainder of the complete review of systems was negative. Impression   1. Osteoarthritis, knee, right: Deteriorated  2. Valgus deformity, not elsewhere classified, right knee: Deteriorated  3. Gait abnormality: Deteriorated    Plan of Care:   I had a long discussion regarding degenerative joint disease. Operative and nonoperative treatment was discussed. Xrays show severe degenerative changes, nonop treatment was failed and pain has become a quality of life issue. Futher nonoperative treatment is unlikely to provide any benefit. Marquita Martinez would like to proceed with Right Tahir Total Knee Arthroplasty. The surgery, implants, postoperative course and risks and benefits of surgery were discussed and all questions were answered.

## 2022-06-29 ENCOUNTER — ANESTHESIA (OUTPATIENT)
Dept: OPERATING ROOM | Age: 77
End: 2022-06-29
Payer: MEDICARE

## 2022-06-29 ENCOUNTER — APPOINTMENT (OUTPATIENT)
Dept: GENERAL RADIOLOGY | Age: 77
End: 2022-06-29
Attending: ORTHOPAEDIC SURGERY
Payer: MEDICARE

## 2022-06-29 ENCOUNTER — HOSPITAL ENCOUNTER (OUTPATIENT)
Age: 77
Setting detail: OBSERVATION
Discharge: HOME | End: 2022-06-30
Attending: ORTHOPAEDIC SURGERY | Admitting: ORTHOPAEDIC SURGERY
Payer: MEDICARE

## 2022-06-29 DIAGNOSIS — M19.90 OSTEOARTHRITIS, UNSPECIFIED OSTEOARTHRITIS TYPE, UNSPECIFIED SITE: ICD-10-CM

## 2022-06-29 DIAGNOSIS — M17.11 PRIMARY OSTEOARTHRITIS OF RIGHT KNEE: Primary | ICD-10-CM

## 2022-06-29 PROBLEM — Z96.641 S/P TOTAL RIGHT HIP ARTHROPLASTY: Status: ACTIVE | Noted: 2022-06-29

## 2022-06-29 LAB
METER GLUCOSE: 138 MG/DL (ref 74–99)
METER GLUCOSE: 159 MG/DL (ref 74–99)
METER GLUCOSE: 170 MG/DL (ref 74–99)
METER GLUCOSE: 182 MG/DL (ref 74–99)

## 2022-06-29 PROCEDURE — 6360000002 HC RX W HCPCS: Performed by: ORTHOPAEDIC SURGERY

## 2022-06-29 PROCEDURE — 97165 OT EVAL LOW COMPLEX 30 MIN: CPT

## 2022-06-29 PROCEDURE — 73560 X-RAY EXAM OF KNEE 1 OR 2: CPT

## 2022-06-29 PROCEDURE — 6360000002 HC RX W HCPCS: Performed by: NURSE PRACTITIONER

## 2022-06-29 PROCEDURE — C1713 ANCHOR/SCREW BN/BN,TIS/BN: HCPCS | Performed by: ORTHOPAEDIC SURGERY

## 2022-06-29 PROCEDURE — 2500000003 HC RX 250 WO HCPCS: Performed by: NURSE PRACTITIONER

## 2022-06-29 PROCEDURE — C1776 JOINT DEVICE (IMPLANTABLE): HCPCS | Performed by: ORTHOPAEDIC SURGERY

## 2022-06-29 PROCEDURE — 2580000003 HC RX 258: Performed by: ORTHOPAEDIC SURGERY

## 2022-06-29 PROCEDURE — 2500000003 HC RX 250 WO HCPCS: Performed by: ANESTHESIOLOGY

## 2022-06-29 PROCEDURE — 6360000002 HC RX W HCPCS: Performed by: ANESTHESIOLOGY

## 2022-06-29 PROCEDURE — 6370000000 HC RX 637 (ALT 250 FOR IP): Performed by: NURSE PRACTITIONER

## 2022-06-29 PROCEDURE — 6360000002 HC RX W HCPCS: Performed by: NURSE ANESTHETIST, CERTIFIED REGISTERED

## 2022-06-29 PROCEDURE — 94664 DEMO&/EVAL PT USE INHALER: CPT

## 2022-06-29 PROCEDURE — 2500000003 HC RX 250 WO HCPCS: Performed by: ORTHOPAEDIC SURGERY

## 2022-06-29 PROCEDURE — 6370000000 HC RX 637 (ALT 250 FOR IP): Performed by: ORTHOPAEDIC SURGERY

## 2022-06-29 PROCEDURE — 82962 GLUCOSE BLOOD TEST: CPT

## 2022-06-29 PROCEDURE — 7100000000 HC PACU RECOVERY - FIRST 15 MIN: Performed by: ORTHOPAEDIC SURGERY

## 2022-06-29 PROCEDURE — 7100000001 HC PACU RECOVERY - ADDTL 15 MIN: Performed by: ORTHOPAEDIC SURGERY

## 2022-06-29 PROCEDURE — G0378 HOSPITAL OBSERVATION PER HR: HCPCS

## 2022-06-29 PROCEDURE — 99203 OFFICE O/P NEW LOW 30 MIN: CPT | Performed by: INTERNAL MEDICINE

## 2022-06-29 PROCEDURE — 2580000003 HC RX 258: Performed by: NURSE PRACTITIONER

## 2022-06-29 PROCEDURE — 76942 ECHO GUIDE FOR BIOPSY: CPT | Performed by: ANESTHESIOLOGY

## 2022-06-29 PROCEDURE — 88311 DECALCIFY TISSUE: CPT

## 2022-06-29 PROCEDURE — 97161 PT EVAL LOW COMPLEX 20 MIN: CPT

## 2022-06-29 PROCEDURE — 97530 THERAPEUTIC ACTIVITIES: CPT

## 2022-06-29 PROCEDURE — 3700000000 HC ANESTHESIA ATTENDED CARE: Performed by: ORTHOPAEDIC SURGERY

## 2022-06-29 PROCEDURE — 6370000000 HC RX 637 (ALT 250 FOR IP): Performed by: INTERNAL MEDICINE

## 2022-06-29 PROCEDURE — 3700000001 HC ADD 15 MINUTES (ANESTHESIA): Performed by: ORTHOPAEDIC SURGERY

## 2022-06-29 PROCEDURE — 97535 SELF CARE MNGMENT TRAINING: CPT

## 2022-06-29 PROCEDURE — 2500000003 HC RX 250 WO HCPCS: Performed by: NURSE ANESTHETIST, CERTIFIED REGISTERED

## 2022-06-29 PROCEDURE — A4217 STERILE WATER/SALINE, 500 ML: HCPCS | Performed by: ORTHOPAEDIC SURGERY

## 2022-06-29 PROCEDURE — 2580000003 HC RX 258: Performed by: NURSE ANESTHETIST, CERTIFIED REGISTERED

## 2022-06-29 PROCEDURE — 2720000010 HC SURG SUPPLY STERILE: Performed by: ORTHOPAEDIC SURGERY

## 2022-06-29 PROCEDURE — 3600000005 HC SURGERY LEVEL 5 BASE: Performed by: ORTHOPAEDIC SURGERY

## 2022-06-29 PROCEDURE — 3600000015 HC SURGERY LEVEL 5 ADDTL 15MIN: Performed by: ORTHOPAEDIC SURGERY

## 2022-06-29 PROCEDURE — 2709999900 HC NON-CHARGEABLE SUPPLY: Performed by: ORTHOPAEDIC SURGERY

## 2022-06-29 PROCEDURE — 88305 TISSUE EXAM BY PATHOLOGIST: CPT

## 2022-06-29 DEVICE — IMPLANTABLE DEVICE: Type: IMPLANTABLE DEVICE | Site: KNEE | Status: FUNCTIONAL

## 2022-06-29 DEVICE — COMPONENT PAT DIA33MM THK9MM KNEE TRITANIUM MTL BK: Type: IMPLANTABLE DEVICE | Site: KNEE | Status: FUNCTIONAL

## 2022-06-29 DEVICE — BASEPLATE TIB SZ 4 AP46MM ML70MM KNEE TRITANIUM 4 CRUCFRM: Type: IMPLANTABLE DEVICE | Site: KNEE | Status: FUNCTIONAL

## 2022-06-29 RX ORDER — SENNA AND DOCUSATE SODIUM 50; 8.6 MG/1; MG/1
1 TABLET, FILM COATED ORAL 2 TIMES DAILY
Status: DISCONTINUED | OUTPATIENT
Start: 2022-06-29 | End: 2022-06-30 | Stop reason: HOSPADM

## 2022-06-29 RX ORDER — INSULIN LISPRO 100 [IU]/ML
0-6 INJECTION, SOLUTION INTRAVENOUS; SUBCUTANEOUS
Status: DISCONTINUED | OUTPATIENT
Start: 2022-06-29 | End: 2022-06-30 | Stop reason: HOSPADM

## 2022-06-29 RX ORDER — TRAMADOL HYDROCHLORIDE 50 MG/1
50 TABLET ORAL EVERY 6 HOURS
Status: DISCONTINUED | OUTPATIENT
Start: 2022-06-29 | End: 2022-06-30 | Stop reason: HOSPADM

## 2022-06-29 RX ORDER — PROPOFOL 10 MG/ML
INJECTION, EMULSION INTRAVENOUS PRN
Status: DISCONTINUED | OUTPATIENT
Start: 2022-06-29 | End: 2022-06-29 | Stop reason: SDUPTHER

## 2022-06-29 RX ORDER — SODIUM CHLORIDE 9 MG/ML
INJECTION, SOLUTION INTRAVENOUS PRN
Status: DISCONTINUED | OUTPATIENT
Start: 2022-06-29 | End: 2022-06-29 | Stop reason: HOSPADM

## 2022-06-29 RX ORDER — ROPIVACAINE HYDROCHLORIDE 5 MG/ML
INJECTION, SOLUTION EPIDURAL; INFILTRATION; PERINEURAL
Status: COMPLETED | OUTPATIENT
Start: 2022-06-29 | End: 2022-06-29

## 2022-06-29 RX ORDER — SODIUM CHLORIDE 0.9 % (FLUSH) 0.9 %
5-40 SYRINGE (ML) INJECTION PRN
Status: DISCONTINUED | OUTPATIENT
Start: 2022-06-29 | End: 2022-06-29 | Stop reason: HOSPADM

## 2022-06-29 RX ORDER — BUPIVACAINE HYDROCHLORIDE 7.5 MG/ML
INJECTION, SOLUTION INTRASPINAL
Status: COMPLETED | OUTPATIENT
Start: 2022-06-29 | End: 2022-06-29

## 2022-06-29 RX ORDER — ROPIVACAINE HYDROCHLORIDE 5 MG/ML
30 INJECTION, SOLUTION EPIDURAL; INFILTRATION; PERINEURAL
Status: COMPLETED | OUTPATIENT
Start: 2022-06-29 | End: 2022-06-29

## 2022-06-29 RX ORDER — DEXAMETHASONE SODIUM PHOSPHATE 10 MG/ML
8 INJECTION, SOLUTION INTRAMUSCULAR; INTRAVENOUS ONCE
Status: DISCONTINUED | OUTPATIENT
Start: 2022-06-29 | End: 2022-06-29 | Stop reason: HOSPADM

## 2022-06-29 RX ORDER — AMLODIPINE BESYLATE 5 MG/1
5 TABLET ORAL DAILY
Status: DISCONTINUED | OUTPATIENT
Start: 2022-06-30 | End: 2022-06-30 | Stop reason: HOSPADM

## 2022-06-29 RX ORDER — VANCOMYCIN HYDROCHLORIDE 500 MG/10ML
INJECTION, POWDER, LYOPHILIZED, FOR SOLUTION INTRAVENOUS PRN
Status: DISCONTINUED | OUTPATIENT
Start: 2022-06-29 | End: 2022-06-29 | Stop reason: ALTCHOICE

## 2022-06-29 RX ORDER — DEXTROSE MONOHYDRATE 50 MG/ML
100 INJECTION, SOLUTION INTRAVENOUS PRN
Status: DISCONTINUED | OUTPATIENT
Start: 2022-06-29 | End: 2022-06-30 | Stop reason: HOSPADM

## 2022-06-29 RX ORDER — METFORMIN HYDROCHLORIDE 500 MG/1
500 TABLET, EXTENDED RELEASE ORAL 2 TIMES DAILY
Status: DISCONTINUED | OUTPATIENT
Start: 2022-06-29 | End: 2022-06-30

## 2022-06-29 RX ORDER — SODIUM CHLORIDE 9 MG/ML
INJECTION, SOLUTION INTRAVENOUS CONTINUOUS
Status: ACTIVE | OUTPATIENT
Start: 2022-06-29 | End: 2022-06-30

## 2022-06-29 RX ORDER — DEXAMETHASONE SODIUM PHOSPHATE 10 MG/ML
10 INJECTION INTRAMUSCULAR; INTRAVENOUS ONCE
Status: COMPLETED | OUTPATIENT
Start: 2022-06-30 | End: 2022-06-30

## 2022-06-29 RX ORDER — SODIUM CHLORIDE 0.9 % (FLUSH) 0.9 %
5-40 SYRINGE (ML) INJECTION PRN
Status: DISCONTINUED | OUTPATIENT
Start: 2022-06-29 | End: 2022-06-30 | Stop reason: HOSPADM

## 2022-06-29 RX ORDER — KETOROLAC TROMETHAMINE 30 MG/ML
15 INJECTION, SOLUTION INTRAMUSCULAR; INTRAVENOUS EVERY 6 HOURS PRN
Status: DISCONTINUED | OUTPATIENT
Start: 2022-06-29 | End: 2022-06-30 | Stop reason: HOSPADM

## 2022-06-29 RX ORDER — LEVOTHYROXINE SODIUM 0.07 MG/1
75 TABLET ORAL DAILY
Status: DISCONTINUED | OUTPATIENT
Start: 2022-06-29 | End: 2022-06-30 | Stop reason: HOSPADM

## 2022-06-29 RX ORDER — INSULIN LISPRO 100 [IU]/ML
0-3 INJECTION, SOLUTION INTRAVENOUS; SUBCUTANEOUS NIGHTLY
Status: DISCONTINUED | OUTPATIENT
Start: 2022-06-29 | End: 2022-06-30 | Stop reason: HOSPADM

## 2022-06-29 RX ORDER — ANASTROZOLE 1 MG/1
1 TABLET ORAL DAILY
Status: DISCONTINUED | OUTPATIENT
Start: 2022-06-29 | End: 2022-06-30 | Stop reason: HOSPADM

## 2022-06-29 RX ORDER — PREGABALIN 75 MG/1
75 CAPSULE ORAL ONCE
Status: COMPLETED | OUTPATIENT
Start: 2022-06-29 | End: 2022-06-29

## 2022-06-29 RX ORDER — SODIUM CHLORIDE 0.9 % (FLUSH) 0.9 %
5-40 SYRINGE (ML) INJECTION EVERY 12 HOURS SCHEDULED
Status: DISCONTINUED | OUTPATIENT
Start: 2022-06-29 | End: 2022-06-29 | Stop reason: HOSPADM

## 2022-06-29 RX ORDER — GLYCOPYRROLATE 0.2 MG/ML
INJECTION INTRAMUSCULAR; INTRAVENOUS PRN
Status: DISCONTINUED | OUTPATIENT
Start: 2022-06-29 | End: 2022-06-29 | Stop reason: SDUPTHER

## 2022-06-29 RX ORDER — PIOGLITAZONEHYDROCHLORIDE 15 MG/1
15 TABLET ORAL NIGHTLY
Status: DISCONTINUED | OUTPATIENT
Start: 2022-06-29 | End: 2022-06-30 | Stop reason: HOSPADM

## 2022-06-29 RX ORDER — OXYCODONE HYDROCHLORIDE 5 MG/1
5 TABLET ORAL EVERY 4 HOURS PRN
Status: DISCONTINUED | OUTPATIENT
Start: 2022-06-29 | End: 2022-06-30 | Stop reason: HOSPADM

## 2022-06-29 RX ORDER — ONDANSETRON 2 MG/ML
4 INJECTION INTRAMUSCULAR; INTRAVENOUS EVERY 6 HOURS PRN
Status: DISCONTINUED | OUTPATIENT
Start: 2022-06-29 | End: 2022-06-30 | Stop reason: HOSPADM

## 2022-06-29 RX ORDER — SODIUM CHLORIDE 0.9 % (FLUSH) 0.9 %
5-40 SYRINGE (ML) INJECTION EVERY 12 HOURS SCHEDULED
Status: DISCONTINUED | OUTPATIENT
Start: 2022-06-29 | End: 2022-06-30 | Stop reason: HOSPADM

## 2022-06-29 RX ORDER — SODIUM CHLORIDE 9 MG/ML
INJECTION, SOLUTION INTRAVENOUS CONTINUOUS
Status: DISCONTINUED | OUTPATIENT
Start: 2022-06-29 | End: 2022-06-29

## 2022-06-29 RX ORDER — ATORVASTATIN CALCIUM 40 MG/1
40 TABLET, FILM COATED ORAL NIGHTLY
Status: DISCONTINUED | OUTPATIENT
Start: 2022-06-29 | End: 2022-06-30 | Stop reason: HOSPADM

## 2022-06-29 RX ORDER — SODIUM CHLORIDE 9 MG/ML
INJECTION, SOLUTION INTRAVENOUS PRN
Status: DISCONTINUED | OUTPATIENT
Start: 2022-06-29 | End: 2022-06-30 | Stop reason: HOSPADM

## 2022-06-29 RX ORDER — ONDANSETRON 4 MG/1
4 TABLET, ORALLY DISINTEGRATING ORAL EVERY 8 HOURS PRN
Status: DISCONTINUED | OUTPATIENT
Start: 2022-06-29 | End: 2022-06-30 | Stop reason: HOSPADM

## 2022-06-29 RX ORDER — OXYCODONE HYDROCHLORIDE 5 MG/1
10 TABLET ORAL EVERY 4 HOURS PRN
Status: DISCONTINUED | OUTPATIENT
Start: 2022-06-29 | End: 2022-06-30 | Stop reason: HOSPADM

## 2022-06-29 RX ORDER — ACETAMINOPHEN 325 MG/1
650 TABLET ORAL EVERY 6 HOURS
Status: DISCONTINUED | OUTPATIENT
Start: 2022-06-29 | End: 2022-06-30 | Stop reason: HOSPADM

## 2022-06-29 RX ORDER — KETAMINE HYDROCHLORIDE 10 MG/ML
INJECTION, SOLUTION INTRAMUSCULAR; INTRAVENOUS PRN
Status: DISCONTINUED | OUTPATIENT
Start: 2022-06-29 | End: 2022-06-29 | Stop reason: SDUPTHER

## 2022-06-29 RX ORDER — SODIUM CHLORIDE 9 MG/ML
INJECTION, SOLUTION INTRAVENOUS CONTINUOUS PRN
Status: DISCONTINUED | OUTPATIENT
Start: 2022-06-29 | End: 2022-06-29 | Stop reason: SDUPTHER

## 2022-06-29 RX ORDER — CELECOXIB 100 MG/1
100 CAPSULE ORAL ONCE
Status: COMPLETED | OUTPATIENT
Start: 2022-06-29 | End: 2022-06-29

## 2022-06-29 RX ORDER — FENTANYL CITRATE 50 UG/ML
100 INJECTION, SOLUTION INTRAMUSCULAR; INTRAVENOUS ONCE
Status: COMPLETED | OUTPATIENT
Start: 2022-06-29 | End: 2022-06-29

## 2022-06-29 RX ORDER — DEXAMETHASONE SODIUM PHOSPHATE 10 MG/ML
4 INJECTION, SOLUTION INTRAMUSCULAR; INTRAVENOUS ONCE
Status: COMPLETED | OUTPATIENT
Start: 2022-06-29 | End: 2022-06-29

## 2022-06-29 RX ORDER — MIDAZOLAM HYDROCHLORIDE 2 MG/2ML
1 INJECTION, SOLUTION INTRAMUSCULAR; INTRAVENOUS EVERY 5 MIN PRN
Status: DISCONTINUED | OUTPATIENT
Start: 2022-06-29 | End: 2022-06-29 | Stop reason: HOSPADM

## 2022-06-29 RX ORDER — ACETAMINOPHEN 500 MG
1000 TABLET ORAL ONCE
Status: COMPLETED | OUTPATIENT
Start: 2022-06-29 | End: 2022-06-29

## 2022-06-29 RX ORDER — LIDOCAINE HYDROCHLORIDE 10 MG/ML
10 INJECTION, SOLUTION INFILTRATION; PERINEURAL
Status: DISCONTINUED | OUTPATIENT
Start: 2022-06-29 | End: 2022-06-29 | Stop reason: HOSPADM

## 2022-06-29 RX ADMIN — IPRATROPIUM BROMIDE 0.5 MG: 0.5 SOLUTION RESPIRATORY (INHALATION) at 20:48

## 2022-06-29 RX ADMIN — PREGABALIN 75 MG: 75 CAPSULE ORAL at 08:16

## 2022-06-29 RX ADMIN — PROPOFOL 50 MG: 10 INJECTION, EMULSION INTRAVENOUS at 09:39

## 2022-06-29 RX ADMIN — Medication 2000 MG: at 15:29

## 2022-06-29 RX ADMIN — DEXAMETHASONE SODIUM PHOSPHATE 4 MG: 10 INJECTION INTRAMUSCULAR; INTRAVENOUS at 09:15

## 2022-06-29 RX ADMIN — TRAMADOL HYDROCHLORIDE 50 MG: 50 TABLET, COATED ORAL at 15:29

## 2022-06-29 RX ADMIN — ROPIVACAINE HYDROCHLORIDE 30 ML: 5 INJECTION, SOLUTION EPIDURAL; INFILTRATION; PERINEURAL at 09:15

## 2022-06-29 RX ADMIN — INSULIN LISPRO 1 UNITS: 100 INJECTION, SOLUTION INTRAVENOUS; SUBCUTANEOUS at 21:50

## 2022-06-29 RX ADMIN — TRANEXAMIC ACID 1000 MG: 1 INJECTION, SOLUTION INTRAVENOUS at 09:40

## 2022-06-29 RX ADMIN — SODIUM CHLORIDE: 9 INJECTION, SOLUTION INTRAVENOUS at 09:23

## 2022-06-29 RX ADMIN — TRANEXAMIC ACID 1000 MG: 1 INJECTION, SOLUTION INTRAVENOUS at 12:36

## 2022-06-29 RX ADMIN — PIOGLITAZONE 15 MG: 15 TABLET ORAL at 21:47

## 2022-06-29 RX ADMIN — TRAMADOL HYDROCHLORIDE 50 MG: 50 TABLET, COATED ORAL at 21:48

## 2022-06-29 RX ADMIN — ROPIVACAINE HYDROCHLORIDE 30 ML: 5 INJECTION, SOLUTION EPIDURAL; INFILTRATION; PERINEURAL at 09:28

## 2022-06-29 RX ADMIN — ANASTROZOLE 1 MG: 1 TABLET ORAL at 16:29

## 2022-06-29 RX ADMIN — METFORMIN HYDROCHLORIDE 500 MG: 500 TABLET, EXTENDED RELEASE ORAL at 21:47

## 2022-06-29 RX ADMIN — ATORVASTATIN CALCIUM 40 MG: 40 TABLET, FILM COATED ORAL at 21:48

## 2022-06-29 RX ADMIN — ACETAMINOPHEN 650 MG: 325 TABLET ORAL at 15:29

## 2022-06-29 RX ADMIN — MIDAZOLAM HYDROCHLORIDE 1 MG: 1 INJECTION, SOLUTION INTRAMUSCULAR; INTRAVENOUS at 09:09

## 2022-06-29 RX ADMIN — KETAMINE HYDROCHLORIDE 10 MG: 10 INJECTION INTRAMUSCULAR; INTRAVENOUS at 09:39

## 2022-06-29 RX ADMIN — BUPIVACAINE HYDROCHLORIDE IN DEXTROSE 12 MG: 7.5 INJECTION, SOLUTION SUBARACHNOID at 09:35

## 2022-06-29 RX ADMIN — GLYCOPYRROLATE 0.2 MG: 0.2 INJECTION, SOLUTION INTRAMUSCULAR; INTRAVENOUS at 10:06

## 2022-06-29 RX ADMIN — ACETAMINOPHEN 650 MG: 325 TABLET ORAL at 21:48

## 2022-06-29 RX ADMIN — SODIUM CHLORIDE: 9 INJECTION, SOLUTION INTRAVENOUS at 14:33

## 2022-06-29 RX ADMIN — ASPIRIN 325 MG: 325 TABLET, COATED ORAL at 15:29

## 2022-06-29 RX ADMIN — CELECOXIB 100 MG: 100 CAPSULE ORAL at 08:16

## 2022-06-29 RX ADMIN — VANCOMYCIN HYDROCHLORIDE 1500 MG: 10 INJECTION, POWDER, LYOPHILIZED, FOR SOLUTION INTRAVENOUS at 09:22

## 2022-06-29 RX ADMIN — INSULIN LISPRO 1 UNITS: 100 INJECTION, SOLUTION INTRAVENOUS; SUBCUTANEOUS at 16:28

## 2022-06-29 RX ADMIN — ACETAMINOPHEN 1000 MG: 500 TABLET ORAL at 08:16

## 2022-06-29 RX ADMIN — PROPOFOL 60 MCG/KG/MIN: 10 INJECTION, EMULSION INTRAVENOUS at 09:40

## 2022-06-29 RX ADMIN — FENTANYL CITRATE 50 MCG: 50 INJECTION, SOLUTION INTRAMUSCULAR; INTRAVENOUS at 09:09

## 2022-06-29 ASSESSMENT — PAIN SCALES - GENERAL
PAINLEVEL_OUTOF10: 0
PAINLEVEL_OUTOF10: 1
PAINLEVEL_OUTOF10: 0
PAINLEVEL_OUTOF10: 5
PAINLEVEL_OUTOF10: 1
PAINLEVEL_OUTOF10: 0
PAINLEVEL_OUTOF10: 4

## 2022-06-29 ASSESSMENT — PAIN DESCRIPTION - DESCRIPTORS
DESCRIPTORS: ACHING;SORE
DESCRIPTORS: ACHING;SORE
DESCRIPTORS: DISCOMFORT;ACHING
DESCRIPTORS: DULL

## 2022-06-29 ASSESSMENT — PAIN DESCRIPTION - LOCATION
LOCATION: KNEE

## 2022-06-29 ASSESSMENT — PAIN DESCRIPTION - ORIENTATION
ORIENTATION: RIGHT

## 2022-06-29 ASSESSMENT — PAIN DESCRIPTION - ONSET: ONSET: GRADUAL

## 2022-06-29 ASSESSMENT — PAIN - FUNCTIONAL ASSESSMENT
PAIN_FUNCTIONAL_ASSESSMENT: ACTIVITIES ARE NOT PREVENTED
PAIN_FUNCTIONAL_ASSESSMENT: ACTIVITIES ARE NOT PREVENTED
PAIN_FUNCTIONAL_ASSESSMENT: 0-10

## 2022-06-29 ASSESSMENT — PAIN DESCRIPTION - PAIN TYPE: TYPE: SURGICAL PAIN

## 2022-06-29 NOTE — PROGRESS NOTES
Right adductor canal block completed per Dr Benito Nunez with good toleration. Family called to bedside. Call light within reach.

## 2022-06-29 NOTE — ANESTHESIA POSTPROCEDURE EVALUATION
Department of Anesthesiology  Postprocedure Note    Patient: Delilah Segal  MRN: [id number]  YOB: 1945  Date of evaluation: 6/29/2022      Procedure Summary     Date: 06/29/22 Room / Location: [facility] OR 04 / [facility]    Anesthesia Start: 0047 Anesthesia Stop: 1127    Procedure: ANNEMARIE ROBOTIC ASSISTED RIGHT KNEE TOTAL ARTHROPLASTY (Right Knee) Diagnosis:       Osteoarthritis, unspecified osteoarthritis type, unspecified site      (OSTEOARTHRITIS)    Surgeons: Jamel Montenegro MD Responsible Provider: Miryam Freeman DO    Anesthesia Type: regional, spinal ASA Status: 3          Anesthesia Type: No value filed.     Aden Phase I: Aden Score: 9    Aden Phase II:        Anesthesia Post Evaluation    Patient location during evaluation: PACU  Patient participation: complete - patient participated  Level of consciousness: awake and alert  Airway patency: patent  Nausea & Vomiting: no nausea and no vomiting  Complications: no  Cardiovascular status: hemodynamically stable  Respiratory status: acceptable  Hydration status: euvolemic

## 2022-06-29 NOTE — CONSULTS
[facility]      Reason for Consult:  Medical management    History of Present Illness:  78 y.o. female with a history of hypertension, diabetes, hypothyroidism, hyperlipidemia, DJD right knee presents following right total knee arthroplasty, also lateral release right knee. Consultation is requested for medical management. Per patient she stopped tobacco use 34 years ago, also alcohol use at the same time. Informant(s) for H&P: Patient as well as current chart    REVIEW OF SYSTEMS:  Complete ROS performed with patient, pertinent positives and negatives are listed in the HPI.     PMH:  Past Medical History:   Diagnosis Date    Breast CA ([address] 2018    Right    Cerebral artery occlusion with cerebral infarction ([address] 1994     rt arm leg weakness    Chronic kidney disease     Diabetes mellitus ([address]     Disease of blood and blood forming organ     Diverticulitis     Hyperlipidemia     Hypertension     Hypothyroidism     Lymphoma ([address] 12/2016    nonhogkins, previous chemo, immunotherapy-no current issues (6/24/22)    Osteoarthritis     Prolonged emergence from general anesthesia     Thyroid disease        Surgical History:  Past Surgical History:   Procedure Laterality Date    ABDOMEN SURGERY      BACK SURGERY      neck fusion bone graft-no ROM limitations    BREAST SURGERY Right     tumor and lymph node resection-CA    BRONCHOSCOPY N/A 10/1/2019    BRONCHOSCOPY ALVEOLAR LAVAGE WITH BRUSHINGS performed by Sy Monge MD at [address], [facility], DIAGNOSTIC      FRACTURE SURGERY      elbow radius    HYSTERECTOMY (CERVIX STATUS UNKNOWN)      KNEE ARTHROSCOPY      MEDIPORT INSERTION, SINGLE Left 2016    chest    OTHER SURGICAL HISTORY  2017    mediport insertion    OTHER SURGICAL HISTORY  08/12/2017    removal foreign body    UPPER GASTROINTESTINAL ENDOSCOPY         Medications Prior to Admission:    Prior to Admission medications    Medication Sig Start Date End Date Taking? Authorizing Provider   anastrozole (ARIMIDEX) 1 MG tablet Take 1 tablet by mouth daily 3/14/22   Hawk Castro MD   Umeclidinium Bromide (INCRUSE ELLIPTA) 62.5 MCG/INH AEPB Inhale 1 puff into the lungs daily    Historical Provider, MD   Calcium Carb-Cholecalciferol (CALCIUM 600+D) 600-800 MG-UNIT TABS Take by mouth daily Instructed to stop taking due to high calcium in blood work but they will restart her on it ater her next normal blood work. Historical Provider, MD   B Complex Vitamins (VITAMIN B COMPLEX) TABS Take by mouth daily    Historical Provider, MD   aspirin 81 MG tablet Take 81 mg by mouth daily    Historical Provider, MD   amLODIPine (NORVASC) 5 MG tablet Take 5 mg by mouth daily    Historical Provider, MD   pioglitazone (ACTOS) 15 MG tablet Take 15 mg by mouth nightly    Historical Provider, MD   atorvastatin (LIPITOR) 40 MG tablet Take 40 mg by mouth nightly    Historical Provider, MD   levothyroxine (SYNTHROID) 50 MCG tablet Take 75 mcg by mouth Daily     Historical Provider, MD   metFORMIN (GLUCOPHAGE-XR) 500 MG extended release tablet Take 500 mg by mouth 2 times daily     Historical Provider, MD       Allergies:    Penicillins    Social History:    reports that she quit smoking about 27 years ago. She has never used smokeless tobacco. She reports that she does not drink alcohol and does not use drugs.     Family History:       Problem Relation Age of Onset    Cancer Brother 39        brain    Cancer Paternal Aunt 72        uterine         PHYSICAL EXAM:  Vitals:  BP (!) 134/59   Pulse 66   Temp 98.1 °F (36.7 °C) (Oral)   Resp 14   LMP  (LMP Unknown)   SpO2 99%   General Appearance: alert and oriented to person, place and time, well-developed and well-nourished, in no acute distress  Skin: warm and dry, no rash or erythema  Head: normocephalic and atraumatic  Eyes: pupils equal, round, and reactive to light, extraocular eye movements intact, conjunctivae normal  ENT: tympanic membrane, external ear and ear canal normal bilaterally, oropharynx clear and moist with normal mucous membranes  Neck: neck supple and non tender without mass, no thyromegaly or thyroid nodules, no cervical lymphadenopathy   Pulmonary/Chest: clear to auscultation bilaterally- no wheezes, rales or rhonchi, normal air movement, no respiratory distress  Cardiovascular: normal rate, normal S1 and S2, no gallops, intact distal pulses and no carotid bruits  Abdomen: soft, non-tender, non-distended, normal bowel sounds, no masses or organomegaly      LABS:  No results for input(s): NA, K, CL, CO2, BUN, CREATININE, GLUCOSE, CALCIUM in the last 72 hours. No results for input(s): WBC, RBC, HGB, HCT, MCV, MCH, MCHC, RDW, PLT, MPV in the last 72 hours. No results for input(s): POCGLU in the last 72 hours. Radiology:   XR KNEE RIGHT (1-2 VIEWS)   Final Result   Right TKA with no unexpected findings. EKG:     ASSESSMENT  and  PLAN:    1. Right knee arthroplasty, lateral release, management as per the primary service. As of now patient is doing well, patient has been up and about, very happy that she is able to walk. 2. Hypertension, will continue home medications  3. Hypothyroidism, her Synthroid of 75 mcg daily has already been continued. 4. Hyperlipidemia, patient is on statins. 5. Diabetes, type II, managed with metformin and Actos, this already have been ordered, no changes made. Will add sliding scale. 6. DVT prophylaxis, patient is already started on regular aspirin. Thank you very much for this interesting consult and we will continue to follow along. Feel free to call with any questions at [phone]. Electronically signed by Prashant Carrillo MD on 6/29/2022 at 3:29 PM    NOTE: This report was transcribed using voice recognition software.  Every effort was made to ensure accuracy; however, inadvertent computerized transcription errors may be present.

## 2022-06-29 NOTE — PROGRESS NOTES
Occupational Therapy  OCCUPATIONAL THERAPY INITIAL EVALUATION  [facility] [address]    Date: 2022     Patient Name: Sherry Kapoor  MRN: [id number]  : 1945  Room: [address] [place] [address]    Evaluating OT: [facility] Dajuan Holland, OTR/L - [id number]    Referring Provider: Judit Beck MD  Specific Provider Orders/Date: \"OT eval and treat\" - 2022    Diagnosis: Osteoarthritis, unspecified osteoarthritis type, unspecified site [M19.90], Primary osteoarthritis of right knee [M17.11]   Surgery: Patient underwent R TKA and lateral release R knee on 2022. Pertinent Medical History: breast cancer, CKD, DM, HTN, lymphoma, OA    Precautions: fall risk, FWBAT    Assessment of Current Deficits:    [x] Functional mobility   [x]ADLs  [x] Strength               [x]Cognition   [x] Functional transfers   [x] IADLs         [x] Safety Awareness   [x]Endurance   [] Fine Coordination              [x] Balance      [] Vision/perception   [x]Sensation    []Gross Motor Coordination  [] ROM  [] Delirium                   [] Motor Control     OT PLAN OF CARE   OT POC is based on physician orders, patient diagnosis, and results of clinical assessment.   Frequency/Duration 2-5 days/week for 2 weeks PRN   Specific OT Treatment Interventions to Include:   * Instruction/training on adapted ADL techniques and AE recommendations to increase functional independence within precautions       * Training on energy conservation strategies, correct breathing pattern and techniques to improve independence/tolerance for self-care routine  * Functional transfer/mobility training/DME recommendations for increased independence, safety, and fall prevention  * Patient/Family education to increase follow through with safety techniques and functional independence  * Recommendation of environmental modifications for increased safety with functional transfers/mobility and ADLs  * transfer. Patient reported h/o urinary incontinence. Mod I   Bed Mobility  Not assessed. Independent in order to maximize patient's independence with ADLs, re-positioning, and other functional tasks. Functional Transfers Sit-to-Stand: Min A  from bedside chair and toilet. Cues given to facilitate proper hand placement to maximize safety. Independent   Functional Mobility CGA   (with walker) within patient's room, bathroom, and hallway. Mod I with functional mobility (with device, as needed/appropriate) in order to maximize independence with ADLs/IADLs and other functional tasks. Balance Sitting: Good  (at edge of chair)  Standing: Fair  (with walker)  Fair+ dynamic standing balance during completion of ADLs/IADLs and other functional tasks. Activity Tolerance WFL  Patient will demonstrate Good understanding and consistent implementation of energy conservation techniques and work simplification techniques into ADL/IADL routines. Visual/  Perceptual WFL     N/A     Additional Long-Term Goal: Patient will increase functional independence to PLOF in order to allow patient to live in least restrictive environment. Strength: ROM: Additional Information:    R UE  WFL WFL    L UE WFL WFL      Hearing: WFL  Sensation: No complaints of numbness/tingling in B UEs. Tone: WFL  Edema: No    Comments: RN approved patient's participation in [address] [place] [address] activities. Upon arrival, patient seated in bedside chair. At end of session, patient seated in bedside chair with call light and phone within reach, dinner tray items within reach on tray table, and all lines and tubes intact. Patient would benefit from continued skilled OT to increase safety and independence with completion of ADL/IADL tasks for functional independence and quality of life. Treatment: OT treatment provided this date included:    Instruction/training on safety and adapted techniques for completion of ADLs.     Instruction/training on safe functional mobility/transfer techniques. Patient education provided regardin) importance of having staff assistance with ADLs and other OOB activities to prevent falls/injury during hospitalization. Patient indicated understanding. Further skilled OT treatment indicated to increase patient's safety and independence with completion of ADL/IADL tasks in order to maximize patient's functional independence and quality of life. Rehab Potential: Good for established goals. Patient / Family Goal: Patient anticipates returning home. Patient and/or family were instructed on functional diagnosis, prognosis/goals, and OT plan of care. Demonstrated Good understanding. Eval Complexity: Low    Time In: 1600  Time Out: 1625  Total Treatment Time: 10 minutes      Minutes Units   OT Eval Low 88110 15 1   OT Eval Medium 18210     OT Eval High 81205     OT Re-Eval [id number]     Therapeutic Ex 34632     Therapeutic Activities 30410     ADL/Self Care 93965 10 1   Orthotic Management 09514     Neuro Re-Ed 54101     Non-Billable Time N/A ---     Evaluation time includes thorough review of current medical information, gathering information on past medical history/social history and prior level of function, completion of standardized testing/informal observation of tasks, assessment of data, and education on plan of care and goals. SU Hernandez/L  License Number: [id number]

## 2022-06-29 NOTE — DISCHARGE SUMMARY
Patient ID:  Connor Andujar  [id number]  28 y.o.  1945    Admit date: 6/29/2022    Discharge date and time:  6/29/2022    Admitting Physician: RACHEL Hatfield MD     Discharge Physician: Johnathon Shaw. Jr Hatfield MD    Admission Diagnoses: right Knee Osteoarthritis    Discharge Diagnoses: Same    Admission Condition: Good    Discharged Condition: Good    Procedure and Date: Tahir right Total Knee Arthroplasty     Hospital Course: A Tahir total knee arthroplasty was performed on 6/29/2022. Following this procedure the patient was admitted for a 1 day postoperative hospital stay. During this admission, DVT prophylaxis was followed using bilateral ICDs, KALEIGH hose, and daily ASA. Post-operative pain control was achieved with the use of IV/oral pain medications. Discharge plans were discussed with the patient with the aid of . Consults: Hospitalist    Disposition: Home    Patient Instructions:     Medications for pain:    Acetaminophen - Take one tablet every 6 hours for 1 week. Then take every 6 hours as needed for pain. Tramadol - Take one tablet every 6 hours for 1 week. Then take every 6 hours as needed for pain. Oxycodone 1 tablet every 4 hours as needed for pain. You can take 2 every 6 hours for severe pain. Medication for DVT prophylaxis (Prevention of blood clots)  Aspirin - Take 1 tablet daily for 6 weeks for prevention of blood clots    Medication for constipation:  Colace - Take 1 tablet every 12 hours as needed for constipation        Activity: activity as tolerated  Diet: regular diet  Wound Care: Patient should remove dressing in 9 days. Patient can shower with the dressing on but should not bathe. After removing, the dressing keep incision clean and dry    Follow-up with Dr. Rani Glynn in 2 weeks.

## 2022-06-29 NOTE — PROGRESS NOTES
Database initiated pharmacy and medications verified with the patient and her daughter. She is A&O from home. ambulates with a walker and is RA at baseline.

## 2022-06-29 NOTE — CARE COORDINATION
Met with patient about diagnosis and discharge plan of care. Pt post op right TKA. Pt seen in ortho class. Lives with family in ranch home. 2 steps into home. Needs wheeled walker-ordered through [address] and private pay toilet riser-ordered through [employer]. Plan is home with [facility]-orders completed and notified. Will follow-mjo    The Plan for Transition of Care is related to the following treatment goals: home with home care     The Patient and/or patient representative pt was provided with a choice of provider and agrees   with the discharge plan. [x] Yes [] No    Freedom of choice list was provided with basic dialogue that supports the patient's individualized plan of care/goals, treatment preferences and shares the quality data associated with the providers.  [x] Yes [] No

## 2022-06-29 NOTE — OP NOTE
Operative Report  Deyanira Manzanares  [id number]  6/29/2022    Pre-operative diagnosis: Right knee degenerative joint disease    Post-operative diagnosis:  1. Right knee degenerative joint disease      2. Lateral subluxation of patella    Procedure:  1. Tahir Right Total Knee Arthroplasty    2. Lateral release right knee    Surgeon: Ad Hudson MD    Assistant:      Anesthesia:  Spinal     Operative Indication: David Iverson is a 79 y/o female who presents with right knee osteoarthritis. The pain has become a quality of life issue and the patient has failed nonoperative treatment. She presents for a right total knee arthroplasty. The risks and benefits of surgery were discussed and all questions were answered. The risks for surgery that were discussed include bleeding, infection, damage to blood vessels, damage to nerves, risk of further surgery, restricted range of motion, risk of continued discomfort, risk of need for further reconstructive procedures, risk of need for altered activities and altered gait, risk of blood clots, pulmonary embolism, myocardial infarction, and risk of death were discussed. The patient understood these risks and consented for surgery. The typical post-operative recovery process was also discussed. EBL: 50 ml    Tourniquet time: 68 min    Complications: None    Components: 1. Moe triatholon femur, size 4, CR, press fit                         2. [place] triatholon tibia, size 4, press fit    3. Poly 9 mm     4. Patella, size 33, press fit        Operative Procedure: The patient was taken to the operating room and was given spinal anaesthesia. The patient was properly positioned on the table and a tourniquet was applied to the upper thigh. The leg was then prepped and draped in the usual sterile fashion. A timeout was called identifying the patient, prodedure and the adminstration of IV Ancef and TXA. A straight midline incision was made through the skin and subcutaneous tissue.  A medial parapatellar arthrotomy was used. The anterior synovium and fat pad were excised and the proximal tibia was exposed. The ACL and medial and lateral meniscus were transected. 2 Snehal pins were then placed in the distal femur. A Tahir satellite array was then affixed. 2 additional small skin incisions were made over the proximal tibia and 2 Snehal pins were placed. A Tahir satellite array was then affixed. At this point, 2 check points were placed on the femur and tibia. Anatomical landmarks on the ankle were registered. The femur and tibia were then registered and verified. The patient was noted to have a 2° varus deformity and 25° flexion contracture. Osteophytes were then removed with a rongeur. With valgus and varus stresses applied, poses were captured at 0° 90°. Small modifications were made to implant position using the [facility] software to create balanced flexion and extension gaps. The [employer] robot was then used to make cuts in the distal femur, anterior and poster chamfer, anterior femur, posterior condyles and proximal tibia. The medial and lateral meniscus was then excised. The knee was then trialed with a size 4 femur and tibia and a 9 mm poly. The knee felt balanced in flexion and extension and this was confirmed with the [address]. A patellar osteotomy was performed with the patellar reamer and cleaned up with a saw. A size 33 patella was then selected. All trial components were removed and the knee was thoroughly irrigated with normal saline. The implants were then press fit. A size 9 trial poly was used and the knee was again found to be balanced in flexion and extension. The poly was then inserted. The patient was noted to have a tight lateral patellar retinaculum that cause the patella to tilt and track laterally. A lateral release was performed with a 10 blade. After the release, the patella tracked centrally on the femoral implant.     The knee was then thoroughly irrigated with normal saline. The [place] cocktail was then injected into the soft tissues prior to closure. The wound was then closed with 1, 2-0, 3-0 Stratofix, dermabond and steri strips. A sterile dressing was then applied and the knee was wrapped with an ACE wrap. The tourniquet was then dropped. There were no complications and the patient tolerated the procedure well. The patient was taken to the recovery room in stable condition.     Neldon Runner, MD

## 2022-06-29 NOTE — PROGRESS NOTES
Physical Therapy  Facility/Department: [facility] SURGERY  Physical Therapy Initial Assessment    Name: Jasen Driver  : 1945  MRN: [id number]  Date of Service: 2022                 Patient Diagnosis(es): The encounter diagnosis was Osteoarthritis, unspecified osteoarthritis type, unspecified site. Past Medical History:  has a past medical history of Breast CA ([facility] Cerebral artery occlusion with cerebral infarction ([facility] Chronic kidney disease, Diabetes mellitus ([facility] [facility] Disease of blood and blood forming organ, Diverticulitis, Hyperlipidemia, Hypertension, Hypothyroidism, Lymphoma ([facility] Osteoarthritis, Prolonged emergence from general anesthesia, and Thyroid disease. Past Surgical History:  has a past surgical history that includes Hysterectomy; Knee arthroscopy; Colonoscopy; Upper gastrointestinal endoscopy; fracture surgery; Endoscopy, colon, diagnostic; other surgical history (); other surgical history (2017); Abdomen surgery; bronchoscopy (N/A, 10/1/2019); back surgery; Breast surgery (Right); Mediport insertion, single (Left, ); and Total knee arthroplasty (Right, 2022). Requires PT Follow-Up: Yes     Evaluating Therapist: Lincoln Delgado PT     Rec ww     Referring Provider:  Jessy Dodd MD    PT order : PT eval and treat     Room #: 362   DIAGNOSIS:  OA s/p R TKA 2022   Additional Pertinent History ; CVA with R sided weakness   PRECAUTIONS:  Falls, FWBAT     Social:  Pt lives with  Family  in a  1  floor plan  1   Step  to enter. Prior to admission pt walked with no AD in home, rollator outdoors      Initial Evaluation  Date: 2022  Treatment      Short Term/ Long Term   Goals   Was pt agreeable to Eval/treatment? yes      Does pt have pain?  Denies      Bed Mobility  Rolling:  NT   Supine to sit: NT  Sit to supine:  NT   Scooting:  Independent in sit    SBA   Transfers Sit to stand:  Min assist   Stand to sit: min assist   Stand pivot:  NT    S/SBA Ambulation     15 and 80  feet with ww with CGA    200  feet with ww  with  SBA        Stair negotiation: ascended and descended NT    1-4  steps with B  rail with  SBA    LE ROM  AAROM R knee 5-95 degrees      LE strength  4-/ 5 R knee in available range. Pt with decreased R ankle df      AM- PAC RAW score   17/ 24            Pt is alert and Oriented x  4      Balance:  CGA . Fall risk due to  Decreased balance, decreased R foot clearance   Endurance: WFL   Bed/Chair alarm: no      ASSESSMENT  Pt displays functional ability as noted in the objective portion of this evaluation. Conditions Requiring Skilled Therapeutic Intervention:    [x]Decreased strength     [x]Decreased ROM  [x]Decreased functional mobility  [x]Decreased balance   [x]Decreased endurance   []Decreased posture  []Decreased sensation  []Decreased coordination   []Decreased vision  []Decreased safety awareness   [x]Increased pain         Treatment/Education:    Pt in chair  upon arrival ; agreeable to PT. Mobility as above. Instructed in hand and foot placement with transfers. Pt required cues for posture with gait and to stay proper distance from ww with gait. Decreased foot clearance R LE. Pt has a brace, but she does not wear it. Pt educated on fall risk,  Safety with mobility, LE exercises        Patient response to education:   Pt verbalized understanding Pt demonstrated skill Pt requires further education in this area   x  with cues   x       Comments:  Pt left in chair  after session, with call light in reach. Rehab potential is Good for reaching above PT goals. Pts/ family goals   1. Home     Patient and or family understand(s) diagnosis, prognosis, and plan of care. -  Yes     PLAN  PT care will be provided in accordance with the objectives noted above. Whenever appropriate, clear delegation orders will be provided for nursing staff.   Exercises and functional mobility practice will be used as well as appropriate assistive devices or modalities to obtain goals. Patient and family education will also be administered as needed. PLAN OF CARE:    Current Treatment Recommendations     [x] Strengthening to improve independence with functional mobility   [x] ROM to improve independence with functional mobility   [x] Balance Training to improve static/dynamic balance and to reduce fall risk  [x] Endurance Training to improve activity tolerance during functional mobility   [x] Transfer Training to improve safety and independence with all functional transfers   [x] Gait Training to improve gait mechanics, endurance and assess need for appropriate assistive device  [x] Stair Training in preparation for safe discharge home and/or into the community   [x] Positioning to prevent skin breakdown and contractures  [x] Safety and Education Training   [x] Patient/Caregiver Education   [] HEP  [] Other     Frequency of treatments will be BID x 2 days. Time in: 1600   Time out: 1625       Evaluation Time includes thorough review of current medical information, gathering information on past medical history/social history and prior level of function, completion of standardized testing/informal observation of tasks, assessment of data and education on plan of care and goals.     CPT codes:  [x] Low Complexity PT evaluation 88660  [] Moderate Complexity PT evaluation 80415  [] High Complexity PT evaluation 57685  [] PT Re-evaluation 48747  [] Gait training 26440  minutes  [x] Therapeutic activities 17919 8  minutes  [] Therapeutic exercises 69106  minutes  [] Neuromuscular reeducation 85462  minutes       Cindi Escudero number:  PT [id number]

## 2022-06-29 NOTE — ANESTHESIA PROCEDURE NOTES
Spinal Block    Patient location during procedure: OR  End time: 6/29/2022 9:37 AM  Reason for block: primary anesthetic  Staffing  Performed: resident/CRNA   Anesthesiologist: Denis Alexander DO  Resident/CRNA: TERRY Wilde CRNA  Spinal Block  Patient position: sitting  Prep: ChloraPrep  Patient monitoring: cardiac monitor, continuous pulse ox and frequent blood pressure checks  Approach: midline  Location: L3/L4  Provider prep: mask and sterile gloves  Local infiltration: lidocaine  Needle  Needle type: Pencan   Needle gauge: 25 G  Needle length: 3.5 in  Expiration date: 5/31/2023  Assessment  Sensory level: T8  Events: cerebrospinal fluid  Swirl obtained: Yes  CSF: clear  Attempts: 1  Hemodynamics: stable

## 2022-06-30 VITALS
HEART RATE: 63 BPM | HEIGHT: 68 IN | DIASTOLIC BLOOD PRESSURE: 70 MMHG | TEMPERATURE: 98.1 F | RESPIRATION RATE: 16 BRPM | WEIGHT: 208 LBS | OXYGEN SATURATION: 98 % | SYSTOLIC BLOOD PRESSURE: 130 MMHG | BODY MASS INDEX: 31.52 KG/M2

## 2022-06-30 LAB
ANION GAP SERPL CALCULATED.3IONS-SCNC: 16 MMOL/L (ref 7–16)
BUN BLDV-MCNC: 15 MG/DL (ref 6–23)
CALCIUM SERPL-MCNC: 9.6 MG/DL (ref 8.6–10.2)
CHLORIDE BLD-SCNC: 106 MMOL/L (ref 98–107)
CO2: 21 MMOL/L (ref 22–29)
CREAT SERPL-MCNC: 0.8 MG/DL (ref 0.5–1)
GFR AFRICAN AMERICAN: >60
GFR NON-AFRICAN AMERICAN: >60 ML/MIN/1.73
GLUCOSE BLD-MCNC: 122 MG/DL (ref 74–99)
HCT VFR BLD CALC: 41.1 % (ref 34–48)
HEMOGLOBIN: 13.6 G/DL (ref 11.5–15.5)
MCH RBC QN AUTO: 30.2 PG (ref 26–35)
MCHC RBC AUTO-ENTMCNC: 33.1 % (ref 32–34.5)
MCV RBC AUTO: 91.1 FL (ref 80–99.9)
METER GLUCOSE: 103 MG/DL (ref 74–99)
METER GLUCOSE: 187 MG/DL (ref 74–99)
PDW BLD-RTO: 13 FL (ref 11.5–15)
PLATELET # BLD: 241 E9/L (ref 130–450)
PMV BLD AUTO: 9.6 FL (ref 7–12)
POTASSIUM REFLEX MAGNESIUM: 4.1 MMOL/L (ref 3.5–5)
RBC # BLD: 4.51 E12/L (ref 3.5–5.5)
SODIUM BLD-SCNC: 143 MMOL/L (ref 132–146)
WBC # BLD: 17.3 E9/L (ref 4.5–11.5)

## 2022-06-30 PROCEDURE — 36415 COLL VENOUS BLD VENIPUNCTURE: CPT

## 2022-06-30 PROCEDURE — 85027 COMPLETE CBC AUTOMATED: CPT

## 2022-06-30 PROCEDURE — G0378 HOSPITAL OBSERVATION PER HR: HCPCS

## 2022-06-30 PROCEDURE — 97535 SELF CARE MNGMENT TRAINING: CPT

## 2022-06-30 PROCEDURE — 82962 GLUCOSE BLOOD TEST: CPT

## 2022-06-30 PROCEDURE — 6360000002 HC RX W HCPCS: Performed by: ORTHOPAEDIC SURGERY

## 2022-06-30 PROCEDURE — 99225 PR SBSQ OBSERVATION CARE/DAY 25 MINUTES: CPT | Performed by: INTERNAL MEDICINE

## 2022-06-30 PROCEDURE — 6370000000 HC RX 637 (ALT 250 FOR IP): Performed by: INTERNAL MEDICINE

## 2022-06-30 PROCEDURE — 2580000003 HC RX 258: Performed by: ORTHOPAEDIC SURGERY

## 2022-06-30 PROCEDURE — 94640 AIRWAY INHALATION TREATMENT: CPT

## 2022-06-30 PROCEDURE — 80048 BASIC METABOLIC PNL TOTAL CA: CPT

## 2022-06-30 PROCEDURE — 97530 THERAPEUTIC ACTIVITIES: CPT

## 2022-06-30 PROCEDURE — 97110 THERAPEUTIC EXERCISES: CPT

## 2022-06-30 PROCEDURE — 6370000000 HC RX 637 (ALT 250 FOR IP): Performed by: ORTHOPAEDIC SURGERY

## 2022-06-30 RX ORDER — TRAMADOL HYDROCHLORIDE 50 MG/1
50 TABLET ORAL EVERY 6 HOURS
Qty: 28 TABLET | Refills: 1 | Status: SHIPPED | OUTPATIENT
Start: 2022-06-30 | End: 2022-07-07

## 2022-06-30 RX ORDER — OXYCODONE HYDROCHLORIDE 5 MG/1
5 TABLET ORAL EVERY 4 HOURS PRN
Qty: 42 TABLET | Refills: 0 | Status: SHIPPED | OUTPATIENT
Start: 2022-06-30 | End: 2022-07-07

## 2022-06-30 RX ORDER — DOCUSATE SODIUM 100 MG/1
100 CAPSULE, LIQUID FILLED ORAL 2 TIMES DAILY
Qty: 30 CAPSULE | Refills: 1 | Status: SHIPPED | OUTPATIENT
Start: 2022-06-30

## 2022-06-30 RX ORDER — ACETAMINOPHEN 325 MG/1
650 TABLET ORAL EVERY 6 HOURS
Qty: 120 TABLET | Refills: 3 | Status: SHIPPED | OUTPATIENT
Start: 2022-06-30

## 2022-06-30 RX ADMIN — METFORMIN HYDROCHLORIDE 500 MG: 500 TABLET, EXTENDED RELEASE ORAL at 08:12

## 2022-06-30 RX ADMIN — SODIUM CHLORIDE, PRESERVATIVE FREE 10 ML: 5 INJECTION INTRAVENOUS at 08:19

## 2022-06-30 RX ADMIN — TRAMADOL HYDROCHLORIDE 50 MG: 50 TABLET, COATED ORAL at 08:05

## 2022-06-30 RX ADMIN — DOCUSATE SODIUM 50 MG AND SENNOSIDES 8.6 MG 1 TABLET: 8.6; 5 TABLET, FILM COATED ORAL at 08:12

## 2022-06-30 RX ADMIN — IPRATROPIUM BROMIDE 0.5 MG: 0.5 SOLUTION RESPIRATORY (INHALATION) at 08:48

## 2022-06-30 RX ADMIN — SODIUM CHLORIDE, PRESERVATIVE FREE 10 ML: 5 INJECTION INTRAVENOUS at 01:06

## 2022-06-30 RX ADMIN — ACETAMINOPHEN 650 MG: 325 TABLET ORAL at 12:29

## 2022-06-30 RX ADMIN — ANASTROZOLE 1 MG: 1 TABLET ORAL at 08:12

## 2022-06-30 RX ADMIN — ACETAMINOPHEN 650 MG: 325 TABLET ORAL at 08:05

## 2022-06-30 RX ADMIN — OXYCODONE 5 MG: 5 TABLET ORAL at 12:29

## 2022-06-30 RX ADMIN — SODIUM CHLORIDE, PRESERVATIVE FREE 10 ML: 5 INJECTION INTRAVENOUS at 08:06

## 2022-06-30 RX ADMIN — ASPIRIN 325 MG: 325 TABLET, COATED ORAL at 08:12

## 2022-06-30 RX ADMIN — Medication 2000 MG: at 01:06

## 2022-06-30 RX ADMIN — DEXAMETHASONE SODIUM PHOSPHATE 10 MG: 10 INJECTION INTRAMUSCULAR; INTRAVENOUS at 08:13

## 2022-06-30 RX ADMIN — AMLODIPINE BESYLATE 5 MG: 5 TABLET ORAL at 08:12

## 2022-06-30 RX ADMIN — ACETAMINOPHEN 650 MG: 325 TABLET ORAL at 02:56

## 2022-06-30 RX ADMIN — INSULIN LISPRO 1 UNITS: 100 INJECTION, SOLUTION INTRAVENOUS; SUBCUTANEOUS at 11:11

## 2022-06-30 RX ADMIN — TRAMADOL HYDROCHLORIDE 50 MG: 50 TABLET, COATED ORAL at 02:57

## 2022-06-30 RX ADMIN — LEVOTHYROXINE SODIUM 75 MCG: 75 TABLET ORAL at 06:07

## 2022-06-30 ASSESSMENT — PAIN DESCRIPTION - DESCRIPTORS
DESCRIPTORS: ACHING;SHARP;PRESSURE
DESCRIPTORS: ACHING

## 2022-06-30 ASSESSMENT — PAIN SCALES - GENERAL
PAINLEVEL_OUTOF10: 3
PAINLEVEL_OUTOF10: 2
PAINLEVEL_OUTOF10: 0
PAINLEVEL_OUTOF10: 9
PAINLEVEL_OUTOF10: 6

## 2022-06-30 ASSESSMENT — PAIN DESCRIPTION - FREQUENCY: FREQUENCY: CONTINUOUS

## 2022-06-30 ASSESSMENT — PAIN DESCRIPTION - ORIENTATION
ORIENTATION: RIGHT
ORIENTATION: RIGHT

## 2022-06-30 ASSESSMENT — PAIN DESCRIPTION - LOCATION
LOCATION: HIP
LOCATION: KNEE

## 2022-06-30 ASSESSMENT — PAIN DESCRIPTION - ONSET: ONSET: ON-GOING

## 2022-06-30 NOTE — PROGRESS NOTES
[facility] Hospitalist   Progress Note    Admitting Date and Time: 6/29/2022  7:15 AM  Admit Dx: Osteoarthritis, unspecified osteoarthritis type, unspecified site [M19.90]  S/P total right hip arthroplasty [Z96.641]  Primary osteoarthritis of right knee [M17.11]    Subjective:    Patient was admitted with Osteoarthritis, unspecified osteoarthritis type, unspecified site [M19.90]  S/P total right hip arthroplasty [V20.759]  Primary osteoarthritis of right knee [M17.11].  Patient denies fever, chills, cp, sob, n/v.     amLODIPine  5 mg Oral Daily    anastrozole  1 mg Oral Daily    atorvastatin  40 mg Oral Nightly    levothyroxine  75 mcg Oral Daily    metFORMIN  500 mg Oral BID    pioglitazone  15 mg Oral Nightly    ipratropium  0.5 mg Nebulization Daily    sodium chloride flush  5-40 mL IntraVENous 2 times per day    acetaminophen  650 mg Oral Q6H    sennosides-docusate sodium  1 tablet Oral BID    aspirin  325 mg Oral Daily    traMADol  50 mg Oral Q6H    insulin lispro  0-6 Units SubCUTAneous TID WC    insulin lispro  0-3 Units SubCUTAneous Nightly     sodium chloride flush, 5-40 mL, PRN  sodium chloride, , PRN  oxyCODONE, 5 mg, Q4H PRN   Or  oxyCODONE, 10 mg, Q4H PRN  ketorolac, 15 mg, Q6H PRN  magnesium hydroxide, 30 mL, Daily PRN  ondansetron, 4 mg, Q8H PRN   Or  ondansetron, 4 mg, Q6H PRN  glucose, 4 tablet, PRN  dextrose bolus, 125 mL, PRN   Or  dextrose bolus, 250 mL, PRN  glucagon (rDNA), 1 mg, PRN  dextrose, 100 mL/hr, PRN         Objective:    /70   Pulse 63   Temp 98.1 °F (36.7 °C) (Oral)   Resp 16   Ht 5' 8\" (1.727 m)   Wt 208 lb (94.3 kg)   LMP  (LMP Unknown)   SpO2 98%   BMI 31.63 kg/m²   Skin: warm and dry, no rash or erythema  Pulmonary/Chest: clear to auscultation bilaterally- no wheezes, rales or rhonchi, normal air movement, no respiratory distress  Cardiovascular: rhythm reg at rate of 64  Abdomen: soft, non-tender, non-distended, normal bowel sounds, no masses or organomegaly  Extremities: no cyanosis, no clubbing and no edema      Recent Labs     06/30/22  0505      K 4.1      CO2 21*   BUN 15   CREATININE 0.8   GLUCOSE 122*   CALCIUM 9.6       Recent Labs     06/30/22  0505   WBC 17.3*   RBC 4.51   HGB 13.6   HCT 41.1   MCV 91.1   MCH 30.2   MCHC 33.1   RDW 13.0      MPV 9.6       CBC:   Lab Results   Component Value Date/Time    WBC 17.3 06/30/2022 05:05 AM    RBC 4.51 06/30/2022 05:05 AM    HGB 13.6 06/30/2022 05:05 AM    HCT 41.1 06/30/2022 05:05 AM    MCV 91.1 06/30/2022 05:05 AM    MCH 30.2 06/30/2022 05:05 AM    MCHC 33.1 06/30/2022 05:05 AM    RDW 13.0 06/30/2022 05:05 AM     06/30/2022 05:05 AM    MPV 9.6 06/30/2022 05:05 AM     BMP:    Lab Results   Component Value Date/Time     06/30/2022 05:05 AM    K 4.1 06/30/2022 05:05 AM     06/30/2022 05:05 AM    CO2 21 06/30/2022 05:05 AM    BUN 15 06/30/2022 05:05 AM    LABALBU 4.5 03/11/2022 11:04 AM    CREATININE 0.8 06/30/2022 05:05 AM    CALCIUM 9.6 06/30/2022 05:05 AM    GFRAA >60 06/30/2022 05:05 AM    LABGLOM >60 06/30/2022 05:05 AM    GLUCOSE 122 06/30/2022 05:05 AM        Radiology:   XR KNEE RIGHT (1-2 VIEWS)   Final Result   Right TKA with no unexpected findings. Assessment:    Principal Problem:    S/P total right hip arthroplasty  Active Problems:    Primary osteoarthritis of right knee  Resolved Problems:    * No resolved hospital problems. *      Plan:  1. Right knee DJD  S/p TKA  Pain control per ortho. Encourage activity per ortho. Encourage IS. 2. Leukocytosis likely due to inflammation  Pt denies infectious symptoms and is afebrile. 3. Acidosis monitor  4. Dm type 2 controlled monitor bs and hold metformin for now. Recommend pt to hold and be restarted at follow up med doctor appt. 5. Hypothyroidism continue med  6. htn continue med  7.  Hyperlipidemia continue med          Electronically signed by Cyndi Marshall DO on 6/30/2022 at 10:47 AM

## 2022-06-30 NOTE — PROGRESS NOTES
Occupational Therapy  OT BEDSIDE TREATMENT NOTE      Date:2022  Patient Name: Pelon Whitaker  MRN: [id number]  : 1945  Room: [address] [place] [address]       Evaluating OT: Marquez Yip, OTR/L - [id number]     Referring Provider: Shay Larkin MD  Specific Provider Orders/Date: \"OT eval and treat\" - 2022     Diagnosis: Osteoarthritis, unspecified osteoarthritis type, unspecified site [M19.90], Primary osteoarthritis of right knee [M17.11]   Surgery: Patient underwent R TKA and lateral release R knee on 2022. Pertinent Medical History: breast cancer, CKD, DM, HTN, lymphoma, OA     Precautions: fall risk, FWBAT     Assessment of Current Deficits:    [x]? Functional mobility             [x]?ADLs           [x]? Strength                  [x]? Cognition   [x]? Functional transfers           [x]? IADLs         [x]? Safety Awareness   [x]? Endurance   []? Fine Coordination              [x]? Balance      []? Vision/perception   [x]? Sensation     []? Gross Motor Coordination  []? ROM           []? Delirium                   []? Motor Control      OT PLAN OF CARE   OT POC is based on physician orders, patient diagnosis, and results of clinical assessment.   Frequency/Duration 2-5 days/week for 2 weeks PRN   Specific OT Treatment Interventions to Include:   * Instruction/training on adapted ADL techniques and AE recommendations to increase functional independence within precautions       * Training on energy conservation strategies, correct breathing pattern and techniques to improve independence/tolerance for self-care routine  * Functional transfer/mobility training/DME recommendations for increased independence, safety, and fall prevention  * Patient/Family education to increase follow through with safety techniques and functional independence  * Recommendation of environmental modifications for increased safety with functional transfers/mobility and ADLs  * Therapeutic exercise to improve motor endurance, ROM, and functional strength for ADLs/functional transfers  * Therapeutic activities to facilitate/challenge dynamic balance, stand tolerance for increased safety and independence with ADLs  * Neuro-muscular re-education: facilitation of righting/equilibrium reactions, midline orientation, scapular stability/mobility, normalization of muscle tone, and facilitation of volitional active controled movement     Recommended Adaptive Equipment: continue to assess      Home Living: Patient lives with family in a one-floor setup (one step to enter). Bathroom Setup: tub shower (with tub seat) and elevated toilet seat  Equipment Owned: rollator, tub seat     Prior Level of Function (PLOF): Patient noted that she was independent with ADLs; family assists with IADLs. Patient was independent with functional mobility (without device within her home - rollator used otherwise recently).     Pain Level: Pt with complaint of knee pain. Cognition: Pt awake and alert.      Functional Assessment:                  AM-PAC Daily Activity Raw Score: 17/24    Initial Eval Status  Date: 6/29/2022 Treatment Status  Date: 6/30/22  Short Term Goals = Long Term Goals   Feeding Independent   N/A   Grooming CGA for hand hygiene while standing at sink. Patient education provided regarding techniques to maximize safety with management of walker within bathroom. Setup   Mod I  (seated/standing at sink)   UB Dressing SBA  setup  Mod I  (including item retrieval)   LB Dressing Mod A  assist for quinton hose. Assist to don R shoe. Pt able to thread pants over feet. Declined AE needs. States she has help as needed from family. Quinton hose sleeve issued. Mod I / Independent - with use of AE, as needed/appropriate   Bathing Mod A Unable to safely step over tub surface at this time. Pt has tub seat however unsure if it can be setup sot that she can sit on it prior then swing legs over tub surface.     Recommended pt sponge bathe and have home health assess her tub seat and transfer safety.     Mod I / Independent - with use of AE/DME, as needed/appropriate   Toileting Min A with toilet transfer; hygiene completed independently while seated. Cues given to maximize safety with toilet transfer. Patient reported h/o urinary incontinence.   Mod I   Bed Mobility  Not assessed. Pt sitting in the chair.   Independent in order to maximize patient's independence with ADLs, re-positioning, and other functional tasks. Functional Transfers Sit-to-Stand: Min A  from bedside chair and toilet. Cues given to facilitate proper hand placement to maximize safety. [facility] cues for hand placement to prevent tipping of the walker when standing/sitting. Independent   Functional Mobility CGA   (with walker) within patient's room, bathroom, and hallway. CGA short distance in the room. Pt keeps knee flexed while standing/mobility.   Mod I with functional mobility (with device, as needed/appropriate) in order to maximize independence with ADLs/IADLs and other functional tasks. Balance Sitting: Good  (at edge of chair)  Standing: Fair  (with walker)   Fair+ dynamic standing balance during completion of ADLs/IADLs and other functional tasks. Activity Tolerance WFL Fair   Patient will demonstrate Good understanding and consistent implementation of energy conservation techniques and work simplification techniques into ADL/IADL routines. Comments:  Pt with complaint of pain and stating several times \"It didn't hurt yesterday. \"  Limited tolerance for activity due to pain. Pt received pain medication during this session from nursing. States family will assist her as needed. She remained in the chair to await PT. Education/treatment:  ADL retraining with facilitation of movement to increase self care skills. Therapeutic activity to address balance and endurance for ADL and transfers. Pt education of walker safety, transfer safety, and home safety.        · Pt has made

## 2022-06-30 NOTE — PROGRESS NOTES
Physical Therapy  Facility/Department: [address] ORTHO SURGERY  Daily Treatment Note  NAME: Annie Cade  : 1945  MRN: [id number]    Date of Service: 2022    Patient Diagnosis(es): The primary encounter diagnosis was Primary osteoarthritis of right knee. A diagnosis of Osteoarthritis, unspecified osteoarthritis type, unspecified site was also pertinent to this visit. Patient Diagnosis(es): The encounter diagnosis was Osteoarthritis, unspecified osteoarthritis type, unspecified site. Past Medical History:  has a past medical history of Breast CA ([address] Cerebral artery occlusion with cerebral infarction ([facility] [address] Chronic kidney disease, Diabetes mellitus ([facility] [address] Disease of blood and blood forming organ, Diverticulitis, Hyperlipidemia, Hypertension, Hypothyroidism, Lymphoma ([facility] [address] Osteoarthritis, Prolonged emergence from general anesthesia, and Thyroid disease. Past Surgical History:  has a past surgical history that includes Hysterectomy; Knee arthroscopy; Colonoscopy; Upper gastrointestinal endoscopy; fracture surgery; Endoscopy, colon, diagnostic; other surgical history (); other surgical history (2017); Abdomen surgery; bronchoscopy (N/A, 10/1/2019); back surgery; Breast surgery (Right); Mediport insertion, single (Left, ); and Total knee arthroplasty (Right, 2022).    Requires PT Follow-Up: Yes      Evaluating Therapist: Franklin Burns PT      Rec ww      Referring Provider:  Aamir Haynes MD     PT order : PT eval and treat      Room #: 163   DIAGNOSIS:  OA s/p R TKA 2022   Additional Pertinent History ; CVA with R sided weakness   PRECAUTIONS:  Falls, FWBAT      Social:  Pt lives with  Family  in a  1  floor plan  1   Step  to enter. Prior to admission pt walked with no AD in home, rollator outdoors        Initial Evaluation  Date: 2022  Treatment     2022 Short Term/ Long Term   Goals   Was pt agreeable to Eval/treatment? yes   yes      Does pt have pain? Denies   R knee pain      Bed Mobility  Rolling:  NT   Supine to sit: NT  Sit to supine:  NT   Scooting:  Independent in sit   supine <> sit : SBA . Performed x 2  SBA   Transfers Sit to stand:  Min assist   Stand to sit: min assist   Stand pivot:  NT   sit <> stand : SBA  SPS : SBA/CGA  S/SBA    Ambulation     15 and 80  feet with ww with CGA   15 feet x 2 and 120 feet x 2 with ww SBA  200  feet with ww  with  SBA          Stair negotiation: ascended and descended NT   1platform step with ww x 2 SBA/CGA   1-4  steps with B  rail with  SBA    LE ROM  AAROM R knee 5-95 degrees        LE strength  4-/ 5 R knee in available range. Pt with decreased R ankle df        AM- PAC RAW score   17/ 24 18/ 24               Pt is alert and Oriented x  4       Balance:  CGA . Fall risk due to  Decreased balance, decreased R foot clearance   Endurance: WFL   Bed/Chair alarm: no      ASSESSMENT    Pt displays functional ability as noted in the objective portion of this treatment         Conditions Requiring Skilled Therapeutic Intervention:     [x]? Decreased strength                                      [x]? Decreased ROM  [x]? Decreased functional mobility  [x]? Decreased balance   [x]? Decreased endurance   []? Decreased posture  []? Decreased sensation  []? Decreased coordination   []? Decreased vision  []? Decreased safety awareness   [x]? Increased pain                Treatment/Education:    Pt in chair  upon arrival ; agreeable to PT. Performed seated knee flex and LAQs. Mobility as above. Instructed in hand and foot placement and safe and controlled sit with transfers. Pt required cues for posture with gait and to stay proper distance from ww with gait. Decreased foot clearance R LE. Pt required a seated rest break between bouts of gait. Mildly labored breathing noted. Pulse ox 98%. Pt instructed in sequencing on step, required cues for correct performance.  Also instructed pt in proper car transfers technique and she reports understanding.      Pt educated on fall risk,  Safety with mobility, LE exercises         Patient response to education:   Pt verbalized understanding Pt demonstrated skill Pt requires further education in this area   x  with cues   x         Comments:  Pt left in bed per pt request   after session, with call light in reach.               PLAN    PT care will be provided in accordance with the objectives noted above. Whenever appropriate, clear delegation orders will be provided for nursing staff. Exercises and functional mobility practice will be used as well as appropriate assistive devices or modalities to obtain goals. Patient and family education will also be administered as needed.           PLAN OF CARE:     Current Treatment Recommendations      [x]? Strengthening to improve independence with functional mobility   [x]? ROM to improve independence with functional mobility   [x]? Balance Training to improve static/dynamic balance and to reduce fall risk  [x]? Endurance Training to improve activity tolerance during functional mobility   [x]? Transfer Training to improve safety and independence with all functional transfers   [x]? Gait Training to improve gait mechanics, endurance and assess need for appropriate assistive device  [x]? Stair Training in preparation for safe discharge home and/or into the community   [x]? Positioning to prevent skin breakdown and contractures  [x]? Safety and Education Training   [x]? Patient/Caregiver Education   []? HEP  []? Other      Frequency of treatments will be BID x 2 days.     Time in: 0900  Time out: 0939         Con't with PT POC with discharge planned for this PM      CPT codes:  []? Low Complexity PT evaluation [id number]  []? Moderate Complexity PT evaluation 25560  []? High Complexity PT evaluation [id number]  []? PT Re-evaluation [id number]  []? Gait training 27713  minutes  [x]? Therapeutic activities 80019 30  minutes  [x]? Therapeutic exercises 46160 8  minutes  []? Neuromuscular reeducation 25018  minutes         [address] number:  PT [id number]

## 2022-06-30 NOTE — CARE COORDINATION
Updated plan of care. POD#1. Awaiting equipment.  Plan is home today with [facility]-notified and orders completed-mjo

## 2022-06-30 NOTE — PROGRESS NOTES
Patient ambulated to chair from bed, and the bathroom (approx 50 feet) as a one person assist. Patient tolerated well. RN will continue to promote progressive ambulation during hourly rounds.

## 2022-06-30 NOTE — PLAN OF CARE
Problem: Chronic Conditions and Co-morbidities  Goal: Patient's chronic conditions and co-morbidity symptoms are monitored and maintained or improved  Outcome: Progressing  Flowsheets (Taken 6/30/2022 0715)  Care Plan - Patient's Chronic Conditions and Co-Morbidity Symptoms are Monitored and Maintained or Improved:   Monitor and assess patient's chronic conditions and comorbid symptoms for stability, deterioration, or improvement   Update acute care plan with appropriate goals if chronic or comorbid symptoms are exacerbated and prevent overall improvement and discharge   Collaborate with multidisciplinary team to address chronic and comorbid conditions and prevent exacerbation or deterioration     Problem: Discharge Planning  Goal: Discharge to home or other facility with appropriate resources  Outcome: Progressing  Flowsheets (Taken 6/30/2022 0715)  Discharge to home or other facility with appropriate resources:   Arrange for needed discharge resources and transportation as appropriate   Arrange for interpreters to assist at discharge as needed   Identify discharge learning needs (meds, wound care, etc)   Refer to discharge planning if patient needs post-hospital services based on physician order or complex needs related to functional status, cognitive ability or social support system     Problem: Pain  Goal: Verbalizes/displays adequate comfort level or baseline comfort level  Outcome: Progressing  Flowsheets (Taken 6/30/2022 0715)  Verbalizes/displays adequate comfort level or baseline comfort level:   Assess pain using appropriate pain scale   Implement non-pharmacological measures as appropriate and evaluate response   Administer analgesics based on type and severity of pain and evaluate response     Problem: Safety - Adult  Goal: Free from fall injury  Outcome: Progressing  Flowsheets (Taken 6/30/2022 1021)  Free From Fall Injury: Instruct family/caregiver on patient safety

## 2022-06-30 NOTE — PROGRESS NOTES
Discharge instructions reviewed with patient and her daughter. Denies questions or concerns .  Awaiting wheelchair

## 2022-06-30 NOTE — PROGRESS NOTES
Orthopaedic Surgery Progress Note      SUBJECTIVE:  No acute events over night. Pain controlled. Denies chest pain or shortness of breath.     OBJECTIVE:   AAOx3, NAD    Right lower extremity    Dressings C/D/I  SILT L1-S1  TA/EHL/GS intact  Calf soft, NT  +2 DP, W/WP     Data:  CBC:   Lab Results   Component Value Date/Time    WBC 17.3 06/30/2022 05:05 AM    RBC 4.51 06/30/2022 05:05 AM    HGB 13.6 06/30/2022 05:05 AM    HCT 41.1 06/30/2022 05:05 AM    MCV 91.1 06/30/2022 05:05 AM    MCH 30.2 06/30/2022 05:05 AM    MCHC 33.1 06/30/2022 05:05 AM    RDW 13.0 06/30/2022 05:05 AM     06/30/2022 05:05 AM    MPV 9.6 06/30/2022 05:05 AM     BMP:    Lab Results   Component Value Date/Time     06/30/2022 05:05 AM    K 4.1 06/30/2022 05:05 AM     06/30/2022 05:05 AM    CO2 21 06/30/2022 05:05 AM    BUN 15 06/30/2022 05:05 AM    LABALBU 4.5 03/11/2022 11:04 AM    CREATININE 0.8 06/30/2022 05:05 AM    CALCIUM 9.6 06/30/2022 05:05 AM    GFRAA >60 06/30/2022 05:05 AM    LABGLOM >60 06/30/2022 05:05 AM    GLUCOSE 122 06/30/2022 05:05 AM         A/P: POD 1 right total knee arthroplasty   - WBAT  - Pain control  - PT/OT  - DVT prophylaxis  - D/C planning for home with home care    Ary STEWART-CNP

## 2022-07-28 ASSESSMENT — PROMIS GLOBAL HEALTH SCALE
IN THE PAST 7 DAYS, HOW WOULD YOU RATE YOUR FATIGUE ON AVERAGE [ON A SCALE FROM 1 (NONE) TO 5 (VERY SEVERE)]?: 4
SUM OF RESPONSES TO QUESTIONS 2, 4, 5, & 10: 10
IN GENERAL, HOW WOULD YOU RATE YOUR SATISFACTION WITH YOUR SOCIAL ACTIVITIES AND RELATIONSHIPS [ON A SCALE OF 1 (POOR) TO 5 (EXCELLENT)]?: 3
TO WHAT EXTENT ARE YOU ABLE TO CARRY OUT YOUR EVERYDAY PHYSICAL ACTIVITIES SUCH AS WALKING, CLIMBING STAIRS, CARRYING GROCERIES, OR MOVING A CHAIR [ON A SCALE OF 1 (NOT AT ALL) TO 5 (COMPLETELY)]?: 2
IN GENERAL, PLEASE RATE HOW WELL YOU CARRY OUT YOUR USUAL SOCIAL ACTIVITIES (INCLUDES ACTIVITIES AT HOME, AT WORK, AND IN YOUR COMMUNITY, AND RESPONSIBILITIES AS A PARENT, CHILD, SPOUSE, EMPLOYEE, FRIEND, ETC) [ON A SCALE OF 1 (POOR) TO 5 (EXCELLENT)]?: 3
IN THE PAST 7 DAYS, HOW OFTEN HAVE YOU BEEN BOTHERED BY EMOTIONAL PROBLEMS, SUCH AS FEELING ANXIOUS, DEPRESSED, OR IRRITABLE [ON A SCALE FROM 1 (NEVER) TO 5 (ALWAYS)]?: 2
IN THE PAST 7 DAYS, HOW WOULD YOU RATE YOUR PAIN ON AVERAGE [ON A SCALE FROM 0 (NO PAIN) TO 10 (WORST IMAGINABLE PAIN)]?: 3
IN GENERAL, HOW WOULD YOU RATE YOUR PHYSICAL HEALTH [ON A SCALE OF 1 (POOR) TO 5 (EXCELLENT)]?: 3
HOW IS THE PROMIS V1.1 BEING ADMINISTERED?: 2
IN GENERAL, WOULD YOU SAY YOUR HEALTH IS...[ON A SCALE OF 1 (POOR) TO 5 (EXCELLENT)]: 2
SUM OF RESPONSES TO QUESTIONS 3, 6, 7, & 8: 12
IN GENERAL, WOULD YOU SAY YOUR QUALITY OF LIFE IS...[ON A SCALE OF 1 (POOR) TO 5 (EXCELLENT)]: 2
IN GENERAL, HOW WOULD YOU RATE YOUR MENTAL HEALTH, INCLUDING YOUR MOOD AND YOUR ABILITY TO THINK [ON A SCALE OF 1 (POOR) TO 5 (EXCELLENT)]?: 3

## 2022-07-28 ASSESSMENT — KOOS JR
KOOS JR TOTAL INTERVAL SCORE: 70.704
BENDING TO THE FLOOR TO PICK UP OBJECT: 1
STANDING UPRIGHT: 0
STRAIGHTENING KNEE FULLY: 1
HOW SEVERE IS YOUR KNEE STIFFNESS AFTER FIRST WAKING IN MORNING: 1
GOING UP OR DOWN STAIRS: 1
TWISING OR PIVOTING ON KNEE: 1
RISING FROM SITTING: 1

## 2022-07-29 ENCOUNTER — HOSPITAL ENCOUNTER (OUTPATIENT)
Dept: INFUSION THERAPY | Age: 77
Discharge: HOME | End: 2022-07-29
Payer: MEDICARE

## 2022-07-29 DIAGNOSIS — C82.09 GRADE I FOLLICULAR LYMPHOMA OF EXTRANODAL SITE EXCLUDING SPLEEN AND OTHER SOLID ORGANS (HCC): ICD-10-CM

## 2022-07-29 DIAGNOSIS — C50.911 MALIGNANT NEOPLASM OF RIGHT BREAST, STAGE 1, ESTROGEN RECEPTOR POSITIVE (HCC): Primary | ICD-10-CM

## 2022-07-29 DIAGNOSIS — C82.13 FOLLICULAR LYMPHOMA GRADE II OF INTRA-ABDOMINAL LYMPH NODES (HCC): ICD-10-CM

## 2022-07-29 DIAGNOSIS — Z17.0 MALIGNANT NEOPLASM OF RIGHT BREAST, STAGE 1, ESTROGEN RECEPTOR POSITIVE (HCC): Primary | ICD-10-CM

## 2022-07-29 PROCEDURE — 6360000002 HC RX W HCPCS: Performed by: INTERNAL MEDICINE

## 2022-07-29 PROCEDURE — 2580000003 HC RX 258: Performed by: INTERNAL MEDICINE

## 2022-07-29 PROCEDURE — 96523 IRRIG DRUG DELIVERY DEVICE: CPT

## 2022-07-29 RX ORDER — SODIUM CHLORIDE 0.9 % (FLUSH) 0.9 %
10 SYRINGE (ML) INJECTION PRN
Status: DISCONTINUED | OUTPATIENT
Start: 2022-07-29 | End: 2022-07-30 | Stop reason: HOSPADM

## 2022-07-29 RX ORDER — HEPARIN SODIUM (PORCINE) LOCK FLUSH IV SOLN 100 UNIT/ML 100 UNIT/ML
500 SOLUTION INTRAVENOUS PRN
Status: DISCONTINUED | OUTPATIENT
Start: 2022-07-29 | End: 2022-07-30 | Stop reason: HOSPADM

## 2022-07-29 RX ADMIN — HEPARIN 500 UNITS: 100 SYRINGE at 13:45

## 2022-07-29 RX ADMIN — SODIUM CHLORIDE, PRESERVATIVE FREE 10 ML: 5 INJECTION INTRAVENOUS at 13:45

## 2022-07-29 NOTE — DISCHARGE INSTRUCTIONS
Patient tolerated infusion well. Patient alert and oriented x3. No distress noted. Patient denies any new or worsening pain. Patient educated on signs and symptoms of reaction to medication. Educated patient on possible side effects and treatment of medication. Patient verbalized understanding. Offered patient education an/or discharge material.  Patient declined. Patient denies any needs. All questions answered.

## 2022-10-05 ENCOUNTER — OFFICE VISIT (OUTPATIENT)
Dept: ONCOLOGY | Age: 77
End: 2022-10-05
Payer: MEDICARE

## 2022-10-05 ENCOUNTER — HOSPITAL ENCOUNTER (OUTPATIENT)
Dept: INFUSION THERAPY | Age: 77
Discharge: HOME | End: 2022-10-05
Payer: MEDICARE

## 2022-10-05 VITALS
DIASTOLIC BLOOD PRESSURE: 76 MMHG | WEIGHT: 196.2 LBS | HEART RATE: 69 BPM | SYSTOLIC BLOOD PRESSURE: 160 MMHG | OXYGEN SATURATION: 99 % | BODY MASS INDEX: 29.73 KG/M2 | HEIGHT: 68 IN | TEMPERATURE: 97.5 F

## 2022-10-05 DIAGNOSIS — C82.13 FOLLICULAR LYMPHOMA GRADE II OF INTRA-ABDOMINAL LYMPH NODES (HCC): Primary | ICD-10-CM

## 2022-10-05 DIAGNOSIS — C50.911 STAGE 1 BREAST CANCER, ER+, RIGHT (HCC): ICD-10-CM

## 2022-10-05 DIAGNOSIS — C82.13 FOLLICULAR LYMPHOMA GRADE II OF INTRA-ABDOMINAL LYMPH NODES (HCC): ICD-10-CM

## 2022-10-05 DIAGNOSIS — C50.911 MALIGNANT NEOPLASM OF RIGHT BREAST, STAGE 1, ESTROGEN RECEPTOR POSITIVE (HCC): Primary | ICD-10-CM

## 2022-10-05 DIAGNOSIS — Z17.0 STAGE 1 BREAST CANCER, ER+, RIGHT (HCC): ICD-10-CM

## 2022-10-05 DIAGNOSIS — C82.09 GRADE I FOLLICULAR LYMPHOMA OF EXTRANODAL SITE EXCLUDING SPLEEN AND OTHER SOLID ORGANS (HCC): ICD-10-CM

## 2022-10-05 DIAGNOSIS — Z17.0 MALIGNANT NEOPLASM OF RIGHT BREAST, STAGE 1, ESTROGEN RECEPTOR POSITIVE (HCC): Primary | ICD-10-CM

## 2022-10-05 LAB
ALBUMIN SERPL-MCNC: 4.3 G/DL (ref 3.5–5.2)
ALP BLD-CCNC: 101 U/L (ref 35–104)
ALT SERPL-CCNC: 8 U/L (ref 0–32)
ANION GAP SERPL CALCULATED.3IONS-SCNC: 10 MMOL/L (ref 7–16)
AST SERPL-CCNC: 12 U/L (ref 0–31)
BASOPHILS ABSOLUTE: 0.03 E9/L (ref 0–0.2)
BASOPHILS RELATIVE PERCENT: 0.5 % (ref 0–2)
BILIRUB SERPL-MCNC: 0.4 MG/DL (ref 0–1.2)
BUN BLDV-MCNC: 17 MG/DL (ref 6–23)
CALCIUM SERPL-MCNC: 9.8 MG/DL (ref 8.6–10.2)
CHLORIDE BLD-SCNC: 100 MMOL/L (ref 98–107)
CO2: 27 MMOL/L (ref 22–29)
CREAT SERPL-MCNC: 0.9 MG/DL (ref 0.5–1)
EOSINOPHILS ABSOLUTE: 0.22 E9/L (ref 0.05–0.5)
EOSINOPHILS RELATIVE PERCENT: 3.7 % (ref 0–6)
GFR AFRICAN AMERICAN: >60
GFR NON-AFRICAN AMERICAN: >60 ML/MIN/1.73
GLUCOSE BLD-MCNC: 141 MG/DL (ref 74–99)
HCT VFR BLD CALC: 43.8 % (ref 34–48)
HEMOGLOBIN: 14.6 G/DL (ref 11.5–15.5)
IMMATURE GRANULOCYTES #: 0.02 E9/L
IMMATURE GRANULOCYTES %: 0.3 % (ref 0–5)
LYMPHOCYTES ABSOLUTE: 1.12 E9/L (ref 1.5–4)
LYMPHOCYTES RELATIVE PERCENT: 18.8 % (ref 20–42)
MCH RBC QN AUTO: 29.7 PG (ref 26–35)
MCHC RBC AUTO-ENTMCNC: 33.3 % (ref 32–34.5)
MCV RBC AUTO: 89 FL (ref 80–99.9)
MONOCYTES ABSOLUTE: 0.69 E9/L (ref 0.1–0.95)
MONOCYTES RELATIVE PERCENT: 11.6 % (ref 2–12)
NEUTROPHILS ABSOLUTE: 3.89 E9/L (ref 1.8–7.3)
NEUTROPHILS RELATIVE PERCENT: 65.1 % (ref 43–80)
PDW BLD-RTO: 12.9 FL (ref 11.5–15)
PLATELET # BLD: 237 E9/L (ref 130–450)
PMV BLD AUTO: 9.2 FL (ref 7–12)
POTASSIUM SERPL-SCNC: 4 MMOL/L (ref 3.5–5)
RBC # BLD: 4.92 E12/L (ref 3.5–5.5)
SODIUM BLD-SCNC: 137 MMOL/L (ref 132–146)
TOTAL PROTEIN: 6.7 G/DL (ref 6.4–8.3)
WBC # BLD: 6 E9/L (ref 4.5–11.5)

## 2022-10-05 PROCEDURE — 80053 COMPREHEN METABOLIC PANEL: CPT

## 2022-10-05 PROCEDURE — 2580000003 HC RX 258: Performed by: INTERNAL MEDICINE

## 2022-10-05 PROCEDURE — 85025 COMPLETE CBC W/AUTO DIFF WBC: CPT

## 2022-10-05 PROCEDURE — 6360000002 HC RX W HCPCS: Performed by: INTERNAL MEDICINE

## 2022-10-05 PROCEDURE — 36591 DRAW BLOOD OFF VENOUS DEVICE: CPT

## 2022-10-05 PROCEDURE — 99214 OFFICE O/P EST MOD 30 MIN: CPT

## 2022-10-05 RX ORDER — SODIUM CHLORIDE 0.9 % (FLUSH) 0.9 %
10 SYRINGE (ML) INJECTION PRN
Status: DISCONTINUED | OUTPATIENT
Start: 2022-10-05 | End: 2022-10-05

## 2022-10-05 RX ORDER — SODIUM CHLORIDE 0.9 % (FLUSH) 0.9 %
20 SYRINGE (ML) INJECTION PRN
Status: DISCONTINUED | OUTPATIENT
Start: 2022-10-05 | End: 2022-10-06 | Stop reason: HOSPADM

## 2022-10-05 RX ORDER — ANASTROZOLE 1 MG/1
1 TABLET ORAL DAILY
Qty: 90 TABLET | Refills: 1 | Status: SHIPPED | OUTPATIENT
Start: 2022-10-05

## 2022-10-05 RX ORDER — HEPARIN SODIUM (PORCINE) LOCK FLUSH IV SOLN 100 UNIT/ML 100 UNIT/ML
500 SOLUTION INTRAVENOUS PRN
Status: DISCONTINUED | OUTPATIENT
Start: 2022-10-05 | End: 2022-10-06 | Stop reason: HOSPADM

## 2022-10-05 RX ADMIN — Medication 20 ML: at 11:05

## 2022-10-05 RX ADMIN — HEPARIN 500 UNITS: 100 SYRINGE at 11:05

## 2022-10-05 NOTE — PROGRESS NOTES
Patient presents to clinic for labs today. LEFT CHEST  SQ port accessed per policy using 20  Burrell needle for good blood return. Aspirate for waste and specimen sent to lab. Site flushed easily with 10 mL NSS followed by 5 mL Heparin solution 100 units/ml rinse prior to de-access. Dry sterile dressing to area. Tolerated procedure well. Encouraged to schedule port flush every 4 weeks.

## 2022-10-05 NOTE — PROGRESS NOTES
[address] [facility]. 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 Feels tired today. No constitutional B symptoms. S/P Robotic Assisted Right Total Knee by Dr. Guy Abad on 6/29/2022 at [facility]  [place]annah Wilson-  Patient was recently hospitalized at [facility] at the end of April 2021 with weakness and and dehydration as well as febrile illness. She stated her WBC count was very low. She was seen by hematology Dr. Maddie Ahumada and underwent a bone marrow biopsy and the flow cytometry is available and shows less than 3% blasts and was otherwise nonspecific. Bone marrow morphology report not available. Apparently she stated that she might have been tested for COVID-19 but does not know the results. She denies any constitutional B symptoms. She is back on anastrozole along with calcium and vitamin D supplements. Tolerating anastrozole well        OBJECTIVE:  VITALS:  vitals were not taken for this visit. Physical Exam:  Performance Status: 0  Well developed, well nourished female  Head and neck: PERRLA, EOMI. Sclera non icteric. Oropharynx: Clear  Neck: no JVD,  bilateral cervical lymphadenopathy markedly decreased in size  Heart: Regular rate and regular rhythm. No murmur. Lungs: Clear to auscultation. Abdomen: Soft, non-tender;no masses, no organomegaly. Extremities: No edema,no cyanosis, no clubbing. Neurologic: Cranial nerves grossly intact. Very mild weakness in the right upper and right lower extremity. Right Total Knee scars.   Medi-port:  Left Subclavian          Medications  Prior to Admission medications    Medication Sig Start Date End Date Taking? Authorizing Provider   acetaminophen (TYLENOL) 325 MG tablet Take 2 tablets by mouth every 6 hours 6/30/22   TERRY Catalan NP   aspirin 325 MG EC tablet Take 1 tablet by mouth daily 6/30/22   TERRY Catalan NP   docusate sodium (COLACE) 100 MG capsule Take 1 capsule by mouth 2 times daily 6/30/22   TERRY Catalan NP   anastrozole (ARIMIDEX) 1 MG tablet Take 1 tablet by mouth daily 3/14/22   Horace Colin MD   Umeclidinium Bromide (INCRUSE ELLIPTA) 62.5 MCG/INH AEPB Inhale 1 puff into the lungs daily    Historical Provider, MD   Calcium Carb-Cholecalciferol (CALCIUM 600+D) 600-800 MG-UNIT TABS Take by mouth daily Instructed to stop taking due to high calcium in blood work but they will restart her on it ater her next normal blood work.     Historical Provider, MD   B Complex Vitamins (VITAMIN B COMPLEX) TABS Take by mouth daily    Historical Provider, MD   amLODIPine (NORVASC) 5 MG tablet Take 5 mg by mouth daily    Historical Provider, MD   pioglitazone (ACTOS) 15 MG tablet Take 15 mg by mouth nightly    Historical Provider, MD   atorvastatin (LIPITOR) 40 MG tablet Take 40 mg by mouth nightly    Historical Provider, MD   levothyroxine (SYNTHROID) 50 MCG tablet Take 75 mcg by mouth Daily     Historical Provider, MD    Scheduled Meds:  Continuous Infusions:  PRN Meds:.        Recent Laboratory Data-     Lab Results   Component Value Date    WBC 17.3 (H) 06/30/2022    HGB 13.6 06/30/2022    HCT 41.1 06/30/2022    MCV 91.1 06/30/2022     06/30/2022    LYMPHOPCT 19.2 (L) 03/11/2022    RBC 4.51 06/30/2022    MCH 30.2 06/30/2022    MCHC 33.1 06/30/2022    RDW 13.0 06/30/2022    NEUTOPHILPCT 63.3 03/11/2022    MONOPCT 13.2 (H) 03/11/2022    BASOPCT 0.5 03/11/2022    NEUTROABS 3.48 03/11/2022    LYMPHSABS 1.06 (L) 03/11/2022    MONOSABS 0.73 03/11/2022    EOSABS 0.20 03/11/2022    BASOSABS 0.03 03/11/2022       Lab Results   Component Value Date     06/30/2022    K 4.1 06/30/2022     06/30/2022    CO2 21 (L) 06/30/2022    BUN 15 06/30/2022    CREATININE 0.8 06/30/2022    GLUCOSE 122 (H) 06/30/2022    CALCIUM 9.6 06/30/2022    PROT 7.0 03/11/2022    LABALBU 4.5 03/11/2022    BILITOT 0.4 03/11/2022    ALKPHOS 100 03/11/2022    AST 16 03/11/2022    ALT 14 03/11/2022    LABGLOM >60 06/30/2022    GFRAA >60 06/30/2022         Radiology-    BILATERAL SCREENING MAMMOGRAM:  6/03/2022  No mammographic evidence of malignancy in either breast.       RECOMMENDATION:       Annual bilateral mammogram is advised with consideration for annual bilateral   screening ultrasound given the patient's breast density. BIRADS:   BIRADS - CATEGORY 1       Negative. OVERALL ASSESSMENT - NEGATIVE       DEXA BONE DENSITY: 6/03/2022  FINDINGS:   LUMBAR SPINE:       Multilevel degenerative change precludes accurate bone density assessment       LEFT HIP:       The bone mineral density in the total hip is measured at 0.991 g/cm2   corresponding to a T-score of -0.1 and a Z-score of 0.8. This is within the   normal range by WHO criteria. The bone mineral density of the femoral neck is measured at 0.876 g/cm2   corresponding to a T-score of -1.2 and a Z-score of 0.0. This is within the   osteopenia range by WHO criteria. RIGHT HIP:       The bone mineral density in the total hip is measured at 0.910 g/cm2   corresponding to a T-score of -0.8 and a Z-score of 0.1. This is within the   normal range by WHO criteria. The bone mineral density of the femoral neck is measured at 0.790 g/cm2   corresponding to a T-score of -1.8 and a Z-score of -0.6. This is within the   osteopenia range by WHO criteria. Impression   Osteopenia by WHO criteria.          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 She was diagnosed in 2016  Her PET/CT scan showed extensive generalized bulky lymphadenopathy with bilateral hydronephrosis  Her serum creatinine remains fairly stable at 1.4 with an estimated GFR of 37 mL/m  She was recommended systemic chemotherapy with Rituxan and bendamustine along with Elitek because of risk of tumor lysis in view of the bulky nature of her disease. All potential side effects of chemotherapy were discussed  She tolerated cycle 1 of chemotherapy very well and had no evidence of tumor lysis syndrome        She completed Cycle #4 of Rituxan and Bendeka on 3/15/17       She was restaged 3/23/17 with a follow-up PET CT scan which showed essentially complete remission and resolution of her diffuse bulky lymphadenopathy as well as resolution of bilateral hydronephrosis. There was question of an ill-defined lesion at the level of the introitus and vagina but she has no GYN symptomatology whatsoever and she will follow with her gynecologist      She was then recommended maintenance Rituxan for 2 years. Received her 1st dose of maintenance Rituxan on 5/17/2017      The fact that her absolute lymphocyte count remains very low she will be maintained on VZV prophylaxis with acyclovir 400 mg twice a day.       Her spironolactone was discontinued because of her renal insufficiency         Her PET/CT scan was again unremarkable without any intrathoracic abnormality and no evidence of recurrent lymphadenopathy      She completed Cycle #12 Maintenance Rituxan/Hycela on 3/13/19    She was cleared to receive her Shingrix vaccine in September 2- Postmenopausal right  breast cancer  diagnosed in 2018 stage I , T1c N1mi M0 with positive ER and UT receptors and nonamplified HER-2/kylah by fish. One out of 4 sentinel nodes had micrometastasis measuring only 2 mm  Clear surgical margins were achieved     It was explained to her that the mainstay of adjuvant therapy at her age would be an aromatase inhibitor. Oncotype DX or mammaprint will be done to determine if she has any significant benefit from chemo which I doubt     Requested  for Oncotype or mammaprint. Was submitted to pathology department at [facility]. Oncotype DX  RS 17---Low risk for recurrence. She will be recommended adjuvant aromatase inhibitor to be followed by adjuvant local radiation therapy to the right breast     Radiation oncology consult was obtained. She was initiated on anastrozole 1 mg daily along with calcium and vitamin D supplements on 11/21/18. Bone density was done on 5/11/18     Her last mammogram done on January 27, 2020 was negative    She will be maintained on anastrozole along with calcium and vitamin D supplements for her breast cancer  Her recent mammogram in February 2020 was negative    Recently hospitalized at [facility] with weakness and abnormal WBC counts. She underwent a Bone Marrow Biopsy and Aspiration on May 6, 2020. It was nonspecific with less than 3% blasts. CBC done today is completely in the normal range and this is suggestive that her neutropenia was related to bone marrow suppression by a systemic viral illness which she has recovered from    Her discharge summary from [facility] will be retrieved and reviewed and her COVID-19 tests will be rechecked with consideration for either another swab or the  COVID-19 antibody test.  Her repeat CBC today is normal with WBC count of 5.8, hemoglobin of 13.5 with normal MCV and normal platelet count. Her differential shows slight lymphopenia. She has been reassured. To continue anastrozole along with calcium and vitamin D supplements.   She will have a follow-up mammogram in January 2021  Her CT can of abdomen and pelvis done in June 2020 was negative with incidental diverticulosis. It was done without contrast    9/16/2020  Her recent CMP shows acute renal insufficiency and her GFR is down from normal range to 32 mL/min and her serum creatinine is up to 1.6  Needs to follow up with. Dr Eliazar Cazares due to elevated creatinine especially that she is on metformin        1/13/2021  Renal function has markedly improved since last visit. Her diabetes is well controlled. She is tolerating anastrozole well and is compliant with calcium and vitamin D supplements. She has no B symptoms and her lymphoma is in remission  She will have follow-up mammogram in February 2021.      5/12/2021  Her diabetes is well controlled. She is tolerating anastrozole well and is compliant with calcium and vitamin D supplements. She has no B symptoms and her lymphoma is in remission  Mammogram 4/2021 was Negative. She will have follow-up mammogram in April 2022 9/13/2021  Tolerating anastrozole well. She continues on calcium and vitamin D supplements. She has no constitutional B symptoms and her lymphoma remains in remission. Last mammogram done in April 2021 was negative. Diabetes is well controlled. Continues on meloxicam for DJD. She will complete anastrozole in December 2023.      3/14/2022  Main complaint is arthritic pains mostly in her knees and she ambulates with the assistance of a walker  No constitutional B symptoms  Tolerating anastrozole well and has been compliant with her calcium and vitamin D supplements  Physical exam negative    She will have follow-up mammogram as well as DEXA scan in April 2022.      9/21/2022  No Show      10/05/2022  Main complaint is arthritic pain. She is status post right knee replacement in June and has been ambulating with assistance of a walker. She continues on anastrozole along with calcium and vitamin D supplements and has been tolerating it well.   She denies any constitutional B symptoms and her non-Hodgkin's lymphoma remains in remission. Her last mammogram done in June 2022 was negative and her DEXA scan showed osteopenia. She will complete anastrozole in December 2023. She is doing well without evidence of disease recurrence. Follow-up mammogram and DEXA scan done 6/03/2022        Fidelia Lee. Anson Kaur M.D., F.A.C.P.   Electronically signed 10/5/2022 at 7:26 AM

## 2022-12-21 ENCOUNTER — HOSPITAL ENCOUNTER (OUTPATIENT)
Dept: INFUSION THERAPY | Age: 77
Discharge: HOME | End: 2022-12-21
Payer: MEDICARE

## 2022-12-21 DIAGNOSIS — C82.13 FOLLICULAR LYMPHOMA GRADE II OF INTRA-ABDOMINAL LYMPH NODES (HCC): ICD-10-CM

## 2022-12-21 DIAGNOSIS — C50.911 MALIGNANT NEOPLASM OF RIGHT BREAST, STAGE 1, ESTROGEN RECEPTOR POSITIVE (HCC): Primary | ICD-10-CM

## 2022-12-21 DIAGNOSIS — C82.09 GRADE I FOLLICULAR LYMPHOMA OF EXTRANODAL SITE EXCLUDING SPLEEN AND OTHER SOLID ORGANS (HCC): ICD-10-CM

## 2022-12-21 DIAGNOSIS — Z17.0 MALIGNANT NEOPLASM OF RIGHT BREAST, STAGE 1, ESTROGEN RECEPTOR POSITIVE (HCC): Primary | ICD-10-CM

## 2022-12-21 PROCEDURE — 96523 IRRIG DRUG DELIVERY DEVICE: CPT

## 2022-12-21 PROCEDURE — 2580000003 HC RX 258: Performed by: INTERNAL MEDICINE

## 2022-12-21 PROCEDURE — 6360000002 HC RX W HCPCS: Performed by: INTERNAL MEDICINE

## 2022-12-21 RX ORDER — HEPARIN SODIUM (PORCINE) LOCK FLUSH IV SOLN 100 UNIT/ML 100 UNIT/ML
500 SOLUTION INTRAVENOUS PRN
OUTPATIENT
Start: 2022-12-21

## 2022-12-21 RX ORDER — HEPARIN SODIUM (PORCINE) LOCK FLUSH IV SOLN 100 UNIT/ML 100 UNIT/ML
500 SOLUTION INTRAVENOUS PRN
Status: DISCONTINUED | OUTPATIENT
Start: 2022-12-21 | End: 2022-12-22 | Stop reason: HOSPADM

## 2022-12-21 RX ORDER — SODIUM CHLORIDE 0.9 % (FLUSH) 0.9 %
10 SYRINGE (ML) INJECTION PRN
OUTPATIENT
Start: 2022-12-21

## 2022-12-21 RX ORDER — SODIUM CHLORIDE 0.9 % (FLUSH) 0.9 %
10 SYRINGE (ML) INJECTION PRN
Status: DISCONTINUED | OUTPATIENT
Start: 2022-12-21 | End: 2022-12-22 | Stop reason: HOSPADM

## 2022-12-21 RX ADMIN — SODIUM CHLORIDE, PRESERVATIVE FREE 10 ML: 5 INJECTION INTRAVENOUS at 13:47

## 2022-12-21 RX ADMIN — Medication 500 UNITS: at 13:47

## 2022-12-21 NOTE — PROGRESS NOTES
PORT FLUSH    Patient presents to clinic for [facility] today. L chest  SQ port accessed per policy using 72L 3.76ZE Burrell needle for good blood return. Site flushed easily with 10 mL NSS followed by 5 mL Heparin solution 100 units/ml rinse prior to de-access. Dry sterile dressing to area. Tolerated procedure well. Encouraged to schedule port flush every 4 weeks.

## 2023-02-01 ENCOUNTER — HOSPITAL ENCOUNTER (OUTPATIENT)
Dept: INFUSION THERAPY | Age: 78
Discharge: HOME | End: 2023-02-01
Payer: MEDICARE

## 2023-02-01 DIAGNOSIS — C82.13 FOLLICULAR LYMPHOMA GRADE II OF INTRA-ABDOMINAL LYMPH NODES (HCC): ICD-10-CM

## 2023-02-01 DIAGNOSIS — C50.911 MALIGNANT NEOPLASM OF RIGHT BREAST, STAGE 1, ESTROGEN RECEPTOR POSITIVE (HCC): Primary | ICD-10-CM

## 2023-02-01 DIAGNOSIS — C82.09 GRADE I FOLLICULAR LYMPHOMA OF EXTRANODAL SITE EXCLUDING SPLEEN AND OTHER SOLID ORGANS (HCC): ICD-10-CM

## 2023-02-01 DIAGNOSIS — Z17.0 MALIGNANT NEOPLASM OF RIGHT BREAST, STAGE 1, ESTROGEN RECEPTOR POSITIVE (HCC): Primary | ICD-10-CM

## 2023-02-01 PROCEDURE — 2580000003 HC RX 258: Performed by: INTERNAL MEDICINE

## 2023-02-01 PROCEDURE — 96523 IRRIG DRUG DELIVERY DEVICE: CPT

## 2023-02-01 PROCEDURE — 6360000002 HC RX W HCPCS: Performed by: INTERNAL MEDICINE

## 2023-02-01 RX ORDER — WATER 1000 ML/1000ML
2.2 INJECTION, SOLUTION INTRAVENOUS ONCE
Status: CANCELLED | OUTPATIENT
Start: 2023-02-01 | End: 2023-02-01

## 2023-02-01 RX ORDER — SODIUM CHLORIDE 0.9 % (FLUSH) 0.9 %
20 SYRINGE (ML) INJECTION PRN
Status: CANCELLED | OUTPATIENT
Start: 2023-02-01

## 2023-02-01 RX ORDER — SODIUM CHLORIDE 0.9 % (FLUSH) 0.9 %
20 SYRINGE (ML) INJECTION PRN
OUTPATIENT
Start: 2023-02-01

## 2023-02-01 RX ORDER — HEPARIN SODIUM (PORCINE) LOCK FLUSH IV SOLN 100 UNIT/ML 100 UNIT/ML
500 SOLUTION INTRAVENOUS PRN
OUTPATIENT
Start: 2023-02-01

## 2023-02-01 RX ORDER — SODIUM CHLORIDE 0.9 % (FLUSH) 0.9 %
10 SYRINGE (ML) INJECTION PRN
OUTPATIENT
Start: 2023-02-01

## 2023-02-01 RX ORDER — SODIUM CHLORIDE 0.9 % (FLUSH) 0.9 %
10 SYRINGE (ML) INJECTION PRN
Status: DISCONTINUED | OUTPATIENT
Start: 2023-02-01 | End: 2023-02-02 | Stop reason: HOSPADM

## 2023-02-01 RX ORDER — WATER 1000 ML/1000ML
2.2 INJECTION, SOLUTION INTRAVENOUS ONCE
OUTPATIENT
Start: 2023-02-01 | End: 2023-02-01

## 2023-02-01 RX ORDER — HEPARIN SODIUM (PORCINE) LOCK FLUSH IV SOLN 100 UNIT/ML 100 UNIT/ML
500 SOLUTION INTRAVENOUS PRN
Status: DISCONTINUED | OUTPATIENT
Start: 2023-02-01 | End: 2023-02-02 | Stop reason: HOSPADM

## 2023-02-01 RX ADMIN — Medication 500 UNITS: at 15:39

## 2023-02-01 RX ADMIN — SODIUM CHLORIDE, PRESERVATIVE FREE 10 ML: 5 INJECTION INTRAVENOUS at 15:39

## 2023-02-01 NOTE — PROGRESS NOTES
PORT FLUSH    Patient presents to clinic for [facility] today. L chest  SQ port accessed per policy using 20 G, 7.28 inches Burrell needle for good blood return. Aspirate for waste and specimen sent to lab. Site flushed easily with 10 mL NSS followed by 5 mL Heparin solution 100 units/ml rinse prior to de-access. Dry sterile dressing to area. Tolerated procedure well. Encouraged to schedule port flush every 4 weeks.

## 2023-05-15 ENCOUNTER — HOSPITAL ENCOUNTER (OUTPATIENT)
Dept: INFUSION THERAPY | Age: 78
Discharge: HOME | End: 2023-05-15
Payer: MEDICARE

## 2023-05-15 ENCOUNTER — OFFICE VISIT (OUTPATIENT)
Dept: ONCOLOGY | Age: 78
End: 2023-05-15
Payer: MEDICARE

## 2023-05-15 VITALS
BODY MASS INDEX: 29.1 KG/M2 | DIASTOLIC BLOOD PRESSURE: 81 MMHG | HEART RATE: 83 BPM | OXYGEN SATURATION: 98 % | SYSTOLIC BLOOD PRESSURE: 159 MMHG | TEMPERATURE: 96.8 F | WEIGHT: 192 LBS | HEIGHT: 68 IN

## 2023-05-15 DIAGNOSIS — C50.911 MALIGNANT NEOPLASM OF RIGHT BREAST, STAGE 1, ESTROGEN RECEPTOR POSITIVE (HCC): ICD-10-CM

## 2023-05-15 DIAGNOSIS — C82.13 FOLLICULAR LYMPHOMA GRADE II OF INTRA-ABDOMINAL LYMPH NODES (HCC): Primary | ICD-10-CM

## 2023-05-15 DIAGNOSIS — C82.09 GRADE I FOLLICULAR LYMPHOMA OF EXTRANODAL SITE EXCLUDING SPLEEN AND OTHER SOLID ORGANS (HCC): ICD-10-CM

## 2023-05-15 DIAGNOSIS — Z17.0 STAGE 1 BREAST CANCER, ER+, RIGHT (HCC): ICD-10-CM

## 2023-05-15 DIAGNOSIS — C50.911 STAGE 1 BREAST CANCER, ER+, RIGHT (HCC): ICD-10-CM

## 2023-05-15 DIAGNOSIS — Z17.0 MALIGNANT NEOPLASM OF RIGHT BREAST, STAGE 1, ESTROGEN RECEPTOR POSITIVE (HCC): ICD-10-CM

## 2023-05-15 LAB
ALBUMIN SERPL-MCNC: 4.4 G/DL (ref 3.5–5.2)
ALP SERPL-CCNC: 110 U/L (ref 35–104)
ALT SERPL-CCNC: 9 U/L (ref 0–32)
ANION GAP SERPL CALCULATED.3IONS-SCNC: 15 MMOL/L (ref 7–16)
AST SERPL-CCNC: 13 U/L (ref 0–31)
BASOPHILS # BLD: 0.03 E9/L (ref 0–0.2)
BASOPHILS NFR BLD: 0.6 % (ref 0–2)
BILIRUB SERPL-MCNC: 0.5 MG/DL (ref 0–1.2)
BUN SERPL-MCNC: 23 MG/DL (ref 6–23)
CALCIUM SERPL-MCNC: 9.7 MG/DL (ref 8.6–10.2)
CHLORIDE SERPL-SCNC: 101 MMOL/L (ref 98–107)
CO2 SERPL-SCNC: 24 MMOL/L (ref 22–29)
CREAT SERPL-MCNC: 0.9 MG/DL (ref 0.5–1)
EOSINOPHIL # BLD: 0.25 E9/L (ref 0.05–0.5)
EOSINOPHIL NFR BLD: 4.6 % (ref 0–6)
ERYTHROCYTE [DISTWIDTH] IN BLOOD BY AUTOMATED COUNT: 13.2 FL (ref 11.5–15)
GLUCOSE SERPL-MCNC: 131 MG/DL (ref 74–99)
HCT VFR BLD AUTO: 43.4 % (ref 34–48)
HGB BLD-MCNC: 14.5 G/DL (ref 11.5–15.5)
IMM GRANULOCYTES # BLD: 0.01 E9/L
IMM GRANULOCYTES NFR BLD: 0.2 % (ref 0–5)
LYMPHOCYTES # BLD: 1.22 E9/L (ref 1.5–4)
LYMPHOCYTES NFR BLD: 22.4 % (ref 20–42)
MCH RBC QN AUTO: 30.2 PG (ref 26–35)
MCHC RBC AUTO-ENTMCNC: 33.4 % (ref 32–34.5)
MCV RBC AUTO: 90.4 FL (ref 80–99.9)
MONOCYTES # BLD: 0.85 E9/L (ref 0.1–0.95)
MONOCYTES NFR BLD: 15.6 % (ref 2–12)
NEUTROPHILS # BLD: 3.08 E9/L (ref 1.8–7.3)
NEUTS SEG NFR BLD: 56.6 % (ref 43–80)
PLATELET # BLD AUTO: 214 E9/L (ref 130–450)
PMV BLD AUTO: 9.4 FL (ref 7–12)
POTASSIUM SERPL-SCNC: 3.6 MMOL/L (ref 3.5–5)
PROT SERPL-MCNC: 6.7 G/DL (ref 6.4–8.3)
RBC # BLD AUTO: 4.8 E12/L (ref 3.5–5.5)
SODIUM SERPL-SCNC: 140 MMOL/L (ref 132–146)
WBC # BLD: 5.4 E9/L (ref 4.5–11.5)

## 2023-05-15 PROCEDURE — 99214 OFFICE O/P EST MOD 30 MIN: CPT

## 2023-05-15 PROCEDURE — 2580000003 HC RX 258: Performed by: INTERNAL MEDICINE

## 2023-05-15 PROCEDURE — 85025 COMPLETE CBC W/AUTO DIFF WBC: CPT

## 2023-05-15 PROCEDURE — 6360000002 HC RX W HCPCS: Performed by: INTERNAL MEDICINE

## 2023-05-15 PROCEDURE — 99213 OFFICE O/P EST LOW 20 MIN: CPT

## 2023-05-15 PROCEDURE — 80053 COMPREHEN METABOLIC PANEL: CPT

## 2023-05-15 RX ORDER — SODIUM CHLORIDE 0.9 % (FLUSH) 0.9 %
10 SYRINGE (ML) INJECTION PRN
OUTPATIENT
Start: 2023-05-15

## 2023-05-15 RX ORDER — ANASTROZOLE 1 MG/1
1 TABLET ORAL DAILY
Qty: 90 TABLET | Refills: 1 | Status: SHIPPED | OUTPATIENT
Start: 2023-05-15

## 2023-05-15 RX ORDER — SODIUM CHLORIDE 0.9 % (FLUSH) 0.9 %
10 SYRINGE (ML) INJECTION PRN
Status: DISCONTINUED | OUTPATIENT
Start: 2023-05-15 | End: 2023-05-16 | Stop reason: HOSPADM

## 2023-05-15 RX ORDER — HEPARIN SODIUM (PORCINE) LOCK FLUSH IV SOLN 100 UNIT/ML 100 UNIT/ML
500 SOLUTION INTRAVENOUS PRN
Status: DISCONTINUED | OUTPATIENT
Start: 2023-05-15 | End: 2023-05-16 | Stop reason: HOSPADM

## 2023-05-15 RX ORDER — WATER 1000 ML/1000ML
2.2 INJECTION, SOLUTION INTRAVENOUS ONCE
OUTPATIENT
Start: 2023-05-15 | End: 2023-05-15

## 2023-05-15 RX ORDER — HEPARIN SODIUM (PORCINE) LOCK FLUSH IV SOLN 100 UNIT/ML 100 UNIT/ML
500 SOLUTION INTRAVENOUS PRN
OUTPATIENT
Start: 2023-05-15

## 2023-05-15 RX ORDER — SODIUM CHLORIDE 0.9 % (FLUSH) 0.9 %
20 SYRINGE (ML) INJECTION PRN
OUTPATIENT
Start: 2023-05-15

## 2023-05-15 RX ADMIN — Medication 500 UNITS: at 10:42

## 2023-05-15 RX ADMIN — SODIUM CHLORIDE, PRESERVATIVE FREE 10 ML: 5 INJECTION INTRAVENOUS at 10:42

## 2023-05-15 NOTE — PROGRESS NOTES
Patient presents to clinic for labs today. L chest  SQ port accessed per policy using 20 G, 8.67 inches Burrell needle for good blood return. Aspirate for waste and specimen sent to lab. Site flushed easily with 10 mL NSS followed by 5 mL Heparin solution 100 units/ml rinse prior to de-access. Dry sterile dressing to area. Tolerated procedure well. Encouraged to schedule port flush every 4 weeks.

## 2023-06-28 ASSESSMENT — PROMIS GLOBAL HEALTH SCALE
SUM OF RESPONSES TO QUESTIONS 2, 4, 5, & 10: 12
IN GENERAL, WOULD YOU SAY YOUR HEALTH IS...[ON A SCALE OF 1 (POOR) TO 5 (EXCELLENT)]: 3
IN GENERAL, PLEASE RATE HOW WELL YOU CARRY OUT YOUR USUAL SOCIAL ACTIVITIES (INCLUDES ACTIVITIES AT HOME, AT WORK, AND IN YOUR COMMUNITY, AND RESPONSIBILITIES AS A PARENT, CHILD, SPOUSE, EMPLOYEE, FRIEND, ETC) [ON A SCALE OF 1 (POOR) TO 5 (EXCELLENT)]?: 3
HOW IS THE PROMIS V1.1 BEING ADMINISTERED?: 2
SUM OF RESPONSES TO QUESTIONS 3, 6, 7, & 8: 11
IN GENERAL, WOULD YOU SAY YOUR QUALITY OF LIFE IS...[ON A SCALE OF 1 (POOR) TO 5 (EXCELLENT)]: 3
IN GENERAL, HOW WOULD YOU RATE YOUR PHYSICAL HEALTH [ON A SCALE OF 1 (POOR) TO 5 (EXCELLENT)]?: 3
WHO IS THE PERSON COMPLETING THE PROMIS V1.1 SURVEY?: 0
IN THE PAST 7 DAYS, HOW OFTEN HAVE YOU BEEN BOTHERED BY EMOTIONAL PROBLEMS, SUCH AS FEELING ANXIOUS, DEPRESSED, OR IRRITABLE [ON A SCALE FROM 1 (NEVER) TO 5 (ALWAYS)]?: 4
IN THE PAST 7 DAYS, HOW WOULD YOU RATE YOUR FATIGUE ON AVERAGE [ON A SCALE FROM 1 (NONE) TO 5 (VERY SEVERE)]?: 3
TO WHAT EXTENT ARE YOU ABLE TO CARRY OUT YOUR EVERYDAY PHYSICAL ACTIVITIES SUCH AS WALKING, CLIMBING STAIRS, CARRYING GROCERIES, OR MOVING A CHAIR [ON A SCALE OF 1 (NOT AT ALL) TO 5 (COMPLETELY)]?: 3
IN THE PAST 7 DAYS, HOW WOULD YOU RATE YOUR PAIN ON AVERAGE [ON A SCALE FROM 0 (NO PAIN) TO 10 (WORST IMAGINABLE PAIN)]?: 2
IN GENERAL, HOW WOULD YOU RATE YOUR MENTAL HEALTH, INCLUDING YOUR MOOD AND YOUR ABILITY TO THINK [ON A SCALE OF 1 (POOR) TO 5 (EXCELLENT)]?: 2
IN GENERAL, HOW WOULD YOU RATE YOUR SATISFACTION WITH YOUR SOCIAL ACTIVITIES AND RELATIONSHIPS [ON A SCALE OF 1 (POOR) TO 5 (EXCELLENT)]?: 3

## 2023-06-28 ASSESSMENT — KOOS JR
TWISING OR PIVOTING ON KNEE: 0
KOOS JR TOTAL INTERVAL SCORE: 79.914
GOING UP OR DOWN STAIRS: 1
STANDING UPRIGHT: 0
STRAIGHTENING KNEE FULLY: 0
RISING FROM SITTING: 0
HOW SEVERE IS YOUR KNEE STIFFNESS AFTER FIRST WAKING IN MORNING: 1
BENDING TO THE FLOOR TO PICK UP OBJECT: 1

## 2023-06-30 DIAGNOSIS — Z12.31 SCREENING MAMMOGRAM FOR HIGH-RISK PATIENT: Primary | ICD-10-CM

## 2023-07-13 ENCOUNTER — HOSPITAL ENCOUNTER (OUTPATIENT)
Dept: GENERAL RADIOLOGY | Age: 78
Discharge: HOME | End: 2023-07-15
Payer: MEDICARE

## 2023-07-13 VITALS — HEIGHT: 68 IN | WEIGHT: 196 LBS | BODY MASS INDEX: 29.7 KG/M2

## 2023-07-13 DIAGNOSIS — Z12.31 SCREENING MAMMOGRAM FOR HIGH-RISK PATIENT: ICD-10-CM

## 2023-07-13 PROCEDURE — 77063 BREAST TOMOSYNTHESIS BI: CPT

## 2023-07-14 ENCOUNTER — HOSPITAL ENCOUNTER (OUTPATIENT)
Dept: INFUSION THERAPY | Age: 78
Discharge: HOME | End: 2023-07-14
Payer: MEDICARE

## 2023-07-14 DIAGNOSIS — Z17.0 MALIGNANT NEOPLASM OF RIGHT BREAST, STAGE 1, ESTROGEN RECEPTOR POSITIVE (HCC): ICD-10-CM

## 2023-07-14 DIAGNOSIS — C82.13 FOLLICULAR LYMPHOMA GRADE II OF INTRA-ABDOMINAL LYMPH NODES (HCC): Primary | ICD-10-CM

## 2023-07-14 DIAGNOSIS — C82.09 GRADE I FOLLICULAR LYMPHOMA OF EXTRANODAL SITE EXCLUDING SPLEEN AND OTHER SOLID ORGANS (HCC): ICD-10-CM

## 2023-07-14 DIAGNOSIS — C50.911 MALIGNANT NEOPLASM OF RIGHT BREAST, STAGE 1, ESTROGEN RECEPTOR POSITIVE (HCC): ICD-10-CM

## 2023-07-14 LAB
ALBUMIN SERPL-MCNC: 4.6 G/DL (ref 3.5–5.2)
ALP SERPL-CCNC: 96 U/L (ref 35–104)
ALT SERPL-CCNC: 12 U/L (ref 0–32)
ANION GAP SERPL CALCULATED.3IONS-SCNC: 14 MMOL/L (ref 7–16)
AST SERPL-CCNC: 15 U/L (ref 0–31)
BASOPHILS # BLD: 0.02 E9/L (ref 0–0.2)
BASOPHILS NFR BLD: 0.4 % (ref 0–2)
BILIRUB SERPL-MCNC: 0.5 MG/DL (ref 0–1.2)
BUN SERPL-MCNC: 23 MG/DL (ref 6–23)
CALCIUM SERPL-MCNC: 9.9 MG/DL (ref 8.6–10.2)
CHLORIDE SERPL-SCNC: 104 MMOL/L (ref 98–107)
CO2 SERPL-SCNC: 25 MMOL/L (ref 22–29)
CREAT SERPL-MCNC: 0.9 MG/DL (ref 0.5–1)
EOSINOPHIL # BLD: 0.17 E9/L (ref 0.05–0.5)
EOSINOPHIL NFR BLD: 3.7 % (ref 0–6)
ERYTHROCYTE [DISTWIDTH] IN BLOOD BY AUTOMATED COUNT: 13.3 FL (ref 11.5–15)
GLUCOSE SERPL-MCNC: 127 MG/DL (ref 74–99)
HCT VFR BLD AUTO: 42 % (ref 34–48)
HGB BLD-MCNC: 14.1 G/DL (ref 11.5–15.5)
IMM GRANULOCYTES # BLD: 0.02 E9/L
IMM GRANULOCYTES NFR BLD: 0.4 % (ref 0–5)
LYMPHOCYTES # BLD: 1.11 E9/L (ref 1.5–4)
LYMPHOCYTES NFR BLD: 24 % (ref 20–42)
MCH RBC QN AUTO: 30.2 PG (ref 26–35)
MCHC RBC AUTO-ENTMCNC: 33.6 % (ref 32–34.5)
MCV RBC AUTO: 89.9 FL (ref 80–99.9)
MONOCYTES # BLD: 0.66 E9/L (ref 0.1–0.95)
MONOCYTES NFR BLD: 14.3 % (ref 2–12)
NEUTROPHILS # BLD: 2.64 E9/L (ref 1.8–7.3)
NEUTS SEG NFR BLD: 57.2 % (ref 43–80)
PLATELET # BLD AUTO: 207 E9/L (ref 130–450)
PMV BLD AUTO: 8.7 FL (ref 7–12)
POTASSIUM SERPL-SCNC: 4.2 MMOL/L (ref 3.5–5)
PROT SERPL-MCNC: 6.9 G/DL (ref 6.4–8.3)
RBC # BLD AUTO: 4.67 E12/L (ref 3.5–5.5)
SODIUM SERPL-SCNC: 143 MMOL/L (ref 132–146)
WBC # BLD: 4.6 E9/L (ref 4.5–11.5)

## 2023-07-14 PROCEDURE — 36591 DRAW BLOOD OFF VENOUS DEVICE: CPT

## 2023-07-14 PROCEDURE — 2580000003 HC RX 258: Performed by: INTERNAL MEDICINE

## 2023-07-14 PROCEDURE — 6360000002 HC RX W HCPCS: Performed by: INTERNAL MEDICINE

## 2023-07-14 PROCEDURE — 80053 COMPREHEN METABOLIC PANEL: CPT

## 2023-07-14 PROCEDURE — 85025 COMPLETE CBC W/AUTO DIFF WBC: CPT

## 2023-07-14 RX ORDER — SODIUM CHLORIDE 0.9 % (FLUSH) 0.9 %
20 SYRINGE (ML) INJECTION PRN
OUTPATIENT
Start: 2023-07-14

## 2023-07-14 RX ORDER — SODIUM CHLORIDE 0.9 % (FLUSH) 0.9 %
10 SYRINGE (ML) INJECTION PRN
Status: DISCONTINUED | OUTPATIENT
Start: 2023-07-14 | End: 2023-07-15 | Stop reason: HOSPADM

## 2023-07-14 RX ORDER — WATER 1000 ML/1000ML
2.2 INJECTION, SOLUTION INTRAVENOUS ONCE
OUTPATIENT
Start: 2023-07-14 | End: 2023-07-14

## 2023-07-14 RX ORDER — HEPARIN SODIUM 100 [USP'U]/ML
500 INJECTION, SOLUTION INTRAVENOUS PRN
OUTPATIENT
Start: 2023-07-14

## 2023-07-14 RX ORDER — HEPARIN SODIUM 100 [USP'U]/ML
500 INJECTION, SOLUTION INTRAVENOUS PRN
Status: DISCONTINUED | OUTPATIENT
Start: 2023-07-14 | End: 2023-07-15 | Stop reason: HOSPADM

## 2023-07-14 RX ORDER — SODIUM CHLORIDE 0.9 % (FLUSH) 0.9 %
10 SYRINGE (ML) INJECTION PRN
OUTPATIENT
Start: 2023-07-14

## 2023-07-14 RX ADMIN — Medication 500 UNITS: at 10:36

## 2023-07-14 RX ADMIN — SODIUM CHLORIDE, PRESERVATIVE FREE 30 ML: 5 INJECTION INTRAVENOUS at 10:36

## 2023-07-14 NOTE — PROGRESS NOTES
PORT FLUSH    Patient presents to clinic for [facility] today. left  SQ port accessed per policy using 20 .75 Burrell needle for good blood return. Aspirate for waste and specimen sent to lab. Site flushed easily with 10 mL NSS followed by 5 mL Heparin solution 100 units/ml rinse prior to de-access. Dry sterile dressing to area. Tolerated procedure well. Encouraged to schedule port flush every 4 weeks.

## 2023-09-01 ENCOUNTER — HOSPITAL ENCOUNTER (OUTPATIENT)
Dept: INFUSION THERAPY | Age: 78
Discharge: HOME | End: 2023-09-01
Payer: MEDICARE

## 2023-09-01 DIAGNOSIS — C50.911 MALIGNANT NEOPLASM OF RIGHT BREAST, STAGE 1, ESTROGEN RECEPTOR POSITIVE (HCC): Primary | ICD-10-CM

## 2023-09-01 DIAGNOSIS — C82.13 FOLLICULAR LYMPHOMA GRADE II OF INTRA-ABDOMINAL LYMPH NODES (HCC): ICD-10-CM

## 2023-09-01 DIAGNOSIS — Z17.0 MALIGNANT NEOPLASM OF RIGHT BREAST, STAGE 1, ESTROGEN RECEPTOR POSITIVE (HCC): Primary | ICD-10-CM

## 2023-09-01 DIAGNOSIS — C82.09 GRADE I FOLLICULAR LYMPHOMA OF EXTRANODAL SITE EXCLUDING SPLEEN AND OTHER SOLID ORGANS (HCC): ICD-10-CM

## 2023-09-01 PROCEDURE — 2580000003 HC RX 258: Performed by: INTERNAL MEDICINE

## 2023-09-01 PROCEDURE — 96523 IRRIG DRUG DELIVERY DEVICE: CPT

## 2023-09-01 PROCEDURE — 6360000002 HC RX W HCPCS: Performed by: INTERNAL MEDICINE

## 2023-09-01 RX ORDER — SODIUM CHLORIDE 0.9 % (FLUSH) 0.9 %
20 SYRINGE (ML) INJECTION PRN
OUTPATIENT
Start: 2023-09-01

## 2023-09-01 RX ORDER — SODIUM CHLORIDE 0.9 % (FLUSH) 0.9 %
10 SYRINGE (ML) INJECTION PRN
Status: DISCONTINUED | OUTPATIENT
Start: 2023-09-01 | End: 2023-09-02 | Stop reason: HOSPADM

## 2023-09-01 RX ORDER — HEPARIN 100 UNIT/ML
500 SYRINGE INTRAVENOUS PRN
OUTPATIENT
Start: 2023-09-01

## 2023-09-01 RX ORDER — HEPARIN 100 UNIT/ML
500 SYRINGE INTRAVENOUS PRN
Status: DISCONTINUED | OUTPATIENT
Start: 2023-09-01 | End: 2023-09-02 | Stop reason: HOSPADM

## 2023-09-01 RX ORDER — WATER 1000 ML/1000ML
2.2 INJECTION, SOLUTION INTRAVENOUS ONCE
OUTPATIENT
Start: 2023-09-01 | End: 2023-09-01

## 2023-09-01 RX ORDER — SODIUM CHLORIDE 0.9 % (FLUSH) 0.9 %
10 SYRINGE (ML) INJECTION PRN
OUTPATIENT
Start: 2023-09-01

## 2023-09-01 RX ADMIN — SODIUM CHLORIDE, PRESERVATIVE FREE 10 ML: 5 INJECTION INTRAVENOUS at 11:31

## 2023-09-01 RX ADMIN — Medication 500 UNITS: at 11:32

## 2023-09-01 NOTE — PROGRESS NOTES
Health Maintenance Due   Topic Date Due   • Depression Screening  Never done   • COVID-19 Vaccine (1) Never done       Patient is due for topics as listed above but is not proceeding with above at this time.         PORT FLUSH    Patient presents to clinic for [facility] today. Left chest  SQ port accessed per policy using 95P .45UM  Burrell needle for good blood return. Aspirate for waste and specimen sent to lab. Site flushed easily with 10 mL NSS followed by 5 mL Heparin solution 100 units/ml rinse prior to de-access. Dry sterile dressing to area. Tolerated procedure well. Encouraged to schedule port flush every 4 weeks.

## 2023-10-13 ENCOUNTER — HOSPITAL ENCOUNTER (OUTPATIENT)
Dept: INFUSION THERAPY | Age: 78
Discharge: HOME | End: 2023-10-13
Payer: MEDICARE

## 2023-10-13 DIAGNOSIS — C82.13 FOLLICULAR LYMPHOMA GRADE II OF INTRA-ABDOMINAL LYMPH NODES (HCC): ICD-10-CM

## 2023-10-13 DIAGNOSIS — C50.911 MALIGNANT NEOPLASM OF RIGHT BREAST, STAGE 1, ESTROGEN RECEPTOR POSITIVE (HCC): Primary | ICD-10-CM

## 2023-10-13 DIAGNOSIS — C82.09 GRADE I FOLLICULAR LYMPHOMA OF EXTRANODAL SITE EXCLUDING SPLEEN AND OTHER SOLID ORGANS (HCC): ICD-10-CM

## 2023-10-13 DIAGNOSIS — Z17.0 MALIGNANT NEOPLASM OF RIGHT BREAST, STAGE 1, ESTROGEN RECEPTOR POSITIVE (HCC): Primary | ICD-10-CM

## 2023-10-13 PROCEDURE — 96523 IRRIG DRUG DELIVERY DEVICE: CPT

## 2023-10-13 PROCEDURE — 36591 DRAW BLOOD OFF VENOUS DEVICE: CPT

## 2023-10-13 RX ORDER — HEPARIN 100 UNIT/ML
500 SYRINGE INTRAVENOUS PRN
OUTPATIENT
Start: 2023-10-13

## 2023-10-13 RX ORDER — SODIUM CHLORIDE 0.9 % (FLUSH) 0.9 %
20 SYRINGE (ML) INJECTION PRN
OUTPATIENT
Start: 2023-10-13

## 2023-10-13 RX ORDER — HEPARIN 100 UNIT/ML
500 SYRINGE INTRAVENOUS PRN
Status: DISCONTINUED | OUTPATIENT
Start: 2023-10-13 | End: 2023-10-14 | Stop reason: HOSPADM

## 2023-10-13 RX ORDER — SODIUM CHLORIDE 0.9 % (FLUSH) 0.9 %
10 SYRINGE (ML) INJECTION PRN
Status: DISCONTINUED | OUTPATIENT
Start: 2023-10-13 | End: 2023-10-14 | Stop reason: HOSPADM

## 2023-10-13 RX ORDER — WATER 1000 ML/1000ML
2.2 INJECTION, SOLUTION INTRAVENOUS ONCE
OUTPATIENT
Start: 2023-10-13 | End: 2023-10-13

## 2023-10-13 RX ORDER — SODIUM CHLORIDE 0.9 % (FLUSH) 0.9 %
10 SYRINGE (ML) INJECTION PRN
OUTPATIENT
Start: 2023-10-13

## 2023-11-15 ENCOUNTER — OFFICE VISIT (OUTPATIENT)
Dept: ONCOLOGY | Age: 78
End: 2023-11-15
Payer: MEDICARE

## 2023-11-15 ENCOUNTER — HOSPITAL ENCOUNTER (OUTPATIENT)
Dept: INFUSION THERAPY | Age: 78
Discharge: HOME | End: 2023-11-15
Payer: MEDICARE

## 2023-11-15 VITALS
BODY MASS INDEX: 30.31 KG/M2 | SYSTOLIC BLOOD PRESSURE: 176 MMHG | DIASTOLIC BLOOD PRESSURE: 84 MMHG | HEIGHT: 68 IN | TEMPERATURE: 97.1 F | HEART RATE: 75 BPM | WEIGHT: 200 LBS | OXYGEN SATURATION: 99 %

## 2023-11-15 DIAGNOSIS — C82.09 GRADE I FOLLICULAR LYMPHOMA OF EXTRANODAL SITE EXCLUDING SPLEEN AND OTHER SOLID ORGANS (HCC): ICD-10-CM

## 2023-11-15 DIAGNOSIS — Z17.0 MALIGNANT NEOPLASM OF RIGHT BREAST, STAGE 1, ESTROGEN RECEPTOR POSITIVE (HCC): Primary | ICD-10-CM

## 2023-11-15 DIAGNOSIS — C82.13 FOLLICULAR LYMPHOMA GRADE II OF INTRA-ABDOMINAL LYMPH NODES (HCC): ICD-10-CM

## 2023-11-15 DIAGNOSIS — C50.911 MALIGNANT NEOPLASM OF RIGHT BREAST, STAGE 1, ESTROGEN RECEPTOR POSITIVE (HCC): Primary | ICD-10-CM

## 2023-11-15 DIAGNOSIS — C82.13 FOLLICULAR LYMPHOMA GRADE II OF INTRA-ABDOMINAL LYMPH NODES (HCC): Primary | ICD-10-CM

## 2023-11-15 LAB
ALBUMIN SERPL-MCNC: 4.6 G/DL (ref 3.5–5.2)
ALP SERPL-CCNC: 117 U/L (ref 35–104)
ALT SERPL-CCNC: 15 U/L (ref 0–32)
ANION GAP SERPL CALCULATED.3IONS-SCNC: 13 MMOL/L (ref 7–16)
AST SERPL-CCNC: 15 U/L (ref 0–31)
BASOPHILS # BLD: 0.03 K/UL (ref 0–0.2)
BASOPHILS NFR BLD: 1 % (ref 0–2)
BILIRUB SERPL-MCNC: 0.5 MG/DL (ref 0–1.2)
BUN SERPL-MCNC: 16 MG/DL (ref 6–23)
CALCIUM SERPL-MCNC: 9.4 MG/DL (ref 8.6–10.2)
CHLORIDE SERPL-SCNC: 103 MMOL/L (ref 98–107)
CO2 SERPL-SCNC: 25 MMOL/L (ref 22–29)
CREAT SERPL-MCNC: 0.8 MG/DL (ref 0.5–1)
EOSINOPHIL # BLD: 0.15 K/UL (ref 0.05–0.5)
EOSINOPHILS RELATIVE PERCENT: 3 % (ref 0–6)
ERYTHROCYTE [DISTWIDTH] IN BLOOD BY AUTOMATED COUNT: 12.6 % (ref 11.5–15)
GFR SERPL CREATININE-BSD FRML MDRD: >60 ML/MIN/1.73M2
GLUCOSE SERPL-MCNC: 145 MG/DL (ref 74–99)
HCT VFR BLD AUTO: 42 % (ref 34–48)
HGB BLD-MCNC: 14.3 G/DL (ref 11.5–15.5)
IMM GRANULOCYTES # BLD AUTO: <0.03 K/UL (ref 0–0.58)
IMM GRANULOCYTES NFR BLD: 0 % (ref 0–5)
LYMPHOCYTES NFR BLD: 0.99 K/UL (ref 1.5–4)
LYMPHOCYTES RELATIVE PERCENT: 19 % (ref 20–42)
MCH RBC QN AUTO: 30.4 PG (ref 26–35)
MCHC RBC AUTO-ENTMCNC: 34 G/DL (ref 32–34.5)
MCV RBC AUTO: 89.2 FL (ref 80–99.9)
MONOCYTES NFR BLD: 0.76 K/UL (ref 0.1–0.95)
MONOCYTES NFR BLD: 14 % (ref 2–12)
NEUTROPHILS NFR BLD: 63 % (ref 43–80)
NEUTS SEG NFR BLD: 3.34 K/UL (ref 1.8–7.3)
PLATELET # BLD AUTO: 205 K/UL (ref 130–450)
PMV BLD AUTO: 9.3 FL (ref 7–12)
POTASSIUM SERPL-SCNC: 3.9 MMOL/L (ref 3.5–5)
PROT SERPL-MCNC: 7.1 G/DL (ref 6.4–8.3)
RBC # BLD AUTO: 4.71 M/UL (ref 3.5–5.5)
SODIUM SERPL-SCNC: 141 MMOL/L (ref 132–146)
WBC OTHER # BLD: 5.3 K/UL (ref 4.5–11.5)

## 2023-11-15 PROCEDURE — 85025 COMPLETE CBC W/AUTO DIFF WBC: CPT

## 2023-11-15 PROCEDURE — 99214 OFFICE O/P EST MOD 30 MIN: CPT

## 2023-11-15 PROCEDURE — 6360000002 HC RX W HCPCS: Performed by: INTERNAL MEDICINE

## 2023-11-15 PROCEDURE — 2580000003 HC RX 258: Performed by: INTERNAL MEDICINE

## 2023-11-15 PROCEDURE — 80053 COMPREHEN METABOLIC PANEL: CPT

## 2023-11-15 RX ORDER — WATER 10 ML/10ML
2.2 INJECTION INTRAMUSCULAR; INTRAVENOUS; SUBCUTANEOUS ONCE
OUTPATIENT
Start: 2023-11-15 | End: 2023-11-15

## 2023-11-15 RX ORDER — SODIUM CHLORIDE 0.9 % (FLUSH) 0.9 %
20 SYRINGE (ML) INJECTION PRN
OUTPATIENT
Start: 2023-11-15

## 2023-11-15 RX ORDER — SODIUM CHLORIDE 0.9 % (FLUSH) 0.9 %
10 SYRINGE (ML) INJECTION PRN
Status: DISCONTINUED | OUTPATIENT
Start: 2023-11-15 | End: 2023-11-16 | Stop reason: HOSPADM

## 2023-11-15 RX ORDER — HEPARIN 100 UNIT/ML
500 SYRINGE INTRAVENOUS PRN
Status: DISCONTINUED | OUTPATIENT
Start: 2023-11-15 | End: 2023-11-16 | Stop reason: HOSPADM

## 2023-11-15 RX ORDER — SODIUM CHLORIDE 0.9 % (FLUSH) 0.9 %
10 SYRINGE (ML) INJECTION PRN
OUTPATIENT
Start: 2023-11-15

## 2023-11-15 RX ORDER — HEPARIN 100 UNIT/ML
500 SYRINGE INTRAVENOUS PRN
OUTPATIENT
Start: 2023-11-15

## 2023-11-15 RX ADMIN — Medication 500 UNITS: at 11:09

## 2023-11-15 RX ADMIN — SODIUM CHLORIDE, PRESERVATIVE FREE 10 ML: 5 INJECTION INTRAVENOUS at 11:09

## 2023-11-15 NOTE — PROGRESS NOTES
Patient presents to clinic for labs today. Middle  SQ port accessed per policy using 20 G, 0.06 inches Burrell needle for good blood return. Aspirate for waste and specimen sent to lab. Site flushed easily with 10 mL NSS followed by 5 mL Heparin solution 100 units/ml rinse prior to de-access. Dry sterile dressing to area. Tolerated procedure well. Encouraged to schedule port flush every 4 weeks.

## 2023-11-15 NOTE — PROGRESS NOTES
07/14/2023    BUN 23 07/14/2023    CREATININE 0.9 07/14/2023    GLUCOSE 127 (H) 07/14/2023    CALCIUM 9.9 07/14/2023    PROT 6.9 07/14/2023    LABALBU 4.6 07/14/2023    BILITOT 0.5 07/14/2023    ALKPHOS 96 07/14/2023    AST 15 07/14/2023    ALT 12 07/14/2023    LABGLOM >60 07/14/2023    GFRAA >60 10/05/2022         Radiology-    BILATERAL SCREENING MAMMOGRAM:  6/03/2022  No mammographic evidence of malignancy in either breast.       RECOMMENDATION:       Annual bilateral mammogram is advised with consideration for annual bilateral   screening ultrasound given the patient's breast density. BIRADS:   BIRADS - CATEGORY 1       Negative. OVERALL ASSESSMENT - NEGATIVE       DEXA BONE DENSITY: 6/03/2022  FINDINGS:   LUMBAR SPINE:       Multilevel degenerative change precludes accurate bone density assessment       LEFT HIP:       The bone mineral density in the total hip is measured at 0.991 g/cm2   corresponding to a T-score of -0.1 and a Z-score of 0.8. This is within the   normal range by WHO criteria. The bone mineral density of the femoral neck is measured at 0.876 g/cm2   corresponding to a T-score of -1.2 and a Z-score of 0.0. This is within the   osteopenia range by WHO criteria. RIGHT HIP:       The bone mineral density in the total hip is measured at 0.910 g/cm2   corresponding to a T-score of -0.8 and a Z-score of 0.1. This is within the   normal range by WHO criteria. The bone mineral density of the femoral neck is measured at 0.790 g/cm2   corresponding to a T-score of -1.8 and a Z-score of -0.6. This is within the   osteopenia range by WHO criteria. Impression   Osteopenia by WHO criteria.          ASSESSMENT/PLAN :  A  77-year-old woman with Hx of  bulky intra-abdominal lymphoma with extensive retroperitoneal and mesenteric lymphadenopathy with bilateral hydronephrosis with preliminary flow cytometry findings of B-cell lymphoma of follicular origin likely

## 2024-01-05 ENCOUNTER — HOSPITAL ENCOUNTER (OUTPATIENT)
Dept: INFUSION THERAPY | Age: 79
Discharge: HOME | End: 2024-01-05
Payer: MEDICARE

## 2024-01-05 DIAGNOSIS — Z17.0 MALIGNANT NEOPLASM OF RIGHT BREAST, STAGE 1, ESTROGEN RECEPTOR POSITIVE (HCC): ICD-10-CM

## 2024-01-05 DIAGNOSIS — C82.09 GRADE I FOLLICULAR LYMPHOMA OF EXTRANODAL SITE EXCLUDING SPLEEN AND OTHER SOLID ORGANS (HCC): Primary | ICD-10-CM

## 2024-01-05 DIAGNOSIS — C82.13 FOLLICULAR LYMPHOMA GRADE II OF INTRA-ABDOMINAL LYMPH NODES (HCC): ICD-10-CM

## 2024-01-05 DIAGNOSIS — C50.911 MALIGNANT NEOPLASM OF RIGHT BREAST, STAGE 1, ESTROGEN RECEPTOR POSITIVE (HCC): ICD-10-CM

## 2024-01-05 PROCEDURE — 6360000002 HC RX W HCPCS: Performed by: INTERNAL MEDICINE

## 2024-01-05 PROCEDURE — 96523 IRRIG DRUG DELIVERY DEVICE: CPT

## 2024-01-05 PROCEDURE — 2580000003 HC RX 258: Performed by: INTERNAL MEDICINE

## 2024-01-05 RX ORDER — HEPARIN 100 UNIT/ML
500 SYRINGE INTRAVENOUS PRN
Status: DISCONTINUED | OUTPATIENT
Start: 2024-01-05 | End: 2024-01-06 | Stop reason: HOSPADM

## 2024-01-05 RX ORDER — HEPARIN 100 UNIT/ML
500 SYRINGE INTRAVENOUS PRN
OUTPATIENT
Start: 2024-01-05

## 2024-01-05 RX ORDER — SODIUM CHLORIDE 0.9 % (FLUSH) 0.9 %
10 SYRINGE (ML) INJECTION PRN
Status: DISCONTINUED | OUTPATIENT
Start: 2024-01-05 | End: 2024-01-06 | Stop reason: HOSPADM

## 2024-01-05 RX ORDER — SODIUM CHLORIDE 0.9 % (FLUSH) 0.9 %
20 SYRINGE (ML) INJECTION PRN
OUTPATIENT
Start: 2024-01-05

## 2024-01-05 RX ORDER — WATER 10 ML/10ML
2.2 INJECTION INTRAMUSCULAR; INTRAVENOUS; SUBCUTANEOUS ONCE
OUTPATIENT
Start: 2024-01-05 | End: 2024-01-05

## 2024-01-05 RX ORDER — SODIUM CHLORIDE 0.9 % (FLUSH) 0.9 %
10 SYRINGE (ML) INJECTION PRN
OUTPATIENT
Start: 2024-01-05

## 2024-01-05 RX ADMIN — Medication 500 UNITS: at 15:22

## 2024-01-05 RX ADMIN — SODIUM CHLORIDE, PRESERVATIVE FREE 10 ML: 5 INJECTION INTRAVENOUS at 15:22

## 2024-01-07 ENCOUNTER — APPOINTMENT (OUTPATIENT)
Dept: GENERAL RADIOLOGY | Age: 79
DRG: 064 | End: 2024-01-07
Payer: MEDICARE

## 2024-01-07 ENCOUNTER — APPOINTMENT (OUTPATIENT)
Dept: CT IMAGING | Age: 79
DRG: 064 | End: 2024-01-07
Payer: MEDICARE

## 2024-01-07 ENCOUNTER — HOSPITAL ENCOUNTER (EMERGENCY)
Age: 79
Discharge: HOME | DRG: 064 | End: 2024-01-07
Attending: EMERGENCY MEDICINE
Payer: MEDICARE

## 2024-01-07 ENCOUNTER — HOSPITAL ENCOUNTER (INPATIENT)
Age: 79
LOS: 7 days | Discharge: ANOTHER HOSPITAL | DRG: 064 | End: 2024-01-14
Attending: STUDENT IN AN ORGANIZED HEALTH CARE EDUCATION/TRAINING PROGRAM | Admitting: INTERNAL MEDICINE
Payer: MEDICARE

## 2024-01-07 VITALS
SYSTOLIC BLOOD PRESSURE: 159 MMHG | WEIGHT: 192 LBS | BODY MASS INDEX: 30.86 KG/M2 | HEIGHT: 66 IN | RESPIRATION RATE: 16 BRPM | HEART RATE: 88 BPM | TEMPERATURE: 98.8 F | DIASTOLIC BLOOD PRESSURE: 75 MMHG | OXYGEN SATURATION: 100 %

## 2024-01-07 DIAGNOSIS — R53.83 OTHER FATIGUE: Primary | ICD-10-CM

## 2024-01-07 DIAGNOSIS — R35.0 FREQUENT URINATION: ICD-10-CM

## 2024-01-07 DIAGNOSIS — Z86.59 HISTORY OF DEMENTIA: ICD-10-CM

## 2024-01-07 DIAGNOSIS — R41.82 ALTERED MENTAL STATUS, UNSPECIFIED ALTERED MENTAL STATUS TYPE: Primary | ICD-10-CM

## 2024-01-07 DIAGNOSIS — R53.83 FATIGUE, UNSPECIFIED TYPE: ICD-10-CM

## 2024-01-07 DIAGNOSIS — I50.21 ACUTE SYSTOLIC HEART FAILURE (HCC): ICD-10-CM

## 2024-01-07 DIAGNOSIS — G93.41 ACUTE METABOLIC ENCEPHALOPATHY: ICD-10-CM

## 2024-01-07 DIAGNOSIS — I63.9 ACUTE CVA (CEREBROVASCULAR ACCIDENT) (HCC): ICD-10-CM

## 2024-01-07 DIAGNOSIS — R53.1 GENERAL WEAKNESS: ICD-10-CM

## 2024-01-07 PROBLEM — R26.2 AMBULATORY DYSFUNCTION: Status: ACTIVE | Noted: 2024-01-07

## 2024-01-07 LAB
ALBUMIN SERPL-MCNC: 4.4 G/DL (ref 3.5–5.2)
ALBUMIN SERPL-MCNC: 4.6 G/DL (ref 3.5–5.2)
ALP SERPL-CCNC: 111 U/L (ref 35–104)
ALP SERPL-CCNC: 119 U/L (ref 35–104)
ALT SERPL-CCNC: 11 U/L (ref 0–32)
ALT SERPL-CCNC: 12 U/L (ref 0–32)
ANION GAP SERPL CALCULATED.3IONS-SCNC: 13 MMOL/L (ref 7–16)
ANION GAP SERPL CALCULATED.3IONS-SCNC: 15 MMOL/L (ref 7–16)
AST SERPL-CCNC: 13 U/L (ref 0–31)
AST SERPL-CCNC: 16 U/L (ref 0–31)
BACTERIA URNS QL MICRO: ABNORMAL
BASOPHILS # BLD: 0.04 K/UL (ref 0–0.2)
BASOPHILS # BLD: 0.04 K/UL (ref 0–0.2)
BASOPHILS NFR BLD: 0 % (ref 0–2)
BASOPHILS NFR BLD: 1 % (ref 0–2)
BILIRUB SERPL-MCNC: 0.6 MG/DL (ref 0–1.2)
BILIRUB SERPL-MCNC: 0.6 MG/DL (ref 0–1.2)
BILIRUB UR QL STRIP: NEGATIVE
BILIRUB UR QL STRIP: NEGATIVE
BUN SERPL-MCNC: 15 MG/DL (ref 6–23)
BUN SERPL-MCNC: 19 MG/DL (ref 6–23)
CALCIUM SERPL-MCNC: 10.1 MG/DL (ref 8.6–10.2)
CALCIUM SERPL-MCNC: 9.9 MG/DL (ref 8.6–10.2)
CHLORIDE SERPL-SCNC: 101 MMOL/L (ref 98–107)
CHLORIDE SERPL-SCNC: 99 MMOL/L (ref 98–107)
CLARITY UR: ABNORMAL
CLARITY UR: CLEAR
CO2 SERPL-SCNC: 24 MMOL/L (ref 22–29)
CO2 SERPL-SCNC: 26 MMOL/L (ref 22–29)
COLOR UR: YELLOW
COLOR UR: YELLOW
CREAT SERPL-MCNC: 0.9 MG/DL (ref 0.5–1)
CREAT SERPL-MCNC: 0.9 MG/DL (ref 0.5–1)
EKG ATRIAL RATE: 92 BPM
EKG P AXIS: 57 DEGREES
EKG P-R INTERVAL: 212 MS
EKG Q-T INTERVAL: 396 MS
EKG QRS DURATION: 138 MS
EKG QTC CALCULATION (BAZETT): 489 MS
EKG R AXIS: -23 DEGREES
EKG T AXIS: 121 DEGREES
EKG VENTRICULAR RATE: 92 BPM
EOSINOPHIL # BLD: 0.14 K/UL (ref 0.05–0.5)
EOSINOPHIL # BLD: 0.18 K/UL (ref 0.05–0.5)
EOSINOPHILS RELATIVE PERCENT: 2 % (ref 0–6)
EOSINOPHILS RELATIVE PERCENT: 2 % (ref 0–6)
EPI CELLS #/AREA URNS HPF: ABNORMAL /HPF
ERYTHROCYTE [DISTWIDTH] IN BLOOD BY AUTOMATED COUNT: 12.6 % (ref 11.5–15)
ERYTHROCYTE [DISTWIDTH] IN BLOOD BY AUTOMATED COUNT: 12.8 % (ref 11.5–15)
GFR SERPL CREATININE-BSD FRML MDRD: >60 ML/MIN/1.73M2
GFR SERPL CREATININE-BSD FRML MDRD: >60 ML/MIN/1.73M2
GLUCOSE BLD-MCNC: 120 MG/DL (ref 74–99)
GLUCOSE SERPL-MCNC: 120 MG/DL (ref 74–99)
GLUCOSE SERPL-MCNC: 131 MG/DL (ref 74–99)
GLUCOSE UR STRIP-MCNC: NEGATIVE MG/DL
GLUCOSE UR STRIP-MCNC: NEGATIVE MG/DL
HCT VFR BLD AUTO: 43.4 % (ref 34–48)
HCT VFR BLD AUTO: 46.1 % (ref 34–48)
HGB BLD-MCNC: 14.9 G/DL (ref 11.5–15.5)
HGB BLD-MCNC: 15.5 G/DL (ref 11.5–15.5)
HGB UR QL STRIP.AUTO: NEGATIVE
HGB UR QL STRIP.AUTO: NEGATIVE
IMM GRANULOCYTES # BLD AUTO: 0.03 K/UL (ref 0–0.58)
IMM GRANULOCYTES # BLD AUTO: 0.05 K/UL (ref 0–0.58)
IMM GRANULOCYTES NFR BLD: 0 % (ref 0–5)
IMM GRANULOCYTES NFR BLD: 0 % (ref 0–5)
INFLUENZA A BY PCR: NOT DETECTED
INFLUENZA B BY PCR: NOT DETECTED
KETONES UR STRIP-MCNC: ABNORMAL MG/DL
KETONES UR STRIP-MCNC: NEGATIVE MG/DL
LACTATE BLDV-SCNC: 1.7 MMOL/L (ref 0.5–2.2)
LEUKOCYTE ESTERASE UR QL STRIP: NEGATIVE
LEUKOCYTE ESTERASE UR QL STRIP: NEGATIVE
LIPASE SERPL-CCNC: 38 U/L (ref 13–60)
LYMPHOCYTES NFR BLD: 1.21 K/UL (ref 1.5–4)
LYMPHOCYTES NFR BLD: 1.65 K/UL (ref 1.5–4)
LYMPHOCYTES RELATIVE PERCENT: 11 % (ref 20–42)
LYMPHOCYTES RELATIVE PERCENT: 20 % (ref 20–42)
MAGNESIUM SERPL-MCNC: 2.1 MG/DL (ref 1.6–2.6)
MCH RBC QN AUTO: 29.6 PG (ref 26–35)
MCH RBC QN AUTO: 29.8 PG (ref 26–35)
MCHC RBC AUTO-ENTMCNC: 33.6 G/DL (ref 32–34.5)
MCHC RBC AUTO-ENTMCNC: 34.3 G/DL (ref 32–34.5)
MCV RBC AUTO: 86.3 FL (ref 80–99.9)
MCV RBC AUTO: 88.7 FL (ref 80–99.9)
MONOCYTES NFR BLD: 0.94 K/UL (ref 0.1–0.95)
MONOCYTES NFR BLD: 1.09 K/UL (ref 0.1–0.95)
MONOCYTES NFR BLD: 13 % (ref 2–12)
MONOCYTES NFR BLD: 8 % (ref 2–12)
NEUTROPHILS NFR BLD: 64 % (ref 43–80)
NEUTROPHILS NFR BLD: 79 % (ref 43–80)
NEUTS SEG NFR BLD: 5.27 K/UL (ref 1.8–7.3)
NEUTS SEG NFR BLD: 8.89 K/UL (ref 1.8–7.3)
NITRITE UR QL STRIP: NEGATIVE
NITRITE UR QL STRIP: NEGATIVE
PH UR STRIP: 6 [PH] (ref 5–9)
PH UR STRIP: 7 [PH] (ref 5–9)
PLATELET # BLD AUTO: 237 K/UL (ref 130–450)
PLATELET # BLD AUTO: 244 K/UL (ref 130–450)
PMV BLD AUTO: 9.3 FL (ref 7–12)
PMV BLD AUTO: 9.5 FL (ref 7–12)
POTASSIUM SERPL-SCNC: 4.2 MMOL/L (ref 3.5–5)
POTASSIUM SERPL-SCNC: 4.5 MMOL/L (ref 3.5–5)
PROT SERPL-MCNC: 7 G/DL (ref 6.4–8.3)
PROT SERPL-MCNC: 7.3 G/DL (ref 6.4–8.3)
PROT UR STRIP-MCNC: NEGATIVE MG/DL
PROT UR STRIP-MCNC: NEGATIVE MG/DL
RBC # BLD AUTO: 5.03 M/UL (ref 3.5–5.5)
RBC # BLD AUTO: 5.2 M/UL (ref 3.5–5.5)
RBC #/AREA URNS HPF: ABNORMAL /HPF
RBC #/AREA URNS HPF: ABNORMAL /HPF
SARS-COV-2 RDRP RESP QL NAA+PROBE: NOT DETECTED
SODIUM SERPL-SCNC: 138 MMOL/L (ref 132–146)
SODIUM SERPL-SCNC: 140 MMOL/L (ref 132–146)
SP GR UR STRIP: 1.01 (ref 1–1.03)
SP GR UR STRIP: <1.005 (ref 1–1.03)
SPECIMEN DESCRIPTION: NORMAL
TROPONIN I SERPL HS-MCNC: 23 NG/L (ref 0–9)
TROPONIN I SERPL HS-MCNC: 23 NG/L (ref 0–9)
UROBILINOGEN UR STRIP-ACNC: 0.2 EU/DL (ref 0–1)
UROBILINOGEN UR STRIP-ACNC: 0.2 EU/DL (ref 0–1)
WBC #/AREA URNS HPF: ABNORMAL /HPF
WBC #/AREA URNS HPF: ABNORMAL /HPF
WBC OTHER # BLD: 11.3 K/UL (ref 4.5–11.5)
WBC OTHER # BLD: 8.2 K/UL (ref 4.5–11.5)

## 2024-01-07 PROCEDURE — 93010 ELECTROCARDIOGRAM REPORT: CPT | Performed by: INTERNAL MEDICINE

## 2024-01-07 PROCEDURE — 82962 GLUCOSE BLOOD TEST: CPT

## 2024-01-07 PROCEDURE — 84484 ASSAY OF TROPONIN QUANT: CPT

## 2024-01-07 PROCEDURE — 81001 URINALYSIS AUTO W/SCOPE: CPT

## 2024-01-07 PROCEDURE — 87502 INFLUENZA DNA AMP PROBE: CPT

## 2024-01-07 PROCEDURE — 70450 CT HEAD/BRAIN W/O DYE: CPT

## 2024-01-07 PROCEDURE — 71046 X-RAY EXAM CHEST 2 VIEWS: CPT

## 2024-01-07 PROCEDURE — 1200000000 HC SEMI PRIVATE

## 2024-01-07 PROCEDURE — 83605 ASSAY OF LACTIC ACID: CPT

## 2024-01-07 PROCEDURE — 85025 COMPLETE CBC W/AUTO DIFF WBC: CPT

## 2024-01-07 PROCEDURE — 99285 EMERGENCY DEPT VISIT HI MDM: CPT

## 2024-01-07 PROCEDURE — 93005 ELECTROCARDIOGRAM TRACING: CPT | Performed by: STUDENT IN AN ORGANIZED HEALTH CARE EDUCATION/TRAINING PROGRAM

## 2024-01-07 PROCEDURE — 51701 INSERT BLADDER CATHETER: CPT

## 2024-01-07 PROCEDURE — 83735 ASSAY OF MAGNESIUM: CPT

## 2024-01-07 PROCEDURE — 83690 ASSAY OF LIPASE: CPT

## 2024-01-07 PROCEDURE — 2580000003 HC RX 258: Performed by: STUDENT IN AN ORGANIZED HEALTH CARE EDUCATION/TRAINING PROGRAM

## 2024-01-07 PROCEDURE — 36415 COLL VENOUS BLD VENIPUNCTURE: CPT

## 2024-01-07 PROCEDURE — 80053 COMPREHEN METABOLIC PANEL: CPT

## 2024-01-07 PROCEDURE — 71045 X-RAY EXAM CHEST 1 VIEW: CPT

## 2024-01-07 PROCEDURE — 87635 SARS-COV-2 COVID-19 AMP PRB: CPT

## 2024-01-07 RX ORDER — DONEPEZIL HYDROCHLORIDE 5 MG/1
5 TABLET, FILM COATED ORAL ONCE
Status: COMPLETED | OUTPATIENT
Start: 2024-01-07 | End: 2024-01-08

## 2024-01-07 RX ORDER — ANASTROZOLE 1 MG/1
1 TABLET ORAL DAILY
Status: DISCONTINUED | OUTPATIENT
Start: 2024-01-08 | End: 2024-01-10

## 2024-01-07 RX ORDER — AMLODIPINE BESYLATE 5 MG/1
5 TABLET ORAL DAILY
Status: DISCONTINUED | OUTPATIENT
Start: 2024-01-08 | End: 2024-01-08

## 2024-01-07 RX ORDER — ENOXAPARIN SODIUM 100 MG/ML
40 INJECTION SUBCUTANEOUS DAILY
Status: DISCONTINUED | OUTPATIENT
Start: 2024-01-08 | End: 2024-01-08

## 2024-01-07 RX ORDER — 0.9 % SODIUM CHLORIDE 0.9 %
1000 INTRAVENOUS SOLUTION INTRAVENOUS ONCE
Status: COMPLETED | OUTPATIENT
Start: 2024-01-07 | End: 2024-01-07

## 2024-01-07 RX ORDER — SODIUM CHLORIDE 9 MG/ML
INJECTION, SOLUTION INTRAVENOUS CONTINUOUS
Status: ACTIVE | OUTPATIENT
Start: 2024-01-07 | End: 2024-01-08

## 2024-01-07 RX ORDER — DONEPEZIL HYDROCHLORIDE 5 MG/1
5 TABLET, FILM COATED ORAL ONCE
COMMUNITY

## 2024-01-07 RX ORDER — ASPIRIN 325 MG
325 TABLET, DELAYED RELEASE (ENTERIC COATED) ORAL DAILY
Status: DISCONTINUED | OUTPATIENT
Start: 2024-01-08 | End: 2024-01-08

## 2024-01-07 RX ORDER — INSULIN LISPRO 100 [IU]/ML
0-8 INJECTION, SOLUTION INTRAVENOUS; SUBCUTANEOUS
Status: DISCONTINUED | OUTPATIENT
Start: 2024-01-08 | End: 2024-01-14 | Stop reason: HOSPADM

## 2024-01-07 RX ORDER — ATORVASTATIN CALCIUM 40 MG/1
40 TABLET, FILM COATED ORAL NIGHTLY
Status: DISCONTINUED | OUTPATIENT
Start: 2024-01-07 | End: 2024-01-14 | Stop reason: HOSPADM

## 2024-01-07 RX ORDER — INSULIN LISPRO 100 [IU]/ML
0-4 INJECTION, SOLUTION INTRAVENOUS; SUBCUTANEOUS NIGHTLY
Status: DISCONTINUED | OUTPATIENT
Start: 2024-01-07 | End: 2024-01-14 | Stop reason: HOSPADM

## 2024-01-07 RX ADMIN — SODIUM CHLORIDE 1000 ML: 9 INJECTION, SOLUTION INTRAVENOUS at 05:21

## 2024-01-07 ASSESSMENT — PAIN - FUNCTIONAL ASSESSMENT
PAIN_FUNCTIONAL_ASSESSMENT: NONE - DENIES PAIN
PAIN_FUNCTIONAL_ASSESSMENT: NONE - DENIES PAIN

## 2024-01-07 ASSESSMENT — LIFESTYLE VARIABLES
HOW MANY STANDARD DRINKS CONTAINING ALCOHOL DO YOU HAVE ON A TYPICAL DAY: PATIENT DOES NOT DRINK
HOW OFTEN DO YOU HAVE A DRINK CONTAINING ALCOHOL: NEVER
HOW MANY STANDARD DRINKS CONTAINING ALCOHOL DO YOU HAVE ON A TYPICAL DAY: PATIENT DOES NOT DRINK
HOW OFTEN DO YOU HAVE A DRINK CONTAINING ALCOHOL: NEVER
HOW OFTEN DO YOU HAVE A DRINK CONTAINING ALCOHOL: NEVER

## 2024-01-07 NOTE — ED PROVIDER NOTES
[facility] EMERGENCY DEPARTMENT  EMERGENCY DEPARTMENT ENCOUNTER      Pt Name: Nika Aviles  MRN: [id number]  Birthdate 1945  Date of evaluation: 1/7/2024  Provider: Paul Frederick DO  PCP: Zana Pandya MD  Note Started: 4:33 AM EST 1/7/24    CHIEF COMPLAINT       Chief Complaint   Patient presents with    Fatigue     Patient's daughter states that her mother has become increasingly weak, mentions atypical incontinence and increased confusion. Patient is in early stages of dementia, per daughter.        HISTORY OF PRESENT ILLNESS: 1 or more Elements   History From: Patient's daughter  Limitations to history : Altered Mental Status    Nika Aviles is a 78 y.o. female who presents to the ED due to altered mental status.  Patient's daughter notes that she has early dementia and is at baseline alert and oriented x 2 to person and place.  She says that over the past day she has had worsening weakness and confusion.  Says that she has some baseline incontinence as well but has been wetting her diapers more frequently than she usually does.  Daughter states that she does have some mild congestion and cough associated with her weakness.  Daughter denies that the patient has any fever, chills, nausea or vomiting.  Patient denies any urinary complaints at this time.    Nursing Notes were all reviewed and agreed with or any disagreements were addressed in the HPI.    REVIEW OF SYSTEMS :    Positives and Pertinent negatives as per HPI.     PAST MEDICAL HISTORY/Chronic Conditions Affecting Care    has a past medical history of Breast CA (HCC) (2018), Breast cancer (HCC), Cerebral artery occlusion with cerebral infarction (HCC) (1994), Chronic kidney disease, Dementia (HCC), Diabetes mellitus (HCC), Disease of blood and blood forming organ, Diverticulitis, Hyperlipidemia, Hypertension, Hypothyroidism, Lymphoma (HCC) (12/2016), Osteoarthritis, Prolonged emergence from general anesthesia,

## 2024-01-07 NOTE — ED NOTES
While discharging patient this nurse noticed that the patient was having difficulty getting from bed to wheel chair; shuffling gait noted and delayed following commands; daughter expressed concerns about taking her home and safety; daughter states patient has appointment with PCP tomorrow and has already discussed rehab and physical therapy; ED day turn doctor to bedside to discuss plan of care with patient and daughter; daughter ok to take patient home at this time

## 2024-01-07 NOTE — ED PROVIDER NOTES
[facility] EMERGENCY DEPARTMENT  EMERGENCY DEPARTMENT ENCOUNTER        Pt Name: Nika Aviles  MRN: [id number]  Birthdate 1945  Date of evaluation: 1/7/2024  Provider: Zana Davis DO  PCP: Zana Pandya MD  Note Started: 6:51 PM EST 1/7/24    CHIEF COMPLAINT       Chief Complaint   Patient presents with    Altered Mental Status     Pt seen early this morning for increased weakness, increased confusion was told to bring her back if anything worsened.  Pt is having increased weakness.       HISTORY OF PRESENT ILLNESS: 1 or more Elements     Limitations to history : AMS, dementia    Nika Aviles is a 78 y.o. female with PMHx of vascular dementia, reportedly in early stages as per her daughter, and at baseline she is alert and oriented x 2 to person and place.  She presents today for evaluation of worsening confusion and weakness.  She came in overnight for the same symptoms, was evaluated without any significant abnormalities noted.  Through shared decision-making, daughter elected to take the patient home and bring her back if anything worsened.  Patient's daughter states that throughout the day today she has seemed more weak and fatigued, stating that while drinking her coffee or eating food she will sometimes just drift off and fall asleep in the middle of doing an activity.  She has also had general weakness worse in her right arm and leg; her daughter states that she has had history of strokes and has had weakness in her right arm and right leg before, but has noticed that it has been worsening since Friday.  She has not had any falls or injuries, emesis or fevers.  Daughter did report that she had mild congestion and cough, no fevers or chills.  Patient is currently denying any complaints, but history is limited due to her history of dementia and reportedly worsened confusion.  Of note, daughter also reported that the patient has some baseline incontinence and

## 2024-01-08 ENCOUNTER — APPOINTMENT (OUTPATIENT)
Dept: MRI IMAGING | Age: 79
DRG: 064 | End: 2024-01-08
Payer: MEDICARE

## 2024-01-08 ENCOUNTER — APPOINTMENT (OUTPATIENT)
Age: 79
DRG: 064 | End: 2024-01-08
Payer: MEDICARE

## 2024-01-08 ENCOUNTER — APPOINTMENT (OUTPATIENT)
Dept: ULTRASOUND IMAGING | Age: 79
DRG: 064 | End: 2024-01-08
Payer: MEDICARE

## 2024-01-08 PROBLEM — G93.41 ACUTE METABOLIC ENCEPHALOPATHY: Status: ACTIVE | Noted: 2024-01-08

## 2024-01-08 LAB
25(OH)D3 SERPL-MCNC: 32.7 NG/ML (ref 30–100)
ALBUMIN SERPL-MCNC: 3.9 G/DL (ref 3.5–5.2)
ALP SERPL-CCNC: 101 U/L (ref 35–104)
ALT SERPL-CCNC: 10 U/L (ref 0–32)
ANION GAP SERPL CALCULATED.3IONS-SCNC: 10 MMOL/L (ref 7–16)
AST SERPL-CCNC: 13 U/L (ref 0–31)
B PARAP IS1001 DNA NPH QL NAA+NON-PROBE: NOT DETECTED
B PERT DNA SPEC QL NAA+PROBE: NOT DETECTED
BASOPHILS # BLD: 0.04 K/UL (ref 0–0.2)
BASOPHILS NFR BLD: 1 % (ref 0–2)
BILIRUB SERPL-MCNC: 0.6 MG/DL (ref 0–1.2)
BUN SERPL-MCNC: 14 MG/DL (ref 6–23)
C PNEUM DNA NPH QL NAA+NON-PROBE: NOT DETECTED
CALCIUM SERPL-MCNC: 9.1 MG/DL (ref 8.6–10.2)
CHLORIDE SERPL-SCNC: 105 MMOL/L (ref 98–107)
CHOLEST SERPL-MCNC: 147 MG/DL
CO2 SERPL-SCNC: 26 MMOL/L (ref 22–29)
CREAT SERPL-MCNC: 0.8 MG/DL (ref 0.5–1)
EKG ATRIAL RATE: 72 BPM
EKG ATRIAL RATE: 75 BPM
EKG P AXIS: 53 DEGREES
EKG P AXIS: 65 DEGREES
EKG P-R INTERVAL: 230 MS
EKG P-R INTERVAL: 268 MS
EKG Q-T INTERVAL: 432 MS
EKG Q-T INTERVAL: 450 MS
EKG QRS DURATION: 142 MS
EKG QRS DURATION: 142 MS
EKG QTC CALCULATION (BAZETT): 482 MS
EKG QTC CALCULATION (BAZETT): 492 MS
EKG R AXIS: -33 DEGREES
EKG R AXIS: -39 DEGREES
EKG T AXIS: 134 DEGREES
EKG T AXIS: 137 DEGREES
EKG VENTRICULAR RATE: 72 BPM
EKG VENTRICULAR RATE: 75 BPM
EOSINOPHIL # BLD: 0.17 K/UL (ref 0.05–0.5)
EOSINOPHILS RELATIVE PERCENT: 3 % (ref 0–6)
ERYTHROCYTE [DISTWIDTH] IN BLOOD BY AUTOMATED COUNT: 12.8 % (ref 11.5–15)
FLUAV RNA NPH QL NAA+NON-PROBE: NOT DETECTED
FLUBV RNA NPH QL NAA+NON-PROBE: NOT DETECTED
FOLATE SERPL-MCNC: 8.4 NG/ML (ref 4.8–24.2)
GFR SERPL CREATININE-BSD FRML MDRD: >60 ML/MIN/1.73M2
GLUCOSE BLD-MCNC: 134 MG/DL (ref 74–99)
GLUCOSE BLD-MCNC: 137 MG/DL (ref 74–99)
GLUCOSE BLD-MCNC: 149 MG/DL (ref 74–99)
GLUCOSE BLD-MCNC: 161 MG/DL (ref 74–99)
GLUCOSE BLD-MCNC: 83 MG/DL (ref 74–99)
GLUCOSE SERPL-MCNC: 125 MG/DL (ref 74–99)
HADV DNA NPH QL NAA+NON-PROBE: NOT DETECTED
HBA1C MFR BLD: 6.9 % (ref 4–5.6)
HCOV 229E RNA NPH QL NAA+NON-PROBE: NOT DETECTED
HCOV HKU1 RNA NPH QL NAA+NON-PROBE: NOT DETECTED
HCOV NL63 RNA NPH QL NAA+NON-PROBE: NOT DETECTED
HCOV OC43 RNA NPH QL NAA+NON-PROBE: NOT DETECTED
HCT VFR BLD AUTO: 41 % (ref 34–48)
HDLC SERPL-MCNC: 41 MG/DL
HGB BLD-MCNC: 13.7 G/DL (ref 11.5–15.5)
HMPV RNA NPH QL NAA+NON-PROBE: NOT DETECTED
HPIV1 RNA NPH QL NAA+NON-PROBE: NOT DETECTED
HPIV2 RNA NPH QL NAA+NON-PROBE: NOT DETECTED
HPIV3 RNA NPH QL NAA+NON-PROBE: NOT DETECTED
HPIV4 RNA NPH QL NAA+NON-PROBE: NOT DETECTED
IMM GRANULOCYTES # BLD AUTO: <0.03 K/UL (ref 0–0.58)
IMM GRANULOCYTES NFR BLD: 0 % (ref 0–5)
LDLC SERPL CALC-MCNC: 74 MG/DL
LYMPHOCYTES NFR BLD: 1.09 K/UL (ref 1.5–4)
LYMPHOCYTES RELATIVE PERCENT: 19 % (ref 20–42)
M PNEUMO DNA NPH QL NAA+NON-PROBE: NOT DETECTED
MCH RBC QN AUTO: 30.2 PG (ref 26–35)
MCHC RBC AUTO-ENTMCNC: 33.4 G/DL (ref 32–34.5)
MCV RBC AUTO: 90.5 FL (ref 80–99.9)
MONOCYTES NFR BLD: 0.81 K/UL (ref 0.1–0.95)
MONOCYTES NFR BLD: 14 % (ref 2–12)
NEUTROPHILS NFR BLD: 63 % (ref 43–80)
NEUTS SEG NFR BLD: 3.58 K/UL (ref 1.8–7.3)
PLATELET # BLD AUTO: 212 K/UL (ref 130–450)
PMV BLD AUTO: 9.3 FL (ref 7–12)
POTASSIUM SERPL-SCNC: 3.7 MMOL/L (ref 3.5–5)
PROT SERPL-MCNC: 6.1 G/DL (ref 6.4–8.3)
RBC # BLD AUTO: 4.53 M/UL (ref 3.5–5.5)
RSV RNA NPH QL NAA+NON-PROBE: NOT DETECTED
RV+EV RNA NPH QL NAA+NON-PROBE: NOT DETECTED
SARS-COV-2 RNA NPH QL NAA+NON-PROBE: NOT DETECTED
SODIUM SERPL-SCNC: 141 MMOL/L (ref 132–146)
SPECIMEN DESCRIPTION: NORMAL
T4 FREE SERPL-MCNC: 1.2 NG/DL (ref 0.9–1.7)
TRIGL SERPL-MCNC: 159 MG/DL
TROPONIN I SERPL HS-MCNC: 22 NG/L (ref 0–9)
TROPONIN I SERPL HS-MCNC: 22 NG/L (ref 0–9)
TSH SERPL DL<=0.05 MIU/L-ACNC: 1.69 UIU/ML (ref 0.27–4.2)
VIT B12 SERPL-MCNC: >2000 PG/ML (ref 211–946)
VLDLC SERPL CALC-MCNC: 32 MG/DL
WBC OTHER # BLD: 5.7 K/UL (ref 4.5–11.5)

## 2024-01-08 PROCEDURE — 93010 ELECTROCARDIOGRAM REPORT: CPT | Performed by: INTERNAL MEDICINE

## 2024-01-08 PROCEDURE — 93880 EXTRACRANIAL BILAT STUDY: CPT

## 2024-01-08 PROCEDURE — 6370000000 HC RX 637 (ALT 250 FOR IP): Performed by: NURSE PRACTITIONER

## 2024-01-08 PROCEDURE — 6370000000 HC RX 637 (ALT 250 FOR IP)

## 2024-01-08 PROCEDURE — 80053 COMPREHEN METABOLIC PANEL: CPT

## 2024-01-08 PROCEDURE — 82306 VITAMIN D 25 HYDROXY: CPT

## 2024-01-08 PROCEDURE — 84439 ASSAY OF FREE THYROXINE: CPT

## 2024-01-08 PROCEDURE — 6360000002 HC RX W HCPCS: Performed by: NURSE PRACTITIONER

## 2024-01-08 PROCEDURE — 85025 COMPLETE CBC W/AUTO DIFF WBC: CPT

## 2024-01-08 PROCEDURE — 6370000000 HC RX 637 (ALT 250 FOR IP): Performed by: PSYCHIATRY & NEUROLOGY

## 2024-01-08 PROCEDURE — 83036 HEMOGLOBIN GLYCOSYLATED A1C: CPT

## 2024-01-08 PROCEDURE — 70553 MRI BRAIN STEM W/O & W/DYE: CPT

## 2024-01-08 PROCEDURE — 1200000000 HC SEMI PRIVATE

## 2024-01-08 PROCEDURE — 93306 TTE W/DOPPLER COMPLETE: CPT

## 2024-01-08 PROCEDURE — 82607 VITAMIN B-12: CPT

## 2024-01-08 PROCEDURE — 2580000003 HC RX 258: Performed by: NURSE PRACTITIONER

## 2024-01-08 PROCEDURE — 82746 ASSAY OF FOLIC ACID SERUM: CPT

## 2024-01-08 PROCEDURE — 92610 EVALUATE SWALLOWING FUNCTION: CPT | Performed by: SPEECH-LANGUAGE PATHOLOGIST

## 2024-01-08 PROCEDURE — 6360000002 HC RX W HCPCS

## 2024-01-08 PROCEDURE — 80061 LIPID PANEL: CPT

## 2024-01-08 PROCEDURE — 93306 TTE W/DOPPLER COMPLETE: CPT | Performed by: INTERNAL MEDICINE

## 2024-01-08 PROCEDURE — 93005 ELECTROCARDIOGRAM TRACING: CPT | Performed by: NURSE PRACTITIONER

## 2024-01-08 PROCEDURE — 0202U NFCT DS 22 TRGT SARS-COV-2: CPT

## 2024-01-08 PROCEDURE — A4216 STERILE WATER/SALINE, 10 ML: HCPCS

## 2024-01-08 PROCEDURE — 84425 ASSAY OF VITAMIN B-1: CPT

## 2024-01-08 PROCEDURE — 84484 ASSAY OF TROPONIN QUANT: CPT

## 2024-01-08 PROCEDURE — 84443 ASSAY THYROID STIM HORMONE: CPT

## 2024-01-08 PROCEDURE — 2580000003 HC RX 258

## 2024-01-08 PROCEDURE — C9113 INJ PANTOPRAZOLE SODIUM, VIA: HCPCS

## 2024-01-08 PROCEDURE — 6360000004 HC RX CONTRAST MEDICATION: Performed by: RADIOLOGY

## 2024-01-08 PROCEDURE — 82962 GLUCOSE BLOOD TEST: CPT

## 2024-01-08 PROCEDURE — A9577 INJ MULTIHANCE: HCPCS | Performed by: RADIOLOGY

## 2024-01-08 RX ORDER — LEVOTHYROXINE SODIUM 88 UG/1
88 TABLET ORAL DAILY
Status: DISCONTINUED | OUTPATIENT
Start: 2024-01-08 | End: 2024-01-14 | Stop reason: HOSPADM

## 2024-01-08 RX ORDER — LISINOPRIL 10 MG/1
10 TABLET ORAL DAILY
Status: DISCONTINUED | OUTPATIENT
Start: 2024-01-08 | End: 2024-01-09

## 2024-01-08 RX ORDER — ONDANSETRON 4 MG/1
4 TABLET, ORALLY DISINTEGRATING ORAL EVERY 8 HOURS PRN
Status: DISCONTINUED | OUTPATIENT
Start: 2024-01-08 | End: 2024-01-14 | Stop reason: HOSPADM

## 2024-01-08 RX ORDER — ACETAMINOPHEN 650 MG/1
650 SUPPOSITORY RECTAL EVERY 6 HOURS PRN
Status: DISCONTINUED | OUTPATIENT
Start: 2024-01-08 | End: 2024-01-14 | Stop reason: HOSPADM

## 2024-01-08 RX ORDER — SODIUM CHLORIDE 0.9 % (FLUSH) 0.9 %
10 SYRINGE (ML) INJECTION PRN
Status: DISCONTINUED | OUTPATIENT
Start: 2024-01-08 | End: 2024-01-14 | Stop reason: HOSPADM

## 2024-01-08 RX ORDER — ACETAMINOPHEN 325 MG/1
650 TABLET ORAL EVERY 6 HOURS PRN
Status: DISCONTINUED | OUTPATIENT
Start: 2024-01-08 | End: 2024-01-14 | Stop reason: HOSPADM

## 2024-01-08 RX ORDER — AMLODIPINE BESYLATE 5 MG/1
5 TABLET ORAL DAILY
Status: DISCONTINUED | OUTPATIENT
Start: 2024-01-09 | End: 2024-01-09

## 2024-01-08 RX ORDER — SODIUM CHLORIDE 9 MG/ML
INJECTION, SOLUTION INTRAVENOUS PRN
Status: DISCONTINUED | OUTPATIENT
Start: 2024-01-08 | End: 2024-01-14 | Stop reason: HOSPADM

## 2024-01-08 RX ORDER — POTASSIUM CHLORIDE 7.45 MG/ML
10 INJECTION INTRAVENOUS PRN
Status: DISCONTINUED | OUTPATIENT
Start: 2024-01-08 | End: 2024-01-14 | Stop reason: HOSPADM

## 2024-01-08 RX ORDER — ONDANSETRON 2 MG/ML
4 INJECTION INTRAMUSCULAR; INTRAVENOUS EVERY 6 HOURS PRN
Status: DISCONTINUED | OUTPATIENT
Start: 2024-01-08 | End: 2024-01-14 | Stop reason: HOSPADM

## 2024-01-08 RX ORDER — CLOPIDOGREL BISULFATE 75 MG/1
75 TABLET ORAL DAILY
Status: DISCONTINUED | OUTPATIENT
Start: 2024-01-08 | End: 2024-01-14 | Stop reason: HOSPADM

## 2024-01-08 RX ORDER — PANTOPRAZOLE SODIUM 40 MG/1
40 TABLET, DELAYED RELEASE ORAL
Status: DISCONTINUED | OUTPATIENT
Start: 2024-01-09 | End: 2024-01-14 | Stop reason: HOSPADM

## 2024-01-08 RX ORDER — GAUZE BANDAGE 2" X 2"
100 BANDAGE TOPICAL DAILY
Status: DISCONTINUED | OUTPATIENT
Start: 2024-01-08 | End: 2024-01-14 | Stop reason: HOSPADM

## 2024-01-08 RX ORDER — ASPIRIN 81 MG/1
81 TABLET ORAL DAILY
Status: DISCONTINUED | OUTPATIENT
Start: 2024-01-09 | End: 2024-01-14 | Stop reason: HOSPADM

## 2024-01-08 RX ORDER — MAGNESIUM SULFATE IN WATER 40 MG/ML
2000 INJECTION, SOLUTION INTRAVENOUS PRN
Status: DISCONTINUED | OUTPATIENT
Start: 2024-01-08 | End: 2024-01-14 | Stop reason: HOSPADM

## 2024-01-08 RX ORDER — ENOXAPARIN SODIUM 100 MG/ML
40 INJECTION SUBCUTANEOUS DAILY
Status: DISCONTINUED | OUTPATIENT
Start: 2024-01-08 | End: 2024-01-14 | Stop reason: HOSPADM

## 2024-01-08 RX ORDER — SODIUM CHLORIDE 0.9 % (FLUSH) 0.9 %
10 SYRINGE (ML) INJECTION EVERY 12 HOURS SCHEDULED
Status: DISCONTINUED | OUTPATIENT
Start: 2024-01-08 | End: 2024-01-14 | Stop reason: HOSPADM

## 2024-01-08 RX ORDER — POTASSIUM CHLORIDE 20 MEQ/1
40 TABLET, EXTENDED RELEASE ORAL PRN
Status: DISCONTINUED | OUTPATIENT
Start: 2024-01-08 | End: 2024-01-14 | Stop reason: HOSPADM

## 2024-01-08 RX ADMIN — DESMOPRESSIN ACETATE 40 MG: 0.2 TABLET ORAL at 20:20

## 2024-01-08 RX ADMIN — CLOPIDOGREL BISULFATE 75 MG: 75 TABLET ORAL at 16:39

## 2024-01-08 RX ADMIN — Medication 10 ML: at 20:21

## 2024-01-08 RX ADMIN — REGULAR STRENGTH 325 MG: 325 TABLET ORAL at 08:42

## 2024-01-08 RX ADMIN — SODIUM CHLORIDE: 9 INJECTION, SOLUTION INTRAVENOUS at 01:02

## 2024-01-08 RX ADMIN — LISINOPRIL 10 MG: 10 TABLET ORAL at 16:39

## 2024-01-08 RX ADMIN — ENOXAPARIN SODIUM 40 MG: 100 INJECTION SUBCUTANEOUS at 08:42

## 2024-01-08 RX ADMIN — SODIUM CHLORIDE 40 MG: 9 INJECTION, SOLUTION INTRAMUSCULAR; INTRAVENOUS; SUBCUTANEOUS at 08:42

## 2024-01-08 RX ADMIN — GADOBENATE DIMEGLUMINE 18 ML: 529 INJECTION, SOLUTION INTRAVENOUS at 13:04

## 2024-01-08 RX ADMIN — DESMOPRESSIN ACETATE 40 MG: 0.2 TABLET ORAL at 01:03

## 2024-01-08 RX ADMIN — DONEPEZIL HYDROCHLORIDE 5 MG: 5 TABLET, FILM COATED ORAL at 01:27

## 2024-01-08 RX ADMIN — Medication 10 ML: at 07:28

## 2024-01-08 RX ADMIN — ANASTROZOLE 1 MG: 1 TABLET ORAL at 08:42

## 2024-01-08 RX ADMIN — AMLODIPINE BESYLATE 5 MG: 5 TABLET ORAL at 08:41

## 2024-01-08 RX ADMIN — Medication 100 MG: at 08:41

## 2024-01-08 ASSESSMENT — PAIN - FUNCTIONAL ASSESSMENT: PAIN_FUNCTIONAL_ASSESSMENT: NONE - DENIES PAIN

## 2024-01-08 NOTE — H&P
noted.  Motor strength is 4 out of 5 all extremities bilaterally.  Tone is normal.    NEUROLOGIC:    Awake, alert, oriented to name, place confused to time but able to state name of president and the last holiday.  Cranial nerves II-XII are grossly intact.  Motor is 5 out of 5 bilaterally.      SKIN:    No bruising or bleeding.  No redness, warmth, or swelling    EXTREMITIES:    Peripheral pulses present.  No edema, cyanosis, or swelling.    OSTEOPATHIC:    Examined in  supine positions.  Normal thoracic kyphosis and lumbar lordosis.  No acute somatic dysfunction.    LABORATORY DATA:  CBC with Differential:    Lab Results   Component Value Date/Time    WBC 8.2 01/07/2024 07:20 PM    RBC 5.20 01/07/2024 07:20 PM    HGB 15.5 01/07/2024 07:20 PM    HCT 46.1 01/07/2024 07:20 PM     01/07/2024 07:20 PM    MCV 88.7 01/07/2024 07:20 PM    MCH 29.8 01/07/2024 07:20 PM    MCHC 33.6 01/07/2024 07:20 PM    RDW 12.6 01/07/2024 07:20 PM    NRBC 0.5 01/04/2017 10:18 AM    METASPCT 1.0 05/16/2018 11:32 AM    LYMPHOPCT 20 01/07/2024 07:20 PM    MONOPCT 13 01/07/2024 07:20 PM    MYELOPCT 0.5 03/22/2017 09:15 PM    BASOPCT 1 01/07/2024 07:20 PM    MONOSABS 1.09 01/07/2024 07:20 PM    LYMPHSABS 1.65 01/07/2024 07:20 PM    EOSABS 0.14 01/07/2024 07:20 PM    BASOSABS 0.04 01/07/2024 07:20 PM     CMP:    Lab Results   Component Value Date/Time     01/07/2024 07:20 PM    K 4.5 01/07/2024 07:20 PM    K 4.1 06/30/2022 05:05 AM     01/07/2024 07:20 PM    CO2 24 01/07/2024 07:20 PM    BUN 15 01/07/2024 07:20 PM    CREATININE 0.9 01/07/2024 07:20 PM    GFRAA >60 10/05/2022 11:18 AM    LABGLOM >60 01/07/2024 07:20 PM    GLUCOSE 120 01/07/2024 07:20 PM    PROT 7.3 01/07/2024 07:20 PM    LABALBU 4.6 01/07/2024 07:20 PM    CALCIUM 10.1 01/07/2024 07:20 PM    BILITOT 0.6 01/07/2024 07:20 PM    ALKPHOS 119 01/07/2024 07:20 PM    AST 16 01/07/2024 07:20 PM    ALT 11 01/07/2024 07:20 PM     BMP:    Lab Results   Component Value

## 2024-01-08 NOTE — PLAN OF CARE
Problem: Safety - Adult  Goal: Free from fall injury  Outcome: Progressing     Problem: Discharge Planning  Goal: Discharge to home or other facility with appropriate resources  Outcome: Progressing  Flowsheets (Taken 1/8/2024 0432)  Discharge to home or other facility with appropriate resources: Identify barriers to discharge with patient and caregiver

## 2024-01-08 NOTE — ED NOTES
Assumed care of patient, vitals stable. Pt repositioned in bed, no other concerns or complaints. Pt on purewick. Family at bedside.

## 2024-01-08 NOTE — ED NOTES
Pt changed into dry gown, clean sheets. Purewick applied with brief over top. Howie pads placed under patient. Warm blankets applied.

## 2024-01-09 ENCOUNTER — APPOINTMENT (OUTPATIENT)
Dept: GENERAL RADIOLOGY | Age: 79
DRG: 064 | End: 2024-01-09
Payer: MEDICARE

## 2024-01-09 PROBLEM — I63.9 ACUTE CVA (CEREBROVASCULAR ACCIDENT) (HCC): Status: ACTIVE | Noted: 2024-01-09

## 2024-01-09 LAB
ALBUMIN SERPL-MCNC: 4 G/DL (ref 3.5–5.2)
ALP SERPL-CCNC: 109 U/L (ref 35–104)
ALT SERPL-CCNC: 10 U/L (ref 0–32)
ANION GAP SERPL CALCULATED.3IONS-SCNC: 12 MMOL/L (ref 7–16)
AST SERPL-CCNC: 13 U/L (ref 0–31)
BASOPHILS # BLD: 0.03 K/UL (ref 0–0.2)
BASOPHILS NFR BLD: 0 % (ref 0–2)
BILIRUB SERPL-MCNC: 0.8 MG/DL (ref 0–1.2)
BUN SERPL-MCNC: 12 MG/DL (ref 6–23)
CALCIUM SERPL-MCNC: 9.3 MG/DL (ref 8.6–10.2)
CHLORIDE SERPL-SCNC: 105 MMOL/L (ref 98–107)
CO2 SERPL-SCNC: 24 MMOL/L (ref 22–29)
CREAT SERPL-MCNC: 0.9 MG/DL (ref 0.5–1)
ECHO AV AREA PEAK VELOCITY: 1.6 CM2
ECHO AV AREA PEAK VELOCITY: 1.9 CM2
ECHO AV AREA PEAK VELOCITY: 1.9 CM2
ECHO AV AREA PEAK VELOCITY: 2.3 CM2
ECHO AV AREA VTI: 2.2 CM2
ECHO AV AREA/BSA VTI: 1.1 CM2/M2
ECHO AV CUSP MM: 1.9 CM
ECHO AV MEAN GRADIENT: 4 MMHG
ECHO AV MEAN VELOCITY: 0.9 M/S
ECHO AV PEAK GRADIENT: 10 MMHG
ECHO AV PEAK GRADIENT: 7 MMHG
ECHO AV PEAK VELOCITY: 1.4 M/S
ECHO AV PEAK VELOCITY: 1.6 M/S
ECHO AV VTI: 27.7 CM
ECHO BSA: 2.01 M2
ECHO EST RA PRESSURE: 3 MMHG
ECHO LA DIAMETER INDEX: 1.57 CM/M2
ECHO LA DIAMETER: 3.1 CM
ECHO LA VOL A-L A2C: 100 ML (ref 22–52)
ECHO LA VOL A-L A4C: 61 ML (ref 22–52)
ECHO LA VOL MOD A2C: 93 ML (ref 22–52)
ECHO LA VOL MOD A4C: 59 ML (ref 22–52)
ECHO LA VOLUME AREA LENGTH: 79 ML
ECHO LA VOLUME INDEX A-L A2C: 51 ML/M2 (ref 16–34)
ECHO LA VOLUME INDEX A-L A4C: 31 ML/M2 (ref 16–34)
ECHO LA VOLUME INDEX AREA LENGTH: 40 ML/M2 (ref 16–34)
ECHO LA VOLUME INDEX MOD A2C: 47 ML/M2 (ref 16–34)
ECHO LA VOLUME INDEX MOD A4C: 30 ML/M2 (ref 16–34)
ECHO LV EDV A2C: 131 ML
ECHO LV EDV A4C: 126 ML
ECHO LV EDV BP: 132 ML (ref 56–104)
ECHO LV EDV INDEX A4C: 64 ML/M2
ECHO LV EDV INDEX BP: 67 ML/M2
ECHO LV EDV NDEX A2C: 66 ML/M2
ECHO LV EJECTION FRACTION A2C: 42 %
ECHO LV EJECTION FRACTION A4C: 28 %
ECHO LV EJECTION FRACTION BIPLANE: 35 % (ref 55–100)
ECHO LV ESV A2C: 76 ML
ECHO LV ESV A4C: 91 ML
ECHO LV ESV BP: 85 ML (ref 19–49)
ECHO LV ESV INDEX A2C: 39 ML/M2
ECHO LV ESV INDEX A4C: 46 ML/M2
ECHO LV ESV INDEX BP: 43 ML/M2
ECHO LV FRACTIONAL SHORTENING: 13 % (ref 28–44)
ECHO LV INTERNAL DIMENSION DIASTOLE INDEX: 2.74 CM/M2
ECHO LV INTERNAL DIMENSION DIASTOLIC: 5.4 CM (ref 3.9–5.3)
ECHO LV INTERNAL DIMENSION SYSTOLIC INDEX: 2.39 CM/M2
ECHO LV INTERNAL DIMENSION SYSTOLIC: 4.7 CM
ECHO LV IVSD: 1.1 CM (ref 0.6–0.9)
ECHO LV IVSS: 1.6 CM
ECHO LV MASS 2D: 234.8 G (ref 67–162)
ECHO LV MASS INDEX 2D: 119.2 G/M2 (ref 43–95)
ECHO LV POSTERIOR WALL DIASTOLIC: 1.1 CM (ref 0.6–0.9)
ECHO LV POSTERIOR WALL SYSTOLIC: 1.4 CM
ECHO LV RELATIVE WALL THICKNESS RATIO: 0.41
ECHO LVOT AREA: 3.8 CM2
ECHO LVOT AV VTI INDEX: 0.59
ECHO LVOT DIAM: 2.2 CM
ECHO LVOT MEAN GRADIENT: 1 MMHG
ECHO LVOT PEAK GRADIENT: 2 MMHG
ECHO LVOT PEAK GRADIENT: 3 MMHG
ECHO LVOT PEAK VELOCITY: 0.7 M/S
ECHO LVOT PEAK VELOCITY: 0.9 M/S
ECHO LVOT STROKE VOLUME INDEX: 31.4 ML/M2
ECHO LVOT SV: 61.9 ML
ECHO LVOT VTI: 16.3 CM
ECHO MV "A" WAVE DURATION: 175.3 MSEC
ECHO MV A VELOCITY: 1.23 M/S
ECHO MV AREA PHT: 9.6 CM2
ECHO MV AREA VTI: 1.9 CM2
ECHO MV E DECELERATION TIME (DT): 123.6 MS
ECHO MV E VELOCITY: 0.53 M/S
ECHO MV E/A RATIO: 0.43
ECHO MV LVOT VTI INDEX: 1.98
ECHO MV MAX VELOCITY: 1.4 M/S
ECHO MV MEAN GRADIENT: 3 MMHG
ECHO MV MEAN VELOCITY: 0.7 M/S
ECHO MV PEAK GRADIENT: 8 MMHG
ECHO MV PRESSURE HALF TIME (PHT): 22.8 MS
ECHO MV VTI: 32.2 CM
ECHO PV MAX VELOCITY: 1.2 M/S
ECHO PV MEAN GRADIENT: 3 MMHG
ECHO PV MEAN VELOCITY: 0.8 M/S
ECHO PV PEAK GRADIENT: 5 MMHG
ECHO PV VTI: 30.2 CM
ECHO PVEIN A DURATION: 166.1 MS
ECHO PVEIN A VELOCITY: 0.3 M/S
ECHO PVEIN PEAK D VELOCITY: 0.2 M/S
ECHO PVEIN PEAK S VELOCITY: 0.4 M/S
ECHO PVEIN S/D RATIO: 2
ECHO RIGHT VENTRICULAR SYSTOLIC PRESSURE (RVSP): 10 MMHG
ECHO RV INTERNAL DIMENSION: 2.5 CM
ECHO RVOT PEAK GRADIENT: 2 MMHG
ECHO RVOT PEAK VELOCITY: 0.7 M/S
ECHO TV REGURGITANT MAX VELOCITY: 1.32 M/S
ECHO TV REGURGITANT PEAK GRADIENT: 7 MMHG
EOSINOPHIL # BLD: 0.23 K/UL (ref 0.05–0.5)
EOSINOPHILS RELATIVE PERCENT: 3 % (ref 0–6)
ERYTHROCYTE [DISTWIDTH] IN BLOOD BY AUTOMATED COUNT: 12.9 % (ref 11.5–15)
GFR SERPL CREATININE-BSD FRML MDRD: >60 ML/MIN/1.73M2
GLUCOSE BLD-MCNC: 121 MG/DL (ref 74–99)
GLUCOSE BLD-MCNC: 152 MG/DL (ref 74–99)
GLUCOSE BLD-MCNC: 154 MG/DL (ref 74–99)
GLUCOSE BLD-MCNC: 172 MG/DL (ref 74–99)
GLUCOSE SERPL-MCNC: 133 MG/DL (ref 74–99)
HCT VFR BLD AUTO: 42.5 % (ref 34–48)
HGB BLD-MCNC: 14.4 G/DL (ref 11.5–15.5)
IMM GRANULOCYTES # BLD AUTO: <0.03 K/UL (ref 0–0.58)
IMM GRANULOCYTES NFR BLD: 0 % (ref 0–5)
LYMPHOCYTES NFR BLD: 1.24 K/UL (ref 1.5–4)
LYMPHOCYTES RELATIVE PERCENT: 15 % (ref 20–42)
MAGNESIUM SERPL-MCNC: 2 MG/DL (ref 1.6–2.6)
MCH RBC QN AUTO: 29.8 PG (ref 26–35)
MCHC RBC AUTO-ENTMCNC: 33.9 G/DL (ref 32–34.5)
MCV RBC AUTO: 87.8 FL (ref 80–99.9)
MONOCYTES NFR BLD: 0.92 K/UL (ref 0.1–0.95)
MONOCYTES NFR BLD: 11 % (ref 2–12)
NEUTROPHILS NFR BLD: 70 % (ref 43–80)
NEUTS SEG NFR BLD: 5.66 K/UL (ref 1.8–7.3)
PHOSPHATE SERPL-MCNC: 3.5 MG/DL (ref 2.5–4.5)
PLATELET # BLD AUTO: 209 K/UL (ref 130–450)
PMV BLD AUTO: 9.3 FL (ref 7–12)
POTASSIUM SERPL-SCNC: 3.7 MMOL/L (ref 3.5–5)
PROT SERPL-MCNC: 6.3 G/DL (ref 6.4–8.3)
RBC # BLD AUTO: 4.84 M/UL (ref 3.5–5.5)
SODIUM SERPL-SCNC: 141 MMOL/L (ref 132–146)
WBC OTHER # BLD: 8.1 K/UL (ref 4.5–11.5)

## 2024-01-09 PROCEDURE — 97530 THERAPEUTIC ACTIVITIES: CPT

## 2024-01-09 PROCEDURE — 83735 ASSAY OF MAGNESIUM: CPT

## 2024-01-09 PROCEDURE — 74230 X-RAY XM SWLNG FUNCJ C+: CPT

## 2024-01-09 PROCEDURE — 6370000000 HC RX 637 (ALT 250 FOR IP)

## 2024-01-09 PROCEDURE — 82962 GLUCOSE BLOOD TEST: CPT

## 2024-01-09 PROCEDURE — 92526 ORAL FUNCTION THERAPY: CPT | Performed by: SPEECH-LANGUAGE PATHOLOGIST

## 2024-01-09 PROCEDURE — 92523 SPEECH SOUND LANG COMPREHEN: CPT | Performed by: SPEECH-LANGUAGE PATHOLOGIST

## 2024-01-09 PROCEDURE — 6370000000 HC RX 637 (ALT 250 FOR IP): Performed by: PSYCHIATRY & NEUROLOGY

## 2024-01-09 PROCEDURE — 1200000000 HC SEMI PRIVATE

## 2024-01-09 PROCEDURE — 84100 ASSAY OF PHOSPHORUS: CPT

## 2024-01-09 PROCEDURE — 97112 NEUROMUSCULAR REEDUCATION: CPT | Performed by: PHYSICAL THERAPIST

## 2024-01-09 PROCEDURE — 92611 MOTION FLUOROSCOPY/SWALLOW: CPT | Performed by: SPEECH-LANGUAGE PATHOLOGIST

## 2024-01-09 PROCEDURE — 97161 PT EVAL LOW COMPLEX 20 MIN: CPT | Performed by: PHYSICAL THERAPIST

## 2024-01-09 PROCEDURE — 85025 COMPLETE CBC W/AUTO DIFF WBC: CPT

## 2024-01-09 PROCEDURE — 6370000000 HC RX 637 (ALT 250 FOR IP): Performed by: INTERNAL MEDICINE

## 2024-01-09 PROCEDURE — 97165 OT EVAL LOW COMPLEX 30 MIN: CPT

## 2024-01-09 PROCEDURE — 6360000002 HC RX W HCPCS: Performed by: NURSE PRACTITIONER

## 2024-01-09 PROCEDURE — 36415 COLL VENOUS BLD VENIPUNCTURE: CPT

## 2024-01-09 PROCEDURE — 6370000000 HC RX 637 (ALT 250 FOR IP): Performed by: NURSE PRACTITIONER

## 2024-01-09 PROCEDURE — 97530 THERAPEUTIC ACTIVITIES: CPT | Performed by: PHYSICAL THERAPIST

## 2024-01-09 PROCEDURE — 80053 COMPREHEN METABOLIC PANEL: CPT

## 2024-01-09 PROCEDURE — 2500000003 HC RX 250 WO HCPCS: Performed by: INTERNAL MEDICINE

## 2024-01-09 RX ORDER — AMLODIPINE BESYLATE 10 MG/1
10 TABLET ORAL DAILY
Status: DISCONTINUED | OUTPATIENT
Start: 2024-01-09 | End: 2024-01-10

## 2024-01-09 RX ORDER — METOPROLOL SUCCINATE 25 MG/1
25 TABLET, EXTENDED RELEASE ORAL DAILY
Status: DISCONTINUED | OUTPATIENT
Start: 2024-01-09 | End: 2024-01-10

## 2024-01-09 RX ADMIN — CLOPIDOGREL BISULFATE 75 MG: 75 TABLET ORAL at 09:07

## 2024-01-09 RX ADMIN — BARIUM SULFATE 45 G: 0.81 POWDER, FOR SUSPENSION ORAL at 13:29

## 2024-01-09 RX ADMIN — BARIUM SULFATE 45 ML: 400 SUSPENSION ORAL at 13:29

## 2024-01-09 RX ADMIN — ANASTROZOLE 1 MG: 1 TABLET ORAL at 09:09

## 2024-01-09 RX ADMIN — DESMOPRESSIN ACETATE 40 MG: 0.2 TABLET ORAL at 19:54

## 2024-01-09 RX ADMIN — Medication 100 MG: at 09:07

## 2024-01-09 RX ADMIN — AMLODIPINE BESYLATE 10 MG: 10 TABLET ORAL at 09:07

## 2024-01-09 RX ADMIN — ENOXAPARIN SODIUM 40 MG: 100 INJECTION SUBCUTANEOUS at 09:07

## 2024-01-09 RX ADMIN — LISINOPRIL 10 MG: 10 TABLET ORAL at 09:07

## 2024-01-09 RX ADMIN — BARIUM SULFATE 45 ML: 400 PASTE ORAL at 13:28

## 2024-01-09 RX ADMIN — ASPIRIN 81 MG: 81 TABLET, COATED ORAL at 09:07

## 2024-01-09 RX ADMIN — PANTOPRAZOLE SODIUM 40 MG: 40 TABLET, DELAYED RELEASE ORAL at 05:42

## 2024-01-09 RX ADMIN — LEVOTHYROXINE SODIUM 88 MCG: 0.09 TABLET ORAL at 05:42

## 2024-01-09 NOTE — DISCHARGE INSTR - COC
Continuity of Care Form    Patient Name: Nika Aviles   :  1945  MRN:  [id number]    Admit date:  2024  Discharge date:  2024    Code Status Order: Full Code   Advance Directives:     Admitting Physician:  Christofer Good DO  PCP: Zana Pandya MD    Discharging Nurse: ***  Discharging Hospital Unit/Room#: 0323/0323-01  Discharging Unit Phone Number: [phone]    Emergency Contact:   Extended Emergency Contact Information  Primary Emergency Contact: NeryRosangela  Address: [address] [place] [address]  Home Phone: [phone]  Mobile Phone: [phone]  Relation: Child  Preferred language: English   needed? No  Secondary Emergency Contact: alesia aviles  Mobile Phone: [phone]  Relation: Grandchild  Preferred language: English   needed? No    Past Surgical History:  Past Surgical History:   Procedure Laterality Date    ABDOMEN SURGERY      BACK SURGERY      neck fusion bone graft-no ROM limitations    BREAST SURGERY Right     tumor and lymph node resection-CA    BRONCHOSCOPY N/A 10/01/2019    BRONCHOSCOPY ALVEOLAR LAVAGE WITH BRUSHINGS performed by Arley Delgado MD at [facility] ENDOSCOPY    COLONOSCOPY      ENDOSCOPY, COLON, DIAGNOSTIC      FRACTURE SURGERY      elbow radius    HYSTERECTOMY (CERVIX STATUS UNKNOWN)      KNEE ARTHROSCOPY      MEDIPORT INSERTION, SINGLE Left 2016    chest    OTHER SURGICAL HISTORY  2017    mediport insertion    OTHER SURGICAL HISTORY  2017    removal foreign body    TOTAL KNEE ARTHROPLASTY Right 2022    ANNEMARIE ROBOTIC ASSISTED RIGHT KNEE TOTAL ARTHROPLASTY performed by Clarence Harris MD at [facility] OR    UPPER GASTROINTESTINAL ENDOSCOPY         Immunization History:   Immunization History   Administered Date(s) Administered    COVID-19, PFIZER PURPLE top, DILUTE for use, (age 12 y+), 30mcg/0.3mL 2021, 2021, 10/12/2021       Active Problems:  Patient Active Problem List   Diagnosis Code

## 2024-01-09 NOTE — PLAN OF CARE
Problem: Safety - Adult  Goal: Free from fall injury  1/8/2024 2304 by Cameron De Leon RN  Outcome: Progressing  1/8/2024 1837 by Vickie Hightower RN  Outcome: Progressing     Problem: Discharge Planning  Goal: Discharge to home or other facility with appropriate resources  1/8/2024 2304 by Cameron De Leon RN  Outcome: Progressing  1/8/2024 1837 by Vickie Hightower RN  Outcome: Progressing  Flowsheets (Taken 1/8/2024 1831)  Discharge to home or other facility with appropriate resources: Identify barriers to discharge with patient and caregiver     Problem: Skin/Tissue Integrity  Goal: Absence of new skin breakdown  Description: 1.  Monitor for areas of redness and/or skin breakdown  2.  Assess vascular access sites hourly  3.  Every 4-6 hours minimum:  Change oxygen saturation probe site  4.  Every 4-6 hours:  If on nasal continuous positive airway pressure, respiratory therapy assess nares and determine need for appliance change or resting period.  Outcome: Progressing     Problem: ABCDS Injury Assessment  Goal: Absence of physical injury  Outcome: Progressing     Problem: Confusion  Goal: Confusion, delirium, dementia, or psychosis is improved or at baseline  Description: INTERVENTIONS:  1. Assess for possible contributors to thought disturbance, including medications, impaired vision or hearing, underlying metabolic abnormalities, dehydration, psychiatric diagnoses, and notify attending LIP  2. [place] high risk fall precautions, as indicated  3. Provide frequent short contacts to provide reality reorientation, refocusing and direction  4. Decrease environmental stimuli, including noise as appropriate  5. Monitor and intervene to maintain adequate nutrition, hydration, elimination, sleep and activity  6. If unable to ensure safety without constant attention obtain sitter and review sitter guidelines with assigned personnel  7. Initiate Psychosocial CNS and Spiritual Care consult, as indicated  Outcome: Progressing

## 2024-01-09 NOTE — ACP (ADVANCE CARE PLANNING)
Advance Care Planning   Healthcare Decision Maker:    Primary Decision Maker: Rosangela Gabriel - Child - [phone]    Secondary Decision Maker: alesia alexander - Grandchild - [phone]    Click here to complete Healthcare Decision Makers including selection of the Healthcare Decision Maker Relationship (ie \"Primary\").  Today we documented Decision Maker(s) consistent with Legal Next of Kin hierarchy.

## 2024-01-10 PROBLEM — R41.82 ALTERED MENTAL STATUS: Status: ACTIVE | Noted: 2024-01-10

## 2024-01-10 LAB
ALBUMIN SERPL-MCNC: 3.8 G/DL (ref 3.5–5.2)
ALP SERPL-CCNC: 112 U/L (ref 35–104)
ALT SERPL-CCNC: 8 U/L (ref 0–32)
ANION GAP SERPL CALCULATED.3IONS-SCNC: 13 MMOL/L (ref 7–16)
AST SERPL-CCNC: 11 U/L (ref 0–31)
BASOPHILS # BLD: 0.03 K/UL (ref 0–0.2)
BASOPHILS NFR BLD: 1 % (ref 0–2)
BILIRUB SERPL-MCNC: 0.8 MG/DL (ref 0–1.2)
BNP SERPL-MCNC: 575 PG/ML (ref 0–450)
BUN SERPL-MCNC: 13 MG/DL (ref 6–23)
CALCIUM SERPL-MCNC: 9.4 MG/DL (ref 8.6–10.2)
CHLORIDE SERPL-SCNC: 103 MMOL/L (ref 98–107)
CO2 SERPL-SCNC: 22 MMOL/L (ref 22–29)
CREAT SERPL-MCNC: 0.8 MG/DL (ref 0.5–1)
EOSINOPHIL # BLD: 0.3 K/UL (ref 0.05–0.5)
EOSINOPHILS RELATIVE PERCENT: 5 % (ref 0–6)
ERYTHROCYTE [DISTWIDTH] IN BLOOD BY AUTOMATED COUNT: 12.7 % (ref 11.5–15)
GFR SERPL CREATININE-BSD FRML MDRD: >60 ML/MIN/1.73M2
GLUCOSE BLD-MCNC: 109 MG/DL (ref 74–99)
GLUCOSE BLD-MCNC: 152 MG/DL (ref 74–99)
GLUCOSE BLD-MCNC: 154 MG/DL (ref 74–99)
GLUCOSE BLD-MCNC: 157 MG/DL (ref 74–99)
GLUCOSE SERPL-MCNC: 128 MG/DL (ref 74–99)
HCT VFR BLD AUTO: 43 % (ref 34–48)
HGB BLD-MCNC: 14.4 G/DL (ref 11.5–15.5)
IMM GRANULOCYTES # BLD AUTO: <0.03 K/UL (ref 0–0.58)
IMM GRANULOCYTES NFR BLD: 0 % (ref 0–5)
LYMPHOCYTES NFR BLD: 1.18 K/UL (ref 1.5–4)
LYMPHOCYTES RELATIVE PERCENT: 21 % (ref 20–42)
MAGNESIUM SERPL-MCNC: 2.1 MG/DL (ref 1.6–2.6)
MCH RBC QN AUTO: 30.1 PG (ref 26–35)
MCHC RBC AUTO-ENTMCNC: 33.5 G/DL (ref 32–34.5)
MCV RBC AUTO: 89.8 FL (ref 80–99.9)
MONOCYTES NFR BLD: 0.77 K/UL (ref 0.1–0.95)
MONOCYTES NFR BLD: 14 % (ref 2–12)
NEUTROPHILS NFR BLD: 59 % (ref 43–80)
NEUTS SEG NFR BLD: 3.28 K/UL (ref 1.8–7.3)
PHOSPHATE SERPL-MCNC: 3.4 MG/DL (ref 2.5–4.5)
PLATELET # BLD AUTO: 186 K/UL (ref 130–450)
PMV BLD AUTO: 9.5 FL (ref 7–12)
POTASSIUM SERPL-SCNC: 3.6 MMOL/L (ref 3.5–5)
PROT SERPL-MCNC: 6.4 G/DL (ref 6.4–8.3)
RBC # BLD AUTO: 4.79 M/UL (ref 3.5–5.5)
SODIUM SERPL-SCNC: 138 MMOL/L (ref 132–146)
VIT B1 PYROPHOSHATE BLD-SCNC: 140 NMOL/L (ref 70–180)
WBC OTHER # BLD: 5.6 K/UL (ref 4.5–11.5)

## 2024-01-10 PROCEDURE — 83735 ASSAY OF MAGNESIUM: CPT

## 2024-01-10 PROCEDURE — 97530 THERAPEUTIC ACTIVITIES: CPT

## 2024-01-10 PROCEDURE — APPSS60 APP SPLIT SHARED TIME 46-60 MINUTES: Performed by: NURSE PRACTITIONER

## 2024-01-10 PROCEDURE — 2580000003 HC RX 258: Performed by: NURSE PRACTITIONER

## 2024-01-10 PROCEDURE — 97110 THERAPEUTIC EXERCISES: CPT

## 2024-01-10 PROCEDURE — 83880 ASSAY OF NATRIURETIC PEPTIDE: CPT

## 2024-01-10 PROCEDURE — 6370000000 HC RX 637 (ALT 250 FOR IP)

## 2024-01-10 PROCEDURE — 85025 COMPLETE CBC W/AUTO DIFF WBC: CPT

## 2024-01-10 PROCEDURE — 99222 1ST HOSP IP/OBS MODERATE 55: CPT | Performed by: INTERNAL MEDICINE

## 2024-01-10 PROCEDURE — 92526 ORAL FUNCTION THERAPY: CPT | Performed by: SPEECH-LANGUAGE PATHOLOGIST

## 2024-01-10 PROCEDURE — 84100 ASSAY OF PHOSPHORUS: CPT

## 2024-01-10 PROCEDURE — 6370000000 HC RX 637 (ALT 250 FOR IP): Performed by: INTERNAL MEDICINE

## 2024-01-10 PROCEDURE — 80053 COMPREHEN METABOLIC PANEL: CPT

## 2024-01-10 PROCEDURE — 6370000000 HC RX 637 (ALT 250 FOR IP): Performed by: PSYCHIATRY & NEUROLOGY

## 2024-01-10 PROCEDURE — 6360000002 HC RX W HCPCS: Performed by: NURSE PRACTITIONER

## 2024-01-10 PROCEDURE — 36415 COLL VENOUS BLD VENIPUNCTURE: CPT

## 2024-01-10 PROCEDURE — 6370000000 HC RX 637 (ALT 250 FOR IP): Performed by: NURSE PRACTITIONER

## 2024-01-10 PROCEDURE — 97535 SELF CARE MNGMENT TRAINING: CPT

## 2024-01-10 PROCEDURE — 1200000000 HC SEMI PRIVATE

## 2024-01-10 PROCEDURE — 82962 GLUCOSE BLOOD TEST: CPT

## 2024-01-10 RX ORDER — METOPROLOL SUCCINATE 50 MG/1
50 TABLET, EXTENDED RELEASE ORAL DAILY
Status: DISCONTINUED | OUTPATIENT
Start: 2024-01-10 | End: 2024-01-14 | Stop reason: HOSPADM

## 2024-01-10 RX ORDER — AMLODIPINE BESYLATE 5 MG/1
5 TABLET ORAL DAILY
Status: DISCONTINUED | OUTPATIENT
Start: 2024-01-10 | End: 2024-01-10

## 2024-01-10 RX ORDER — SPIRONOLACTONE 25 MG/1
25 TABLET ORAL DAILY
Status: DISCONTINUED | OUTPATIENT
Start: 2024-01-10 | End: 2024-01-13

## 2024-01-10 RX ADMIN — ANASTROZOLE 1 MG: 1 TABLET ORAL at 08:56

## 2024-01-10 RX ADMIN — Medication 100 MG: at 08:56

## 2024-01-10 RX ADMIN — Medication 10 ML: at 20:18

## 2024-01-10 RX ADMIN — ENOXAPARIN SODIUM 40 MG: 100 INJECTION SUBCUTANEOUS at 08:56

## 2024-01-10 RX ADMIN — Medication 10 ML: at 08:57

## 2024-01-10 RX ADMIN — METOPROLOL SUCCINATE 50 MG: 50 TABLET, EXTENDED RELEASE ORAL at 08:56

## 2024-01-10 RX ADMIN — LEVOTHYROXINE SODIUM 88 MCG: 0.09 TABLET ORAL at 07:03

## 2024-01-10 RX ADMIN — CLOPIDOGREL BISULFATE 75 MG: 75 TABLET ORAL at 08:56

## 2024-01-10 RX ADMIN — PANTOPRAZOLE SODIUM 40 MG: 40 TABLET, DELAYED RELEASE ORAL at 07:03

## 2024-01-10 RX ADMIN — ASPIRIN 81 MG: 81 TABLET, COATED ORAL at 08:56

## 2024-01-10 RX ADMIN — DESMOPRESSIN ACETATE 40 MG: 0.2 TABLET ORAL at 20:17

## 2024-01-10 RX ADMIN — AMLODIPINE BESYLATE 5 MG: 5 TABLET ORAL at 08:56

## 2024-01-10 RX ADMIN — SPIRONOLACTONE 25 MG: 25 TABLET ORAL at 15:51

## 2024-01-10 NOTE — CONSULTS
INPATIENT CARDIOLOGY CONSULT     Reason for Consult: Cardiomyopathy    Cardiologist: Dr. Michael    Requesting Physician: Dr. Good     Date of Consultation: 1/10/2024    HISTORY OF PRESENT ILLNESS:   Ms. Aviles  is a 78 year old obese female new to [facility].    PMHx:  cLBBB, HTN, HLD, non-insulin requiring T2DM, former tobacco smoker, obesity, CVA, hypothyroidism on HRT, low-grade follicular B-cell lymphoma s/p chemotherapy -- currently in remission, postmenopausal R breast CA stage I maintained on Anastrozole therapy, Alzheimer's disease, Aldactone intolerance, arthritis/gait instability --ambulates with Rollator, osteopenia. She resides with her daughter, grandson and granddaughter.    Please note the following information was obtained from the patient, daughter who is at the bedside and extensive chart review.    Patient's daughter reported that she is normally fairly active.  She is able to do household chores but has right-sided residual weakness from a stroke that she had approximately 30 years ago.  She has had no recent falls but has noticed occasional ankle edema without chest pain or shortness of breath.  Over the past week the patient had noticed she was having mild dyspnea with doing light activity.  She denies abdominal bloating, orthopnea and PND.  Her daughter had mentioned that her blood pressure had been running on the \"higher side\" over the past month despite being on Norvasc 5 mg daily.  On 1/5/2024, patient reported that she began having weakness in her lower extremities.  Her daughter reported that time she was \"shuffling her feet.\"  Due to worsening weakness and gait instability she presented to [facility] 1/7/2024. Patient was noted to have an unremarkable Chest x-ray, urinalysis and EKG and therefore was discharged to home. She presented back on 1/7/2024 [facility] due to worsening concerns that \"something else was wrong.\"     Upon arrival to the ED: Blood pressure 157/71, heart rate 80, 
insulin lispro  0-8 Units SubCUTAneous TID     insulin lispro  0-4 Units SubCUTAneous Nightly    pantoprazole (PROTONIX) 40 mg in sodium chloride (PF) 0.9 % 10 mL injection  40 mg IntraVENous Daily    amLODIPine  5 mg Oral Daily     Continuous Infusions:   sodium chloride      sodium chloride 75 mL/hr at 01/08/24 0102     PRN Meds:sodium chloride flush, sodium chloride, potassium chloride **OR** potassium alternative oral replacement **OR** potassium chloride, magnesium sulfate, ondansetron **OR** ondansetron, acetaminophen **OR** acetaminophen, magnesium hydroxide, perflutren lipid microspheres    Allergies:  Penicillins    Social History:   TOBACCO:   reports that she quit smoking about 29 years ago. Her smoking use included cigarettes. She has never used smokeless tobacco.  ETOH:   reports no history of alcohol use.    Past Medical History:        Diagnosis Date    Breast CA (HCC) 2018    Right    Breast cancer (HCC)     2017  right ILC    Cerebral artery occlusion with cerebral infarction (HCC) 1994     rt arm leg weakness    Chronic kidney disease     Dementia (HCC)     Diabetes mellitus (HCC)     Disease of blood and blood forming organ     Diverticulitis     Hyperlipidemia     Hypertension     Hypothyroidism     Lymphoma (HCC) 12/2016    nonhogkins, previous chemo, immunotherapy-no current issues (6/24/22)    Osteoarthritis     Prolonged emergence from general anesthesia     Thyroid disease        Past Surgical History:        Procedure Laterality Date    ABDOMEN SURGERY      BACK SURGERY      neck fusion bone graft-no ROM limitations    BREAST SURGERY Right     tumor and lymph node resection-CA    BRONCHOSCOPY N/A 10/01/2019    BRONCHOSCOPY ALVEOLAR LAVAGE WITH BRUSHINGS performed by Arley Delgado MD at [facility] ENDOSCOPY    COLONOSCOPY      ENDOSCOPY, COLON, DIAGNOSTIC      FRACTURE SURGERY      elbow radius    HYSTERECTOMY (CERVIX STATUS UNKNOWN)      KNEE ARTHROSCOPY      MEDIPORT INSERTION, SINGLE

## 2024-01-10 NOTE — PLAN OF CARE
Problem: Safety - Adult  Goal: Free from fall injury  Outcome: Progressing     Problem: Discharge Planning  Goal: Discharge to home or other facility with appropriate resources  Outcome: Progressing     Problem: Skin/Tissue Integrity  Goal: Absence of new skin breakdown  Description: 1.  Monitor for areas of redness and/or skin breakdown  2.  Assess vascular access sites hourly  3.  Every 4-6 hours minimum:  Change oxygen saturation probe site  4.  Every 4-6 hours:  If on nasal continuous positive airway pressure, respiratory therapy assess nares and determine need for appliance change or resting period.  Outcome: Progressing     Problem: ABCDS Injury Assessment  Goal: Absence of physical injury  Outcome: Progressing     Problem: Confusion  Goal: Confusion, delirium, dementia, or psychosis is improved or at baseline  Description: INTERVENTIONS:  1. Assess for possible contributors to thought disturbance, including medications, impaired vision or hearing, underlying metabolic abnormalities, dehydration, psychiatric diagnoses, and notify attending LIP  2. [place] high risk fall precautions, as indicated  3. Provide frequent short contacts to provide reality reorientation, refocusing and direction  4. Decrease environmental stimuli, including noise as appropriate  5. Monitor and intervene to maintain adequate nutrition, hydration, elimination, sleep and activity  6. If unable to ensure safety without constant attention obtain sitter and review sitter guidelines with assigned personnel  7. Initiate Psychosocial CNS and Spiritual Care consult, as indicated  Outcome: Progressing     Problem: Chronic Conditions and Co-morbidities  Goal: Patient's chronic conditions and co-morbidity symptoms are monitored and maintained or improved  Outcome: Progressing

## 2024-01-10 NOTE — PLAN OF CARE
Problem: Safety - Adult  Goal: Free from fall injury  1/10/2024 0735 by Elizabet Goel, RN  Outcome: Progressing  1/9/2024 2033 by James Arzola, RN  Outcome: Progressing

## 2024-01-11 LAB
ALBUMIN SERPL-MCNC: 4 G/DL (ref 3.5–5.2)
ALP SERPL-CCNC: 116 U/L (ref 35–104)
ALT SERPL-CCNC: 9 U/L (ref 0–32)
ANION GAP SERPL CALCULATED.3IONS-SCNC: 13 MMOL/L (ref 7–16)
AST SERPL-CCNC: 11 U/L (ref 0–31)
BASOPHILS # BLD: 0.02 K/UL (ref 0–0.2)
BASOPHILS NFR BLD: 0 % (ref 0–2)
BILIRUB SERPL-MCNC: 0.8 MG/DL (ref 0–1.2)
BUN SERPL-MCNC: 13 MG/DL (ref 6–23)
CALCIUM SERPL-MCNC: 9.3 MG/DL (ref 8.6–10.2)
CHLORIDE SERPL-SCNC: 104 MMOL/L (ref 98–107)
CO2 SERPL-SCNC: 24 MMOL/L (ref 22–29)
CREAT SERPL-MCNC: 0.8 MG/DL (ref 0.5–1)
EOSINOPHIL # BLD: 0.31 K/UL (ref 0.05–0.5)
EOSINOPHILS RELATIVE PERCENT: 5 % (ref 0–6)
ERYTHROCYTE [DISTWIDTH] IN BLOOD BY AUTOMATED COUNT: 12.8 % (ref 11.5–15)
GFR SERPL CREATININE-BSD FRML MDRD: >60 ML/MIN/1.73M2
GLUCOSE BLD-MCNC: 113 MG/DL (ref 74–99)
GLUCOSE BLD-MCNC: 126 MG/DL (ref 74–99)
GLUCOSE BLD-MCNC: 146 MG/DL (ref 74–99)
GLUCOSE BLD-MCNC: 172 MG/DL (ref 74–99)
GLUCOSE SERPL-MCNC: 129 MG/DL (ref 74–99)
HCT VFR BLD AUTO: 41 % (ref 34–48)
HGB BLD-MCNC: 14 G/DL (ref 11.5–15.5)
IMM GRANULOCYTES # BLD AUTO: <0.03 K/UL (ref 0–0.58)
IMM GRANULOCYTES NFR BLD: 0 % (ref 0–5)
LYMPHOCYTES NFR BLD: 1.27 K/UL (ref 1.5–4)
LYMPHOCYTES RELATIVE PERCENT: 20 % (ref 20–42)
MAGNESIUM SERPL-MCNC: 2.1 MG/DL (ref 1.6–2.6)
MCH RBC QN AUTO: 29.5 PG (ref 26–35)
MCHC RBC AUTO-ENTMCNC: 34.1 G/DL (ref 32–34.5)
MCV RBC AUTO: 86.5 FL (ref 80–99.9)
MONOCYTES NFR BLD: 0.83 K/UL (ref 0.1–0.95)
MONOCYTES NFR BLD: 13 % (ref 2–12)
NEUTROPHILS NFR BLD: 62 % (ref 43–80)
NEUTS SEG NFR BLD: 4.05 K/UL (ref 1.8–7.3)
PHOSPHATE SERPL-MCNC: 3.7 MG/DL (ref 2.5–4.5)
PLATELET # BLD AUTO: 213 K/UL (ref 130–450)
PMV BLD AUTO: 9.4 FL (ref 7–12)
POTASSIUM SERPL-SCNC: 3.6 MMOL/L (ref 3.5–5)
PROT SERPL-MCNC: 6.4 G/DL (ref 6.4–8.3)
RBC # BLD AUTO: 4.74 M/UL (ref 3.5–5.5)
SODIUM SERPL-SCNC: 141 MMOL/L (ref 132–146)
WBC OTHER # BLD: 6.5 K/UL (ref 4.5–11.5)

## 2024-01-11 PROCEDURE — 6370000000 HC RX 637 (ALT 250 FOR IP)

## 2024-01-11 PROCEDURE — 6370000000 HC RX 637 (ALT 250 FOR IP): Performed by: PSYCHIATRY & NEUROLOGY

## 2024-01-11 PROCEDURE — 92526 ORAL FUNCTION THERAPY: CPT | Performed by: SPEECH-LANGUAGE PATHOLOGIST

## 2024-01-11 PROCEDURE — 97535 SELF CARE MNGMENT TRAINING: CPT

## 2024-01-11 PROCEDURE — 6370000000 HC RX 637 (ALT 250 FOR IP): Performed by: NURSE PRACTITIONER

## 2024-01-11 PROCEDURE — 84100 ASSAY OF PHOSPHORUS: CPT

## 2024-01-11 PROCEDURE — 6360000002 HC RX W HCPCS: Performed by: NURSE PRACTITIONER

## 2024-01-11 PROCEDURE — 97530 THERAPEUTIC ACTIVITIES: CPT

## 2024-01-11 PROCEDURE — 2580000003 HC RX 258: Performed by: NURSE PRACTITIONER

## 2024-01-11 PROCEDURE — 99233 SBSQ HOSP IP/OBS HIGH 50: CPT | Performed by: INTERNAL MEDICINE

## 2024-01-11 PROCEDURE — 80053 COMPREHEN METABOLIC PANEL: CPT

## 2024-01-11 PROCEDURE — 6370000000 HC RX 637 (ALT 250 FOR IP): Performed by: INTERNAL MEDICINE

## 2024-01-11 PROCEDURE — 83735 ASSAY OF MAGNESIUM: CPT

## 2024-01-11 PROCEDURE — 1200000000 HC SEMI PRIVATE

## 2024-01-11 PROCEDURE — 85025 COMPLETE CBC W/AUTO DIFF WBC: CPT

## 2024-01-11 PROCEDURE — 82962 GLUCOSE BLOOD TEST: CPT

## 2024-01-11 PROCEDURE — 36415 COLL VENOUS BLD VENIPUNCTURE: CPT

## 2024-01-11 RX ORDER — DEXTROSE MONOHYDRATE 100 MG/ML
INJECTION, SOLUTION INTRAVENOUS CONTINUOUS PRN
Status: DISCONTINUED | OUTPATIENT
Start: 2024-01-11 | End: 2024-01-14 | Stop reason: HOSPADM

## 2024-01-11 RX ORDER — METFORMIN HYDROCHLORIDE 500 MG/1
500 TABLET, EXTENDED RELEASE ORAL 2 TIMES DAILY WITH MEALS
Status: DISCONTINUED | OUTPATIENT
Start: 2024-01-11 | End: 2024-01-14 | Stop reason: HOSPADM

## 2024-01-11 RX ORDER — METFORMIN HYDROCHLORIDE 500 MG/1
500 TABLET, EXTENDED RELEASE ORAL
COMMUNITY

## 2024-01-11 RX ADMIN — SPIRONOLACTONE 25 MG: 25 TABLET ORAL at 09:59

## 2024-01-11 RX ADMIN — SACUBITRIL AND VALSARTAN 1 TABLET: 24; 26 TABLET, FILM COATED ORAL at 21:34

## 2024-01-11 RX ADMIN — SACUBITRIL AND VALSARTAN 1 TABLET: 24; 26 TABLET, FILM COATED ORAL at 09:59

## 2024-01-11 RX ADMIN — PANTOPRAZOLE SODIUM 40 MG: 40 TABLET, DELAYED RELEASE ORAL at 05:02

## 2024-01-11 RX ADMIN — Medication 100 MG: at 09:59

## 2024-01-11 RX ADMIN — ASPIRIN 81 MG: 81 TABLET, COATED ORAL at 09:59

## 2024-01-11 RX ADMIN — METFORMIN HYDROCHLORIDE 500 MG: 500 TABLET, EXTENDED RELEASE ORAL at 18:28

## 2024-01-11 RX ADMIN — DESMOPRESSIN ACETATE 40 MG: 0.2 TABLET ORAL at 21:34

## 2024-01-11 RX ADMIN — ENOXAPARIN SODIUM 40 MG: 100 INJECTION SUBCUTANEOUS at 09:59

## 2024-01-11 RX ADMIN — Medication 10 ML: at 21:35

## 2024-01-11 RX ADMIN — CLOPIDOGREL BISULFATE 75 MG: 75 TABLET ORAL at 09:59

## 2024-01-11 RX ADMIN — METOPROLOL SUCCINATE 50 MG: 50 TABLET, EXTENDED RELEASE ORAL at 09:59

## 2024-01-11 RX ADMIN — LEVOTHYROXINE SODIUM 88 MCG: 0.09 TABLET ORAL at 05:02

## 2024-01-11 NOTE — PLAN OF CARE
Problem: Safety - Adult  Goal: Free from fall injury  Outcome: Progressing     Problem: Discharge Planning  Goal: Discharge to home or other facility with appropriate resources  Outcome: Progressing     Problem: Skin/Tissue Integrity  Goal: Absence of new skin breakdown  Description: 1.  Monitor for areas of redness and/or skin breakdown  2.  Assess vascular access sites hourly  3.  Every 4-6 hours minimum:  Change oxygen saturation probe site  4.  Every 4-6 hours:  If on nasal continuous positive airway pressure, respiratory therapy assess nares and determine need for appliance change or resting period.  Outcome: Progressing     Problem: ABCDS Injury Assessment  Goal: Absence of physical injury  Outcome: Progressing     Problem: Confusion  Goal: Confusion, delirium, dementia, or psychosis is improved or at baseline  Description: INTERVENTIONS:  1. Assess for possible contributors to thought disturbance, including medications, impaired vision or hearing, underlying metabolic abnormalities, dehydration, psychiatric diagnoses, and notify attending LIP  2. [place] high risk fall precautions, as indicated  3. Provide frequent short contacts to provide reality reorientation, refocusing and direction  4. Decrease environmental stimuli, including noise as appropriate  5. Monitor and intervene to maintain adequate nutrition, hydration, elimination, sleep and activity  6. If unable to ensure safety without constant attention obtain sitter and review sitter guidelines with assigned personnel  7. Initiate Psychosocial CNS and Spiritual Care consult, as indicated  Outcome: Progressing     Problem: Chronic Conditions and Co-morbidities  Goal: Patient's chronic conditions and co-morbidity symptoms are monitored and maintained or improved  Outcome: Progressing

## 2024-01-12 LAB
ALBUMIN SERPL-MCNC: 4.1 G/DL (ref 3.5–5.2)
ALP SERPL-CCNC: 132 U/L (ref 35–104)
ALT SERPL-CCNC: 10 U/L (ref 0–32)
ANION GAP SERPL CALCULATED.3IONS-SCNC: 16 MMOL/L (ref 7–16)
AST SERPL-CCNC: 13 U/L (ref 0–31)
BASOPHILS # BLD: 0.03 K/UL (ref 0–0.2)
BASOPHILS NFR BLD: 0 % (ref 0–2)
BILIRUB SERPL-MCNC: 0.7 MG/DL (ref 0–1.2)
BUN SERPL-MCNC: 15 MG/DL (ref 6–23)
CALCIUM SERPL-MCNC: 9.8 MG/DL (ref 8.6–10.2)
CHLORIDE SERPL-SCNC: 104 MMOL/L (ref 98–107)
CO2 SERPL-SCNC: 20 MMOL/L (ref 22–29)
CREAT SERPL-MCNC: 0.8 MG/DL (ref 0.5–1)
EOSINOPHIL # BLD: 0.29 K/UL (ref 0.05–0.5)
EOSINOPHILS RELATIVE PERCENT: 3 % (ref 0–6)
ERYTHROCYTE [DISTWIDTH] IN BLOOD BY AUTOMATED COUNT: 12.3 % (ref 11.5–15)
GFR SERPL CREATININE-BSD FRML MDRD: >60 ML/MIN/1.73M2
GLUCOSE BLD-MCNC: 128 MG/DL (ref 74–99)
GLUCOSE BLD-MCNC: 137 MG/DL (ref 74–99)
GLUCOSE BLD-MCNC: 149 MG/DL (ref 74–99)
GLUCOSE BLD-MCNC: 150 MG/DL (ref 74–99)
GLUCOSE SERPL-MCNC: 137 MG/DL (ref 74–99)
HCT VFR BLD AUTO: 48.4 % (ref 34–48)
HGB BLD-MCNC: 16.4 G/DL (ref 11.5–15.5)
IMM GRANULOCYTES # BLD AUTO: 0.03 K/UL (ref 0–0.58)
IMM GRANULOCYTES NFR BLD: 0 % (ref 0–5)
LYMPHOCYTES NFR BLD: 1.29 K/UL (ref 1.5–4)
LYMPHOCYTES RELATIVE PERCENT: 14 % (ref 20–42)
MCH RBC QN AUTO: 29.8 PG (ref 26–35)
MCHC RBC AUTO-ENTMCNC: 33.9 G/DL (ref 32–34.5)
MCV RBC AUTO: 87.8 FL (ref 80–99.9)
MONOCYTES NFR BLD: 0.91 K/UL (ref 0.1–0.95)
MONOCYTES NFR BLD: 10 % (ref 2–12)
NEUTROPHILS NFR BLD: 72 % (ref 43–80)
NEUTS SEG NFR BLD: 6.68 K/UL (ref 1.8–7.3)
PLATELET # BLD AUTO: 234 K/UL (ref 130–450)
PMV BLD AUTO: 9.4 FL (ref 7–12)
POTASSIUM SERPL-SCNC: 4 MMOL/L (ref 3.5–5)
PROT SERPL-MCNC: 7 G/DL (ref 6.4–8.3)
RBC # BLD AUTO: 5.51 M/UL (ref 3.5–5.5)
SODIUM SERPL-SCNC: 140 MMOL/L (ref 132–146)
WBC OTHER # BLD: 9.2 K/UL (ref 4.5–11.5)

## 2024-01-12 PROCEDURE — 85025 COMPLETE CBC W/AUTO DIFF WBC: CPT

## 2024-01-12 PROCEDURE — 6370000000 HC RX 637 (ALT 250 FOR IP): Performed by: NURSE PRACTITIONER

## 2024-01-12 PROCEDURE — 6370000000 HC RX 637 (ALT 250 FOR IP)

## 2024-01-12 PROCEDURE — 6370000000 HC RX 637 (ALT 250 FOR IP): Performed by: PSYCHIATRY & NEUROLOGY

## 2024-01-12 PROCEDURE — 97110 THERAPEUTIC EXERCISES: CPT

## 2024-01-12 PROCEDURE — 2580000003 HC RX 258: Performed by: NURSE PRACTITIONER

## 2024-01-12 PROCEDURE — 6360000002 HC RX W HCPCS: Performed by: NURSE PRACTITIONER

## 2024-01-12 PROCEDURE — 99233 SBSQ HOSP IP/OBS HIGH 50: CPT | Performed by: INTERNAL MEDICINE

## 2024-01-12 PROCEDURE — 36415 COLL VENOUS BLD VENIPUNCTURE: CPT

## 2024-01-12 PROCEDURE — 82962 GLUCOSE BLOOD TEST: CPT

## 2024-01-12 PROCEDURE — 97530 THERAPEUTIC ACTIVITIES: CPT

## 2024-01-12 PROCEDURE — 6370000000 HC RX 637 (ALT 250 FOR IP): Performed by: INTERNAL MEDICINE

## 2024-01-12 PROCEDURE — 80053 COMPREHEN METABOLIC PANEL: CPT

## 2024-01-12 PROCEDURE — 2580000003 HC RX 258

## 2024-01-12 PROCEDURE — 1200000000 HC SEMI PRIVATE

## 2024-01-12 RX ORDER — SODIUM CHLORIDE 9 MG/ML
INJECTION, SOLUTION INTRAVENOUS CONTINUOUS
Status: DISCONTINUED | OUTPATIENT
Start: 2024-01-12 | End: 2024-01-14 | Stop reason: HOSPADM

## 2024-01-12 RX ADMIN — CLOPIDOGREL BISULFATE 75 MG: 75 TABLET ORAL at 08:42

## 2024-01-12 RX ADMIN — SACUBITRIL AND VALSARTAN 1 TABLET: 24; 26 TABLET, FILM COATED ORAL at 08:42

## 2024-01-12 RX ADMIN — Medication 100 MG: at 08:42

## 2024-01-12 RX ADMIN — ENOXAPARIN SODIUM 40 MG: 100 INJECTION SUBCUTANEOUS at 08:42

## 2024-01-12 RX ADMIN — SACUBITRIL AND VALSARTAN 1 TABLET: 24; 26 TABLET, FILM COATED ORAL at 21:39

## 2024-01-12 RX ADMIN — SODIUM CHLORIDE: 9 INJECTION, SOLUTION INTRAVENOUS at 09:01

## 2024-01-12 RX ADMIN — METOPROLOL SUCCINATE 50 MG: 50 TABLET, EXTENDED RELEASE ORAL at 08:42

## 2024-01-12 RX ADMIN — PANTOPRAZOLE SODIUM 40 MG: 40 TABLET, DELAYED RELEASE ORAL at 06:01

## 2024-01-12 RX ADMIN — LEVOTHYROXINE SODIUM 88 MCG: 0.09 TABLET ORAL at 06:01

## 2024-01-12 RX ADMIN — ASPIRIN 81 MG: 81 TABLET, COATED ORAL at 08:42

## 2024-01-12 RX ADMIN — SPIRONOLACTONE 25 MG: 25 TABLET ORAL at 08:42

## 2024-01-12 RX ADMIN — METFORMIN HYDROCHLORIDE 500 MG: 500 TABLET, EXTENDED RELEASE ORAL at 08:42

## 2024-01-12 RX ADMIN — Medication 10 ML: at 08:44

## 2024-01-12 RX ADMIN — METFORMIN HYDROCHLORIDE 500 MG: 500 TABLET, EXTENDED RELEASE ORAL at 17:36

## 2024-01-12 RX ADMIN — DESMOPRESSIN ACETATE 40 MG: 0.2 TABLET ORAL at 21:40

## 2024-01-12 NOTE — PLAN OF CARE
Problem: Safety - Adult  Goal: Free from fall injury  Outcome: Progressing     Problem: Discharge Planning  Goal: Discharge to home or other facility with appropriate resources  Outcome: Progressing     Problem: Skin/Tissue Integrity  Goal: Absence of new skin breakdown  Description: 1.  Monitor for areas of redness and/or skin breakdown  2.  Assess vascular access sites hourly  3.  Every 4-6 hours minimum:  Change oxygen saturation probe site  4.  Every 4-6 hours:  If on nasal continuous positive airway pressure, respiratory therapy assess nares and determine need for appliance change or resting period.  Outcome: Progressing     Problem: ABCDS Injury Assessment  Goal: Absence of physical injury  Outcome: Progressing     Problem: Confusion  Goal: Confusion, delirium, dementia, or psychosis is improved or at baseline  Description: INTERVENTIONS:  1. Assess for possible contributors to thought disturbance, including medications, impaired vision or hearing, underlying metabolic abnormalities, dehydration, psychiatric diagnoses, and notify attending LIP  2. [place] high risk fall precautions, as indicated  3. Provide frequent short contacts to provide reality reorientation, refocusing and direction  4. Decrease environmental stimuli, including noise as appropriate  5. Monitor and intervene to maintain adequate nutrition, hydration, elimination, sleep and activity  6. If unable to ensure safety without constant attention obtain sitter and review sitter guidelines with assigned personnel  7. Initiate Psychosocial CNS and Spiritual Care consult, as indicated  Outcome: Progressing     Problem: Chronic Conditions and Co-morbidities  Goal: Patient's chronic conditions and co-morbidity symptoms are monitored and maintained or improved  Outcome: Progressing

## 2024-01-12 NOTE — PLAN OF CARE
Problem: Safety - Adult  Goal: Free from fall injury  1/12/2024 1046 by Carina Monaco RN  Outcome: Progressing  1/11/2024 2246 by Gina Martinez RN  Outcome: Progressing     Problem: Discharge Planning  Goal: Discharge to home or other facility with appropriate resources  1/12/2024 1046 by Carina Monaco RN  Outcome: Progressing  1/11/2024 2246 by Gina Martinez RN  Outcome: Progressing     Problem: Skin/Tissue Integrity  Goal: Absence of new skin breakdown  Description: 1.  Monitor for areas of redness and/or skin breakdown  2.  Assess vascular access sites hourly  3.  Every 4-6 hours minimum:  Change oxygen saturation probe site  4.  Every 4-6 hours:  If on nasal continuous positive airway pressure, respiratory therapy assess nares and determine need for appliance change or resting period.  1/12/2024 1046 by Carina Monaco RN  Outcome: Progressing  1/11/2024 2246 by Gina Martinez RN  Outcome: Progressing     Problem: ABCDS Injury Assessment  Goal: Absence of physical injury  1/12/2024 1046 by Carina Monaco RN  Outcome: Progressing  1/11/2024 2246 by Gina Martinez RN  Outcome: Progressing     Problem: Confusion  Goal: Confusion, delirium, dementia, or psychosis is improved or at baseline  Description: INTERVENTIONS:  1. Assess for possible contributors to thought disturbance, including medications, impaired vision or hearing, underlying metabolic abnormalities, dehydration, psychiatric diagnoses, and notify attending LIP  2. [place] high risk fall precautions, as indicated  3. Provide frequent short contacts to provide reality reorientation, refocusing and direction  4. Decrease environmental stimuli, including noise as appropriate  5. Monitor and intervene to maintain adequate nutrition, hydration, elimination, sleep and activity  6. If unable to ensure safety without constant attention obtain sitter and review sitter guidelines with assigned personnel  7. Initiate Psychosocial CNS and

## 2024-01-13 LAB
ALBUMIN SERPL-MCNC: 3.9 G/DL (ref 3.5–5.2)
ALP SERPL-CCNC: 122 U/L (ref 35–104)
ALT SERPL-CCNC: 8 U/L (ref 0–32)
ANION GAP SERPL CALCULATED.3IONS-SCNC: 12 MMOL/L (ref 7–16)
AST SERPL-CCNC: 11 U/L (ref 0–31)
BASOPHILS # BLD: 0.04 K/UL (ref 0–0.2)
BASOPHILS NFR BLD: 1 % (ref 0–2)
BILIRUB SERPL-MCNC: 0.7 MG/DL (ref 0–1.2)
BUN SERPL-MCNC: 19 MG/DL (ref 6–23)
CALCIUM SERPL-MCNC: 9.1 MG/DL (ref 8.6–10.2)
CHLORIDE SERPL-SCNC: 104 MMOL/L (ref 98–107)
CO2 SERPL-SCNC: 20 MMOL/L (ref 22–29)
CREAT SERPL-MCNC: 0.9 MG/DL (ref 0.5–1)
EOSINOPHIL # BLD: 0.29 K/UL (ref 0.05–0.5)
EOSINOPHILS RELATIVE PERCENT: 4 % (ref 0–6)
ERYTHROCYTE [DISTWIDTH] IN BLOOD BY AUTOMATED COUNT: 12.7 % (ref 11.5–15)
GFR SERPL CREATININE-BSD FRML MDRD: >60 ML/MIN/1.73M2
GLUCOSE BLD-MCNC: 112 MG/DL (ref 74–99)
GLUCOSE BLD-MCNC: 113 MG/DL (ref 74–99)
GLUCOSE BLD-MCNC: 129 MG/DL (ref 74–99)
GLUCOSE BLD-MCNC: 174 MG/DL (ref 74–99)
GLUCOSE SERPL-MCNC: 131 MG/DL (ref 74–99)
HCT VFR BLD AUTO: 47.2 % (ref 34–48)
HGB BLD-MCNC: 15.7 G/DL (ref 11.5–15.5)
IMM GRANULOCYTES # BLD AUTO: <0.03 K/UL (ref 0–0.58)
IMM GRANULOCYTES NFR BLD: 0 % (ref 0–5)
LYMPHOCYTES NFR BLD: 1.56 K/UL (ref 1.5–4)
LYMPHOCYTES RELATIVE PERCENT: 20 % (ref 20–42)
MCH RBC QN AUTO: 29.4 PG (ref 26–35)
MCHC RBC AUTO-ENTMCNC: 33.3 G/DL (ref 32–34.5)
MCV RBC AUTO: 88.4 FL (ref 80–99.9)
MONOCYTES NFR BLD: 0.89 K/UL (ref 0.1–0.95)
MONOCYTES NFR BLD: 11 % (ref 2–12)
NEUTROPHILS NFR BLD: 64 % (ref 43–80)
NEUTS SEG NFR BLD: 5.04 K/UL (ref 1.8–7.3)
PLATELET # BLD AUTO: 221 K/UL (ref 130–450)
PMV BLD AUTO: 9 FL (ref 7–12)
POTASSIUM SERPL-SCNC: 3.8 MMOL/L (ref 3.5–5)
PROT SERPL-MCNC: 6.4 G/DL (ref 6.4–8.3)
RBC # BLD AUTO: 5.34 M/UL (ref 3.5–5.5)
SODIUM SERPL-SCNC: 136 MMOL/L (ref 132–146)
WBC OTHER # BLD: 7.8 K/UL (ref 4.5–11.5)

## 2024-01-13 PROCEDURE — 82962 GLUCOSE BLOOD TEST: CPT

## 2024-01-13 PROCEDURE — 6360000002 HC RX W HCPCS: Performed by: NURSE PRACTITIONER

## 2024-01-13 PROCEDURE — 6370000000 HC RX 637 (ALT 250 FOR IP): Performed by: PSYCHIATRY & NEUROLOGY

## 2024-01-13 PROCEDURE — 85025 COMPLETE CBC W/AUTO DIFF WBC: CPT

## 2024-01-13 PROCEDURE — 2580000003 HC RX 258: Performed by: NURSE PRACTITIONER

## 2024-01-13 PROCEDURE — 1200000000 HC SEMI PRIVATE

## 2024-01-13 PROCEDURE — 97110 THERAPEUTIC EXERCISES: CPT

## 2024-01-13 PROCEDURE — 6370000000 HC RX 637 (ALT 250 FOR IP): Performed by: INTERNAL MEDICINE

## 2024-01-13 PROCEDURE — 6370000000 HC RX 637 (ALT 250 FOR IP): Performed by: NURSE PRACTITIONER

## 2024-01-13 PROCEDURE — 80053 COMPREHEN METABOLIC PANEL: CPT

## 2024-01-13 PROCEDURE — 6370000000 HC RX 637 (ALT 250 FOR IP)

## 2024-01-13 PROCEDURE — 97530 THERAPEUTIC ACTIVITIES: CPT

## 2024-01-13 PROCEDURE — 36415 COLL VENOUS BLD VENIPUNCTURE: CPT

## 2024-01-13 RX ORDER — CLOPIDOGREL BISULFATE 75 MG/1
75 TABLET ORAL DAILY
Qty: 30 TABLET | Refills: 3 | Status: SHIPPED | OUTPATIENT
Start: 2024-01-13

## 2024-01-13 RX ORDER — ASPIRIN 81 MG/1
81 TABLET ORAL DAILY
Qty: 30 TABLET | Refills: 3 | Status: SHIPPED | OUTPATIENT
Start: 2024-01-13

## 2024-01-13 RX ORDER — LEVOTHYROXINE SODIUM 88 UG/1
88 TABLET ORAL DAILY
Qty: 30 TABLET | Refills: 3 | Status: SHIPPED | OUTPATIENT
Start: 2024-01-14

## 2024-01-13 RX ORDER — METOPROLOL SUCCINATE 50 MG/1
50 TABLET, EXTENDED RELEASE ORAL DAILY
Qty: 30 TABLET | Refills: 3 | Status: SHIPPED | OUTPATIENT
Start: 2024-01-13

## 2024-01-13 RX ADMIN — Medication 100 MG: at 09:07

## 2024-01-13 RX ADMIN — Medication 10 ML: at 09:15

## 2024-01-13 RX ADMIN — METFORMIN HYDROCHLORIDE 500 MG: 500 TABLET, EXTENDED RELEASE ORAL at 16:20

## 2024-01-13 RX ADMIN — LEVOTHYROXINE SODIUM 88 MCG: 0.09 TABLET ORAL at 05:26

## 2024-01-13 RX ADMIN — METOPROLOL SUCCINATE 50 MG: 50 TABLET, EXTENDED RELEASE ORAL at 09:07

## 2024-01-13 RX ADMIN — ASPIRIN 81 MG: 81 TABLET, COATED ORAL at 09:07

## 2024-01-13 RX ADMIN — DESMOPRESSIN ACETATE 40 MG: 0.2 TABLET ORAL at 20:21

## 2024-01-13 RX ADMIN — PANTOPRAZOLE SODIUM 40 MG: 40 TABLET, DELAYED RELEASE ORAL at 05:26

## 2024-01-13 RX ADMIN — ENOXAPARIN SODIUM 40 MG: 100 INJECTION SUBCUTANEOUS at 09:07

## 2024-01-13 RX ADMIN — METFORMIN HYDROCHLORIDE 500 MG: 500 TABLET, EXTENDED RELEASE ORAL at 09:07

## 2024-01-13 RX ADMIN — SACUBITRIL AND VALSARTAN 1 TABLET: 24; 26 TABLET, FILM COATED ORAL at 20:20

## 2024-01-13 RX ADMIN — SACUBITRIL AND VALSARTAN 1 TABLET: 24; 26 TABLET, FILM COATED ORAL at 09:07

## 2024-01-13 RX ADMIN — CLOPIDOGREL BISULFATE 75 MG: 75 TABLET ORAL at 09:07

## 2024-01-13 ASSESSMENT — PAIN SCALES - GENERAL: PAINLEVEL_OUTOF10: 0

## 2024-01-13 NOTE — PLAN OF CARE
Problem: Safety - Adult  Goal: Free from fall injury  1/12/2024 2335 by Shen Mendes RN  Outcome: Progressing     Problem: Discharge Planning  Goal: Discharge to home or other facility with appropriate resources  1/12/2024 2335 by Shen Mendes RN  Outcome: Progressing     Problem: Skin/Tissue Integrity  Goal: Absence of new skin breakdown  Description: 1.  Monitor for areas of redness and/or skin breakdown  2.  Assess vascular access sites hourly  3.  Every 4-6 hours minimum:  Change oxygen saturation probe site  4.  Every 4-6 hours:  If on nasal continuous positive airway pressure, respiratory therapy assess nares and determine need for appliance change or resting period.  1/12/2024 2335 by Shen Mendes RN  Outcome: Progressing     Problem: ABCDS Injury Assessment  Goal: Absence of physical injury  1/12/2024 2335 by Shen Mendes RN  Outcome: Progressing     Problem: Confusion  Goal: Confusion, delirium, dementia, or psychosis is improved or at baseline  Description: INTERVENTIONS:  1. Assess for possible contributors to thought disturbance, including medications, impaired vision or hearing, underlying metabolic abnormalities, dehydration, psychiatric diagnoses, and notify attending LIP  2. [place] high risk fall precautions, as indicated  3. Provide frequent short contacts to provide reality reorientation, refocusing and direction  4. Decrease environmental stimuli, including noise as appropriate  5. Monitor and intervene to maintain adequate nutrition, hydration, elimination, sleep and activity  6. If unable to ensure safety without constant attention obtain sitter and review sitter guidelines with assigned personnel  7. Initiate Psychosocial CNS and Spiritual Care consult, as indicated  1/12/2024 2335 by Shen Mendes RN  Outcome: Progressing     Problem: Chronic Conditions and Co-morbidities  Goal: Patient's chronic conditions and co-morbidity symptoms are monitored and maintained or

## 2024-01-14 VITALS
DIASTOLIC BLOOD PRESSURE: 72 MMHG | HEART RATE: 50 BPM | RESPIRATION RATE: 16 BRPM | BODY MASS INDEX: 31.05 KG/M2 | WEIGHT: 193.2 LBS | SYSTOLIC BLOOD PRESSURE: 146 MMHG | OXYGEN SATURATION: 99 % | HEIGHT: 66 IN | TEMPERATURE: 97.3 F

## 2024-01-14 LAB
ALBUMIN SERPL-MCNC: 3.9 G/DL (ref 3.5–5.2)
ALP SERPL-CCNC: 128 U/L (ref 35–104)
ALT SERPL-CCNC: 9 U/L (ref 0–32)
ANION GAP SERPL CALCULATED.3IONS-SCNC: 13 MMOL/L (ref 7–16)
AST SERPL-CCNC: 13 U/L (ref 0–31)
BASOPHILS # BLD: 0.05 K/UL (ref 0–0.2)
BASOPHILS NFR BLD: 1 % (ref 0–2)
BILIRUB SERPL-MCNC: 0.6 MG/DL (ref 0–1.2)
BUN SERPL-MCNC: 14 MG/DL (ref 6–23)
CALCIUM SERPL-MCNC: 9.3 MG/DL (ref 8.6–10.2)
CHLORIDE SERPL-SCNC: 108 MMOL/L (ref 98–107)
CO2 SERPL-SCNC: 20 MMOL/L (ref 22–29)
CREAT SERPL-MCNC: 0.8 MG/DL (ref 0.5–1)
EOSINOPHIL # BLD: 0.39 K/UL (ref 0.05–0.5)
EOSINOPHILS RELATIVE PERCENT: 6 % (ref 0–6)
ERYTHROCYTE [DISTWIDTH] IN BLOOD BY AUTOMATED COUNT: 13 % (ref 11.5–15)
GFR SERPL CREATININE-BSD FRML MDRD: >60 ML/MIN/1.73M2
GLUCOSE BLD-MCNC: 106 MG/DL (ref 74–99)
GLUCOSE BLD-MCNC: 145 MG/DL (ref 74–99)
GLUCOSE SERPL-MCNC: 115 MG/DL (ref 74–99)
HCT VFR BLD AUTO: 47.2 % (ref 34–48)
HGB BLD-MCNC: 15.9 G/DL (ref 11.5–15.5)
IMM GRANULOCYTES # BLD AUTO: <0.03 K/UL (ref 0–0.58)
IMM GRANULOCYTES NFR BLD: 0 % (ref 0–5)
LYMPHOCYTES NFR BLD: 1.51 K/UL (ref 1.5–4)
LYMPHOCYTES RELATIVE PERCENT: 21 % (ref 20–42)
MCH RBC QN AUTO: 29.6 PG (ref 26–35)
MCHC RBC AUTO-ENTMCNC: 33.7 G/DL (ref 32–34.5)
MCV RBC AUTO: 87.7 FL (ref 80–99.9)
MONOCYTES NFR BLD: 0.89 K/UL (ref 0.1–0.95)
MONOCYTES NFR BLD: 13 % (ref 2–12)
NEUTROPHILS NFR BLD: 60 % (ref 43–80)
NEUTS SEG NFR BLD: 4.21 K/UL (ref 1.8–7.3)
PLATELET # BLD AUTO: 217 K/UL (ref 130–450)
PMV BLD AUTO: 9.3 FL (ref 7–12)
POTASSIUM SERPL-SCNC: 3.9 MMOL/L (ref 3.5–5)
PROT SERPL-MCNC: 6.5 G/DL (ref 6.4–8.3)
RBC # BLD AUTO: 5.38 M/UL (ref 3.5–5.5)
SODIUM SERPL-SCNC: 141 MMOL/L (ref 132–146)
WBC OTHER # BLD: 7.1 K/UL (ref 4.5–11.5)

## 2024-01-14 PROCEDURE — 6360000002 HC RX W HCPCS: Performed by: NURSE PRACTITIONER

## 2024-01-14 PROCEDURE — 6370000000 HC RX 637 (ALT 250 FOR IP): Performed by: PSYCHIATRY & NEUROLOGY

## 2024-01-14 PROCEDURE — 82962 GLUCOSE BLOOD TEST: CPT

## 2024-01-14 PROCEDURE — 6370000000 HC RX 637 (ALT 250 FOR IP): Performed by: INTERNAL MEDICINE

## 2024-01-14 PROCEDURE — 6370000000 HC RX 637 (ALT 250 FOR IP): Performed by: NURSE PRACTITIONER

## 2024-01-14 PROCEDURE — 80053 COMPREHEN METABOLIC PANEL: CPT

## 2024-01-14 PROCEDURE — 2580000003 HC RX 258: Performed by: NURSE PRACTITIONER

## 2024-01-14 PROCEDURE — 85025 COMPLETE CBC W/AUTO DIFF WBC: CPT

## 2024-01-14 PROCEDURE — 36415 COLL VENOUS BLD VENIPUNCTURE: CPT

## 2024-01-14 PROCEDURE — 6370000000 HC RX 637 (ALT 250 FOR IP)

## 2024-01-14 RX ADMIN — METOPROLOL SUCCINATE 50 MG: 50 TABLET, EXTENDED RELEASE ORAL at 08:38

## 2024-01-14 RX ADMIN — ASPIRIN 81 MG: 81 TABLET, COATED ORAL at 08:38

## 2024-01-14 RX ADMIN — METFORMIN HYDROCHLORIDE 500 MG: 500 TABLET, EXTENDED RELEASE ORAL at 08:38

## 2024-01-14 RX ADMIN — ENOXAPARIN SODIUM 40 MG: 100 INJECTION SUBCUTANEOUS at 08:38

## 2024-01-14 RX ADMIN — Medication 100 MG: at 08:38

## 2024-01-14 RX ADMIN — Medication 10 ML: at 08:38

## 2024-01-14 RX ADMIN — CLOPIDOGREL BISULFATE 75 MG: 75 TABLET ORAL at 08:38

## 2024-01-14 RX ADMIN — SACUBITRIL AND VALSARTAN 1 TABLET: 24; 26 TABLET, FILM COATED ORAL at 08:38

## 2024-01-14 ASSESSMENT — PAIN SCALES - GENERAL: PAINLEVEL_OUTOF10: 0

## 2024-01-14 NOTE — PLAN OF CARE
Problem: Safety - Adult  Goal: Free from fall injury  Outcome: Progressing     Problem: Discharge Planning  Goal: Discharge to home or other facility with appropriate resources  Outcome: Progressing     Problem: Skin/Tissue Integrity  Goal: Absence of new skin breakdown  Description: 1.  Monitor for areas of redness and/or skin breakdown  2.  Assess vascular access sites hourly  3.  Every 4-6 hours minimum:  Change oxygen saturation probe site  4.  Every 4-6 hours:  If on nasal continuous positive airway pressure, respiratory therapy assess nares and determine need for appliance change or resting period.  Outcome: Progressing     Problem: ABCDS Injury Assessment  Goal: Absence of physical injury  Outcome: Progressing     Problem: Confusion  Goal: Confusion, delirium, dementia, or psychosis is improved or at baseline  Description: INTERVENTIONS:  1. Assess for possible contributors to thought disturbance, including medications, impaired vision or hearing, underlying metabolic abnormalities, dehydration, psychiatric diagnoses, and notify attending LIP  2. [place] high risk fall precautions, as indicated  3. Provide frequent short contacts to provide reality reorientation, refocusing and direction  4. Decrease environmental stimuli, including noise as appropriate  5. Monitor and intervene to maintain adequate nutrition, hydration, elimination, sleep and activity  6. If unable to ensure safety without constant attention obtain sitter and review sitter guidelines with assigned personnel  7. Initiate Psychosocial CNS and Spiritual Care consult, as indicated  Outcome: Progressing     Problem: Chronic Conditions and Co-morbidities  Goal: Patient's chronic conditions and co-morbidity symptoms are monitored and maintained or improved  Outcome: Progressing     Problem: Nutrition Deficit:  Goal: Optimize nutritional status  Outcome: Progressing      Mercedes Flap Text: The defect edges were debeveled with a #15 scalpel blade.  Given the location of the defect, shape of the defect and the proximity to free margins a Mercedes flap was deemed most appropriate.  Using a sterile surgical marker, an appropriate advancement flap was drawn incorporating the defect and placing the expected incisions within the relaxed skin tension lines where possible. The area thus outlined was incised deep to adipose tissue with a #15 scalpel blade.  The skin margins were undermined to an appropriate distance in all directions utilizing iris scissors.

## 2024-01-14 NOTE — CARE COORDINATION
01/09/24 1154   Condition of Participation: Discharge Planning   The Plan for Transition of Care is related to the following treatment goals: Acute rehab placement   The Patient and/or Patient Representative was provided with a Choice of Provider? Patient;Patient Representative   Name of the Patient Representative who was provided with the Choice of Provider and agrees with the Discharge Plan?  dtr- Rosangela   The Patient and/Or Patient Representative agree with the Discharge Plan? Yes   Freedom of Choice list was provided with basic dialogue that supports the patient's individualized plan of care/goals, treatment preferences, and shares the quality data associated with the providers?  No  (declined list- prefers [facility], [place] where pt was at in the past)       
1-14-Cm note: [facility] is scheduled to transoprt pt via stretcher at 1PM to [facility]. Rn on unit ,Pt's dtr and facility aware of transport time Electronically signed by Lena Vasquez RN on 1/14/2024 at 9:46 AM    
1/10/2024: SS NOTE:  Pt accepted medically for acute rehab admission per Tamela, admissions liaison for [facility] & PRE CERT SUBMITTED 1/9, RODRIGUE initiated, pt, family & Nursing informed, sw/cm to follow for insurance authorization and medical d/c to confirm transfer.Electronically signed by JIA Moreno on 1/10/2024 at 10:20 AM  
1/11/2024: SS NOTE:  Notified by Tamela admissions for [facility] that they are still showing as in the PORTAL but PRE CERT AS PENDING but anticipate getting soon, sw called [facility] to reschedule trip, next available to time is 7:30pm, tentatively set up pending authorization, nursing informed.Electronically signed by JIA Moreno on 1/11/2024 at 1:33 PM  
1/11/2024: SS NOTE:  Notified by Tamela admissions for [facility] that they have NOT received the insurance authorization as anticipated for an admission today, [facility] notified, 7:30pm transfer cancelled & trip placed on a \"will call\" for tomorrow 1/12, pt and nursing informed, sw will follow.Electronically signed by JIA Moreno on 1/11/2024 at 3:54 PM  
1/11/2024: SS NOTE:  Notified by Tamela, admissions liaison for [facility] that she anticipates receiving PRE CERT to admit pt today 1/11, [facility] tentatively scheduled for 2:30pm, RODRIGUE initiated, Nursing informed, sw/cm to follow to confirm insurance authorization & transfer arrangements.Electronically signed by JIA Moreno on 1/11/2024 at 9:42 AM  
1/12/2024: SS NOTE:  Notified by Tamela [facility] that she was informed today by [employer] that they have \"up to 14 days to give a decision\", she did request they \"expedite this case\" and sent additional clinical updates per their request but PRE CERT is still PENDING, RODRIGUE initiated, [facility] placed on a \"will call\" sw/cm to follow, Nursing informed.Electronically signed by JIA Moreno on 1/12/2024 at 12:27 PM  
Ss note: 1/13/2024. 11:11 AM discharge order noted however per liaGIDEON Shelton for [facility] remains pending. Liaison relays to this  it has been expedited and relays they have 72 hours from yesterday to process. [facility] placed on \"will call \" per charting from yesterday. JIA Rodriguez  
/24    Family can provide assistance at DC: Yes  Would you like Case Management to discuss the discharge plan with any other family members/significant others, and if so, who? Yes (pt's daughter, Rosangela)  Plans to Return to Present Housing: No  Other Identified Issues/Barriers to RETURNING to current housing: acute rehab placement  Potential Assistance needed at discharge: N/A            Potential DME:    Patient expects to discharge to: rehab facility  Plan for transportation at discharge:      Financial    Payor: [employer] MEDICARE / Plan: [employer] MEDICARE-ADVANTAGE PPO / Product Type: Medicare /     Does insurance require precert for SNF: Yes    Potential assistance Purchasing Medications: No  Meds-to-Beds request:        [facility] - [place], [place] - [address] - P [phone] - F [phone]  [address] [place] [address]  Phone: [phone] Fax: [phone]      Notes:    Factors facilitating achievement of predicted outcomes: Family support, Motivated, Cooperative, Pleasant, and Good insight into deficits    Barriers to discharge: Decreased endurance, Upper extremity weakness, and Lower extremity weakness    Additional Case Management Notes: 1/9/2024: SS NOTE:  SS Consult noted for d/c planning for acute rehab placement vs SNF placement, pt presents with a new CVA, PT/OT ordered, met with pt and pt's daughter, Rosangela at her mother's bedside, explained sw role for transition of care planning and reviewed consult and rehab options, dtr states that her mother has a stroke 30 years ago and went to [facility] for rehab at that time, they prefer In[facility] rehab at [facility] again, referral made to Tam[facility] admissions liaison who will SUBMIT PRE CERT TODAY 1/9, RODRIGUE initiated, Nursing informed, sw/cm to follow.Electronically signed by JIA Moreno on 1/9/2024 at 11:46 AM     The Plan for Transition of Care is related to the following treatment goals of Frequent urination [R35.0]  General weakness

## 2024-01-14 NOTE — PLAN OF CARE
Problem: Safety - Adult  Goal: Free from fall injury  1/14/2024 0152 by Anjana Morgan RN  Outcome: Progressing     Problem: Discharge Planning  Goal: Discharge to home or other facility with appropriate resources  1/14/2024 0152 by Anjana Morgan RN  Outcome: Progressing     Problem: Skin/Tissue Integrity  Goal: Absence of new skin breakdown  Description: 1.  Monitor for areas of redness and/or skin breakdown  2.  Assess vascular access sites hourly  3.  Every 4-6 hours minimum:  Change oxygen saturation probe site  4.  Every 4-6 hours:  If on nasal continuous positive airway pressure, respiratory therapy assess nares and determine need for appliance change or resting period.  1/14/2024 0152 by Anjana Morgan RN  Outcome: Progressing     Problem: ABCDS Injury Assessment  Goal: Absence of physical injury  1/14/2024 0152 by Anjana Morgan RN  Outcome: Progressing     Problem: Confusion  Goal: Confusion, delirium, dementia, or psychosis is improved or at baseline  Description: INTERVENTIONS:  1. Assess for possible contributors to thought disturbance, including medications, impaired vision or hearing, underlying metabolic abnormalities, dehydration, psychiatric diagnoses, and notify attending LIP  2. [place] high risk fall precautions, as indicated  3. Provide frequent short contacts to provide reality reorientation, refocusing and direction  4. Decrease environmental stimuli, including noise as appropriate  5. Monitor and intervene to maintain adequate nutrition, hydration, elimination, sleep and activity  6. If unable to ensure safety without constant attention obtain sitter and review sitter guidelines with assigned personnel  7. Initiate Psychosocial CNS and Spiritual Care consult, as indicated  1/14/2024 0152 by Anjana Morgan RN  Outcome: Progressing     Problem: Chronic Conditions and Co-morbidities  Goal: Patient's chronic conditions and co-morbidity symptoms are monitored and maintained or improved  1/14/2024

## 2024-01-14 NOTE — DISCHARGE SUMMARY
pressure control was achieved throughout the hospitalization.  Unfortunately, stroke workup revealed a new cardiomyopathy with systolic dysfunction of 30 to 35%.  She was evaluated by the cardiovascular team and adjustments were made in cardiac medications.  She worked with the therapy teams and made improvements.  It was determined she would benefit from acute rehabilitation.  Chronic comorbidities stabilized.  She was tolerating a diet.  Precertification was a prolonged process and has now been obtained.  She is acceptable for discharge to [facility] today.   Patient is medically stable and acceptable for discharge today.    BRIEF PHYSICAL EXAMINATION AND LABORATORIES ON DAY OF DISCHARGE:  VITALS:  BP (!) 146/72   Pulse 50   Temp 97.3 °F (36.3 °C) (Oral)   Resp 16   Ht 1.676 m (5' 5.98\")   Wt 87.6 kg (193 lb 3.2 oz)   LMP  (LMP Unknown)   SpO2 99%   BMI 31.20 kg/m²     General appearance:  Alert, responsive, oriented to person, disoriented to situation, place, and time.  Chronically ill, comfortable appearing, no distress.  Head:  Normocephalic. No masses, lesions or tenderness.  Eyes:  PERRLA.  EOMI.  Sclera clear.    ENT:  Ears normal. Mucosa normal.  Neck:    Supple. Trachea midline. No thyromegaly. No JVD. No bruits.  Heart:    Rhythm regular. Rate controlled.  S1 and S2.  Systolic murmur.  Lungs:    Symmetrical.  Mildly diminished bibasilar air exchange.  Clear to auscultation bilaterally.  No wheezes. No rhonchi. No rales.  Abdomen:   Soft. Non-tender. Non-distended. Bowel sounds positive. No organomegaly or masses.  No pain on palpation.  Extremities:    Peripheral pulses present.  No significant pitting peripheral edema.  No ulcers. No cyanosis. No clubbing.  Neurologic:    Awake and alert.  Polite and conversational.  Generally weak without focal deficit.  Limited recall and memory, suspect underlying dementia, otherwise cranial nerves grossly intact.   Psych:   Behavior is mildly confused which is

## 2024-01-14 NOTE — DISCHARGE INSTRUCTIONS
Your information:  Name: Nika Aviles  : 1945    Your instructions:    Discharge to [facility].  Follow up with primary care provider as directed.    What to do after you leave the hospital:    Recommended diet: {diet:50492}    Recommended activity: {discharge activity:99652}        The following personal items were collected during your admission and were returned to you:    Belongings  Dental Appliances: Uppers, Lowers  Vision - Corrective Lenses: Eyeglasses  Hearing Aid: None  Clothing: Footwear, Jacket/Coat, Pants, Shirt  Jewelry: None  Body Piercings Removed: No  Electronic Devices: None  Weapons (Notify Protective Services/Security): None  Other Valuables: At home  Home Medications: None  Valuables Given To: Patient  Provide Name(s) of Who Valuable(s) Were Given To: n/a  Responsible person(s) in the waiting room: n/a  Patient approves for provider to speak to responsible person post operatively: Yes    Information obtained by:  By signing below, I understand that if any problems occur once I leave the hospital I am to contact ***.  I understand and acknowledge receipt of the instructions indicated above.

## 2024-01-14 NOTE — PROGRESS NOTES
SPEECH/LANGUAGE PATHOLOGY  CLINICAL ASSESSMENT OF SWALLOWING FUNCTION   and PLAN OF CARE    PATIENT NAME:  Nika Aviles  (female)     MRN:  [id number]    :  1945  (78 y.o.)  STATUS:  Inpatient: Room     TODAY'S DATE:  2024  REFERRING PROVIDER:   SLP eval and treat  Start:  24,   End:  24,   ONE TIME,   Standing Count:  1 Occurrences,   R       Sy Delcid, APRN - CNP  REASON FOR REFERRAL: Rule out aspiration   EVALUATING THERAPIST: Leah Nice, SLP                 RESULTS:    DYSPHAGIA DIAGNOSIS:   Clinical indicators of mild  oropharyngeal phase dysphagia       DIET RECOMMENDATIONS:  Soft and bite size consistency solids (IDDSI level 6) with  thin liquids (IDDSI level 0)     FEEDING RECOMMENDATIONS:     Assistance level:  Set-up is required for all oral intake      Compensatory strategies recommended: Upright in bed/ chair as tolerated, Fully alert for all PO, and Small bites/sips      Discussed recommendations with nursing and/or faxed report to referring provider: Yes    SPEECH THERAPY  PLAN OF CARE   The dysphagia POC is established based on physician order, dysphagia diagnosis and results of clinical assessment     Skilled SLP intervention for dysphagia management on acute care up to 5 x per week until goals met, pt plateaus in function and/or discharged from hospital    Conditions Requiring Skilled Therapeutic Intervention for dysphagia:    Patient is performing below functional baseline d/t  current acute condition, respiratory compromise, multiple medications, and/or increased dependency upon caregivers.  Left facial asymmetry not sure if longstanding     Specific dysphagia interventions to include:     compensatory swallowing strategies to improve airway protection and swallow function.  Training in positioning for improved integrity of swallow  ongoing mealtime assessment to provide diet modification and compensatory strategy implementation to minimize risk of 
4 Eyes Skin Assessment     NAME:  Nika Aviles  YOB: 1945  MEDICAL RECORD NUMBER:  [id number]    The patient is being assessed for  Admission    I agree that at least one RN has performed a thorough Head to Toe Skin Assessment on the patient. ALL assessment sites listed below have been assessed.      Areas assessed by both nurses:    Head, Face, Ears, Shoulders, Back, Chest, Arms, Elbows, Hands, Sacrum. Buttock, Coccyx, Ischium, Legs. Feet and Heels, and Under Medical Devices         Does the Patient have a Wound? No noted wound(s)       Bob Prevention initiated by RN: No  Wound Care Orders initiated by RN: No    Pressure Injury (Stage 3,4, Unstageable, DTI, NWPT, and Complex wounds) if present, place Wound referral order by RN under : No    New Ostomies, if present place, Ostomy referral order under : No     Nurse 1 eSignature: Electronically signed by Cameron De Leon RN on 1/8/24 at 11:04 PM EST    **SHARE this note so that the co-signing nurse can place an eSignature**    Nurse 2 eSignature: Electronically signed by Gina Martinez RN on 1/8/24 at 11:40 PM EST   
Comprehensive Nutrition Assessment    Type and Reason for Visit:  Initial, RD Nutrition Re-Screen/LOS    Nutrition Recommendations/Plan:   Continue current diet        Malnutrition Assessment:  Malnutrition Status:  At risk for malnutrition (Comment) (01/12/24 1416)    Context:  Acute Illness     Findings of the 6 clinical characteristics of malnutrition:  Energy Intake:  Mild decrease in energy intake (Comment)  Weight Loss:  No significant weight loss     Body Fat Loss:  No significant body fat loss     Muscle Mass Loss:  No significant muscle mass loss    Fluid Accumulation:  No significant fluid accumulation     Strength:  Not Performed    Nutrition Assessment:    Pt admit w/ acute CVA, poorly controlled HTN. PMHx CVA, breast Ca, CKD, DM, dementia, HTN, HLD, hypothyroidism, diverticulitis, nonhodgkins lymphoma. Pt currently on minced & moist w/ nectar (mildly thick) liquids, magic cup not appropriate d/t DM, will continue to encourage PO intake. Consider ONS if able to tolerate thin liquids.    Nutrition Related Findings:    abd WDL, +1 edema, I/O's -1.16L since admit, dentures Wound Type: None       Current Nutrition Intake & Therapies:    Average Meal Intake: 1-25%, 0%  Average Supplements Intake: None Ordered  ADULT DIET; Dysphagia - Minced and Moist; 4 carb choices (60 gm/meal); No Added Salt (3-4 gm); Mildly Thick ([place])    Anthropometric Measures:  Height: 167.6 cm (5' 5.98\")  Ideal Body Weight (IBW): 130 lbs (59 kg)    Admission Body Weight: 87.9 kg (193 lb 13 oz) (1/10 first measured)  Current Body Weight: 90.2 kg (198 lb 13.7 oz) (1/11 bed scale), 153 % IBW. Weight Source: Bed Scale  Current BMI (kg/m2): 32.1  Usual Body Weight: 89 kg (196 lb 3 oz) (10/5/22)  % Weight Change (Calculated): 1.4  Weight Adjustment For: No Adjustment  BMI Categories: Obese Class 1 (BMI 30.0-34.9)    Estimated Daily Nutrient Needs:  Energy Requirements Based On: Formula  Weight Used for Energy Requirements: 
Date:2024  Patient Name: Nika Aviles  MRN: [id number]  : 1945  ROOM #: 0323/0323-01    Occupational Therapy order received, chart reviewed and evaluation attempted this date. Patient is unavailable for OT evaluation due to being off the unit for a swallow study.     Will attempt OT evaluation at a later time. Thank you.   Kaitlynn Toney OTR/L #[id number]       
Internal Medicine Progress Note    [facility]    Vickey Alanis D.O., LIS Good D.O., LIS Johnston D.O.    Laura Dasilva, MSN, APRN, NP-C  Onesimo De La Garza, MSN, APRN-CNP  Sy Delcid, MSN, APRN, NP-C     Primary Care Physician: Zana Pandya MD   Admitting Physician:  Christofer Good DO  Admission date and time: 1/7/2024  6:09 PM    Room:  04/04  Admitting diagnosis: Ambulatory dysfunction [R26.2]  Acute metabolic encephalopathy [G93.41]    Patient Name: Nika Aviles  MRN: [id number]    Date of Service: 1/8/2024     Subjective:  Nika is a 78 y.o. female who was seen and examined today,1/8/2024, at the bedside.  She was admitted last evening due to altered mental status superimposed on what is believed to be advanced vascular dementia.  She is a poor historian and has limited recall of the events that led up to her hospitalization and her medical history.  History is largely obtained from review of EMR.  She presently has no significant complaints and states she is feeling better however.  Unfortunately no family present during my examination.    Review of systems:  Constitutional:   Positive for fatigue and malaise , denies fever/chills  HEENT:   Positive for sinus and nasal congestion.  Denies ear pain, sore throat, or eye problems.  Cardiovascular:   Denies any chest pain, irregular heartbeats, or palpitations.   Respiratory:   Denies dyspnea at rest, denies dyspnea on exertion, denies coughing, denies sputum, denies hemoptysis  Gastrointestinal:   Denies nausea, denies vomiting. denies diarrhea, denies constipation. Positive for poor appetite and poor intake. Denies abdominal pain.  Genitourinary:    Positive for incontinence. Denies any urgency, frequency, hematuria. Voiding  without difficulty.  Extremities:   Denies lower extremity swelling, edema or cyanosis.   Neurology:    Denies any headache or 
Internal Medicine Progress Note    [facility]    Vickey Alanis D.O., LIS Good D.O., LIS Johnston D.O.    Laura Dasilva, MSN, APRN, NP-C  Onesimo De La Garza, MSN, APRN-CNP  Sy Delcid, MSN, APRN, NP-C     Primary Care Physician: Zana Pandya MD   Admitting Physician:  Christofer Good DO  Admission date and time: 1/7/2024  6:09 PM    Room:  [address] [place] [address]  Admitting diagnosis: Frequent urination [R35.0]  General weakness [R53.1]  Ambulatory dysfunction [R26.2]  History of dementia [Z86.59]  Altered mental status, unspecified altered mental status type [R41.82]  Fatigue, unspecified type [R53.83]  Acute metabolic encephalopathy [G93.41]    Patient Name: Nika Aviles  MRN: [id number]    Date of Service: 1/10/2024     Subjective:  Nika is a 78 y.o. female who was seen and examined today,1/10/2024, at the bedside.  MRI shows evidence of new onset stroke.  Discussed these findings with family members present at the bedside.  Discussed need for rehab.  Precertification started.  Further workup in progress    Daughter and granddaughter at the bedside      Review of systems:  Constitutional:   Ongoing malaise and fatigue.  HEENT:   Positive for sinus and nasal congestion.  Denies ear pain, sore throat, or eye problems.  Cardiovascular:   Denies any chest pain, irregular heartbeats, or palpitations.   Respiratory:   Denies dyspnea at rest, denies dyspnea on exertion, denies coughing, denies sputum, denies hemoptysis  Gastrointestinal:   Denies nausea, denies vomiting. denies diarrhea, denies constipation. Positive for poor appetite and poor intake. Denies abdominal pain.  Genitourinary:    Positive for incontinence. Denies any urgency, frequency, hematuria. Voiding  without difficulty.  Extremities:   Denies lower extremity swelling, edema or cyanosis.   Neurology:    Denies any headache or focal neurological deficits, 
Internal Medicine Progress Note    [facility]    Vickey Alanis D.O., LIS Good D.O., LIS Johnston D.O.    Laura Dasilva, MSN, APRN, NP-C  Onesimo De La Garza, MSN, APRN-CNP  Sy Delcid, MSN, APRN, NP-C     Primary Care Physician: Zana Pandya MD   Admitting Physician:  Christofer Good DO  Admission date and time: 1/7/2024  6:09 PM    Room:  [address] [place] [address]  Admitting diagnosis: Frequent urination [R35.0]  General weakness [R53.1]  Ambulatory dysfunction [R26.2]  History of dementia [Z86.59]  Altered mental status, unspecified altered mental status type [R41.82]  Fatigue, unspecified type [R53.83]  Acute metabolic encephalopathy [G93.41]    Patient Name: Nika Aviles  MRN: [id number]    Date of Service: 1/11/2024     Subjective:  iNka is a 78 y.o. female who was seen and examined today,1/11/2024, at the bedside.  Patient resting comfortably at the present time.  No specific complaints.  Working with physical therapy.  Awaiting precertification for [place]      No family present at the bedside      Review of systems:  Constitutional:   Ongoing malaise and fatigue.  HEENT:   Positive for sinus and nasal congestion.  Denies ear pain, sore throat, or eye problems.  Cardiovascular:   Denies any chest pain, irregular heartbeats, or palpitations.   Respiratory:   Denies dyspnea at rest, denies dyspnea on exertion, denies coughing, denies sputum, denies hemoptysis  Gastrointestinal:   Denies nausea, denies vomiting. denies diarrhea, denies constipation. Positive for poor appetite and poor intake. Denies abdominal pain.  Genitourinary:    Positive for incontinence. Denies any urgency, frequency, hematuria. Voiding  without difficulty.  Extremities:   Denies lower extremity swelling, edema or cyanosis.   Neurology:    Denies any headache or focal neurological deficits, positive for generalized weakness and fatigue without 
Internal Medicine Progress Note    [facility]    Vickey Alanis D.O., LIS Good D.O., LIS Johnston D.O.    Laura Dasilva, MSN, APRN, NP-C  Onesimo De La Garza, MSN, APRN-CNP  Sy Delcid, MSN, APRN, NP-C     Primary Care Physician: Zana Pandya MD   Admitting Physician:  Christofer Good DO  Admission date and time: 1/7/2024  6:09 PM    Room:  [address] [place] [address]  Admitting diagnosis: Frequent urination [R35.0]  General weakness [R53.1]  Ambulatory dysfunction [R26.2]  History of dementia [Z86.59]  Altered mental status, unspecified altered mental status type [R41.82]  Fatigue, unspecified type [R53.83]  Acute metabolic encephalopathy [G93.41]    Patient Name: Nika Aviles  MRN: [id number]    Date of Service: 1/12/2024     Subjective:  Nika is a 78 y.o. female who was seen and examined today,1/12/2024, at the bedside.  Patient seem to have more difficulty swallowing today despite modification of diet.  Still waiting precertification.  Hemoglobin appears to be volume contracted will place IV.  Continue work with physical therapy and speech therapy        No family present at the bedside      Review of systems:  Constitutional:   Ongoing malaise and fatigue.  HEENT:   Positive for sinus and nasal congestion.  Denies ear pain, sore throat, or eye problems.  Cardiovascular:   Denies any chest pain, irregular heartbeats, or palpitations.   Respiratory:   Denies dyspnea at rest, denies dyspnea on exertion, denies coughing, denies sputum, denies hemoptysis  Gastrointestinal:   Denies nausea, denies vomiting. denies diarrhea, denies constipation. Positive for poor appetite and poor intake. Denies abdominal pain.  Genitourinary:    Positive for incontinence. Denies any urgency, frequency, hematuria. Voiding  without difficulty.  Extremities:   Denies lower extremity swelling, edema or cyanosis.   Neurology:    Denies any 
No meaningful complaints  Objective:      Neuro exam 146/72 p 70 t 98  General: lethargic-arouses and follows simple commands inconsistently  Cranial nerve testing  unable to cooperate.  PERRL, corneal reflexes +  .  Funduscopic eye exam revealed not testable.  Motor exam:  3-4/5 .  Deep tendon reflexes were absent bilaterally.  Plantar responses were flexor bilaterally.  Cerebellar exam noted  . .  Sensation was decreased in the lower extremities.        Assessment:   Ambulatory dysfunction multifactorial including chemotherapy and diabetic peripheral nerve injury/disease; dementia with history of stroke with incidental infarct noted per MRI  Head ct negative  Bradycardia (?significance)           Plan:   Thiamine  If B12 low parenterally replace  Ongoing vascular risk factor reduction (internal medicine started DAPT--recommend reverting to asa 81 mg monotherapy in 1 month to reduce risk of bleeding).  Consider cardiology evaluation  Suggest out patient EMG  PT/?rehab  
Nurse to nurse given to christophe at [facility]  
OCCUPATIONAL THERAPY TREATMENT NOTE    [facility]   [address] [facility]        Date:1/10/2024  Patient Name: Nika Aviles  MRN: [id number]  : 1945  Room: [address] [place] [address]       Evaluating OT: Kaitlynn Toney OTR/L; [id number]      Referring Provider and Specific Provider Orders/Date:      24 07   OT eval and treat  Start:  24 07,   End:  24 07,   ONE TIME,   Standing Count:  1 Occurrences,   R         Christofer Good, DO       Placement Recommendation: Acute Rehab        Diagnosis:   1. Altered mental status, unspecified altered mental status type    2. Fatigue, unspecified type    3. General weakness    4. History of dementia    5. Frequent urination    6. Acute metabolic encephalopathy    7. Acute CVA (cerebrovascular accident) (HCC)         Surgery: none        Pertinent Medical History:       Past Medical History        Past Medical History:   Diagnosis Date    Breast CA (HCC)      Right    Breast cancer (HCC)         right ILC    Cerebral artery occlusion with cerebral infarction (HCC)       rt arm leg weakness    Chronic kidney disease      Dementia (HCC)      Diabetes mellitus (HCC)      Disease of blood and blood forming organ      Diverticulitis      Hyperlipidemia      Hypertension      Hypothyroidism      Lymphoma (HCC) 2016     nonhogkins, previous chemo, immunotherapy-no current issues (22)    Osteoarthritis      Prolonged emergence from general anesthesia      Thyroid disease              Past Surgical History         Past Surgical History:   Procedure Laterality Date    ABDOMEN SURGERY        BACK SURGERY         neck fusion bone graft-no ROM limitations    BREAST SURGERY Right       tumor and lymph node resection-CA    BRONCHOSCOPY N/A 10/01/2019     BRONCHOSCOPY ALVEOLAR LAVAGE WITH BRUSHINGS performed by Arley Delgado MD at [facility] ENDOSCOPY    COLONOSCOPY        ENDOSCOPY, COLON, DIAGNOSTIC     
Physical Therapy    Physical Therapy Initial Evaluation/Plan of Care    Room #:  0323/0323-01  Patient Name: Nika Aviles  YOB: 1945  MRN: [id number]    Date of Service: 1/9/2024     Tentative placement recommendation: Acute Rehab  Equipment recommendation: To be determined      Evaluating Physical Therapist: Dm Allen, PT  #[id number]      Specific Provider Orders/Date/Referring Provider :     PT eval and treat  Start:  01/07/24 2330,   End:  01/07/24 2330,   ONE TIME,   Standing Count:  1 Occurrences,   R       Sy Delcid, APRN - CNP    Admitting Diagnosis:   Frequent urination [R35.0]  General weakness [R53.1]  Ambulatory dysfunction [R26.2]  History of dementia [Z86.59]  Altered mental status, unspecified altered mental status type [R41.82]  Fatigue, unspecified type [R53.83]  Acute metabolic encephalopathy [G93.41]    Admitted with    above, worsening confusion and weakness , incontinent  Mri IMPRESSION:  1. Acute to subacute stroke involving right frontal white matter extending  into right basal ganglia.  2. Chronic stroke involving left frontal lobe, left basal ganglia, and left  insula.  3. No evidence of intracranial metastases.  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 
Physical Therapy    Physical Therapy Treatment Note/Plan of Care    Room #:  0323/0323-01  Patient Name: Nika Aviles  YOB: 1945  MRN: [id number]    Date of Service: 1/12/2024     Tentative placement recommendation: Acute Rehab  Equipment recommendation: To be determined      Evaluating Physical Therapist: Dm Allen, PT  #[id number]      Specific Provider Orders/Date/Referring Provider :     PT eval and treat  Start:  01/07/24 2330,   End:  01/07/24 2330,   ONE TIME,   Standing Count:  1 Occurrences,   R       Sy Delcid, APRN - CNP    Admitting Diagnosis:   Frequent urination [R35.0]  General weakness [R53.1]  Ambulatory dysfunction [R26.2]  History of dementia [Z86.59]  Altered mental status, unspecified altered mental status type [R41.82]  Fatigue, unspecified type [R53.83]  Acute metabolic encephalopathy [G93.41]    Admitted with    above, worsening confusion and weakness , incontinent  Mri IMPRESSION:  1. Acute to subacute stroke involving right frontal white matter extending  into right basal ganglia.  2. Chronic stroke involving left frontal lobe, left basal ganglia, and left  insula.  3. No evidence of intracranial metastases.  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 
Physical Therapy    Physical Therapy Treatment Note/Plan of Care    Room #:  0323/0323-01  Patient Name: Nika Aviles  YOB: 1945  MRN: [id number]    Date of Service: 1/11/2024     Tentative placement recommendation: Acute Rehab  Equipment recommendation: To be determined      Evaluating Physical Therapist: Dm Allen, PT  #[id number]      Specific Provider Orders/Date/Referring Provider :     PT eval and treat  Start:  01/07/24 2330,   End:  01/07/24 2330,   ONE TIME,   Standing Count:  1 Occurrences,   R       Sy Delcid, APRN - CNP    Admitting Diagnosis:   Frequent urination [R35.0]  General weakness [R53.1]  Ambulatory dysfunction [R26.2]  History of dementia [Z86.59]  Altered mental status, unspecified altered mental status type [R41.82]  Fatigue, unspecified type [R53.83]  Acute metabolic encephalopathy [G93.41]    Admitted with    above, worsening confusion and weakness , incontinent  Mri IMPRESSION:  1. Acute to subacute stroke involving right frontal white matter extending  into right basal ganglia.  2. Chronic stroke involving left frontal lobe, left basal ganglia, and left  insula.  3. No evidence of intracranial metastases.  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 
Physical Therapy    Physical Therapy Treatment Note/Plan of Care    Room #:  0323/0323-01  Patient Name: Nika Aviles  YOB: 1945  MRN: [id number]    Date of Service: 1/13/2024     Tentative placement recommendation: Acute Rehab  Equipment recommendation: To be determined      Evaluating Physical Therapist: Dm Allen, PT  #[id number]      Specific Provider Orders/Date/Referring Provider :     PT eval and treat  Start:  01/07/24 2330,   End:  01/07/24 2330,   ONE TIME,   Standing Count:  1 Occurrences,   R       Sy Delcid, APRN - CNP    Admitting Diagnosis:   Frequent urination [R35.0]  General weakness [R53.1]  Ambulatory dysfunction [R26.2]  History of dementia [Z86.59]  Altered mental status, unspecified altered mental status type [R41.82]  Fatigue, unspecified type [R53.83]  Acute metabolic encephalopathy [G93.41]    Admitted with    above, worsening confusion and weakness , incontinent  Mri IMPRESSION:  1. Acute to subacute stroke involving right frontal white matter extending  into right basal ganglia.  2. Chronic stroke involving left frontal lobe, left basal ganglia, and left  insula.  3. No evidence of intracranial metastases.  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 
SPEECH LANGUAGE PATHOLOGY  DAILY PROGRESS NOTE      PATIENT NAME:  Nika Aviles      :  1945          TODAY'S DATE:  2024 ROOM:  [address] [place] [address]    Current Diet: ADULT DIET; Dysphagia - Minced and Moist; 4 carb choices (60 gm/meal); No Added Salt (3-4 gm); Mildly Thick ([place]ar)    Pateint seen for dysphagia therapy patient still very tired at meal time however per PT report patient had already worked with OT had been up in the chair and then walked with him and just returned to bed.  She needed mod to max cues to stay awake for meal.  Only ate a small amount due to fatigue.  Patient tolerated current items but is high risk for falling asleep with food in mouth needs supervision during meals due to this.     Recommendation: continue on current diet with supervision due to degree of fatigue.       CPT code(s) 55829  dysphagia tx  Total minutes :  15 minutes    Leah Nice MSCCC/SLP  Speech Language Pathologist  [id number]      
SPEECH/LANGUAGE PATHOLOGY  VIDEOFLUOROSCOPIC STUDY OF SWALLOWING (MBS)   and PLAN OF CARE    PATIENT NAME:  Nika Aviles  (female)     MRN:  [id number]    :  1945  (78 y.o.)  STATUS:  Inpatient: Room 0323/0323-01    TODAY'S DATE:  2024  REFERRING PROVIDER:      SLP video swallow  Start:  24 1100,   End:  24 1100,   ONE TIME,   Standing Count:  1 Occurrences,   R       Christofer Good DO   REASON FOR REFERRAL: aspiration    EVALUATING THERAPIST: Leah Nice, SELENA      RESULTS:      DYSPHAGIA DIAGNOSIS:  moderate oropharyngeal phase dysphagia     DIET RECOMMENDATIONS:  Minced and moist consistency solids (IDDSI level 5) with  nectar consistency (mildly thick - IDDSI level 2) liquids    FEEDING RECOMMENDATIONS:    Assistance level:  Set-up is required for all oral intake, Supervision is needed during all oral intake, Encourage self-feeding     Compensatory strategies recommended: Upright in bed/ chair as tolerated, Fully alert for all PO, Small bites/sips, Small, SINGLE cup sips only, Chin neutral to slightly down , Check for oral pocketing, and Throat clear     Discussed recommendations with nursing and/or faxed report to referring provider: Yes    Laryngeal Penetration and Aspiration:  Penetration WITH aspiration was observed in today's study with  thin liquid    SPEECH THERAPY  PLAN OF CARE   The dysphagia POC is established based on physician order and dysphagia diagnosis    Skilled SLP intervention for dysphagia management on acute care up to 5 x per week until goals met, pt plateaus in function and/or discharged from hospital  Anticipate Patient will benefit from ongoing intensive speech therapy at discharge due to degree of deficits       Conditions Requiring Skilled Therapeutic Intervention for dysphagia:    repetitive/disorganized lingual motion   impaired mastication   swallow triggered once bolus head entered the laryngeal vestibule which increases risk of aspiration   impaired laryngeal 
Speech Language Pathology      NAME:  Nika Aviles  :  1945  DATE: 1/10/2024  ROOM:  [address] [place] [address]    Patient seen for dysphagia therapy 10 minutes up to chair following OT session.  Patient would wake up and  items to drink however would fall back to as she was bringing the cup to her mouth.  Daughter present.  Reviewed not having patient eat if she is fatigued and not able to feed herself.  Will continue POC    Frequent urination [R35.0]  General weakness [R53.1]  Ambulatory dysfunction [R26.2]  History of dementia [Z86.59]  Altered mental status, unspecified altered mental status type [R41.82]  Fatigue, unspecified type [R53.83]  Acute metabolic encephalopathy [G93.41]    63360  dysphagia tx    Leah Nice MSCCC/SLP  Speech Language Pathologist  [id number]        
Speech Language Pathology      NAME:  Nika Aviles  :  1945  DATE: 2024  ROOM:  [address] [place] [address]-    Patient seen for speech and swallowing.  Family present patient much more alert today participated in conversation still with distortions in her speech.  She was able to accurately answer questions today and remained engaged during the entire session.  Patient tolerated PO intake much better today family aware of the consistency that items need to be and provide appropriate cues and encouragement during PO intake.  Will benefit from ongoing speech therapy family very supportive.      Frequent urination [R35.0]  General weakness [R53.1]  Ambulatory dysfunction [R26.2]  History of dementia [Z86.59]  Altered mental status, unspecified altered mental status type [R41.82]  Fatigue, unspecified type [R53.83]  Acute metabolic encephalopathy [G93.41]    69756  speech/language tx  17327  dysphagia tx    Leah Nice MSCCC/SLP  Speech Language Pathologist  [id number]        
    To initiate POC    SHORT/LONG TERM GOALS  Pt will improve orientation to spatial and temporal surroundings with use of external memory aides.  Pt will improve immediate, short term, recent memory during structured and unstructured tasks with 90% accuracy   Pt will improve receptive and expressive language skills with adequate thought content, organization, and processing time to facilitate improved communication with minimal.  Pt will improve speech intelligibility and increased articulatory precision via compensatory strategies and oral motor exercises     Patient goals: Patient/family involved in developing goals and treatment plan:   Treatment goals discussed with Patient    The Patient did not demonstrate complete understanding of the diagnosis, prognosis and plan of care   The patient/family Agreed with above,     This plan may be re-evaluated and revised as warranted.        Rehabilitation Potential/Prognosis: good                CLINICAL ASSESSMENT:  MOTOR SPEECH       Oral Peripheral Examination   Left labiobuccal weakness    Parameters of Speech Production  Respiration:  Adequate for speech production  Articulation:  Distortion  Resonance:  Within functional limits  Quality:   Within functional limits  Pitch:    Within functional limits  Intensity: Within functional limits  Fluency:  Intact  Prosody Intact    Speech Intelligibility      Mild to moderate impairment, patient slurs at least some words and at worst, can be understood with some difficulty            RECEPTIVE LANGUAGE    Comprehension of Yes/No Questions:   Latent    Process  Simple Verbal Commands:   Latent  Process Intermediate Verbal Commands:   Latent and Cueing  Process Complex Verbal Commands:     Unable    Comprehension of Conversation:      Latent, Required repetition of questions/information , and Needed to have questions/information simplified       EXPRESSIVE LANGUAGE     Serials: Functional    Imitation:  Words  
  No unilateral joint edema, erythema or warmth. Gait not assessed.  Integumentary:  No rashes  Skin normal color and texture.  Genitalia/Breast:  Deferred    Medication:  Scheduled Meds:   sodium chloride flush  10 mL IntraVENous 2 times per day    enoxaparin  40 mg SubCUTAneous Daily    thiamine mononitrate  100 mg Oral Daily    levothyroxine  88 mcg Oral Daily    aspirin  81 mg Oral Daily    clopidogrel  75 mg Oral Daily    pantoprazole  40 mg Oral QAM AC    amLODIPine  5 mg Oral Daily    lisinopril  10 mg Oral Daily    anastrozole  1 mg Oral Daily    atorvastatin  40 mg Oral Nightly    insulin lispro  0-8 Units SubCUTAneous TID WC    insulin lispro  0-4 Units SubCUTAneous Nightly     Continuous Infusions:   sodium chloride         Objective Data:  CBC with Differential:    Lab Results   Component Value Date/Time    WBC 8.1 01/09/2024 04:34 AM    RBC 4.84 01/09/2024 04:34 AM    HGB 14.4 01/09/2024 04:34 AM    HCT 42.5 01/09/2024 04:34 AM     01/09/2024 04:34 AM    MCV 87.8 01/09/2024 04:34 AM    MCH 29.8 01/09/2024 04:34 AM    MCHC 33.9 01/09/2024 04:34 AM    RDW 12.9 01/09/2024 04:34 AM    NRBC 0.5 01/04/2017 10:18 AM    METASPCT 1.0 05/16/2018 11:32 AM    LYMPHOPCT 15 01/09/2024 04:34 AM    MONOPCT 11 01/09/2024 04:34 AM    MYELOPCT 0.5 03/22/2017 09:15 PM    BASOPCT 0 01/09/2024 04:34 AM    MONOSABS 0.92 01/09/2024 04:34 AM    LYMPHSABS 1.24 01/09/2024 04:34 AM    EOSABS 0.23 01/09/2024 04:34 AM    BASOSABS 0.03 01/09/2024 04:34 AM     BMP:    Lab Results   Component Value Date/Time     01/09/2024 04:34 AM    K 3.7 01/09/2024 04:34 AM    K 4.1 06/30/2022 05:05 AM     01/09/2024 04:34 AM    CO2 24 01/09/2024 04:34 AM    BUN 12 01/09/2024 04:34 AM    LABALBU 4.0 01/09/2024 04:34 AM    CREATININE 0.9 01/09/2024 04:34 AM    CALCIUM 9.3 01/09/2024 04:34 AM    GFRAA >60 10/05/2022 11:18 AM    LABGLOM >60 01/09/2024 04:34 AM    GLUCOSE 133 01/09/2024 04:34 AM     Assessment:  Acute/subacute CVA 
to increase level of endurance to allow for safe functional mobility including transfers and gait  and Use graduated activities to promote good breathing techniques and provide support and education to maximize respiratory function    PT long term treatment goals are located in below grid    Patient and or family understand(s) diagnosis, prognosis, and plan of care.    Frequency of treatments: Patient will be seen  twice daily.         Prior Level of Function: Patient ambulated independently  prior to illness 2 weeks ago  Rehab Potential: good   for baseline    Past medical history:   Past Medical History:   Diagnosis Date    Breast CA (HCC) 2018    Right    Breast cancer (HCC)     2017  right ILC    Cerebral artery occlusion with cerebral infarction (HCC) 1994     rt arm leg weakness    Chronic kidney disease     Dementia (HCC)     Diabetes mellitus (HCC)     Disease of blood and blood forming organ     Diverticulitis     Hyperlipidemia     Hypertension     Hypothyroidism     Lymphoma (HCC) 12/2016    nonhogkins, previous chemo, immunotherapy-no current issues (6/24/22)    Osteoarthritis     Prolonged emergence from general anesthesia     Thyroid disease      Past Surgical History:   Procedure Laterality Date    ABDOMEN SURGERY      BACK SURGERY      neck fusion bone graft-no ROM limitations    BREAST SURGERY Right     tumor and lymph node resection-CA    BRONCHOSCOPY N/A 10/01/2019    BRONCHOSCOPY ALVEOLAR LAVAGE WITH BRUSHINGS performed by Arley Delgado MD at [facility] ENDOSCOPY    COLONOSCOPY      ENDOSCOPY, COLON, DIAGNOSTIC      FRACTURE SURGERY      elbow radius    HYSTERECTOMY (CERVIX STATUS UNKNOWN)      KNEE ARTHROSCOPY      MEDIPORT INSERTION, SINGLE Left 2016    chest    OTHER SURGICAL HISTORY  2017    mediport insertion    OTHER SURGICAL HISTORY  08/12/2017    removal foreign body    TOTAL KNEE ARTHROPLASTY Right 06/29/2022    ANNEMARIE ROBOTIC ASSISTED RIGHT KNEE TOTAL ARTHROPLASTY performed by Clarence Harris 
   LABALBU 3.9 01/14/2024 04:21 AM    CREATININE 0.8 01/14/2024 04:21 AM    CALCIUM 9.3 01/14/2024 04:21 AM    GFRAA >60 10/05/2022 11:18 AM    LABGLOM >60 01/14/2024 04:21 AM    GLUCOSE 115 01/14/2024 04:21 AM     Assessment:  Acute/subacute CVA resulting in acute metabolic encephalopathy superimposed on vascular dementia  Poorly controlled essential hypertension  History of prior CVA  New cardiomyopathy with systolic dysfunction and EF of 30 to 35%  Non-insulin-dependent diabetes mellitus type 2  Hypothyroidism  History of breast cancer  History of non-Hodgkin's lymphoma    Plan:   Nika did much better with the therapy teams yesterday walking 15 feet x 2.  She seems to be regaining much of her strength.  Blood pressure is better controlled.  She has tolerated advancement in medications for treatment of the underlying cardiomyopathy.  Chronic comorbidities are being monitored.  She describes a decreased appetite and does not like the food here.  We discussed ingesting nutritional supplementation.    Nika remains acceptable for discharge.  We have been notified by the insurance company that they have 14 days to make a determination regarding placement.  She is maintained in a hospital facility riddled with infection and we hope that this process is not delayed any longer.  Unfortunately, we are at the mercy of the insurance company decision.      DO DAVID Santos  1/14/2024  7:00 AM    
03:46 AM    CREATININE 0.9 01/13/2024 03:46 AM    CALCIUM 9.1 01/13/2024 03:46 AM    GFRAA >60 10/05/2022 11:18 AM    LABGLOM >60 01/13/2024 03:46 AM    GLUCOSE 131 01/13/2024 03:46 AM     Assessment:  Acute/subacute CVA resulting in acute metabolic encephalopathy superimposed on vascular dementia  Poorly controlled essential hypertension  History of prior CVA  New cardiomyopathy with systolic dysfunction and EF of 30 to 35%  Non-insulin-dependent diabetes mellitus type 2  Hypothyroidism  History of breast cancer  History of non-Hodgkin's lymphoma    Plan:   Nika remains acceptable for discharge.  We have been notified by the insurance company that they have 14 days to make a determination regarding placement.  She is maintained in a hospital facility riddled with infection and we hope that this process is not delayed any longer.  Unfortunately, we are at the mercy of the insurance company decision.  Otherwise, she is being treated maximally for the acute CVA.  Goal-directed therapy is being employed for the new cardiomyopathy and we appreciate the cardiovascular input.  The patient has been acceptable for discharge for many days.    Christofer Good DO FACOI  1/13/2024  7:45 AM    
Minimal Assist    Bed Mobility  Supine to sit: Minimal Assist   Sit to supine: Moderate Assist to bring B LE back to bed  Rolling:Minimal Assist    Supine to sit: Independent   Sit to supine: Independent   Rolling:Independent     Functional Transfers Moderate Assist from EOB to wheeled walker.   Transfer training with verbal cues for hand placement throughout session to improve safety.   Supervision    Functional Mobility Moderate Assist with wheeled walker to improve balance to side step towards head of bed, verbal cues for walker sequence and safety. Assistance for weight shifting.   Supervision    Balance Sitting:     Static: good     Dynamic: fair   Standing: fair  with wheeled walker  Sitting:     Static: good     Dynamic: good   Standing: good  with wheeled walker   Activity Tolerance Fair   good    Visual/  Perceptual Glasses: no                Hand Dominance: right      AROM (PROM) Strength Additional Info:  Goal:   RUE  WFL 2+/5 good  and wfl FMC/dexterity noted during ADL tasks   Improve overall RUE strength  for participation in functional tasks   LUE WFL 3/5 good  and wfl FMC/dexterity noted during ADL tasks   Improve overall LUE strength  for participation in functional tasks     Hearing: WFL   Sensation:  No c/o numbness or tingling  Tone: WFL   Edema: no    Comments: Upon arrival the patient was side lying on right side with assistance from comfort glide and wedge cushions.  At end of session, patient was side lying on left with assistance of comfort glide and wedge cushions with call light and phone within reach, all lines and tubes intact.  Overall patient demonstrated decreased independence and safety during completion of ADL/functional transfer/mobility tasks.  Pt would benefit from continued skilled OT to increase safety and independence with completion of ADL/IADL tasks for functional independence and quality of life.    Treatment: OT treatment provided this date includes:   
SYSTEMS:     Negative except as noted above in HPI.      PHYSICAL EXAM:   BP (!) 140/57   Pulse 54   Temp 97.9 °F (36.6 °C) (Oral)   Resp 17   Ht 1.676 m (5' 6\")   Wt 83.7 kg (184 lb 8 oz)   LMP  (LMP Unknown)   SpO2 98%   BMI 29.78 kg/m²   CONST:  Well developed, well nourished elderly  female who appears stated age. Awake, alert, cooperative, no apparent distress.  HEENT:   Head- Normocephalic, atraumatic.   Eyes- Conjunctivae pink, anicteric.  Neck-  No stridor, trachea midline, no apparent jugular venous distention.   CHEST: Chest symmetrical and non-tender to palpation. No accessory muscle use or intercostal retractions.  RESPIRATORY: Lung sounds -coarse breath sounds, with poor expiratory effort.  On room air.  CARDIOVASCULAR:     No noted carotid bruit.  Heart Ausculation- Regular rate and rhythm, no apparent murmur.   PV: No lower extremity edema. No varicosities. Pedal pulses palpable, no clubbing or cyanosis.   ABDOMEN: Soft, obese, non-tender to light palpation. Bowel sounds present.   MS: Good muscle strength and tone. No atrophy or abnormal movements.   SKIN: Warm and dry. No statis dermatitis or ulcers.  NEURO / PSYCH: Oriented to person, place and time. Speech clear and appropriate. Follows all commands.  Drowsy appearing at times.      DATA:    Telemetry: Not on telemetry monitor     Diagnostic:  All diagnostic testing and lab work thus far this admission reviewed in detail.    CXR 1/7/2024  IMPRESSION:  No acute cardiopulmonary process. Bibasilar atelectasis.    CT Head 1/7/2024  IMPRESSION:  1.  No acute intracranial abnormality.  2.  Remote left frontal lacunar infarct  3.  Acute right maxillary sinusitis    Carotid US 1/8/2024  IMPRESSION:  The right internal carotid artery demonstrates 0-50% stenosis.  The left internal carotid artery demonstrates 0-50% stenosis.  Bilateral vertebral arteries are patent with flow in the normal direction.    Brain MRI 1/8/2024  IMPRESSION:  1. 
fair   Standing: fair  with wheeled walker Sitting:  Static: Mod A for positioning d/t R lateral lean; supervision in chair after positioning   Dynamic: MIN A   Standing: MOD A with w/w  Sitting:     Static: good     Dynamic: good   Standing: good  with wheeled walker   Activity Tolerance Fair  Fair, although pt rested her head back in chair at end of session and started to fall asleep good    Visual/  Perceptual Glasses: no                         Comments: Patient cleared by nursing staff.  Upon arrival pt seated in bed with HOB partially elevated. Pt demo's strong R lateral lean in bed. Pt agreeable to OT tx session. Daughter present, however stepped OOR during part of session.  Pt educated with regards to bed mobility, hand placement, safety awareness, static sitting balance,  standing balance, transfer training, functional mobility, ADL retraining,  grooming tasks, UE/LE bathing, LE/UE dressing, toileting/hygiene, positioning in chair, ECT's.  At end of session pt seated in chair with daughter present and call light within reach. Overall, pt demonstrated decreased independence and safety during completion of ADL/functional transfers/mobility tasks. Pt would benefit from continued skilled OT to increase safety and independence with completion of ADL/IADL tasks for functional independence and quality of life.      Pt required cues and education as noted above for safe facilitation and completion of tasks. Therapist provided skilled monitoring of patient's response during treatment session. Prior to and at the end of session, environmental modifications / line management completed for patients safety and efficiency of treatment session.     Overall, patient demonstrates moderate difficulties with completion of BADLs and IADLs. Factors contributing to these difficulties include bowel and bladder incontinence, R side weakness, decreased endurance, and generalized weakness. As noted above, patient likely to benefit 
    Plan:   Patient has new onset heart failure with depressed systolic function in the setting of acute stroke  She also has chronic left bundle branch block and no prior ischemic evaluation  She will need eventual cardiac catheterization once recovered from acute stroke  We will optimize guideline directed medical therapy until recovered from acute stroke to proceed for ischemic evaluation  We will add spironolactone to metoprolol succinate and Entresto and discontinuing amlodipine  Blood pressure and kidney function appears to be tolerating Entresto and spironolactone  If persistent uncontrolled blood pressure increase Entresto from low-dose to medium intensity  Monitor potassium, electrolyte and kidney function closely while on Entresto and spironolactone given prior acute kidney injury with spironolactone  If significant acute kidney dysfunction consider holding spironolactone at that time  Uptitrate Entresto for optimal blood pressure control and to reflect guideline directed medical therapy as kidney function allows  Resume spironolactone if there is no contraindication  LifeVest is ordered  BNP to guide therapy  If patient develops orthopnea or worsening edema/heart failure symptoms initiate low-dose Lasix 20 mg IV daily if kidney function is stable at that time  Aggressive PT OT  Outpatient sleep apnea evaluation is recommended  Cardiology will sign off.  Please call with questions.    Elliot Michael MD  [facility]

## 2024-01-14 NOTE — PLAN OF CARE
Problem: Safety - Adult  Goal: Free from fall injury  1/14/2024 1206 by Vickie Hightower RN  Outcome: Completed  1/14/2024 0152 by Anjana Morgan RN  Outcome: Progressing     Problem: Discharge Planning  Goal: Discharge to home or other facility with appropriate resources  1/14/2024 1206 by Vickie Hightower RN  Outcome: Completed  1/14/2024 0152 by Anjana Morgan RN  Outcome: Progressing     Problem: Skin/Tissue Integrity  Goal: Absence of new skin breakdown  Description: 1.  Monitor for areas of redness and/or skin breakdown  2.  Assess vascular access sites hourly  3.  Every 4-6 hours minimum:  Change oxygen saturation probe site  4.  Every 4-6 hours:  If on nasal continuous positive airway pressure, respiratory therapy assess nares and determine need for appliance change or resting period.  1/14/2024 0152 by Anjana Morgan RN  Outcome: Progressing     Problem: ABCDS Injury Assessment  Goal: Absence of physical injury  1/14/2024 0152 by Anjana Morgan RN  Outcome: Progressing     Problem: Confusion  Goal: Confusion, delirium, dementia, or psychosis is improved or at baseline  Description: INTERVENTIONS:  1. Assess for possible contributors to thought disturbance, including medications, impaired vision or hearing, underlying metabolic abnormalities, dehydration, psychiatric diagnoses, and notify attending LIP  2. [place] high risk fall precautions, as indicated  3. Provide frequent short contacts to provide reality reorientation, refocusing and direction  4. Decrease environmental stimuli, including noise as appropriate  5. Monitor and intervene to maintain adequate nutrition, hydration, elimination, sleep and activity  6. If unable to ensure safety without constant attention obtain sitter and review sitter guidelines with assigned personnel  7. Initiate Psychosocial CNS and Spiritual Care consult, as indicated  1/14/2024 0152 by Anjana Morgan RN  Outcome: Progressing

## 2024-02-06 ENCOUNTER — TELEPHONE (OUTPATIENT)
Dept: ADMINISTRATIVE | Age: 79
End: 2024-02-06

## 2024-02-06 NOTE — TELEPHONE ENCOUNTER
Pt needs HFU w/ [facility]  will be dischaged from [facility] tomorrow  does not have lifevest yet, please [phone]  Tamela

## 2024-02-06 NOTE — TELEPHONE ENCOUNTER
Follow-up in 4 to 12 weeks.  Follow-up sooner if there are symptoms.  If symptoms are significant please go to the emergency room.  Also please follow-up with your primary care physician.

## 2024-02-07 ENCOUNTER — TELEPHONE (OUTPATIENT)
Dept: CARDIOLOGY CLINIC | Age: 79
End: 2024-02-07

## 2024-02-07 NOTE — TELEPHONE ENCOUNTER
----- Message from Elliot Michael MD sent at 2/6/2024  2:40 PM EST -----  Regarding: LifeVest  Please reach out to his goals to request LifeVest for this patient for new onset heart failure which is likely due to dilated cardiomyopathy.

## 2024-02-07 NOTE — TELEPHONE ENCOUNTER
Tamela  calling to schedule HFU with Dr. Michael.  Drs recommendations relayed to her and pt is schedule for her HFU on: 4/11/24 @ 11:30 - she is wearing the LifeVest.

## 2024-02-09 NOTE — TELEPHONE ENCOUNTER
[facility] and [facility] will be notified [facility]est[facility]

## 2024-02-16 ENCOUNTER — HOSPITAL ENCOUNTER (OUTPATIENT)
Dept: INFUSION THERAPY | Age: 79
Discharge: HOME | End: 2024-02-16

## 2024-03-04 ENCOUNTER — APPOINTMENT (OUTPATIENT)
Dept: GENERAL RADIOLOGY | Age: 79
End: 2024-03-04
Payer: MEDICARE

## 2024-03-04 ENCOUNTER — HOSPITAL ENCOUNTER (EMERGENCY)
Age: 79
Discharge: HOME | End: 2024-03-04
Attending: EMERGENCY MEDICINE
Payer: MEDICARE

## 2024-03-04 VITALS
SYSTOLIC BLOOD PRESSURE: 138 MMHG | TEMPERATURE: 97.5 F | DIASTOLIC BLOOD PRESSURE: 59 MMHG | OXYGEN SATURATION: 96 % | RESPIRATION RATE: 14 BRPM | HEART RATE: 56 BPM

## 2024-03-04 DIAGNOSIS — N39.0 URINARY TRACT INFECTION WITHOUT HEMATURIA, SITE UNSPECIFIED: Primary | ICD-10-CM

## 2024-03-04 DIAGNOSIS — R53.83 FATIGUE, UNSPECIFIED TYPE: ICD-10-CM

## 2024-03-04 LAB
ALBUMIN SERPL-MCNC: 3.9 G/DL (ref 3.5–5.2)
ALP SERPL-CCNC: 131 U/L (ref 35–104)
ALT SERPL-CCNC: 9 U/L (ref 0–32)
ANION GAP SERPL CALCULATED.3IONS-SCNC: 16 MMOL/L (ref 7–16)
AST SERPL-CCNC: 15 U/L (ref 0–31)
BACTERIA URNS QL MICRO: ABNORMAL
BASOPHILS # BLD: 0.03 K/UL (ref 0–0.2)
BASOPHILS NFR BLD: 0 % (ref 0–2)
BILIRUB SERPL-MCNC: 0.4 MG/DL (ref 0–1.2)
BILIRUB UR QL STRIP: NEGATIVE
BUN SERPL-MCNC: 20 MG/DL (ref 6–23)
CALCIUM SERPL-MCNC: 10.2 MG/DL (ref 8.6–10.2)
CHLORIDE SERPL-SCNC: 102 MMOL/L (ref 98–107)
CLARITY UR: ABNORMAL
CO2 SERPL-SCNC: 18 MMOL/L (ref 22–29)
COLOR UR: YELLOW
CREAT SERPL-MCNC: 0.9 MG/DL (ref 0.5–1)
EOSINOPHIL # BLD: 0.1 K/UL (ref 0.05–0.5)
EOSINOPHILS RELATIVE PERCENT: 1 % (ref 0–6)
EPI CELLS #/AREA URNS HPF: ABNORMAL /HPF
ERYTHROCYTE [DISTWIDTH] IN BLOOD BY AUTOMATED COUNT: 14.3 % (ref 11.5–15)
GFR SERPL CREATININE-BSD FRML MDRD: >60 ML/MIN/1.73M2
GLUCOSE SERPL-MCNC: 164 MG/DL (ref 74–99)
GLUCOSE UR STRIP-MCNC: NEGATIVE MG/DL
HCT VFR BLD AUTO: 43 % (ref 34–48)
HGB BLD-MCNC: 14 G/DL (ref 11.5–15.5)
HGB UR QL STRIP.AUTO: NEGATIVE
IMM GRANULOCYTES # BLD AUTO: 0.03 K/UL (ref 0–0.58)
IMM GRANULOCYTES NFR BLD: 0 % (ref 0–5)
INFLUENZA A BY PCR: NOT DETECTED
INFLUENZA B BY PCR: NOT DETECTED
KETONES UR STRIP-MCNC: NEGATIVE MG/DL
LEUKOCYTE ESTERASE UR QL STRIP: ABNORMAL
LYMPHOCYTES NFR BLD: 1.05 K/UL (ref 1.5–4)
LYMPHOCYTES RELATIVE PERCENT: 13 % (ref 20–42)
MCH RBC QN AUTO: 29.9 PG (ref 26–35)
MCHC RBC AUTO-ENTMCNC: 32.6 G/DL (ref 32–34.5)
MCV RBC AUTO: 91.9 FL (ref 80–99.9)
MONOCYTES NFR BLD: 0.74 K/UL (ref 0.1–0.95)
MONOCYTES NFR BLD: 9 % (ref 2–12)
MUCOUS THREADS URNS QL MICRO: PRESENT
NEUTROPHILS NFR BLD: 75 % (ref 43–80)
NEUTS SEG NFR BLD: 5.89 K/UL (ref 1.8–7.3)
NITRITE UR QL STRIP: POSITIVE
PH UR STRIP: 7 [PH] (ref 5–9)
PLATELET # BLD AUTO: 189 K/UL (ref 130–450)
PMV BLD AUTO: 9.6 FL (ref 7–12)
POTASSIUM SERPL-SCNC: 4.6 MMOL/L (ref 3.5–5)
PROT SERPL-MCNC: 6.6 G/DL (ref 6.4–8.3)
PROT UR STRIP-MCNC: NEGATIVE MG/DL
RBC # BLD AUTO: 4.68 M/UL (ref 3.5–5.5)
RBC #/AREA URNS HPF: ABNORMAL /HPF
SARS-COV-2 RDRP RESP QL NAA+PROBE: NOT DETECTED
SODIUM SERPL-SCNC: 136 MMOL/L (ref 132–146)
SP GR UR STRIP: 1.01 (ref 1–1.03)
SPECIMEN DESCRIPTION: NORMAL
TROPONIN I SERPL HS-MCNC: 33 NG/L (ref 0–9)
TROPONIN I SERPL HS-MCNC: 35 NG/L (ref 0–9)
UROBILINOGEN UR STRIP-ACNC: 0.2 EU/DL (ref 0–1)
WBC #/AREA URNS HPF: ABNORMAL /HPF
WBC OTHER # BLD: 7.8 K/UL (ref 4.5–11.5)

## 2024-03-04 PROCEDURE — 71045 X-RAY EXAM CHEST 1 VIEW: CPT

## 2024-03-04 PROCEDURE — 6370000000 HC RX 637 (ALT 250 FOR IP)

## 2024-03-04 PROCEDURE — 80053 COMPREHEN METABOLIC PANEL: CPT

## 2024-03-04 PROCEDURE — 93005 ELECTROCARDIOGRAM TRACING: CPT

## 2024-03-04 PROCEDURE — 84484 ASSAY OF TROPONIN QUANT: CPT

## 2024-03-04 PROCEDURE — 87635 SARS-COV-2 COVID-19 AMP PRB: CPT

## 2024-03-04 PROCEDURE — 81001 URINALYSIS AUTO W/SCOPE: CPT

## 2024-03-04 PROCEDURE — 99285 EMERGENCY DEPT VISIT HI MDM: CPT

## 2024-03-04 PROCEDURE — 87502 INFLUENZA DNA AMP PROBE: CPT

## 2024-03-04 PROCEDURE — 85025 COMPLETE CBC W/AUTO DIFF WBC: CPT

## 2024-03-04 RX ORDER — CEFDINIR 300 MG/1
300 CAPSULE ORAL ONCE
Status: COMPLETED | OUTPATIENT
Start: 2024-03-04 | End: 2024-03-04

## 2024-03-04 RX ORDER — CEFDINIR 300 MG/1
300 CAPSULE ORAL 2 TIMES DAILY
Qty: 20 CAPSULE | Refills: 0 | Status: SHIPPED | OUTPATIENT
Start: 2024-03-04 | End: 2024-03-14

## 2024-03-04 RX ADMIN — CEFDINIR 300 MG: 300 CAPSULE ORAL at 15:48

## 2024-03-04 ASSESSMENT — LIFESTYLE VARIABLES
HOW MANY STANDARD DRINKS CONTAINING ALCOHOL DO YOU HAVE ON A TYPICAL DAY: PATIENT DOES NOT DRINK
HOW OFTEN DO YOU HAVE A DRINK CONTAINING ALCOHOL: NEVER

## 2024-03-04 NOTE — ED PROVIDER NOTES
personally performed and/or participated in the history, exam, medical decision making, and procedures and agree with all pertinent clinical information unless otherwise noted.    I have also reviewed and agree with the past medical, family and social history unless otherwise noted.    I have discussed this patient in detail with the resident and provided the instruction and education regarding the evidence-based evaluation and treatment of fatigue.    Any EKG that may have been performed has been personally reviewed by me and I agree with the documentation as noted by the resident.    History: Patient presents to the ED for evaluation of fatigue.  Patient has had symptoms consisting of cough, congestion, and fatigue.  Her daughter was concerned and wanted her to come in.  She states he otherwise feels fine.  No associated nausea, vomiting, or diarrhea.  Denies muscle aches or joint aches.  No associated shortness of breath.  Nonproductive cough.     My findings: Nika Aviles is a 79 y.o. female whom is in no distress. Physical exam reveals patient to be lying in bed comfortably in no distress.  Heart regular rate and rhythm.  Lungs clear to auscultation.  No diaphoresis or pallor.  Patient is alert and oriented x 3.  No focal neurological deficits on exam    My plan: Symptomatic and supportive care.  Appropriate labs and imaging.    Electronically signed by Zana Davis DO on 3/4/24 at 1:13 PM EST       [MS]   1321 EKG Interpretation  Interpreted by emergency department physician.    3/4/24  Time: 12:10    Rate: 57 bpm  Axis: left axis deviation  WA: 252 ms  QRS: 146 ms  Qtc: 436 ms  Rhythm: regular  Clinical Impression: sinus bradycardia with 1st degree AV block and LBBB  Comparison to old EKG: similar to most recent     [AD]      ED Course User Index  [AD] Jenna Gannon DO  [MS] Zana Davis DO       Medications   cefdinir (OMNICEF) capsule 300 mg (300 mg Oral Given 3/4/24 2485)       New

## 2024-03-05 LAB
EKG ATRIAL RATE: 57 BPM
EKG P AXIS: 102 DEGREES
EKG P-R INTERVAL: 252 MS
EKG Q-T INTERVAL: 448 MS
EKG QRS DURATION: 146 MS
EKG QTC CALCULATION (BAZETT): 436 MS
EKG R AXIS: -33 DEGREES
EKG T AXIS: 149 DEGREES
EKG VENTRICULAR RATE: 57 BPM

## 2024-03-05 PROCEDURE — 93010 ELECTROCARDIOGRAM REPORT: CPT | Performed by: INTERNAL MEDICINE

## 2024-03-15 ENCOUNTER — HOSPITAL ENCOUNTER (INPATIENT)
Age: 79
LOS: 4 days | Discharge: OTHER INSTITUTION | DRG: 313 | End: 2024-03-22
Attending: EMERGENCY MEDICINE | Admitting: INTERNAL MEDICINE
Payer: MEDICARE

## 2024-03-15 ENCOUNTER — APPOINTMENT (OUTPATIENT)
Dept: GENERAL RADIOLOGY | Age: 79
DRG: 313 | End: 2024-03-15
Payer: MEDICARE

## 2024-03-15 DIAGNOSIS — I42.8 OTHER CARDIOMYOPATHY (HCC): ICD-10-CM

## 2024-03-15 DIAGNOSIS — R07.9 ACUTE CHEST PAIN: Primary | ICD-10-CM

## 2024-03-15 DIAGNOSIS — B34.8 RHINOVIRUS INFECTION: ICD-10-CM

## 2024-03-15 DIAGNOSIS — I95.9 HYPOTENSION, UNSPECIFIED HYPOTENSION TYPE: ICD-10-CM

## 2024-03-15 DIAGNOSIS — R06.02 SHORTNESS OF BREATH: ICD-10-CM

## 2024-03-15 LAB
ALBUMIN SERPL-MCNC: 3.7 G/DL (ref 3.5–5.2)
ALP SERPL-CCNC: 106 U/L (ref 35–104)
ALT SERPL-CCNC: 13 U/L (ref 0–32)
ANION GAP SERPL CALCULATED.3IONS-SCNC: 20 MMOL/L (ref 7–16)
AST SERPL-CCNC: 19 U/L (ref 0–31)
B PARAP IS1001 DNA NPH QL NAA+NON-PROBE: NOT DETECTED
B PERT DNA SPEC QL NAA+PROBE: NOT DETECTED
BASOPHILS # BLD: 0.04 K/UL (ref 0–0.2)
BASOPHILS NFR BLD: 0 % (ref 0–2)
BILIRUB SERPL-MCNC: 0.5 MG/DL (ref 0–1.2)
BILIRUB UR QL STRIP: NEGATIVE
BNP SERPL-MCNC: 1175 PG/ML (ref 0–450)
BUN SERPL-MCNC: 28 MG/DL (ref 6–23)
C PNEUM DNA NPH QL NAA+NON-PROBE: NOT DETECTED
CALCIUM SERPL-MCNC: 9.1 MG/DL (ref 8.6–10.2)
CHLORIDE SERPL-SCNC: 102 MMOL/L (ref 98–107)
CLARITY UR: ABNORMAL
CO2 SERPL-SCNC: 15 MMOL/L (ref 22–29)
COLOR UR: YELLOW
CREAT SERPL-MCNC: 1.2 MG/DL (ref 0.5–1)
EOSINOPHIL # BLD: 0.06 K/UL (ref 0.05–0.5)
EOSINOPHILS RELATIVE PERCENT: 0 % (ref 0–6)
ERYTHROCYTE [DISTWIDTH] IN BLOOD BY AUTOMATED COUNT: 14.6 % (ref 11.5–15)
FLUAV RNA NPH QL NAA+NON-PROBE: NOT DETECTED
FLUBV RNA NPH QL NAA+NON-PROBE: NOT DETECTED
GFR SERPL CREATININE-BSD FRML MDRD: 45 ML/MIN/1.73M2
GLUCOSE SERPL-MCNC: 223 MG/DL (ref 74–99)
GLUCOSE UR STRIP-MCNC: NEGATIVE MG/DL
HADV DNA NPH QL NAA+NON-PROBE: NOT DETECTED
HCOV 229E RNA NPH QL NAA+NON-PROBE: NOT DETECTED
HCOV HKU1 RNA NPH QL NAA+NON-PROBE: NOT DETECTED
HCOV NL63 RNA NPH QL NAA+NON-PROBE: NOT DETECTED
HCOV OC43 RNA NPH QL NAA+NON-PROBE: NOT DETECTED
HCT VFR BLD AUTO: 36 % (ref 34–48)
HGB BLD-MCNC: 11.3 G/DL (ref 11.5–15.5)
HGB UR QL STRIP.AUTO: NEGATIVE
HMPV RNA NPH QL NAA+NON-PROBE: NOT DETECTED
HPIV1 RNA NPH QL NAA+NON-PROBE: NOT DETECTED
HPIV2 RNA NPH QL NAA+NON-PROBE: NOT DETECTED
HPIV3 RNA NPH QL NAA+NON-PROBE: NOT DETECTED
HPIV4 RNA NPH QL NAA+NON-PROBE: NOT DETECTED
IMM GRANULOCYTES # BLD AUTO: 0.12 K/UL (ref 0–0.58)
IMM GRANULOCYTES NFR BLD: 1 % (ref 0–5)
KETONES UR STRIP-MCNC: ABNORMAL MG/DL
LEUKOCYTE ESTERASE UR QL STRIP: NEGATIVE
LYMPHOCYTES NFR BLD: 1.56 K/UL (ref 1.5–4)
LYMPHOCYTES RELATIVE PERCENT: 11 % (ref 20–42)
M PNEUMO DNA NPH QL NAA+NON-PROBE: NOT DETECTED
MCH RBC QN AUTO: 29.7 PG (ref 26–35)
MCHC RBC AUTO-ENTMCNC: 31.4 G/DL (ref 32–34.5)
MCV RBC AUTO: 94.5 FL (ref 80–99.9)
MONOCYTES NFR BLD: 0.8 K/UL (ref 0.1–0.95)
MONOCYTES NFR BLD: 5 % (ref 2–12)
NEUTROPHILS NFR BLD: 83 % (ref 43–80)
NEUTS SEG NFR BLD: 12.31 K/UL (ref 1.8–7.3)
NITRITE UR QL STRIP: NEGATIVE
PH UR STRIP: 6 [PH] (ref 5–9)
PLATELET # BLD AUTO: 335 K/UL (ref 130–450)
PMV BLD AUTO: 9.5 FL (ref 7–12)
POTASSIUM SERPL-SCNC: 4.7 MMOL/L (ref 3.5–5)
PROT SERPL-MCNC: 5.9 G/DL (ref 6.4–8.3)
PROT UR STRIP-MCNC: NEGATIVE MG/DL
RBC # BLD AUTO: 3.81 M/UL (ref 3.5–5.5)
RSV RNA NPH QL NAA+NON-PROBE: NOT DETECTED
RV+EV RNA NPH QL NAA+NON-PROBE: DETECTED
SARS-COV-2 RNA NPH QL NAA+NON-PROBE: NOT DETECTED
SODIUM SERPL-SCNC: 137 MMOL/L (ref 132–146)
SP GR UR STRIP: >1.03 (ref 1–1.03)
SPECIMEN DESCRIPTION: ABNORMAL
TROPONIN I SERPL HS-MCNC: 37 NG/L (ref 0–9)
UROBILINOGEN UR STRIP-ACNC: 0.2 EU/DL (ref 0–1)
WBC OTHER # BLD: 14.9 K/UL (ref 4.5–11.5)

## 2024-03-15 PROCEDURE — 71045 X-RAY EXAM CHEST 1 VIEW: CPT

## 2024-03-15 PROCEDURE — 85025 COMPLETE CBC W/AUTO DIFF WBC: CPT

## 2024-03-15 PROCEDURE — 0202U NFCT DS 22 TRGT SARS-COV-2: CPT

## 2024-03-15 PROCEDURE — 2580000003 HC RX 258

## 2024-03-15 PROCEDURE — 87086 URINE CULTURE/COLONY COUNT: CPT

## 2024-03-15 PROCEDURE — 81001 URINALYSIS AUTO W/SCOPE: CPT

## 2024-03-15 PROCEDURE — 51701 INSERT BLADDER CATHETER: CPT

## 2024-03-15 PROCEDURE — 93005 ELECTROCARDIOGRAM TRACING: CPT

## 2024-03-15 PROCEDURE — 87040 BLOOD CULTURE FOR BACTERIA: CPT

## 2024-03-15 PROCEDURE — 83880 ASSAY OF NATRIURETIC PEPTIDE: CPT

## 2024-03-15 PROCEDURE — 84484 ASSAY OF TROPONIN QUANT: CPT

## 2024-03-15 PROCEDURE — 96360 HYDRATION IV INFUSION INIT: CPT

## 2024-03-15 PROCEDURE — 80053 COMPREHEN METABOLIC PANEL: CPT

## 2024-03-15 PROCEDURE — 99285 EMERGENCY DEPT VISIT HI MDM: CPT

## 2024-03-15 RX ORDER — 0.9 % SODIUM CHLORIDE 0.9 %
500 INTRAVENOUS SOLUTION INTRAVENOUS ONCE
Status: COMPLETED | OUTPATIENT
Start: 2024-03-15 | End: 2024-03-16

## 2024-03-15 RX ADMIN — SODIUM CHLORIDE 500 ML: 9 INJECTION, SOLUTION INTRAVENOUS at 23:25

## 2024-03-15 ASSESSMENT — LIFESTYLE VARIABLES
HOW MANY STANDARD DRINKS CONTAINING ALCOHOL DO YOU HAVE ON A TYPICAL DAY: PATIENT DOES NOT DRINK
HOW OFTEN DO YOU HAVE A DRINK CONTAINING ALCOHOL: NEVER
HOW OFTEN DO YOU HAVE A DRINK CONTAINING ALCOHOL: NEVER

## 2024-03-15 ASSESSMENT — PAIN - FUNCTIONAL ASSESSMENT: PAIN_FUNCTIONAL_ASSESSMENT: NONE - DENIES PAIN

## 2024-03-16 ENCOUNTER — APPOINTMENT (OUTPATIENT)
Dept: CT IMAGING | Age: 79
DRG: 313 | End: 2024-03-16
Payer: MEDICARE

## 2024-03-16 PROBLEM — R07.9 ACUTE CHEST PAIN: Status: ACTIVE | Noted: 2024-03-16

## 2024-03-16 PROBLEM — B34.8 RHINOVIRUS: Status: ACTIVE | Noted: 2024-03-16

## 2024-03-16 LAB
ALBUMIN SERPL-MCNC: 3.5 G/DL (ref 3.5–5.2)
ALP SERPL-CCNC: 97 U/L (ref 35–104)
ALT SERPL-CCNC: 12 U/L (ref 0–32)
ANION GAP SERPL CALCULATED.3IONS-SCNC: 12 MMOL/L (ref 7–16)
ANION GAP SERPL CALCULATED.3IONS-SCNC: 14 MMOL/L (ref 7–16)
AST SERPL-CCNC: 19 U/L (ref 0–31)
B-OH-BUTYR SERPL-MCNC: 0.32 MMOL/L (ref 0.02–0.27)
BACTERIA URNS QL MICRO: ABNORMAL
BILIRUB SERPL-MCNC: 0.4 MG/DL (ref 0–1.2)
BUN SERPL-MCNC: 32 MG/DL (ref 6–23)
BUN SERPL-MCNC: 34 MG/DL (ref 6–23)
CALCIUM SERPL-MCNC: 8.5 MG/DL (ref 8.6–10.2)
CALCIUM SERPL-MCNC: 8.9 MG/DL (ref 8.6–10.2)
CHLORIDE SERPL-SCNC: 102 MMOL/L (ref 98–107)
CHLORIDE SERPL-SCNC: 103 MMOL/L (ref 98–107)
CHOLEST SERPL-MCNC: 119 MG/DL
CO2 SERPL-SCNC: 17 MMOL/L (ref 22–29)
CO2 SERPL-SCNC: 17 MMOL/L (ref 22–29)
CREAT SERPL-MCNC: 1.2 MG/DL (ref 0.5–1)
CREAT SERPL-MCNC: 1.2 MG/DL (ref 0.5–1)
ERYTHROCYTE [DISTWIDTH] IN BLOOD BY AUTOMATED COUNT: 14.6 % (ref 11.5–15)
GFR SERPL CREATININE-BSD FRML MDRD: 48 ML/MIN/1.73M2
GFR SERPL CREATININE-BSD FRML MDRD: 49 ML/MIN/1.73M2
GLUCOSE BLD-MCNC: 140 MG/DL (ref 74–99)
GLUCOSE BLD-MCNC: 144 MG/DL (ref 74–99)
GLUCOSE SERPL-MCNC: 143 MG/DL (ref 74–99)
GLUCOSE SERPL-MCNC: 149 MG/DL (ref 74–99)
HCT VFR BLD AUTO: 33.4 % (ref 34–48)
HDLC SERPL-MCNC: 36 MG/DL
HGB BLD-MCNC: 10.7 G/DL (ref 11.5–15.5)
LACTATE BLDV-SCNC: 1.6 MMOL/L (ref 0.5–2.2)
LDLC SERPL CALC-MCNC: 49 MG/DL
MAGNESIUM SERPL-MCNC: 1.8 MG/DL (ref 1.6–2.6)
MCH RBC QN AUTO: 30.3 PG (ref 26–35)
MCHC RBC AUTO-ENTMCNC: 32 G/DL (ref 32–34.5)
MCV RBC AUTO: 94.6 FL (ref 80–99.9)
PHOSPHATE SERPL-MCNC: 3.8 MG/DL (ref 2.5–4.5)
PLATELET # BLD AUTO: 253 K/UL (ref 130–450)
PMV BLD AUTO: 9.4 FL (ref 7–12)
POTASSIUM SERPL-SCNC: 4.4 MMOL/L (ref 3.5–5)
POTASSIUM SERPL-SCNC: 4.5 MMOL/L (ref 3.5–5)
PROCALCITONIN SERPL-MCNC: 0.17 NG/ML (ref 0–0.08)
PROT SERPL-MCNC: 5.7 G/DL (ref 6.4–8.3)
RBC # BLD AUTO: 3.53 M/UL (ref 3.5–5.5)
RBC #/AREA URNS HPF: ABNORMAL /HPF
SODIUM SERPL-SCNC: 132 MMOL/L (ref 132–146)
SODIUM SERPL-SCNC: 133 MMOL/L (ref 132–146)
TRIGL SERPL-MCNC: 171 MG/DL
TROPONIN I SERPL HS-MCNC: 31 NG/L (ref 0–9)
TSH SERPL DL<=0.05 MIU/L-ACNC: 2.43 UIU/ML (ref 0.27–4.2)
VLDLC SERPL CALC-MCNC: 34 MG/DL
WBC #/AREA URNS HPF: ABNORMAL /HPF
WBC OTHER # BLD: 11.7 K/UL (ref 4.5–11.5)

## 2024-03-16 PROCEDURE — 36415 COLL VENOUS BLD VENIPUNCTURE: CPT

## 2024-03-16 PROCEDURE — G0378 HOSPITAL OBSERVATION PER HR: HCPCS

## 2024-03-16 PROCEDURE — 96372 THER/PROPH/DIAG INJ SC/IM: CPT

## 2024-03-16 PROCEDURE — 80061 LIPID PANEL: CPT

## 2024-03-16 PROCEDURE — 80048 BASIC METABOLIC PNL TOTAL CA: CPT

## 2024-03-16 PROCEDURE — 84145 PROCALCITONIN (PCT): CPT

## 2024-03-16 PROCEDURE — 85027 COMPLETE CBC AUTOMATED: CPT

## 2024-03-16 PROCEDURE — 99223 1ST HOSP IP/OBS HIGH 75: CPT | Performed by: INTERNAL MEDICINE

## 2024-03-16 PROCEDURE — 94640 AIRWAY INHALATION TREATMENT: CPT

## 2024-03-16 PROCEDURE — 2580000003 HC RX 258: Performed by: INTERNAL MEDICINE

## 2024-03-16 PROCEDURE — 94664 DEMO&/EVAL PT USE INHALER: CPT

## 2024-03-16 PROCEDURE — 6360000004 HC RX CONTRAST MEDICATION: Performed by: RADIOLOGY

## 2024-03-16 PROCEDURE — 6370000000 HC RX 637 (ALT 250 FOR IP): Performed by: INTERNAL MEDICINE

## 2024-03-16 PROCEDURE — 96361 HYDRATE IV INFUSION ADD-ON: CPT

## 2024-03-16 PROCEDURE — 82010 KETONE BODYS QUAN: CPT

## 2024-03-16 PROCEDURE — 83605 ASSAY OF LACTIC ACID: CPT

## 2024-03-16 PROCEDURE — 83735 ASSAY OF MAGNESIUM: CPT

## 2024-03-16 PROCEDURE — 84100 ASSAY OF PHOSPHORUS: CPT

## 2024-03-16 PROCEDURE — 84484 ASSAY OF TROPONIN QUANT: CPT

## 2024-03-16 PROCEDURE — 71275 CT ANGIOGRAPHY CHEST: CPT

## 2024-03-16 PROCEDURE — 82962 GLUCOSE BLOOD TEST: CPT

## 2024-03-16 PROCEDURE — 6360000002 HC RX W HCPCS: Performed by: INTERNAL MEDICINE

## 2024-03-16 PROCEDURE — APPSS60 APP SPLIT SHARED TIME 46-60 MINUTES: Performed by: NURSE PRACTITIONER

## 2024-03-16 PROCEDURE — 84443 ASSAY THYROID STIM HORMONE: CPT

## 2024-03-16 PROCEDURE — 80053 COMPREHEN METABOLIC PANEL: CPT

## 2024-03-16 RX ORDER — POTASSIUM CHLORIDE 20 MEQ/1
40 TABLET, EXTENDED RELEASE ORAL PRN
Status: DISCONTINUED | OUTPATIENT
Start: 2024-03-16 | End: 2024-03-22 | Stop reason: HOSPADM

## 2024-03-16 RX ORDER — AMANTADINE HYDROCHLORIDE 100 MG/1
100 CAPSULE, GELATIN COATED ORAL 2 TIMES DAILY
COMMUNITY

## 2024-03-16 RX ORDER — SODIUM CHLORIDE 9 MG/ML
INJECTION, SOLUTION INTRAVENOUS PRN
Status: DISCONTINUED | OUTPATIENT
Start: 2024-03-16 | End: 2024-03-22 | Stop reason: HOSPADM

## 2024-03-16 RX ORDER — METOPROLOL SUCCINATE 50 MG/1
50 TABLET, EXTENDED RELEASE ORAL DAILY
Status: DISCONTINUED | OUTPATIENT
Start: 2024-03-16 | End: 2024-03-22 | Stop reason: HOSPADM

## 2024-03-16 RX ORDER — ONDANSETRON 2 MG/ML
4 INJECTION INTRAMUSCULAR; INTRAVENOUS EVERY 6 HOURS PRN
Status: DISCONTINUED | OUTPATIENT
Start: 2024-03-16 | End: 2024-03-22 | Stop reason: HOSPADM

## 2024-03-16 RX ORDER — DONEPEZIL HYDROCHLORIDE 5 MG/1
5 TABLET, FILM COATED ORAL
Status: DISCONTINUED | OUTPATIENT
Start: 2024-03-16 | End: 2024-03-22 | Stop reason: HOSPADM

## 2024-03-16 RX ORDER — ONDANSETRON 4 MG/1
4 TABLET, ORALLY DISINTEGRATING ORAL EVERY 8 HOURS PRN
Status: DISCONTINUED | OUTPATIENT
Start: 2024-03-16 | End: 2024-03-22 | Stop reason: HOSPADM

## 2024-03-16 RX ORDER — IPRATROPIUM BROMIDE AND ALBUTEROL SULFATE 2.5; .5 MG/3ML; MG/3ML
1 SOLUTION RESPIRATORY (INHALATION)
Status: DISCONTINUED | OUTPATIENT
Start: 2024-03-16 | End: 2024-03-22 | Stop reason: HOSPADM

## 2024-03-16 RX ORDER — ACETAMINOPHEN 650 MG/1
650 SUPPOSITORY RECTAL EVERY 6 HOURS PRN
Status: DISCONTINUED | OUTPATIENT
Start: 2024-03-16 | End: 2024-03-22 | Stop reason: HOSPADM

## 2024-03-16 RX ORDER — INSULIN LISPRO 100 [IU]/ML
0-4 INJECTION, SOLUTION INTRAVENOUS; SUBCUTANEOUS
Status: DISCONTINUED | OUTPATIENT
Start: 2024-03-16 | End: 2024-03-22 | Stop reason: HOSPADM

## 2024-03-16 RX ORDER — SODIUM CHLORIDE 9 MG/ML
INJECTION, SOLUTION INTRAVENOUS CONTINUOUS
Status: ACTIVE | OUTPATIENT
Start: 2024-03-16 | End: 2024-03-16

## 2024-03-16 RX ORDER — CALCIUM CARBONATE 500 MG/1
500 TABLET, CHEWABLE ORAL 3 TIMES DAILY PRN
Status: DISCONTINUED | OUTPATIENT
Start: 2024-03-16 | End: 2024-03-22 | Stop reason: HOSPADM

## 2024-03-16 RX ORDER — INSULIN LISPRO 100 [IU]/ML
0-4 INJECTION, SOLUTION INTRAVENOUS; SUBCUTANEOUS NIGHTLY
Status: DISCONTINUED | OUTPATIENT
Start: 2024-03-16 | End: 2024-03-22 | Stop reason: HOSPADM

## 2024-03-16 RX ORDER — LEVOTHYROXINE SODIUM 88 UG/1
88 TABLET ORAL DAILY
Status: DISCONTINUED | OUTPATIENT
Start: 2024-03-16 | End: 2024-03-22 | Stop reason: HOSPADM

## 2024-03-16 RX ORDER — MAGNESIUM SULFATE IN WATER 40 MG/ML
2000 INJECTION, SOLUTION INTRAVENOUS PRN
Status: DISCONTINUED | OUTPATIENT
Start: 2024-03-16 | End: 2024-03-22 | Stop reason: HOSPADM

## 2024-03-16 RX ORDER — SODIUM CHLORIDE 0.9 % (FLUSH) 0.9 %
5-40 SYRINGE (ML) INJECTION EVERY 12 HOURS SCHEDULED
Status: DISCONTINUED | OUTPATIENT
Start: 2024-03-16 | End: 2024-03-22 | Stop reason: HOSPADM

## 2024-03-16 RX ORDER — ENOXAPARIN SODIUM 100 MG/ML
40 INJECTION SUBCUTANEOUS DAILY
Status: DISCONTINUED | OUTPATIENT
Start: 2024-03-16 | End: 2024-03-22 | Stop reason: HOSPADM

## 2024-03-16 RX ORDER — METFORMIN HYDROCHLORIDE 500 MG/1
500 TABLET, EXTENDED RELEASE ORAL NIGHTLY
Status: ON HOLD | COMMUNITY
End: 2024-03-22 | Stop reason: HOSPADM

## 2024-03-16 RX ORDER — ATORVASTATIN CALCIUM 40 MG/1
40 TABLET, FILM COATED ORAL NIGHTLY
Status: DISCONTINUED | OUTPATIENT
Start: 2024-03-16 | End: 2024-03-22 | Stop reason: HOSPADM

## 2024-03-16 RX ORDER — DEXTROSE MONOHYDRATE 100 MG/ML
INJECTION, SOLUTION INTRAVENOUS CONTINUOUS PRN
Status: DISCONTINUED | OUTPATIENT
Start: 2024-03-16 | End: 2024-03-22 | Stop reason: HOSPADM

## 2024-03-16 RX ORDER — POTASSIUM CHLORIDE 7.45 MG/ML
10 INJECTION INTRAVENOUS PRN
Status: DISCONTINUED | OUTPATIENT
Start: 2024-03-16 | End: 2024-03-22 | Stop reason: HOSPADM

## 2024-03-16 RX ORDER — ACETAMINOPHEN 325 MG/1
650 TABLET ORAL EVERY 6 HOURS PRN
Status: DISCONTINUED | OUTPATIENT
Start: 2024-03-16 | End: 2024-03-22 | Stop reason: HOSPADM

## 2024-03-16 RX ORDER — ASPIRIN 81 MG/1
81 TABLET ORAL DAILY
Status: DISCONTINUED | OUTPATIENT
Start: 2024-03-16 | End: 2024-03-22 | Stop reason: HOSPADM

## 2024-03-16 RX ORDER — ANASTROZOLE 1 MG/1
1 TABLET ORAL DAILY
Status: DISCONTINUED | OUTPATIENT
Start: 2024-03-16 | End: 2024-03-22 | Stop reason: HOSPADM

## 2024-03-16 RX ORDER — REGADENOSON 0.08 MG/ML
0.4 INJECTION, SOLUTION INTRAVENOUS
Status: DISCONTINUED | OUTPATIENT
Start: 2024-03-16 | End: 2024-03-22 | Stop reason: HOSPADM

## 2024-03-16 RX ORDER — SODIUM CHLORIDE 0.9 % (FLUSH) 0.9 %
5-40 SYRINGE (ML) INJECTION PRN
Status: DISCONTINUED | OUTPATIENT
Start: 2024-03-16 | End: 2024-03-22 | Stop reason: HOSPADM

## 2024-03-16 RX ORDER — POLYETHYLENE GLYCOL 3350 17 G/17G
17 POWDER, FOR SOLUTION ORAL DAILY PRN
Status: DISCONTINUED | OUTPATIENT
Start: 2024-03-16 | End: 2024-03-22 | Stop reason: HOSPADM

## 2024-03-16 RX ORDER — CLOPIDOGREL BISULFATE 75 MG/1
75 TABLET ORAL DAILY
Status: DISCONTINUED | OUTPATIENT
Start: 2024-03-16 | End: 2024-03-22 | Stop reason: HOSPADM

## 2024-03-16 RX ORDER — BENZONATATE 100 MG/1
100 CAPSULE ORAL 3 TIMES DAILY PRN
Status: DISCONTINUED | OUTPATIENT
Start: 2024-03-16 | End: 2024-03-22 | Stop reason: HOSPADM

## 2024-03-16 RX ORDER — GLUCAGON 1 MG/ML
1 KIT INJECTION PRN
Status: DISCONTINUED | OUTPATIENT
Start: 2024-03-16 | End: 2024-03-22 | Stop reason: HOSPADM

## 2024-03-16 RX ORDER — HYDRALAZINE HYDROCHLORIDE 20 MG/ML
10 INJECTION INTRAMUSCULAR; INTRAVENOUS EVERY 6 HOURS PRN
Status: DISCONTINUED | OUTPATIENT
Start: 2024-03-16 | End: 2024-03-22 | Stop reason: HOSPADM

## 2024-03-16 RX ORDER — LANOLIN ALCOHOL/MO/W.PET/CERES
3 CREAM (GRAM) TOPICAL NIGHTLY PRN
Status: DISCONTINUED | OUTPATIENT
Start: 2024-03-16 | End: 2024-03-22 | Stop reason: HOSPADM

## 2024-03-16 RX ADMIN — ANASTROZOLE 1 MG: 1 TABLET, COATED ORAL at 09:10

## 2024-03-16 RX ADMIN — IPRATROPIUM BROMIDE AND ALBUTEROL SULFATE 1 DOSE: .5; 2.5 SOLUTION RESPIRATORY (INHALATION) at 08:47

## 2024-03-16 RX ADMIN — SODIUM CHLORIDE, PRESERVATIVE FREE 10 ML: 5 INJECTION INTRAVENOUS at 09:16

## 2024-03-16 RX ADMIN — ENOXAPARIN SODIUM 40 MG: 100 INJECTION SUBCUTANEOUS at 09:08

## 2024-03-16 RX ADMIN — IOPAMIDOL 75 ML: 755 INJECTION, SOLUTION INTRAVENOUS at 00:39

## 2024-03-16 RX ADMIN — ATORVASTATIN CALCIUM 40 MG: 40 TABLET, FILM COATED ORAL at 21:58

## 2024-03-16 RX ADMIN — IPRATROPIUM BROMIDE AND ALBUTEROL SULFATE 1 DOSE: .5; 2.5 SOLUTION RESPIRATORY (INHALATION) at 17:59

## 2024-03-16 RX ADMIN — IPRATROPIUM BROMIDE AND ALBUTEROL SULFATE 1 DOSE: .5; 2.5 SOLUTION RESPIRATORY (INHALATION) at 12:00

## 2024-03-16 RX ADMIN — SODIUM CHLORIDE, PRESERVATIVE FREE 10 ML: 5 INJECTION INTRAVENOUS at 21:58

## 2024-03-16 RX ADMIN — CLOPIDOGREL BISULFATE 75 MG: 75 TABLET ORAL at 09:08

## 2024-03-16 RX ADMIN — IPRATROPIUM BROMIDE AND ALBUTEROL SULFATE 1 DOSE: .5; 2.5 SOLUTION RESPIRATORY (INHALATION) at 20:23

## 2024-03-16 RX ADMIN — ASPIRIN 81 MG: 81 TABLET, COATED ORAL at 09:07

## 2024-03-16 RX ADMIN — SODIUM CHLORIDE: 9 INJECTION, SOLUTION INTRAVENOUS at 04:30

## 2024-03-16 ASSESSMENT — PAIN - FUNCTIONAL ASSESSMENT
PAIN_FUNCTIONAL_ASSESSMENT: NONE - DENIES PAIN
PAIN_FUNCTIONAL_ASSESSMENT: NONE - DENIES PAIN

## 2024-03-16 NOTE — PROGRESS NOTES
Notified ER that patient stress is on hold until cardiology evaluates patient.   Cassia Holguin RN

## 2024-03-16 NOTE — ED NOTES
Pts daughter Rosangela has been at bedside all night and leaving to rest. Rosangela requesting to be notified of any condition changes, or when pt gets a bed assigned.

## 2024-03-16 NOTE — CONSULTS
regular rhythm, normal S1, S2, no MRG    LUNGS: Clear to auscultation bilaterally, no wheezing.    ABDOMEN: Abdomen is soft and not distended. No tenderness.    EXTREMITIES: There is no pedal edema.   MUSCULOSKELETAL: No joint swelling. Normal range of motion    SKIN: Skin is moist. No ulcers or lesions.   NEUROLOGICAL No evidence of obvious neurological deficits.    PSYCHOLOGICAL: Patient is in normal mood.        Pertinent cardiac studies:    -TTE 1/9/2024:    Left Ventricle: The EF by visual approximation is 30 - 35%.    Right Ventricle: Right ventricle size is normal. Normal systolic function.    Mitral Valve: Mild regurgitation.    Tricuspid Valve: Mild regurgitation.    Pulmonic Valve: Trace regurgitation.    Left Atrium: Left atrium is mildly dilated.    Interatrial Septum: Agitated saline study was negative with and without provocation.    Image quality is suboptimal.    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    Chest pain, hx of newly diagnosed HFrEF (EF 30-35%):  Nonspecific, atypical, resolved.  Troponin at baseline 37-31  ECG with normal sinus rhythm and LBBB  Recent echocardiogram 2 months ago showing EF of 30-35%  Discussion of further ischemic workup with family due to patient's significant dementia  Continue aspirin 81 mg daily  Continue atorvastatin 40 mg daily  Patient on Plavix 75 mg daily due to her recent acute stroke in January 2024  Continue metoprolol XL 50 mg  Obtain repeat limited TTE for further assessment of her EF  Holding Entresto 2/2  hypotension.      -----------------------------------------------------------------------------------------------------------------------------------------------  CARDIOLOGY ATTENDING ATTESTATION  I spent > 51% of the total time involved with completing the encounter. The total time included the following:  Independently interviewing the patient (HPI, ROS, PMH, PSH, FMH, SH, allergies, and medications)  Independently performing a medically appropriate examination  Reviewing the above documentation completed by the ERIC  Ordering medications, tests, and/or procedures  Formulating the assessment/plan and reviewing the rationale for the above recommendations  Reviewing available records, results of all previously ordered testing/procedures, and current problem list  Counseling/educating the patient  Coordinating care with other healthcare professionals  Communicating results to the patient's family/caregiver  Documenting clinical information in the patient's electronic health record  -----------------------------------------------------------------------------------------------------------------------------------------------    Naman Albert MD  Interventional Cardiology/ Structural heart disease

## 2024-03-16 NOTE — ED PROVIDER NOTES
[facility] EMERGENCY DEPARTMENT  EMERGENCY DEPARTMENT ENCOUNTER        Pt Name: Nika Aviles  MRN: [id number]  Birthdate 1945  Date of evaluation: 3/15/2024  Provider: Ulises Marie MD  PCP: Zana Pandya MD  Note Started: 9:06 PM EDT 3/15/24    CHIEF COMPLAINT       Chief Complaint   Patient presents with    Shortness of Breath     Per EMS family reports pt was complaining of SOB, dizziness.  Sat down -LOC       HISTORY OF PRESENT ILLNESS: 1 or more Elements   Nika Aviles is a 79 y.o. female who presents to the emergency department with chief complaint of shortness of breath.  Patient stating that she is more short of breath over the past couple of days and has been feeling little bit more dizzy when she is standing up.  She states that she sat down because she was feeling dizzy.  Does have mild chest pain in the anterior part of her chest that is improved at this time.  Patient states she has history for CHF and has a LifeVest on at this time.  Patient denies any falls or injuries.  Otherwise patient has no significant complaints at this time.  She denies any fever, chills, nausea, vomiting, abdominal pain, hematuria or dysuria, constipation or diarrhea.    Nursing Notes were all reviewed and agreed with or any disagreements were addressed in the HPI.    REVIEW OF SYSTEMS :      Positives and Pertinent negatives as per HPI.     PAST MEDICAL HISTORY/Chronic Conditions Affecting Care      has a past medical history of Breast CA (HCC) (2018), Breast cancer (HCC), Cerebral artery occlusion with cerebral infarction (HCC) (1994), Chronic kidney disease, Dementia (HCC), Diabetes mellitus (HCC), Disease of blood and blood forming organ, Diverticulitis, Hyperlipidemia, Hypertension, Hypothyroidism, Lymphoma (HCC) (12/2016), Osteoarthritis, Prolonged emergence from general anesthesia, and Thyroid disease.     SURGICAL HISTORY     Past Surgical History:   Procedure  consider a repeat   examination if clinically indicated.  Alternatively would advise close   clinical follow-up.      Abnormal right breast with skin thickening, stranding/edema, and a large   masslike density in the upper breast/axilla measuring roughly 8.2 x 6 cm.   Correlate for mastitis, treatment change, venous/lymphatic obstruction,   hematoma, malignancy.      7 mm left upper lobe pulmonary nodule.  Follow-up chest CT within 6 months   advised.         XR CHEST PORTABLE   Final Result   No acute process.           No results found.    No results found.    PROCEDURES   Unless otherwise noted below, none    EMERGENCY DEPARTMENT COURSE    Vitals:    Vitals:    03/15/24 2152 03/15/24 2253 03/16/24 0026 03/16/24 0029   BP:  (!) 89/43 120/66    Pulse: 68 65  58   Resp: 23 18  16   Temp:       TempSrc:       SpO2: 100% 97%  99%   Weight:       Height:           Patient was given the following medications:  Medications   benzocaine-menthol (CEPACOL SORE THROAT) lozenge 1 lozenge (has no administration in time range)   benzonatate (TESSALON) capsule 100 mg (has no administration in time range)   calcium carbonate (TUMS) chewable tablet 500 mg (has no administration in time range)   hydrALAZINE (APRESOLINE) injection 10 mg (has no administration in time range)   melatonin tablet 3 mg (has no administration in time range)   Polyvinyl Alcohol-Povidone PF (REFRESH) 1.4-0.6 % ophthalmic solution 1 drop (has no administration in time range)   sodium chloride (OCEAN, BABY AYR) 0.65 % nasal spray 1 spray (has no administration in time range)   sodium chloride flush 0.9 % injection 5-40 mL (has no administration in time range)   sodium chloride flush 0.9 % injection 5-40 mL (has no administration in time range)   0.9 % sodium chloride infusion (has no administration in time range)   potassium chloride (KLOR-CON M) extended release tablet 40 mEq (has no administration in time range)     Or   potassium bicarb-citric acid

## 2024-03-16 NOTE — ED NOTES
ATTENDING PROVIDER ATTESTATION:     I have personally performed and/or participated in the history, exam, medical decision making, and procedures and agree with all pertinent clinical information.      I have also reviewed and agree with the past medical, family and social history unless otherwise noted.    I have discussed this patient in detail with the resident, and provided the instruction and education regarding sob.    My findings/Plan: I was the primary provider for patient.  History of breast cancer history of stroke history of chronic kidney disease dementia diabetes history of diverticulitis hyperlipidemia hypertension hypothyroidism lymphoma osteoarthritis presenting here because of having chest pains that started this afternoon also feeling short of breath.  Patient has been short of breath with exertion can only walk 5 steps and becomes easily short of breath.  Patient reporting no chest pains or shortness of breath at present time patient reporting no abdominal pain.  Patient does have LifeVest.  There is no history of black or tarry stools family reports recent treatment for UTI.  Patient also reportedly had viral infection.  Patient was seen at outlying facility she was discharged home on 4 March..  Patient family reports she does have a cough.  Patient awake alert oriented to person and place.  Patient heart exam normal lungs are clear abdomen soft.  Patient able to move all extremities she is cool throughout her upper and lower extremities.  Patient does not smoke.  Patient differential includes heart failure as well as PE as well as pneumonia as well as infectious process such as UTI as well as sepsis.  Patient had echo done on 1/8/2024 ejection fraction was 30 to 35% mild mitral valve regurgitation  EKG:  This EKG is signed and interpreted by me.    Rate: 65  Rhythm: Appears to be sinus rhythm  Interpretation: non-specific EKG  Comparison: stable as compared to patient's most recent EKG compared  to EKG from 3/4/2024    Patient while here in the emergency room was given IV fluid bolus due to hypotension.  Patient's lab work sodium is 137 potassium is 4.7 CO2 is 15 BUN is 28 creatinine is 1.2 proBNP was 1175 troponin is 37 alk phos is 106 patient's white count 14.9 hemoglobin 11.3 lactic acid was ordered patient was positive for rhinovirus on viral panel.  Patient did have blood cultures obtained patient urinalysis showed nitrite leukocyte esterase negative.  Patient chest x-ray was negative.  Patient was to undergo CT chest due to shortness of breath as well as hypotension.  Patient plan will be to admit I did speak to patient as well as family patient having no chest pains pulse ox stable.  Internal medicine was consulted and patient will be admitted.  I did review CT findings there is no signs of PE but they could not rule out  Patient right lower lung region is markedly limited due to artifact cannot exclude possible pulmonary emboli.  Abnormal right breast skin thickening stranding edema patient does have history of cancer.     --------------------------------- IMPRESSION AND DISPOSITION ---------------------------------    IMPRESSION  1. Acute chest pain    2. Rhinovirus infection    3. Shortness of breath    4. Hypotension, unspecified hypotension type        DISPOSITION  Disposition: Admit to telemetry  Patient condition is stable           Ulises Marie MD  03/16/24 0025       Ulises Marie MD  03/16/24 0341

## 2024-03-16 NOTE — ED NOTES
Report given to CORRINE Johnson from [id number].   Report method by phone   The following was reviewed with receiving RN:   Current vital signs:  BP (!) 103/42   Pulse 72   Temp 96.8 °F (36 °C) (Oral)   Resp 10   Ht 1.727 m (5' 8\")   Wt 90.7 kg (200 lb)   LMP  (LMP Unknown)   SpO2 95%   BMI 30.41 kg/m²                MEWS Score: 1     Any medication or safety alerts were reviewed. Any pending diagnostics and notifications were also reviewed, as well as any safety concerns or issues, abnormal labs, abnormal imaging, and abnormal assessment findings. Questions were answered.

## 2024-03-16 NOTE — ED NOTES
Radiology Procedure Waiver   Name: Nika Avilse  : 1945  MRN: [id number]    Date:  3/15/24    Time: 10:27 PM EDT    Benefits of immediately proceeding with Radiology exam(s) without pre-testing outweigh the risks or are not indicated as specified below and therefore the following is/are being waived:    [] Pregnancy test   [] Patients LMP on-time and regular.   [] Patient had Tubal Ligation or has other Contraception Device.   [] Patient  is Menopausal or Premenarcheal.    [] Patient had Full or Partial Hysterectomy.    [] Protocol for Iodine allergy    [] MRI Questionnaire     [x] BUN/Creatinine   [] Patient age w/no hx of renal dysfunction.   [] Patient on Dialysis.   [] Recent Normal Labs.  Electronically signed by Ulises Marie MD on 3/15/24 at 10:27 PM EDT               Ulises Marie MD  03/15/24 2163

## 2024-03-16 NOTE — H&P
SURGERY      neck fusion bone graft-no ROM limitations    BREAST SURGERY Right     tumor and lymph node resection-CA    BRONCHOSCOPY N/A 10/01/2019    BRONCHOSCOPY ALVEOLAR LAVAGE WITH BRUSHINGS performed by Arley Delgado MD at [facility] ENDOSCOPY    COLONOSCOPY      ENDOSCOPY, COLON, DIAGNOSTIC      FRACTURE SURGERY      elbow radius    HYSTERECTOMY (CERVIX STATUS UNKNOWN)      KNEE ARTHROSCOPY      MEDIPORT INSERTION, SINGLE Left 2016    chest    OTHER SURGICAL HISTORY  2017    mediport insertion    OTHER SURGICAL HISTORY  08/12/2017    removal foreign body    TOTAL KNEE ARTHROPLASTY Right 06/29/2022    ANNEMARIE ROBOTIC ASSISTED RIGHT KNEE TOTAL ARTHROPLASTY performed by Clarence Harris MD at [facility] OR    UPPER GASTROINTESTINAL ENDOSCOPY         Medications Prior to Admission:      Prior to Admission medications    Medication Sig Start Date End Date Taking? Authorizing Provider   aspirin 81 MG EC tablet Take 1 tablet by mouth daily 1/13/24   Christofer Good DO   clopidogrel (PLAVIX) 75 MG tablet Take 1 tablet by mouth daily 1/13/24   Christofer Good DO   levothyroxine (SYNTHROID) 88 MCG tablet Take 1 tablet by mouth Daily 1/14/24   Christofer Good DO   metoprolol succinate (TOPROL XL) 50 MG extended release tablet Take 1 tablet by mouth daily 1/13/24   Christofer Good DO   sacubitril-valsartan (ENTRESTO) 24-26 MG per tablet Take 1 tablet by mouth 2 times daily 1/13/24   Christofer Good DO   metFORMIN (GLUCOPHAGE-XR) 500 MG extended release tablet Take 1 tablet by mouth daily (with breakfast)    Didier Prasad MD   donepezil (ARICEPT) 5 MG tablet Take 1 tablet by mouth once At lunch    Didier Prasad MD   anastrozole (ARIMIDEX) 1 MG tablet Take 1 tablet by mouth daily 5/15/23   Momo Vick MD   atorvastatin (LIPITOR) 40 MG tablet Take 1 tablet by mouth nightly    ProviderDidier MD       Allergies:  Penicillins    Social History:      The patient currently lives at home alone    TOBACCO:   reports that she  clinically indicated.  Alternatively would advise close   clinical follow-up.      Abnormal right breast with skin thickening, stranding/edema, and a large   masslike density in the upper breast/axilla measuring roughly 8.2 x 6 cm.   Correlate for mastitis, treatment change, venous/lymphatic obstruction,   hematoma, malignancy.      7 mm left upper lobe pulmonary nodule.  Follow-up chest CT within 6 months   advised.         XR CHEST PORTABLE   Final Result   No acute process.               Labs:     Recent Labs     03/15/24  2126 03/16/24  0821   WBC 14.9* 11.7*   HGB 11.3* 10.7*   HCT 36.0 33.4*    253     Recent Labs     03/15/24  2126 03/16/24  0411 03/16/24  0821    132 133   K 4.7 4.5 4.4    103 102   CO2 15* 17* 17*   BUN 28* 34* 32*   CREATININE 1.2* 1.2* 1.2*   CALCIUM 9.1 8.5* 8.9   PHOS  --   --  3.8     Recent Labs     03/15/24  2126 03/16/24  0821   AST 19 19   ALT 13 12   BILITOT 0.5 0.4   ALKPHOS 106* 97     No results for input(s): \"INR\" in the last 72 hours.  No results for input(s): \"CKTOTAL\", \"TROPONINI\" in the last 72 hours.    Urinalysis:      Lab Results   Component Value Date/Time    NITRU NEGATIVE 03/15/2024 10:52 PM    WBCUA 0 TO 5 03/15/2024 10:52 PM    BACTERIA TRACE 03/15/2024 10:52 PM    RBCUA 0 TO 2 03/15/2024 10:52 PM    BLOODU TRACE 03/22/2017 10:00 PM    SPECGRAV >1.030 03/15/2024 10:52 PM    GLUCOSEU NEGATIVE 03/15/2024 10:52 PM         ASSESSMENT:  Rhinovirus infection  Mild JAX  Chest pain  Recently diagnosed systolic heart failure  Recent CVA  Hypertension  Hyperlipidemia  Type 2 diabetes  Former tobacco smoker  Obesity  Hypothyroidism  Low-grade follicular B-cell lymphoma status postchemotherapy, in remission  Alzheimer's disease    PLAN:  Admit observation to telemetry  Strict intake and output, monitor renal function  Cardiology consulted  Continues on aspirin and Plavix  Entresto has been put on hold pending cardiology evaluation given mild JAX  Continue

## 2024-03-17 LAB
EKG ATRIAL RATE: 65 BPM
EKG Q-T INTERVAL: 436 MS
EKG QRS DURATION: 144 MS
EKG QTC CALCULATION (BAZETT): 453 MS
EKG R AXIS: 4 DEGREES
EKG T AXIS: 164 DEGREES
EKG VENTRICULAR RATE: 65 BPM
GLUCOSE BLD-MCNC: 127 MG/DL (ref 74–99)
GLUCOSE BLD-MCNC: 164 MG/DL (ref 74–99)
GLUCOSE BLD-MCNC: 208 MG/DL (ref 74–99)
GLUCOSE BLD-MCNC: 255 MG/DL (ref 74–99)
MICROORGANISM SPEC CULT: NO GROWTH
SPECIMEN DESCRIPTION: NORMAL

## 2024-03-17 PROCEDURE — 6370000000 HC RX 637 (ALT 250 FOR IP): Performed by: INTERNAL MEDICINE

## 2024-03-17 PROCEDURE — G0378 HOSPITAL OBSERVATION PER HR: HCPCS

## 2024-03-17 PROCEDURE — 99233 SBSQ HOSP IP/OBS HIGH 50: CPT | Performed by: INTERNAL MEDICINE

## 2024-03-17 PROCEDURE — 82962 GLUCOSE BLOOD TEST: CPT

## 2024-03-17 PROCEDURE — 6360000002 HC RX W HCPCS: Performed by: INTERNAL MEDICINE

## 2024-03-17 PROCEDURE — 93010 ELECTROCARDIOGRAM REPORT: CPT | Performed by: INTERNAL MEDICINE

## 2024-03-17 PROCEDURE — 96372 THER/PROPH/DIAG INJ SC/IM: CPT

## 2024-03-17 PROCEDURE — 2580000003 HC RX 258: Performed by: INTERNAL MEDICINE

## 2024-03-17 PROCEDURE — 94640 AIRWAY INHALATION TREATMENT: CPT

## 2024-03-17 PROCEDURE — 2500000003 HC RX 250 WO HCPCS: Performed by: INTERNAL MEDICINE

## 2024-03-17 RX ADMIN — IPRATROPIUM BROMIDE AND ALBUTEROL SULFATE 1 DOSE: .5; 2.5 SOLUTION RESPIRATORY (INHALATION) at 09:41

## 2024-03-17 RX ADMIN — ASPIRIN 81 MG: 81 TABLET, COATED ORAL at 09:30

## 2024-03-17 RX ADMIN — IPRATROPIUM BROMIDE AND ALBUTEROL SULFATE 1 DOSE: .5; 2.5 SOLUTION RESPIRATORY (INHALATION) at 16:24

## 2024-03-17 RX ADMIN — SODIUM CHLORIDE, PRESERVATIVE FREE 10 ML: 5 INJECTION INTRAVENOUS at 20:46

## 2024-03-17 RX ADMIN — ENOXAPARIN SODIUM 40 MG: 100 INJECTION SUBCUTANEOUS at 09:30

## 2024-03-17 RX ADMIN — IPRATROPIUM BROMIDE AND ALBUTEROL SULFATE 1 DOSE: .5; 2.5 SOLUTION RESPIRATORY (INHALATION) at 20:04

## 2024-03-17 RX ADMIN — ATORVASTATIN CALCIUM 40 MG: 40 TABLET, FILM COATED ORAL at 20:46

## 2024-03-17 RX ADMIN — SODIUM CHLORIDE, PRESERVATIVE FREE 10 ML: 5 INJECTION INTRAVENOUS at 09:30

## 2024-03-17 RX ADMIN — METOPROLOL SUCCINATE 50 MG: 50 TABLET, EXTENDED RELEASE ORAL at 09:30

## 2024-03-17 RX ADMIN — IPRATROPIUM BROMIDE AND ALBUTEROL SULFATE 1 DOSE: .5; 2.5 SOLUTION RESPIRATORY (INHALATION) at 12:47

## 2024-03-17 RX ADMIN — ANTI-FUNGAL POWDER MICONAZOLE NITRATE TALC FREE: 1.42 POWDER TOPICAL at 15:54

## 2024-03-17 RX ADMIN — ANASTROZOLE 1 MG: 1 TABLET, COATED ORAL at 09:30

## 2024-03-17 RX ADMIN — DONEPEZIL HYDROCHLORIDE 5 MG: 5 TABLET, FILM COATED ORAL at 12:47

## 2024-03-17 RX ADMIN — LEVOTHYROXINE SODIUM 88 MCG: 0.09 TABLET ORAL at 05:43

## 2024-03-17 RX ADMIN — CLOPIDOGREL BISULFATE 75 MG: 75 TABLET ORAL at 09:30

## 2024-03-17 NOTE — PLAN OF CARE
Problem: Chronic Conditions and Co-morbidities  Goal: Patient's chronic conditions and co-morbidity symptoms are monitored and maintained or improved  Outcome: Progressing     Problem: Discharge Planning  Goal: Discharge to home or other facility with appropriate resources  Outcome: Progressing     Problem: Safety - Adult  Goal: Free from fall injury  Outcome: Progressing     Problem: Skin/Tissue Integrity  Goal: Absence of new skin breakdown  Description: 1.  Monitor for areas of redness and/or skin breakdown  2.  Assess vascular access sites hourly  3.  Every 4-6 hours minimum:  Change oxygen saturation probe site  4.  Every 4-6 hours:  If on nasal continuous positive airway pressure, respiratory therapy assess nares and determine need for appliance change or resting period.  Outcome: Progressing     JIMBO Das RN

## 2024-03-17 NOTE — PROGRESS NOTES
Inpatient Cardiology Progress note        SUBJECTIVE:   Denies having chest pain or SOB. No LE edema.     OBJECTIVE: No apparent distress       PHYSICAL EXAM:   BP (!) 116/47   Pulse 72   Temp 98 °F (36.7 °C) (Oral)   Resp 15   Ht 1.727 m (5' 8\")   Wt 90.7 kg (200 lb)   LMP  (LMP Unknown)   SpO2 92%   BMI 30.41 kg/m²    B/P Range last 24 hours: Systolic (24hrs), Av , Min:93 , Max:118    Diastolic (24hrs), Av, Min:37, Max:106      GENERAL: Not in distress.   HEAD: Normocephalic, Atraumatic.   NECK: No JVD. No carotid bruits. No neck masses.?   CARDIOVASCULAR: Normal rate, regular rhythm, normal S1, S2, no MRG    LUNGS: Clear to auscultation bilaterally, no wheezing.?   ABDOMEN: Abdomen is soft and not distended. No tenderness.  EXTREMITIES:There is no pedal edema.   MUSCULOSKELETAL: No joint swelling. Normal range of motion?   SKIN: Skins is moist. No ulcers or lesions.   NEUROLOGICAL: Conscious, alert, oriented to time, place and person. No evidence of obvious neurological deficits.?   PSYCHOLOGICAL: Patient is in normal mood.?     No intake or output data in the 24 hours ending 24 0196    Weight:   Wt Readings from Last 3 Encounters:   03/15/24 90.7 kg (200 lb)   24 87.6 kg (193 lb 3.2 oz)   24 87.1 kg (192 lb)       Current Inpatient Medications:   miconazole   Topical BID    sodium chloride flush  5-40 mL IntraVENous 2 times per day    enoxaparin  40 mg SubCUTAneous Daily    anastrozole  1 mg Oral Daily    aspirin  81 mg Oral Daily    atorvastatin  40 mg Oral Nightly    clopidogrel  75 mg Oral Daily    donepezil  5 mg Oral Lunch    levothyroxine  88 mcg Oral Daily    metoprolol succinate  50 mg Oral Daily    [Held by provider] sacubitril-valsartan  1 tablet Oral BID    insulin lispro  0-4 Units SubCUTAneous TID WC    insulin lispro  0-4 Units SubCUTAneous Nightly    ipratropium 0.5 mg-albuterol 2.5 mg  1 Dose Inhalation Q4H While awake       IV Infusions (if any):   sodium  chloride      dextrose         DIAGNOSTIC/ LABORATORY DATA:  Labs:   CBC:   Recent Labs     03/15/24  2126 03/16/24  0821   WBC 14.9* 11.7*   HGB 11.3* 10.7*   HCT 36.0 33.4*    253     BMP:   Recent Labs     03/16/24  0411 03/16/24  0821    133   K 4.5 4.4   CO2 17* 17*   BUN 34* 32*   CREATININE 1.2* 1.2*   LABGLOM 49* 48*   CALCIUM 8.5* 8.9     Mag:   Recent Labs     03/16/24  0821   MG 1.8     Phos:   Recent Labs     03/16/24 0821   PHOS 3.8     TFT:   Lab Results   Component Value Date    TSH 2.43 03/16/2024    T4FREE 1.2 01/08/2024      HgA1c:   Lab Results   Component Value Date    LABA1C 6.9 (H) 01/08/2024     No results found for: \"EAG\"    BNP: No results for input(s): \"BNP\" in the last 72 hours.  PT/INR: No results for input(s): \"PROTIME\", \"INR\" in the last 72 hours.  APTT:No results for input(s): \"APTT\" in the last 72 hours.  CARDIAC ENZYMES:  Recent Labs     03/15/24  2126 03/16/24  0821   TROPHS 37* 31*     FASTING LIPID PANEL:  Lab Results   Component Value Date/Time    CHOL 119 03/16/2024 08:21 AM    HDL 36 03/16/2024 08:21 AM    TRIG 171 03/16/2024 08:21 AM     LIVER PROFILE:  Recent Labs     03/15/24  2126 03/16/24  0821   AST 19 19   ALT 13 12   LABALBU 3.7 3.5         Pertinent imaging studies:    -TTE 1/9/2024:    Left Ventricle: The EF by visual approximation is 30 - 35%.    Right Ventricle: Right ventricle size is normal. Normal systolic function.    Mitral Valve: Mild regurgitation.    Tricuspid Valve: Mild regurgitation.    Pulmonic Valve: Trace regurgitation.    Left Atrium: Left atrium is mildly dilated.    Interatrial Septum: Agitated saline study was negative with and without provocation.    Image quality is suboptimal.     -ECG 3/15/24: Personally reviewed  Normal sinus rhythm, LBBB       ASSESSMENT/PLAN:      79-year-old female with PMH of dementia, stroke x2 (2000 and in 1/2024), gait instability, cardiomyopathy (diagnosed 1/2024, EF 30-35%), DM, HTN, HLD, hypothyroidism,

## 2024-03-17 NOTE — PROGRESS NOTES
[facility] Hospitalist Progress Note      Synopsis: Patient with a left bundle branch block, hypertension, hyperlipidemia, diabetes, former smoker, obesity, dementia, CVA, hypothyroidism, low-grade follicular lymphoma status postchemotherapy in remission admitted on 3/15/2024 after presenting to the ED with chest pain.  Patient was recently admitted in January for acute stroke was evaluated by cardiology at the time after diagnosis of acute systolic heart failure, discharged with a LifeVest and plan was to get a cardiac cath after recovery from the stroke.  Cardiology is now consulted and will communicate with the patient's family to determine ischemic evaluation.  Patient was also noted to have right breast possible hematoma on CT scan of the chest, surgeries consult    Subjective  Patient denies any current chest pain  Exam:  BP 93/73   Pulse 95   Temp 97.9 °F (36.6 °C) (Oral)   Resp 11   Ht 1.727 m (5' 8\")   Wt 90.7 kg (200 lb)   LMP  (LMP Unknown)   SpO2 94%   BMI 30.41 kg/m²   General appearance: No apparent distress, appears stated age and cooperative.  HEENT: Pupils equal, round, and reactive to light. Conjunctivae/corneas clear.  Neck: Supple. No jugular venous distention. Trachea midline.  Respiratory:  Normal respiratory effort. Clear to auscultation, bilaterally without Rales/Wheezes/Rhonchi.  Cardiovascular: Regular rate and rhythm with normal S1/S2 without murmurs, rubs or gallops.  Abdomen: Soft, non-tender, non-distended with normal bowel sounds.  Musculoskeletal: No clubbing, cyanosis or edema bilaterally. Brisk capillary refill. 2+ lower extremity pulses (dorsalis pedis).   Skin:  No rashes significant bruising along right chest with fluctuance and right breast  Neurologic: awake, alert and following commands     Medications:  Reviewed    Infusion Medications    sodium chloride      dextrose       Scheduled Medications    sodium chloride flush  5-40 mL IntraVENous 2 times per day

## 2024-03-17 NOTE — CONSULTS
GENERAL SURGERY  CONSULT NOTE  3/17/2024    Physician Consulted: Dr. Corona  Reason for Consult: Breast hematoma  Referring Physician: Dr. Anabel BARBER  Nika Aviles is a 79 y.o. female who presents for evaluation of dyspnea.  Patient's past medical history is pertinent for CVA, on aspirin and Plavix, dementia, hypertension, lymphoma as well as right-sided breast cancer.  General surgery consulted for breast hematoma.  Initially patient presented to the emergency department and underwent CTA pulmonary which there is no obvious evidence of PE for her dyspnea.  She was found to have rhinovirus.  Currently she is on room air without any shortness of breath.  Patient reports that she fell approximately 1 week ago.  At that time she fell on her right side.  Had some mild chest pain that has improving.      Past Medical History:   Diagnosis Date    Breast CA (HCC) 2018    Right    Breast cancer (HCC)     2017  right ILC    Cerebral artery occlusion with cerebral infarction (HCC) 1994     rt arm leg weakness    Chronic kidney disease     Dementia (HCC)     Diabetes mellitus (HCC)     Disease of blood and blood forming organ     Diverticulitis     Hyperlipidemia     Hypertension     Hypothyroidism     Lymphoma (HCC) 12/2016    nonhogkins, previous chemo, immunotherapy-no current issues (6/24/22)    Osteoarthritis     Prolonged emergence from general anesthesia     Thyroid disease        Past Surgical History:   Procedure Laterality Date    ABDOMEN SURGERY      BACK SURGERY      neck fusion bone graft-no ROM limitations    BREAST SURGERY Right     tumor and lymph node resection-CA    BRONCHOSCOPY N/A 10/01/2019    BRONCHOSCOPY ALVEOLAR LAVAGE WITH BRUSHINGS performed by Arley Delgado MD at [facility] ENDOSCOPY    COLONOSCOPY      ENDOSCOPY, COLON, DIAGNOSTIC      FRACTURE SURGERY      elbow radius    HYSTERECTOMY (CERVIX STATUS UNKNOWN)      KNEE ARTHROSCOPY      MEDIPORT INSERTION, SINGLE Left 2016    chest    OTHER

## 2024-03-18 ENCOUNTER — APPOINTMENT (OUTPATIENT)
Age: 79
DRG: 313 | End: 2024-03-18
Payer: MEDICARE

## 2024-03-18 LAB
ANION GAP SERPL CALCULATED.3IONS-SCNC: 13 MMOL/L (ref 7–16)
BUN SERPL-MCNC: 15 MG/DL (ref 6–23)
CALCIUM SERPL-MCNC: 8.9 MG/DL (ref 8.6–10.2)
CHLORIDE SERPL-SCNC: 107 MMOL/L (ref 98–107)
CO2 SERPL-SCNC: 19 MMOL/L (ref 22–29)
CREAT SERPL-MCNC: 1.1 MG/DL (ref 0.5–1)
ECHO BSA: 1.99 M2
ECHO EST RA PRESSURE: 3 MMHG
ECHO LA DIAMETER INDEX: 1.22 CM/M2
ECHO LA DIAMETER: 2.4 CM
ECHO LA VOL A-L A2C: 57 ML (ref 22–52)
ECHO LA VOL A-L A4C: 41 ML (ref 22–52)
ECHO LA VOL MOD A2C: 50 ML (ref 22–52)
ECHO LA VOL MOD A4C: 37 ML (ref 22–52)
ECHO LA VOLUME AREA LENGTH: 50 ML
ECHO LA VOLUME INDEX A-L A2C: 29 ML/M2 (ref 16–34)
ECHO LA VOLUME INDEX A-L A4C: 21 ML/M2 (ref 16–34)
ECHO LA VOLUME INDEX AREA LENGTH: 25 ML/M2 (ref 16–34)
ECHO LA VOLUME INDEX MOD A2C: 25 ML/M2 (ref 16–34)
ECHO LA VOLUME INDEX MOD A4C: 19 ML/M2 (ref 16–34)
ECHO LV EJECTION FRACTION A2C: 57 %
ECHO LV EJECTION FRACTION A4C: 48 %
ECHO LV INTERNAL DIMENSION DIASTOLE INDEX: 2.23 CM/M2
ECHO LV INTERNAL DIMENSION DIASTOLIC: 4.4 CM (ref 3.9–5.3)
ECHO LV IVSD: 0.9 CM (ref 0.6–0.9)
ECHO LV MASS 2D: 100.6 G (ref 67–162)
ECHO LV MASS INDEX 2D: 51.1 G/M2 (ref 43–95)
ECHO LV POSTERIOR WALL DIASTOLIC: 0.6 CM (ref 0.6–0.9)
ECHO LV RELATIVE WALL THICKNESS RATIO: 0.27
ECHO MV A VELOCITY: 1.2 M/S
ECHO MV E DECELERATION TIME (DT): 286.3 MS
ECHO MV E VELOCITY: 0.8 M/S
ECHO MV E/A RATIO: 0.67
ECHO RIGHT VENTRICULAR SYSTOLIC PRESSURE (RVSP): 30 MMHG
ECHO TV REGURGITANT MAX VELOCITY: 2.59 M/S
ECHO TV REGURGITANT PEAK GRADIENT: 27 MMHG
GFR SERPL CREATININE-BSD FRML MDRD: 55 ML/MIN/1.73M2
GLUCOSE BLD-MCNC: 142 MG/DL (ref 74–99)
GLUCOSE BLD-MCNC: 147 MG/DL (ref 74–99)
GLUCOSE BLD-MCNC: 181 MG/DL (ref 74–99)
GLUCOSE BLD-MCNC: 216 MG/DL (ref 74–99)
GLUCOSE SERPL-MCNC: 164 MG/DL (ref 74–99)
MAGNESIUM SERPL-MCNC: 1.7 MG/DL (ref 1.6–2.6)
POTASSIUM SERPL-SCNC: 4.6 MMOL/L (ref 3.5–5)
SODIUM SERPL-SCNC: 139 MMOL/L (ref 132–146)

## 2024-03-18 PROCEDURE — 93325 DOPPLER ECHO COLOR FLOW MAPG: CPT | Performed by: INTERNAL MEDICINE

## 2024-03-18 PROCEDURE — 36415 COLL VENOUS BLD VENIPUNCTURE: CPT

## 2024-03-18 PROCEDURE — 97530 THERAPEUTIC ACTIVITIES: CPT

## 2024-03-18 PROCEDURE — 99232 SBSQ HOSP IP/OBS MODERATE 35: CPT | Performed by: INTERNAL MEDICINE

## 2024-03-18 PROCEDURE — 93308 TTE F-UP OR LMTD: CPT | Performed by: INTERNAL MEDICINE

## 2024-03-18 PROCEDURE — 97166 OT EVAL MOD COMPLEX 45 MIN: CPT

## 2024-03-18 PROCEDURE — 99233 SBSQ HOSP IP/OBS HIGH 50: CPT | Performed by: INTERNAL MEDICINE

## 2024-03-18 PROCEDURE — 6370000000 HC RX 637 (ALT 250 FOR IP): Performed by: INTERNAL MEDICINE

## 2024-03-18 PROCEDURE — 2500000003 HC RX 250 WO HCPCS: Performed by: INTERNAL MEDICINE

## 2024-03-18 PROCEDURE — 97161 PT EVAL LOW COMPLEX 20 MIN: CPT

## 2024-03-18 PROCEDURE — 6360000002 HC RX W HCPCS: Performed by: INTERNAL MEDICINE

## 2024-03-18 PROCEDURE — 96372 THER/PROPH/DIAG INJ SC/IM: CPT

## 2024-03-18 PROCEDURE — 93308 TTE F-UP OR LMTD: CPT

## 2024-03-18 PROCEDURE — 93321 DOPPLER ECHO F-UP/LMTD STD: CPT | Performed by: INTERNAL MEDICINE

## 2024-03-18 PROCEDURE — 2140000000 HC CCU INTERMEDIATE R&B

## 2024-03-18 PROCEDURE — 2580000003 HC RX 258: Performed by: INTERNAL MEDICINE

## 2024-03-18 PROCEDURE — 82962 GLUCOSE BLOOD TEST: CPT

## 2024-03-18 PROCEDURE — 94640 AIRWAY INHALATION TREATMENT: CPT

## 2024-03-18 PROCEDURE — 80048 BASIC METABOLIC PNL TOTAL CA: CPT

## 2024-03-18 PROCEDURE — 97535 SELF CARE MNGMENT TRAINING: CPT

## 2024-03-18 PROCEDURE — 83735 ASSAY OF MAGNESIUM: CPT

## 2024-03-18 RX ORDER — SODIUM CHLORIDE 9 MG/ML
INJECTION, SOLUTION INTRAVENOUS CONTINUOUS
Status: DISCONTINUED | OUTPATIENT
Start: 2024-03-18 | End: 2024-03-19

## 2024-03-18 RX ADMIN — LEVOTHYROXINE SODIUM 88 MCG: 0.09 TABLET ORAL at 06:31

## 2024-03-18 RX ADMIN — CLOPIDOGREL BISULFATE 75 MG: 75 TABLET ORAL at 09:42

## 2024-03-18 RX ADMIN — IPRATROPIUM BROMIDE AND ALBUTEROL SULFATE 1 DOSE: .5; 2.5 SOLUTION RESPIRATORY (INHALATION) at 08:14

## 2024-03-18 RX ADMIN — ASPIRIN 81 MG: 81 TABLET, COATED ORAL at 09:42

## 2024-03-18 RX ADMIN — DONEPEZIL HYDROCHLORIDE 5 MG: 5 TABLET, FILM COATED ORAL at 13:10

## 2024-03-18 RX ADMIN — ANTI-FUNGAL POWDER MICONAZOLE NITRATE TALC FREE: 1.42 POWDER TOPICAL at 09:44

## 2024-03-18 RX ADMIN — IPRATROPIUM BROMIDE AND ALBUTEROL SULFATE 1 DOSE: .5; 2.5 SOLUTION RESPIRATORY (INHALATION) at 20:35

## 2024-03-18 RX ADMIN — ATORVASTATIN CALCIUM 40 MG: 40 TABLET, FILM COATED ORAL at 20:29

## 2024-03-18 RX ADMIN — SODIUM CHLORIDE, PRESERVATIVE FREE 10 ML: 5 INJECTION INTRAVENOUS at 09:43

## 2024-03-18 RX ADMIN — ANTI-FUNGAL POWDER MICONAZOLE NITRATE TALC FREE: 1.42 POWDER TOPICAL at 00:02

## 2024-03-18 RX ADMIN — INSULIN LISPRO 1 UNITS: 100 INJECTION, SOLUTION INTRAVENOUS; SUBCUTANEOUS at 13:10

## 2024-03-18 RX ADMIN — ANTI-FUNGAL POWDER MICONAZOLE NITRATE TALC FREE: 1.42 POWDER TOPICAL at 20:29

## 2024-03-18 RX ADMIN — ENOXAPARIN SODIUM 40 MG: 100 INJECTION SUBCUTANEOUS at 09:42

## 2024-03-18 RX ADMIN — SODIUM CHLORIDE 900 ML: 9 INJECTION, SOLUTION INTRAVENOUS at 15:37

## 2024-03-18 RX ADMIN — METOPROLOL SUCCINATE 50 MG: 50 TABLET, EXTENDED RELEASE ORAL at 09:42

## 2024-03-18 RX ADMIN — ANASTROZOLE 1 MG: 1 TABLET, COATED ORAL at 09:42

## 2024-03-18 NOTE — CARE COORDINATION
3/18:  Transition of care:  Pt presented to the ER for SOB from home.  Pt is on 6L/NC at 95%,& SQ Lovenox.  Pt had a stress test today.  Pulmonary, Oncology & CHF Educator are following.  Pt's PCP is John Simmons & uses the [facility].  Pt lives alone in a house with 6-steps to enter.  The bed/bathroom are on the 1st floor.  PTA pt was independent & still drives.  Pt has a cpap, dexacom & treats her blood sugars with Sq injections & Po medications.  Pt's dc plan is home & her family can transport.  Sw/CM will continue to follow.  Electronically signed by Eliza Real RN on 3/18/2024 at 3:22 PM

## 2024-03-18 NOTE — PLAN OF CARE
Problem: Chronic Conditions and Co-morbidities  Goal: Patient's chronic conditions and co-morbidity symptoms are monitored and maintained or improved  3/17/2024 2341 by Manas Osorio RN  Outcome: Progressing     Problem: Discharge Planning  Goal: Discharge to home or other facility with appropriate resources  3/18/2024 1149 by Denice Keyes RN  Outcome: Progressing  3/17/2024 2341 by Manas Osorio RN  Outcome: Progressing     Problem: Safety - Adult  Goal: Free from fall injury  3/18/2024 1149 by Denice Keyes RN  Outcome: Progressing  3/17/2024 2341 by Manas Osorio RN  Outcome: Progressing     Problem: Skin/Tissue Integrity  Goal: Absence of new skin breakdown  Description: 1.  Monitor for areas of redness and/or skin breakdown  2.  Assess vascular access sites hourly  3.  Every 4-6 hours minimum:  Change oxygen saturation probe site  4.  Every 4-6 hours:  If on nasal continuous positive airway pressure, respiratory therapy assess nares and determine need for appliance change or resting period.  3/17/2024 2341 by Manas Osorio RN  Outcome: Progressing     Problem: ABCDS Injury Assessment  Goal: Absence of physical injury  3/17/2024 2341 by Manas Osorio RN  Outcome: Progressing

## 2024-03-18 NOTE — CARE COORDINATION
3/18:  Update CM Note:  CM spoke with pt's daughter to review PT notes.  She will be coming in tonight & evaluate.  CM placed MARIO with [place].  If there any any changes she will reach out with CM tomorrow. Electronically signed by Eliza Real RN on 3/18/2024 at 4:13 PM

## 2024-03-18 NOTE — PROGRESS NOTES
OCCUPATIONAL THERAPY INITIAL EVALUATION    [facility] [address] [place], [place]       Date:3/18/2024                                                  Patient Name: Nika Aviles  MRN: [id number]  : 1945  Room: [address] [place] [address]    Evaluating OT: Austin Britton OTR/L [id number]    Referring Provider: Anabel Basilio MD      Specific Provider Orders/Date: OT evaluation and treatment 3/17/24 1315    Diagnosis:  Shortness of breath [R06.02]  Rhinovirus [B34.8]  Acute chest pain [R07.9]  Rhinovirus infection [B34.8]  Hypotension, unspecified hypotension type [I95.9]  Other cardiomyopathy (HCC) [I42.8]      Pertinent Medical History:  has a past medical history of Breast CA (HCC), Breast cancer (HCC), Cerebral artery occlusion with cerebral infarction (HCC), Chronic kidney disease, Dementia (HCC), Diabetes mellitus (HCC), Disease of blood and blood forming organ, Diverticulitis, Hyperlipidemia, Hypertension, Hypothyroidism, Lymphoma (HCC), Osteoarthritis, Prolonged emergence from general anesthesia, and Thyroid disease.   Past Surgical History:   Procedure Laterality Date    ABDOMEN SURGERY      BACK SURGERY      neck fusion bone graft-no ROM limitations    BREAST SURGERY Right     tumor and lymph node resection-CA    BRONCHOSCOPY N/A 10/01/2019    BRONCHOSCOPY ALVEOLAR LAVAGE WITH BRUSHINGS performed by Arley Delgado MD at [facility] ENDOSCOPY    COLONOSCOPY      ENDOSCOPY, COLON, DIAGNOSTIC      FRACTURE SURGERY      elbow radius    HYSTERECTOMY (CERVIX STATUS UNKNOWN)      KNEE ARTHROSCOPY      MEDIPORT INSERTION, SINGLE Left 2016    chest    OTHER SURGICAL HISTORY  2017    mediport insertion    OTHER SURGICAL HISTORY  2017    removal foreign body    TOTAL KNEE ARTHROPLASTY Right 2022    ANNEMARIE ROBOTIC ASSISTED RIGHT KNEE TOTAL ARTHROPLASTY performed by Clarence Harris MD at [facility] OR    UPPER GASTROINTESTINAL ENDOSCOPY         Pt admitted to the

## 2024-03-18 NOTE — PROGRESS NOTES
[]Decreased vision  [x]Decreased safety awareness   []Increased pain       Comments:  Medically cleared for session by RN. Pt was in bed upon PT entry and agreeable to participate. Pt was pleasantly confused throughout session. Required increased time to complete all functional mobility. Transferred to EOB with HOB elevated with assist for trunk. Maintained fair seated balance once EOB with intermittent assist and cues to correct R lateral lean. Assisted with gown change while sitting EOB. Completed sit<>stand transfer from EOB to WW with lift assist and cues for hand placement. Upon standing demonstrated posterior lean requiring assist to correct. Pt was found incontinent on bowel/bladder and was seated on bed pan. Provided time to void. Completed second stand with similar assist. Noted forward flexed posture. Assisted with annmarie care and nikki pad change. Instructed pt to side step toward HOB; pt abruptly returned to seated. Assisted pt to supine with BLE. Positioned in chair position with all needs met and call light in reach. RN notified.     Treatment:  Patient practiced and was instructed in the following treatment:    Bed mobility training - pt given verbal and tactile cues to facilitate proper sequencing and safety during rolling and supine>sit as well as provided with physical assistance to complete task   Sitting EOB for >15 minutes for upright tolerance, postural awareness and BLE ROM  Transfer training - pt was given verbal and tactile cues to facilitate proper hand placement, technique and safety during sit to stand and stand to sit as well as provided with physical assistance.  Skilled positioning - Pt placed in the chair position with pillows utilized to facilitate upright posture, joint and skin integrity, and interaction with environment.     Skilled monitoring of vitals throughout session.     Pt's/ family goals   1. Not stated    Prognosis is good for reaching above PT goals.    Patient and or  family understand(s) diagnosis, prognosis, and plan of care.  yes    PHYSICAL THERAPY PLAN OF CARE:    PT POC is established based on physician order and patient diagnosis     Referring provider/PT Order:    03/17/24 1315  PT eval and treat  Start:  03/17/24 1315,   End:  03/17/24 1315,   ONE TIME,   Standing Count:  1 Occurrences,   R         Anabel Basilio MD     Diagnosis:  Shortness of breath [R06.02]  Rhinovirus [B34.8]  Acute chest pain [R07.9]  Rhinovirus infection [B34.8]  Hypotension, unspecified hypotension type [I95.9]  Other cardiomyopathy (HCC) [I42.8]  Specific instructions for next treatment:  Maximize safe and independent functional mobility     Current Treatment Recommendations:     [x] Strengthening to improve independence with functional mobility   [] ROM to improve independence with functional mobility   [x] Balance Training to improve static/dynamic balance and to reduce fall risk  [x] Endurance Training to improve activity tolerance during functional mobility   [x] Transfer Training to improve safety and independence with all functional transfers   [x] Gait Training to improve gait mechanics, endurance and assess need for appropriate assistive device  [] Stair Training in preparation for safe discharge home and/or into the community   [x] Positioning to prevent skin breakdown and contractures  [x] Safety and Education Training   [x] Patient/Caregiver Education   [] HEP  [x] Other     PT long term treatment goals are located in above grid    Frequency of treatments: 2-5x/week x 1-2 weeks.    Time in  1433  Time out  1511    Total Treatment Time  23 minutes     Evaluation Time includes thorough review of current medical information, gathering information on past medical history/social history and prior level of function, completion of standardized testing/informal observation of tasks, assessment of data and education on plan of care and goals.    CPT codes:  [x] Low Complexity PT evaluation

## 2024-03-18 NOTE — PROGRESS NOTES
Inpatient Cardiology Progress note     PATIENT IS BEING FOLLOWED FOR:  Chest pain; plan was to do heart cath once recovered from stroke which occurred 2 months ago     Nika Aviles is a 79 y.o. elderly  female seen in initial consultation by Dr Michael 1/10/2024      PMH:  cLBBB, HTN, HLD, non-insulin requiring T2DM, former tobacco smoker, obesity, CVA, hypothyroidism on HRT, low-grade follicular B-cell lymphoma s/p chemotherapy -- currently in remission, postmenopausal R breast CA stage I maintained on Anastrozole therapy, Alzheimer's disease, Aldactone intolerance, arthritis/gait instability --ambulates with Rollator, osteopenia. She resides with her daughter, grandson and granddaughter.     Patient deemed a poor historian 2/2 Alzheimer's dementia    SE-ED 3/15/2024 89//49 HR 60's. Afebrile, and O@ saturation 99% on RA    Troponin 37>31, p-BNP 1175, + Rhinovirus, Bun/Cr 28/1.2>32/1.2, TSH 2.43, WBC 14.9>1.7, Hgb 11.3>10.7, Plt 335.     500 cc NSS> placed on NSS @ 75/hr     ASA 81 mg QD, Plavix, Lipitor, Toprol XL re-ordered from home     Entresto placed on hold 2/2 JAX/hypotension on arrival to ED.         SUBJECTIVE: Denies chest pain or shortness of breath  OBJECTIVE: Confused, does not know why she is in the hospital, laying almost flat in bed, ill-appearing but in no acute distress    ROS: Not reliable  Consist: Denies fevers, chills or night sweats  Heart: Denies chest pain, palpitations, lightheadedness, dizziness or syncope  Lungs: Denies SOB, cough, wheezing, orthopnea or PND  GI: Denies abdominal pain, vomiting or diarrhea    PHYSICAL EXAM:   /60   Pulse 70   Temp 97.8 °F (36.6 °C) (Oral)   Resp 18   Ht 1.727 m (5' 8\")   Wt 82.8 kg (182 lb 8 oz)   LMP  (LMP Unknown)   SpO2 96%   BMI 27.75 kg/m²    B/P Range last 24 hours: Systolic (24hrs), Av , Min:110 , Max:135    Diastolic (24hrs), Av, Min:47, Max:67  CONST:  Well developed, ill appearing elderly   awake       IV Infusions (if any):   sodium chloride      dextrose         DIAGNOSTIC/ LABORATORY DATA:  Labs:   CBC:   Recent Labs     03/15/24  2126 03/16/24  0821   WBC 14.9* 11.7*   HGB 11.3* 10.7*   HCT 36.0 33.4*    253     BMP:   Recent Labs     03/16/24  0411 03/16/24  0821    133   K 4.5 4.4   CO2 17* 17*   BUN 34* 32*   CREATININE 1.2* 1.2*   LABGLOM 49* 48*   CALCIUM 8.5* 8.9     Mag:   Recent Labs     03/16/24  0821   MG 1.8     Phos:   Recent Labs     03/16/24  0821   PHOS 3.8     TFT:   Lab Results   Component Value Date    TSH 2.43 03/16/2024    T4FREE 1.2 01/08/2024      HgA1c:   Lab Results   Component Value Date    LABA1C 6.9 (H) 01/08/2024       CARDIAC ENZYMES:  Recent Labs     03/15/24  2126 03/16/24  0821   TROPHS 37* 31*     FASTING LIPID PANEL:  Lab Results   Component Value Date/Time    CHOL 119 03/16/2024 08:21 AM    HDL 36 03/16/2024 08:21 AM    TRIG 171 03/16/2024 08:21 AM     LIVER PROFILE:  Recent Labs     03/15/24  2126 03/16/24  0821   AST 19 19   ALT 13 12   LABALBU 3.7 3.5     TTE 1/8/2024 Dr Michael: Left Ventricle: The EF by visual approximation is 30 - 35%. Right Ventricle: Right ventricle size is normal. Normal systolic function.  Mild MR/TR. Mildly dilated LA.  Interatrial Septum: Agitated saline study was negative with and without provocation      CXR 3/15/2024: No acute process     CTA Pulmonary W 3/15/2024: No acute pulmonary embolism identified.  Note however that evaluation in the right lower lung region is markedly limited due to artifact and cannot exclude the possibility of pulmonary emboli here.  Could consider a repeat examination if clinically indicated.  Alternatively would advise close clinical follow-up. Abnormal right breast with skin thickening, stranding/edema, and a large mass like density in the upper breast/axilla measuring roughly 8.2 x 6 cm. Correlate for mastitis, treatment change, venous/lymphatic obstruction, hematoma, malignancy. 7 mm left

## 2024-03-18 NOTE — PROGRESS NOTES
[facility] Hospitalist Progress Note      Synopsis: Patient with a left bundle branch block, hypertension, hyperlipidemia, diabetes, former smoker, obesity, dementia, CVA, hypothyroidism, low-grade follicular lymphoma status postchemotherapy in remission admitted on 3/15/2024 after presenting to the ED with chest pain.  Patient was recently admitted in January for acute stroke was evaluated by cardiology at the time after diagnosis of acute systolic heart failure, discharged with a LifeVest and plan was to get a cardiac cath after recovery from the stroke.  Cardiology is now consulted and will communicate with the patient's family to determine ischemic evaluation.  Patient was also noted to have right breast possible hematoma on CT scan of the chest, surgery consulted and no indication for acute surgical intervention at this time.  Will follow-up outpatient with surgery.  Social work discussing patient with family to evaluate for possible placement.  She requires 24/7 support and this is not available to her at home at this time.    Subjective  Patient denies any current chest pain  Exam:  /60   Pulse 70   Temp 97.8 °F (36.6 °C) (Oral)   Resp 18   Ht 1.727 m (5' 8\")   Wt 82.8 kg (182 lb 8 oz)   LMP  (LMP Unknown)   SpO2 96%   BMI 27.75 kg/m²   General appearance: No apparent distress, appears stated age and cooperative.  HEENT: Pupils equal, round, and reactive to light. Conjunctivae/corneas clear.  Neck: Supple. No jugular venous distention. Trachea midline.  Respiratory:  Normal respiratory effort. Clear to auscultation, bilaterally without Rales/Wheezes/Rhonchi.  Cardiovascular: Regular rate and rhythm with normal S1/S2 without murmurs, rubs or gallops.  Abdomen: Soft, non-tender, non-distended with normal bowel sounds.  Musculoskeletal: No clubbing, cyanosis or edema bilaterally. Brisk capillary refill. 2+ lower extremity pulses (dorsalis pedis).   Skin:  No rashes significant bruising along right

## 2024-03-18 NOTE — CARE COORDINATION
3/18:  Transition of care:  Pt presented to the ER for SOB & CP from home.  Pt is on room air at 96% & SQ Lovenox.  Pt is in ISO-Droplet with Rhinovirus.  CM spoke with daughter Rosangela to discuss CM role & dc planning.  Pt's PCP is Zana Pandya & uses [facility].  Pt lives with her daughter, son-in law & their two children - 21 & 14 yrs of age in a ranch house with a ramp to enter.  PTA pt was ambulatory with a walker for short distances & wc outside of the house.  They have a walker, wc, bsc,shower transfer sheet & raised rails for the toilet.  Pt has a glucometer & treats her blood sugar with oral medications.  Pt was active with [facility] & will need a MARIO on dc.  Pt has hx of [place] & no hx of SNF.  Per daughter they have a private caregiver that comes into the house for 81 1/2 hrs a month.  Cm requested PT/OT to see today/tomorrow.  Pt's dc plan is home & family can transport.  Will need 02 testing.  If pt needs DME would prefer [employer] & declined a list.  Sw/CM will continue to follow.  Electronically signed by Eliza Real RN on 3/18/2024 at 3:00 PM    The Plan for Transition of Care is related to the following treatment goals: DME    The Patient and/or patient representative  was provided with a choice of provider and agrees   with the discharge plan. [x] Yes [] No    Freedom of choice list was provided with basic dialogue that supports the patient's individualized plan of care/goals, treatment preferences and shares the quality data associated with the providers. [x] Yes [] No

## 2024-03-18 NOTE — ACP (ADVANCE CARE PLANNING)
Advance Care Planning   Healthcare Decision Maker:    Primary Decision Maker: Rosangela Gabriel - Child - [phone]    Secondary Decision Maker: alesia alexander - Grandchild - [phone]    Click here to complete Healthcare Decision Makers including selection of the Healthcare Decision Maker Relationship (ie \"Primary\").  Today we documented Decision Maker(s) consistent with Legal Next of Kin hierarchy.       Electronically signed by Eliza Real RN on 3/18/2024 at 2:34 PM

## 2024-03-19 LAB
ANION GAP SERPL CALCULATED.3IONS-SCNC: 12 MMOL/L (ref 7–16)
BUN SERPL-MCNC: 12 MG/DL (ref 6–23)
CALCIUM SERPL-MCNC: 8.8 MG/DL (ref 8.6–10.2)
CHLORIDE SERPL-SCNC: 108 MMOL/L (ref 98–107)
CO2 SERPL-SCNC: 19 MMOL/L (ref 22–29)
CREAT SERPL-MCNC: 0.8 MG/DL (ref 0.5–1)
GFR SERPL CREATININE-BSD FRML MDRD: >60 ML/MIN/1.73M2
GLUCOSE BLD-MCNC: 132 MG/DL (ref 74–99)
GLUCOSE BLD-MCNC: 167 MG/DL (ref 74–99)
GLUCOSE BLD-MCNC: 180 MG/DL (ref 74–99)
GLUCOSE BLD-MCNC: 212 MG/DL (ref 74–99)
GLUCOSE SERPL-MCNC: 130 MG/DL (ref 74–99)
POTASSIUM SERPL-SCNC: 4.2 MMOL/L (ref 3.5–5)
SODIUM SERPL-SCNC: 139 MMOL/L (ref 132–146)

## 2024-03-19 PROCEDURE — 99232 SBSQ HOSP IP/OBS MODERATE 35: CPT | Performed by: INTERNAL MEDICINE

## 2024-03-19 PROCEDURE — 80048 BASIC METABOLIC PNL TOTAL CA: CPT

## 2024-03-19 PROCEDURE — 6370000000 HC RX 637 (ALT 250 FOR IP): Performed by: INTERNAL MEDICINE

## 2024-03-19 PROCEDURE — 6360000002 HC RX W HCPCS: Performed by: INTERNAL MEDICINE

## 2024-03-19 PROCEDURE — 94640 AIRWAY INHALATION TREATMENT: CPT

## 2024-03-19 PROCEDURE — 2140000000 HC CCU INTERMEDIATE R&B

## 2024-03-19 PROCEDURE — 2580000003 HC RX 258: Performed by: INTERNAL MEDICINE

## 2024-03-19 PROCEDURE — 82962 GLUCOSE BLOOD TEST: CPT

## 2024-03-19 PROCEDURE — 36415 COLL VENOUS BLD VENIPUNCTURE: CPT

## 2024-03-19 RX ADMIN — ENOXAPARIN SODIUM 40 MG: 100 INJECTION SUBCUTANEOUS at 08:31

## 2024-03-19 RX ADMIN — SODIUM CHLORIDE: 9 INJECTION, SOLUTION INTRAVENOUS at 08:35

## 2024-03-19 RX ADMIN — SODIUM CHLORIDE, PRESERVATIVE FREE 10 ML: 5 INJECTION INTRAVENOUS at 08:31

## 2024-03-19 RX ADMIN — ANASTROZOLE 1 MG: 1 TABLET, COATED ORAL at 08:30

## 2024-03-19 RX ADMIN — SACUBITRIL AND VALSARTAN 1 TABLET: 24; 26 TABLET, FILM COATED ORAL at 09:03

## 2024-03-19 RX ADMIN — INSULIN LISPRO 1 UNITS: 100 INJECTION, SOLUTION INTRAVENOUS; SUBCUTANEOUS at 13:03

## 2024-03-19 RX ADMIN — METOPROLOL SUCCINATE 50 MG: 50 TABLET, EXTENDED RELEASE ORAL at 08:30

## 2024-03-19 RX ADMIN — IPRATROPIUM BROMIDE AND ALBUTEROL SULFATE 1 DOSE: .5; 2.5 SOLUTION RESPIRATORY (INHALATION) at 08:29

## 2024-03-19 RX ADMIN — IPRATROPIUM BROMIDE AND ALBUTEROL SULFATE 1 DOSE: .5; 2.5 SOLUTION RESPIRATORY (INHALATION) at 16:48

## 2024-03-19 RX ADMIN — DONEPEZIL HYDROCHLORIDE 5 MG: 5 TABLET, FILM COATED ORAL at 13:03

## 2024-03-19 RX ADMIN — ANTI-FUNGAL POWDER MICONAZOLE NITRATE TALC FREE: 1.42 POWDER TOPICAL at 21:41

## 2024-03-19 RX ADMIN — IPRATROPIUM BROMIDE AND ALBUTEROL SULFATE 1 DOSE: .5; 2.5 SOLUTION RESPIRATORY (INHALATION) at 20:39

## 2024-03-19 RX ADMIN — IPRATROPIUM BROMIDE AND ALBUTEROL SULFATE 1 DOSE: .5; 2.5 SOLUTION RESPIRATORY (INHALATION) at 12:27

## 2024-03-19 RX ADMIN — CLOPIDOGREL BISULFATE 75 MG: 75 TABLET ORAL at 08:30

## 2024-03-19 RX ADMIN — ANTI-FUNGAL POWDER MICONAZOLE NITRATE TALC FREE: 1.42 POWDER TOPICAL at 08:32

## 2024-03-19 RX ADMIN — ATORVASTATIN CALCIUM 40 MG: 40 TABLET, FILM COATED ORAL at 21:40

## 2024-03-19 RX ADMIN — LEVOTHYROXINE SODIUM 88 MCG: 0.09 TABLET ORAL at 05:50

## 2024-03-19 RX ADMIN — SACUBITRIL AND VALSARTAN 1 TABLET: 24; 26 TABLET, FILM COATED ORAL at 21:40

## 2024-03-19 RX ADMIN — ASPIRIN 81 MG: 81 TABLET, COATED ORAL at 08:30

## 2024-03-19 NOTE — PROGRESS NOTES
Notified Dr. Ivana Anderson of patient removing her IV herself and asked her if patient can remain without an IV since she has no IV medications due and the doctor said yes.  Bell Vogt RN

## 2024-03-19 NOTE — CARE COORDINATION
Transition of care update. Spoke with daughter Rosangela regarding low ampac scores, and plans for SNF. She stated that she spoke with family, and are agreeable to do short term rehab. Referral made to [facility] at [facility] for [facility]. If unable to accept there, next preference is for [facility]. Await input from liaison. CM will follow.  Electronically signed by Dm Edmond RN on 3/19/2024 at 10:57 AM    Addendum: 1225: Patient accepted to [facility], and is daughter Rosangela's first choice. Mare to start precert today. Patient has a port that was used for chemo 7 years ago. Per daughter it is flushed every 4-6 weeks at Dr. Vick's office.  Per Mare ramírez, port needs de-accessed and flushed prior to discharge to their facility. Daughter stated that her mother lives with her and her family, and patient is never alone. They have private care givers when not at home. Per daughter, patient does not have a dexacom or a CPAP, or is able to drive. PASRR and ambulance form in envelope in soft chart. Ambulance form will need signed at discharge. RODRIGUE and destination updated. MB

## 2024-03-19 NOTE — PROGRESS NOTES
[facility] Hospitalist Progress Note    Admitting Date and Time: 3/15/2024  9:06 PM  Synopsis: Patient with a left bundle branch block, hypertension, hyperlipidemia, diabetes, former smoker, obesity, dementia, CVA, hypothyroidism, low-grade follicular lymphoma status postchemotherapy in remission admitted on 3/15/2024 after presenting to the ED with chest pain.  Patient was recently admitted in January for acute stroke was evaluated by cardiology at the time after diagnosis of acute systolic heart failure, discharged with a LifeVest and plan was to get a cardiac cath after recovery from the stroke.  Cardiology is now consulted and will communicate with the patient's family to determine ischemic evaluation.  Patient was also noted to have right breast possible hematoma on CT scan of the chest, surgery consulted and no indication for acute surgical intervention at this time.  Will follow-up outpatient with surgery.  Social work discussing patient with family to evaluate for possible placement. She requires 24/7 support and this is not available to her at home at this time.     Subjective:  Pt feels well this morning   No chest pain   No breathing issues   Tolerating meals     ROS: denies fever, chills, cp, sob, n/v, HA unless stated above.      miconazole   Topical BID    sodium chloride flush  5-40 mL IntraVENous 2 times per day    enoxaparin  40 mg SubCUTAneous Daily    anastrozole  1 mg Oral Daily    aspirin  81 mg Oral Daily    atorvastatin  40 mg Oral Nightly    clopidogrel  75 mg Oral Daily    donepezil  5 mg Oral Lunch    levothyroxine  88 mcg Oral Daily    metoprolol succinate  50 mg Oral Daily    sacubitril-valsartan  1 tablet Oral BID    insulin lispro  0-4 Units SubCUTAneous TID WC    insulin lispro  0-4 Units SubCUTAneous Nightly    ipratropium 0.5 mg-albuterol 2.5 mg  1 Dose Inhalation Q4H While awake     benzocaine-menthol, 1 lozenge, Q2H PRN  benzonatate, 100 mg, TID PRN  calcium carbonate, 500    Final Result   No acute pulmonary embolism identified.  Note however that evaluation in the   right lower lung region is markedly limited due to artifact and cannot   exclude the possibility of pulmonary emboli here.  Could consider a repeat   examination if clinically indicated.  Alternatively would advise close   clinical follow-up.      Abnormal right breast with skin thickening, stranding/edema, and a large   masslike density in the upper breast/axilla measuring roughly 8.2 x 6 cm.   Correlate for mastitis, treatment change, venous/lymphatic obstruction,   hematoma, malignancy.      7 mm left upper lobe pulmonary nodule.  Follow-up chest CT within 6 months   advised.         XR CHEST PORTABLE   Final Result   No acute process.               Assessment and Plan:     Chest pain - Troponin no elevation, no EKG changes, Cardiology following,   HFmEF - EF 45-50%, per cardiology not a candidate for any invasice diagnostic or therapeutic cardiac intervention. 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 Every effort was made to ensure accuracy; however, inadvertent computerized transcription errors may be present.  Electronically signed by Sondra Anderson MD on 3/19/2024 at 11:57 AM

## 2024-03-19 NOTE — PLAN OF CARE
Problem: Chronic Conditions and Co-morbidities  Goal: Patient's chronic conditions and co-morbidity symptoms are monitored and maintained or improved  3/19/2024 0854 by Bell Vogt RN  Outcome: Progressing  3/19/2024 0054 by Manas Osorio RN  Outcome: Progressing     Problem: Discharge Planning  Goal: Discharge to home or other facility with appropriate resources  3/19/2024 0854 by Bell Vogt RN  Outcome: Progressing  3/19/2024 0054 by Manas Osorio RN  Outcome: Progressing     Problem: Safety - Adult  Goal: Free from fall injury  3/19/2024 0854 by Bell Vogt RN  Outcome: Progressing  3/19/2024 0054 by Manas Osorio RN  Outcome: Progressing     Problem: Skin/Tissue Integrity  Goal: Absence of new skin breakdown  Description: 1.  Monitor for areas of redness and/or skin breakdown  2.  Assess vascular access sites hourly  3.  Every 4-6 hours minimum:  Change oxygen saturation probe site  4.  Every 4-6 hours:  If on nasal continuous positive airway pressure, respiratory therapy assess nares and determine need for appliance change or resting period.  3/19/2024 0854 by Bell Vogt RN  Outcome: Progressing  3/19/2024 0054 by Manas Osorio RN  Outcome: Progressing     Problem: ABCDS Injury Assessment  Goal: Absence of physical injury  3/19/2024 0854 by Bell Vogt RN  Outcome: Progressing  3/19/2024 0054 by Manas Osorio RN  Outcome: Progressing

## 2024-03-19 NOTE — DISCHARGE INSTR - COC
Continuity of Care Form    Patient Name: Nika Aviles   :  1945  MRN:  [id number]    Admit date:  3/15/2024  Discharge date:  3/22/2024    Code Status Order: Full Code   Advance Directives:     Admitting Physician:  Anabel Basilio MD  PCP: Zana Pandya MD    Discharging Nurse: MP  Discharging Hospital Unit/Room#: 6423/6423-A  Discharging Unit Phone Number: [phone]    Emergency Contact:   Extended Emergency Contact Information  Primary Emergency Contact: Rosangela Gabriel  Address: [address] [place] [address]  Home Phone: [phone]  Mobile Phone: [phone]  Relation: Child  Preferred language: English   needed? No  Secondary Emergency Contact: alesia aviles  Mobile Phone: [phone]  Relation: Grandchild  Preferred language: English   needed? No    Past Surgical History:  Past Surgical History:   Procedure Laterality Date    ABDOMEN SURGERY      BACK SURGERY      neck fusion bone graft-no ROM limitations    BREAST SURGERY Right     tumor and lymph node resection-CA    BRONCHOSCOPY N/A 10/01/2019    BRONCHOSCOPY ALVEOLAR LAVAGE WITH BRUSHINGS performed by Arley Delgado MD at [facility] ENDOSCOPY    COLONOSCOPY      ENDOSCOPY, COLON, DIAGNOSTIC      FRACTURE SURGERY      elbow radius    HYSTERECTOMY (CERVIX STATUS UNKNOWN)      KNEE ARTHROSCOPY      MEDIPORT INSERTION, SINGLE Left 2016    chest    OTHER SURGICAL HISTORY  2017    mediport insertion    OTHER SURGICAL HISTORY  2017    removal foreign body    TOTAL KNEE ARTHROPLASTY Right 2022    ANNEMARIE ROBOTIC ASSISTED RIGHT KNEE TOTAL ARTHROPLASTY performed by Clarence Harris MD at [facility] OR    UPPER GASTROINTESTINAL ENDOSCOPY         Immunization History:   Immunization History   Administered Date(s) Administered    COVID-19, PFIZER PURPLE top, DILUTE for use, (age 12 y+), 30mcg/0.3mL 2021, 2021, 10/12/2021       Active Problems:  Patient Active Problem List   Diagnosis Code     Nutrition Therapy:   - Oral Diet:  General    Routes of Feeding: Oral  Liquids: Thin Liquids  Daily Fluid Restriction: no  Last Modified Barium Swallow with Video (Video Swallowing Test): not done    Treatments at the Time of Hospital Discharge:   Respiratory Treatments:   Oxygen Therapy:    Ventilator:    - No ventilator support    Rehab Therapies: Physical Therapy and Occupational Therapy  Weight Bearing Status/Restrictions: No weight bearing restrictions  Other Medical Equipment (for information only, NOT a DME order):    Other Treatments:     Patient's personal belongings (please select all that are sent with patient):      RN SIGNATURE:  Electronically signed by Marcelle Cuellar RN on 3/22/24 at 5:11 PM EDT    CASE MANAGEMENT/SOCIAL WORK SECTION    Inpatient Status Date: 3/18/2024    Readmission Risk Assessment Score:  Readmission Risk              Risk of Unplanned Readmission:  21           Discharging to Facility/ Agency   Name: [facility]  Address:  Phone:  Fax:    Dialysis Facility (if applicable)   Name:  Address:  Dialysis Schedule:  Phone:  Fax:    / signature: Electronically signed by Dm Edmond RN on 3/19/24 at 12:48 PM EDT    PHYSICIAN SECTION    Prognosis: {Prognosis:3450436662}    Condition at Discharge: { Patient Condition:649760605}    Rehab Potential (if transferring to Rehab): {Prognosis:8918099232}    Recommended Labs or Other Treatments After Discharge: ***    Physician Certification: I certify the above information and transfer of Nika Aviles  is necessary for the continuing treatment of the diagnosis listed and that she requires Skilled Nursing Facility for less 30 days.     Update Admission H&P: {CHP DME Changes in HandP:113414132}    PHYSICIAN SIGNATURE:  Dr. Ivana gray

## 2024-03-19 NOTE — PLAN OF CARE
Problem: Chronic Conditions and Co-morbidities  Goal: Patient's chronic conditions and co-morbidity symptoms are monitored and maintained or improved  Outcome: Progressing     Problem: Discharge Planning  Goal: Discharge to home or other facility with appropriate resources  3/19/2024 0054 by Manas Osorio RN  Outcome: Progressing  3/18/2024 1149 by Denice Keyes RN  Outcome: Progressing     Problem: Safety - Adult  Goal: Free from fall injury  3/19/2024 0054 by Manas Osorio RN  Outcome: Progressing  3/18/2024 1149 by Denice Keyes RN  Outcome: Progressing     Problem: Skin/Tissue Integrity  Goal: Absence of new skin breakdown  Description: 1.  Monitor for areas of redness and/or skin breakdown  2.  Assess vascular access sites hourly  3.  Every 4-6 hours minimum:  Change oxygen saturation probe site  4.  Every 4-6 hours:  If on nasal continuous positive airway pressure, respiratory therapy assess nares and determine need for appliance change or resting period.  Outcome: Progressing     Problem: ABCDS Injury Assessment  Goal: Absence of physical injury  Outcome: Progressing

## 2024-03-20 LAB
ANION GAP SERPL CALCULATED.3IONS-SCNC: 13 MMOL/L (ref 7–16)
BUN SERPL-MCNC: 11 MG/DL (ref 6–23)
CALCIUM SERPL-MCNC: 8.9 MG/DL (ref 8.6–10.2)
CHLORIDE SERPL-SCNC: 106 MMOL/L (ref 98–107)
CO2 SERPL-SCNC: 20 MMOL/L (ref 22–29)
CREAT SERPL-MCNC: 0.7 MG/DL (ref 0.5–1)
GFR SERPL CREATININE-BSD FRML MDRD: >60 ML/MIN/1.73M2
GLUCOSE BLD-MCNC: 135 MG/DL (ref 74–99)
GLUCOSE BLD-MCNC: 190 MG/DL (ref 74–99)
GLUCOSE BLD-MCNC: 197 MG/DL (ref 74–99)
GLUCOSE BLD-MCNC: 257 MG/DL (ref 74–99)
GLUCOSE SERPL-MCNC: 126 MG/DL (ref 74–99)
POTASSIUM SERPL-SCNC: 3.8 MMOL/L (ref 3.5–5)
SODIUM SERPL-SCNC: 139 MMOL/L (ref 132–146)

## 2024-03-20 PROCEDURE — 6370000000 HC RX 637 (ALT 250 FOR IP): Performed by: INTERNAL MEDICINE

## 2024-03-20 PROCEDURE — 82962 GLUCOSE BLOOD TEST: CPT

## 2024-03-20 PROCEDURE — 2140000000 HC CCU INTERMEDIATE R&B

## 2024-03-20 PROCEDURE — 80048 BASIC METABOLIC PNL TOTAL CA: CPT

## 2024-03-20 PROCEDURE — 36415 COLL VENOUS BLD VENIPUNCTURE: CPT

## 2024-03-20 PROCEDURE — 6360000002 HC RX W HCPCS: Performed by: INTERNAL MEDICINE

## 2024-03-20 PROCEDURE — 94640 AIRWAY INHALATION TREATMENT: CPT

## 2024-03-20 PROCEDURE — 99232 SBSQ HOSP IP/OBS MODERATE 35: CPT | Performed by: INTERNAL MEDICINE

## 2024-03-20 RX ADMIN — SACUBITRIL AND VALSARTAN 1 TABLET: 24; 26 TABLET, FILM COATED ORAL at 21:46

## 2024-03-20 RX ADMIN — ANASTROZOLE 1 MG: 1 TABLET, COATED ORAL at 08:53

## 2024-03-20 RX ADMIN — LEVOTHYROXINE SODIUM 88 MCG: 0.09 TABLET ORAL at 05:57

## 2024-03-20 RX ADMIN — ENOXAPARIN SODIUM 40 MG: 100 INJECTION SUBCUTANEOUS at 08:53

## 2024-03-20 RX ADMIN — ANTI-FUNGAL POWDER MICONAZOLE NITRATE TALC FREE: 1.42 POWDER TOPICAL at 21:47

## 2024-03-20 RX ADMIN — CLOPIDOGREL BISULFATE 75 MG: 75 TABLET ORAL at 08:53

## 2024-03-20 RX ADMIN — INSULIN LISPRO 2 UNITS: 100 INJECTION, SOLUTION INTRAVENOUS; SUBCUTANEOUS at 12:28

## 2024-03-20 RX ADMIN — DONEPEZIL HYDROCHLORIDE 5 MG: 5 TABLET, FILM COATED ORAL at 11:26

## 2024-03-20 RX ADMIN — ASPIRIN 81 MG: 81 TABLET, COATED ORAL at 08:53

## 2024-03-20 RX ADMIN — IPRATROPIUM BROMIDE AND ALBUTEROL SULFATE 1 DOSE: .5; 2.5 SOLUTION RESPIRATORY (INHALATION) at 20:21

## 2024-03-20 RX ADMIN — IPRATROPIUM BROMIDE AND ALBUTEROL SULFATE 1 DOSE: .5; 2.5 SOLUTION RESPIRATORY (INHALATION) at 12:03

## 2024-03-20 RX ADMIN — METOPROLOL SUCCINATE 50 MG: 50 TABLET, EXTENDED RELEASE ORAL at 08:53

## 2024-03-20 RX ADMIN — SACUBITRIL AND VALSARTAN 1 TABLET: 24; 26 TABLET, FILM COATED ORAL at 08:53

## 2024-03-20 RX ADMIN — ANTI-FUNGAL POWDER MICONAZOLE NITRATE TALC FREE: 1.42 POWDER TOPICAL at 08:54

## 2024-03-20 RX ADMIN — IPRATROPIUM BROMIDE AND ALBUTEROL SULFATE 1 DOSE: .5; 2.5 SOLUTION RESPIRATORY (INHALATION) at 16:49

## 2024-03-20 RX ADMIN — ATORVASTATIN CALCIUM 40 MG: 40 TABLET, FILM COATED ORAL at 21:46

## 2024-03-20 NOTE — PLAN OF CARE
Problem: Chronic Conditions and Co-morbidities  Goal: Patient's chronic conditions and co-morbidity symptoms are monitored and maintained or improved  Outcome: Progressing  Flowsheets (Taken 3/19/2024 2130)  Care Plan - Patient's Chronic Conditions and Co-Morbidity Symptoms are Monitored and Maintained or Improved: Monitor and assess patient's chronic conditions and comorbid symptoms for stability, deterioration, or improvement     Problem: Discharge Planning  Goal: Discharge to home or other facility with appropriate resources  Outcome: Progressing  Flowsheets (Taken 3/19/2024 2130)  Discharge to home or other facility with appropriate resources: Identify barriers to discharge with patient and caregiver     Problem: Safety - Adult  Goal: Free from fall injury  Outcome: Progressing  Flowsheets (Taken 3/19/2024 2130)  Free From Fall Injury: Instruct family/caregiver on patient safety     Problem: Skin/Tissue Integrity  Goal: Absence of new skin breakdown  Description: 1.  Monitor for areas of redness and/or skin breakdown  2.  Assess vascular access sites hourly  3.  Every 4-6 hours minimum:  Change oxygen saturation probe site  4.  Every 4-6 hours:  If on nasal continuous positive airway pressure, respiratory therapy assess nares and determine need for appliance change or resting period.  Outcome: Progressing     Problem: ABCDS Injury Assessment  Goal: Absence of physical injury  Outcome: Progressing  Flowsheets (Taken 3/19/2024 2130)  Absence of Physical Injury: Implement safety measures based on patient assessment

## 2024-03-20 NOTE — PLAN OF CARE
Problem: Chronic Conditions and Co-morbidities  Goal: Patient's chronic conditions and co-morbidity symptoms are monitored and maintained or improved  3/20/2024 0742 by Bell Vogt RN  Outcome: Progressing  3/20/2024 0302 by Do Sheth RN  Outcome: Progressing  Flowsheets (Taken 3/19/2024 2130)  Care Plan - Patient's Chronic Conditions and Co-Morbidity Symptoms are Monitored and Maintained or Improved: Monitor and assess patient's chronic conditions and comorbid symptoms for stability, deterioration, or improvement     Problem: Discharge Planning  Goal: Discharge to home or other facility with appropriate resources  3/20/2024 0742 by Bell Vogt RN  Outcome: Progressing  3/20/2024 0302 by Do Sheth RN  Outcome: Progressing  Flowsheets (Taken 3/19/2024 2130)  Discharge to home or other facility with appropriate resources: Identify barriers to discharge with patient and caregiver     Problem: Safety - Adult  Goal: Free from fall injury  3/20/2024 0742 by Bell Vogt RN  Outcome: Progressing  3/20/2024 0302 by Do Sheth RN  Outcome: Progressing  Flowsheets (Taken 3/19/2024 2130)  Free From Fall Injury: Instruct family/caregiver on patient safety     Problem: Skin/Tissue Integrity  Goal: Absence of new skin breakdown  Description: 1.  Monitor for areas of redness and/or skin breakdown  2.  Assess vascular access sites hourly  3.  Every 4-6 hours minimum:  Change oxygen saturation probe site  4.  Every 4-6 hours:  If on nasal continuous positive airway pressure, respiratory therapy assess nares and determine need for appliance change or resting period.  3/20/2024 0742 by Bell Vogt RN  Outcome: Progressing  3/20/2024 0302 by Do Sheth RN  Outcome: Progressing     Problem: ABCDS Injury Assessment  Goal: Absence of physical injury  3/20/2024 0742 by Bell Vogt RN  Outcome: Progressing  3/20/2024 0302 by Do Sheth RN  Outcome: Progressing  Flowsheets (Taken 3/19/2024

## 2024-03-20 NOTE — PROGRESS NOTES
PRN  benzonatate, 100 mg, TID PRN  calcium carbonate, 500 mg, TID PRN  hydrALAZINE, 10 mg, Q6H PRN  melatonin, 3 mg, Nightly PRN  Polyvinyl Alcohol-Povidone PF, 1 drop, PRN  sodium chloride, 1 spray, PRN  sodium chloride flush, 5-40 mL, PRN  sodium chloride, , PRN  potassium chloride, 40 mEq, PRN   Or  potassium alternative oral replacement, 40 mEq, PRN   Or  potassium chloride, 10 mEq, PRN  magnesium sulfate, 2,000 mg, PRN  ondansetron, 4 mg, Q8H PRN   Or  ondansetron, 4 mg, Q6H PRN  polyethylene glycol, 17 g, Daily PRN  acetaminophen, 650 mg, Q6H PRN   Or  acetaminophen, 650 mg, Q6H PRN  glucose, 4 tablet, PRN  dextrose bolus, 125 mL, PRN   Or  dextrose bolus, 250 mL, PRN  glucagon (rDNA), 1 mg, PRN  dextrose, , Continuous PRN  regadenoson, 0.4 mg, ONCE PRN  perflutren lipid microspheres, 1.5 mL, ONCE PRN         Objective:    BP (!) 146/52   Pulse 96   Temp 97.4 °F (36.3 °C) (Oral)   Resp 19   Ht 1.727 m (5' 8\")   Wt 82.5 kg (181 lb 12.8 oz)   LMP  (LMP Unknown)   SpO2 98%   BMI 27.64 kg/m²     General Appearance: awake, disoriented   Skin: warm and dry  Head: normocephalic and atraumatic  Eyes: pupils equal, round, and reactive to light, extraocular eye movements intact, conjunctivae normal  Neck: neck supple and non tender without mass   Pulmonary/Chest: clear to auscultation bilaterally- no wheezes, rales or rhonchi, normal air movement, no respiratory distress  Cardiovascular: normal rate, normal S1 and S2 and no carotid bruits  Abdomen: soft, non-tender, non-distended, normal bowel sounds, no masses or organomegaly  Extremities: no cyanosis, no clubbing and no edema  Neurologic: no cranial nerve deficit and speech normal        Recent Labs     03/18/24  1531 03/19/24  0524 03/20/24  0528    139 139   K 4.6 4.2 3.8    108* 106   CO2 19* 19* 20*   BUN 15 12 11   CREATININE 1.1* 0.8 0.7   GLUCOSE 164* 130* 126*   CALCIUM 8.9 8.8 8.9         No results for input(s): \"WBC\", \"RBC\", \"HGB\",  \"HCT\", \"MCV\", \"MCH\", \"MCHC\", \"RDW\", \"PLT\", \"MPV\" in the last 72 hours.    Radiology:   CTA PULMONARY W CONTRAST   Final Result   No acute pulmonary embolism identified.  Note however that evaluation in the   right lower lung region is markedly limited due to artifact and cannot   exclude the possibility of pulmonary emboli here.  Could consider a repeat   examination if clinically indicated.  Alternatively would advise close   clinical follow-up.      Abnormal right breast with skin thickening, stranding/edema, and a large   masslike density in the upper breast/axilla measuring roughly 8.2 x 6 cm.   Correlate for mastitis, treatment change, venous/lymphatic obstruction,   hematoma, malignancy.      7 mm left upper lobe pulmonary nodule.  Follow-up chest CT within 6 months   advised.         XR CHEST PORTABLE   Final Result   No acute process.               Assessment and Plan:     Chest pain - Troponin no elevation, no EKG changes, Cardiology following,   HFmEF - EF 45-50%, per cardiology not a candidate for any invasice diagnostic or therapeutic cardiac intervention. 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 Every effort was made to ensure accuracy; however, inadvertent computerized transcription errors may be present.  Electronically signed by Sondra Anderson MD on 3/20/2024 at 12:00 PM

## 2024-03-20 NOTE — CARE COORDINATION
3/20:  Update CM Note:  Pt presented to the ER for SOB & CP from home.  Pt is on room air at 96% & SQ Lovenox.  Pt is in ISO-Droplet with Rhinovirus.   Pt's daughter choice [facility] & they have been accepted.  Pt is pending pre-cert.  RODRIGUE, PASRR & Ambulance form in a envelope in soft chart.  [facility] port needs de-accessed & flushed prior to discharge to their facility.  Sw/CM will continue to follow.  Electronically signed by Eliza Real RN on 3/20/2024 at 9:35 AM

## 2024-03-21 LAB
ANION GAP SERPL CALCULATED.3IONS-SCNC: 15 MMOL/L (ref 7–16)
BUN SERPL-MCNC: 11 MG/DL (ref 6–23)
CALCIUM SERPL-MCNC: 9.2 MG/DL (ref 8.6–10.2)
CHLORIDE SERPL-SCNC: 105 MMOL/L (ref 98–107)
CO2 SERPL-SCNC: 20 MMOL/L (ref 22–29)
CREAT SERPL-MCNC: 0.8 MG/DL (ref 0.5–1)
GFR SERPL CREATININE-BSD FRML MDRD: >60 ML/MIN/1.73M2
GLUCOSE BLD-MCNC: 132 MG/DL (ref 74–99)
GLUCOSE BLD-MCNC: 153 MG/DL (ref 74–99)
GLUCOSE BLD-MCNC: 185 MG/DL (ref 74–99)
GLUCOSE BLD-MCNC: 282 MG/DL (ref 74–99)
GLUCOSE SERPL-MCNC: 131 MG/DL (ref 74–99)
MICROORGANISM SPEC CULT: NORMAL
MICROORGANISM SPEC CULT: NORMAL
POTASSIUM SERPL-SCNC: 3.9 MMOL/L (ref 3.5–5)
SERVICE CMNT-IMP: NORMAL
SERVICE CMNT-IMP: NORMAL
SODIUM SERPL-SCNC: 140 MMOL/L (ref 132–146)
SPECIMEN DESCRIPTION: NORMAL
SPECIMEN DESCRIPTION: NORMAL

## 2024-03-21 PROCEDURE — 6360000002 HC RX W HCPCS: Performed by: INTERNAL MEDICINE

## 2024-03-21 PROCEDURE — 80048 BASIC METABOLIC PNL TOTAL CA: CPT

## 2024-03-21 PROCEDURE — 2580000003 HC RX 258: Performed by: INTERNAL MEDICINE

## 2024-03-21 PROCEDURE — 6370000000 HC RX 637 (ALT 250 FOR IP): Performed by: INTERNAL MEDICINE

## 2024-03-21 PROCEDURE — 36415 COLL VENOUS BLD VENIPUNCTURE: CPT

## 2024-03-21 PROCEDURE — 82962 GLUCOSE BLOOD TEST: CPT

## 2024-03-21 PROCEDURE — 94640 AIRWAY INHALATION TREATMENT: CPT

## 2024-03-21 PROCEDURE — 99232 SBSQ HOSP IP/OBS MODERATE 35: CPT | Performed by: INTERNAL MEDICINE

## 2024-03-21 PROCEDURE — 1200000000 HC SEMI PRIVATE

## 2024-03-21 RX ADMIN — IPRATROPIUM BROMIDE AND ALBUTEROL SULFATE 1 DOSE: .5; 2.5 SOLUTION RESPIRATORY (INHALATION) at 15:37

## 2024-03-21 RX ADMIN — ANTI-FUNGAL POWDER MICONAZOLE NITRATE TALC FREE: 1.42 POWDER TOPICAL at 08:23

## 2024-03-21 RX ADMIN — LEVOTHYROXINE SODIUM 88 MCG: 0.09 TABLET ORAL at 08:21

## 2024-03-21 RX ADMIN — METOPROLOL SUCCINATE 50 MG: 50 TABLET, EXTENDED RELEASE ORAL at 08:22

## 2024-03-21 RX ADMIN — SACUBITRIL AND VALSARTAN 1 TABLET: 24; 26 TABLET, FILM COATED ORAL at 08:21

## 2024-03-21 RX ADMIN — ANTI-FUNGAL POWDER MICONAZOLE NITRATE TALC FREE: 1.42 POWDER TOPICAL at 20:34

## 2024-03-21 RX ADMIN — ATORVASTATIN CALCIUM 40 MG: 40 TABLET, FILM COATED ORAL at 20:32

## 2024-03-21 RX ADMIN — DONEPEZIL HYDROCHLORIDE 5 MG: 5 TABLET, FILM COATED ORAL at 11:45

## 2024-03-21 RX ADMIN — INSULIN LISPRO 2 UNITS: 100 INJECTION, SOLUTION INTRAVENOUS; SUBCUTANEOUS at 11:45

## 2024-03-21 RX ADMIN — IPRATROPIUM BROMIDE AND ALBUTEROL SULFATE 1 DOSE: .5; 2.5 SOLUTION RESPIRATORY (INHALATION) at 19:32

## 2024-03-21 RX ADMIN — ANASTROZOLE 1 MG: 1 TABLET, COATED ORAL at 09:17

## 2024-03-21 RX ADMIN — SACUBITRIL AND VALSARTAN 1 TABLET: 24; 26 TABLET, FILM COATED ORAL at 20:32

## 2024-03-21 RX ADMIN — IPRATROPIUM BROMIDE AND ALBUTEROL SULFATE 1 DOSE: .5; 2.5 SOLUTION RESPIRATORY (INHALATION) at 12:02

## 2024-03-21 RX ADMIN — CLOPIDOGREL BISULFATE 75 MG: 75 TABLET ORAL at 08:21

## 2024-03-21 RX ADMIN — IPRATROPIUM BROMIDE AND ALBUTEROL SULFATE 1 DOSE: .5; 2.5 SOLUTION RESPIRATORY (INHALATION) at 07:57

## 2024-03-21 RX ADMIN — ASPIRIN 81 MG: 81 TABLET, COATED ORAL at 08:29

## 2024-03-21 RX ADMIN — ENOXAPARIN SODIUM 40 MG: 100 INJECTION SUBCUTANEOUS at 08:21

## 2024-03-21 NOTE — PROGRESS NOTES
[facility] Hospitalist Progress Note    Admitting Date and Time: 3/15/2024  9:06 PM  Synopsis: Patient with a left bundle branch block, hypertension, hyperlipidemia, diabetes, former smoker, obesity, dementia, CVA, hypothyroidism, low-grade follicular lymphoma status postchemotherapy in remission admitted on 3/15/2024 after presenting to the ED with chest pain.  Patient was recently admitted in January for acute stroke was evaluated by cardiology at the time after diagnosis of acute systolic heart failure, discharged with a LifeVest and plan was to get a cardiac cath after recovery from the stroke.  Cardiology is now consulted and will communicate with the patient's family to determine ischemic evaluation.  Patient was also noted to have right breast possible hematoma on CT scan of the chest, surgery consulted and no indication for acute surgical intervention at this time.  Will follow-up outpatient with surgery.  Social work discussing patient with family to evaluate for possible placement. She requires 24/7 support and this is not available to her at home at this time.  Pre-CERT is pending.    Subjective:  No overnight events, no issues  Pt feels well this morning   No chest pain   No breathing issues   Tolerating meals     ROS: denies fever, chills, cp, sob, n/v, HA unless stated above.      miconazole   Topical BID    sodium chloride flush  5-40 mL IntraVENous 2 times per day    enoxaparin  40 mg SubCUTAneous Daily    anastrozole  1 mg Oral Daily    aspirin  81 mg Oral Daily    atorvastatin  40 mg Oral Nightly    clopidogrel  75 mg Oral Daily    donepezil  5 mg Oral Lunch    levothyroxine  88 mcg Oral Daily    metoprolol succinate  50 mg Oral Daily    sacubitril-valsartan  1 tablet Oral BID    insulin lispro  0-4 Units SubCUTAneous TID WC    insulin lispro  0-4 Units SubCUTAneous Nightly    ipratropium 0.5 mg-albuterol 2.5 mg  1 Dose Inhalation Q4H While awake     benzocaine-menthol, 1 lozenge, Q2H

## 2024-03-21 NOTE — PLAN OF CARE
Problem: Chronic Conditions and Co-morbidities  Goal: Patient's chronic conditions and co-morbidity symptoms are monitored and maintained or improved  Outcome: Progressing  Flowsheets (Taken 3/20/2024 2142)  Care Plan - Patient's Chronic Conditions and Co-Morbidity Symptoms are Monitored and Maintained or Improved: Monitor and assess patient's chronic conditions and comorbid symptoms for stability, deterioration, or improvement     Problem: Discharge Planning  Goal: Discharge to home or other facility with appropriate resources  Outcome: Progressing  Flowsheets (Taken 3/20/2024 2142)  Discharge to home or other facility with appropriate resources: Identify barriers to discharge with patient and caregiver     Problem: Safety - Adult  Goal: Free from fall injury  Outcome: Progressing  Flowsheets (Taken 3/20/2024 2142)  Free From Fall Injury: Instruct family/caregiver on patient safety     Problem: Skin/Tissue Integrity  Goal: Absence of new skin breakdown  Description: 1.  Monitor for areas of redness and/or skin breakdown  2.  Assess vascular access sites hourly  3.  Every 4-6 hours minimum:  Change oxygen saturation probe site  4.  Every 4-6 hours:  If on nasal continuous positive airway pressure, respiratory therapy assess nares and determine need for appliance change or resting period.  Outcome: Progressing     Problem: ABCDS Injury Assessment  Goal: Absence of physical injury  Outcome: Progressing  Flowsheets (Taken 3/20/2024 2142)  Absence of Physical Injury: Implement safety measures based on patient assessment

## 2024-03-21 NOTE — CARE COORDINATION
CM update note.  Discharge plan is [facility].  Precert started yesterday and remains pending.  [place]/destination/pasrr completed.  Ambulance form with envelope is on the soft chart.  Per attending, pt is ready for discharge when precert obtained.  Port will need to be de accessed prior to discharge.  CM/YUAN to follow.  Benito Hardy RN -[phone].

## 2024-03-21 NOTE — PLAN OF CARE
Problem: Chronic Conditions and Co-morbidities  Goal: Patient's chronic conditions and co-morbidity symptoms are monitored and maintained or improved  3/21/2024 1047 by Shey Vazquez RN  Outcome: Progressing     Problem: Discharge Planning  Goal: Discharge to home or other facility with appropriate resources  3/21/2024 1047 by Shey Vazquez RN  Outcome: Progressing     Problem: Safety - Adult  Goal: Free from fall injury  3/21/2024 1047 by Shey Vazquez RN  Outcome: Progressing     Problem: Skin/Tissue Integrity  Goal: Absence of new skin breakdown  Description: 1.  Monitor for areas of redness and/or skin breakdown  2.  Assess vascular access sites hourly  3.  Every 4-6 hours minimum:  Change oxygen saturation probe site  4.  Every 4-6 hours:  If on nasal continuous positive airway pressure, respiratory therapy assess nares and determine need for appliance change or resting period.  3/21/2024 1047 by Shey Vazquez RN  Outcome: Progressing     Problem: ABCDS Injury Assessment  Goal: Absence of physical injury  3/21/2024 1047 by Shey Vazquez RN  Outcome: Progressing

## 2024-03-21 NOTE — PROGRESS NOTES
4 Eyes Skin Assessment     NAME:  Nika Aviles  YOB: 1945  MEDICAL RECORD NUMBER:  [id number]    The patient is being assessed for  Transfer to New Unit    I agree that at least one RN has performed a thorough Head to Toe Skin Assessment on the patient. ALL assessment sites listed below have been assessed.      Areas assessed by both nurses:    Head, Face, Ears, Shoulders, Back, Chest, Arms, Elbows, Hands, Sacrum. Buttock, Coccyx, Ischium, Legs. Feet and Heels, Under Medical Devices , and Other        Patient has wound on her sacrum. She has a old blister on left breast and an abrasion on right wrist.  Does the Patient have a Wound? Yes wound(s) were present on assessment. LDA wound assessment was Initiated and completed by CORRINE Rojas Prevention initiated by RN: Yes  Wound Care Orders initiated by RN: Yes    Pressure Injury (Stage 3,4, Unstageable, DTI, NWPT, and Complex wounds) if present, place Wound referral order by RN under : No    New Ostomies, if present place, Ostomy referral order under : No     Nurse 1 eSignature: Electronically signed by Cyn Vasquez RN on 3/21/24 at 7:30 AM EDT    **SHARE this note so that the co-signing nurse can place an eSignature**    Nurse 2 eSignature: Electronically signed by Shey Vazquez RN on 3/21/24 at 10:00 AM EDT

## 2024-03-22 VITALS
RESPIRATION RATE: 18 BRPM | HEIGHT: 68 IN | DIASTOLIC BLOOD PRESSURE: 66 MMHG | HEART RATE: 70 BPM | TEMPERATURE: 98 F | SYSTOLIC BLOOD PRESSURE: 141 MMHG | OXYGEN SATURATION: 97 % | BODY MASS INDEX: 27.55 KG/M2 | WEIGHT: 181.8 LBS

## 2024-03-22 LAB
GLUCOSE BLD-MCNC: 121 MG/DL (ref 74–99)
GLUCOSE BLD-MCNC: 134 MG/DL (ref 74–99)
GLUCOSE BLD-MCNC: 289 MG/DL (ref 74–99)

## 2024-03-22 PROCEDURE — 6370000000 HC RX 637 (ALT 250 FOR IP): Performed by: INTERNAL MEDICINE

## 2024-03-22 PROCEDURE — 99232 SBSQ HOSP IP/OBS MODERATE 35: CPT | Performed by: INTERNAL MEDICINE

## 2024-03-22 PROCEDURE — 82962 GLUCOSE BLOOD TEST: CPT

## 2024-03-22 PROCEDURE — 94640 AIRWAY INHALATION TREATMENT: CPT

## 2024-03-22 PROCEDURE — 6360000002 HC RX W HCPCS: Performed by: INTERNAL MEDICINE

## 2024-03-22 RX ORDER — ANASTROZOLE 1 MG/1
1 TABLET ORAL DAILY
Qty: 30 TABLET | Refills: 3 | DISCHARGE
Start: 2024-03-23

## 2024-03-22 RX ADMIN — ASPIRIN 81 MG: 81 TABLET, COATED ORAL at 08:52

## 2024-03-22 RX ADMIN — DONEPEZIL HYDROCHLORIDE 5 MG: 5 TABLET, FILM COATED ORAL at 11:37

## 2024-03-22 RX ADMIN — CLOPIDOGREL BISULFATE 75 MG: 75 TABLET ORAL at 08:53

## 2024-03-22 RX ADMIN — IPRATROPIUM BROMIDE AND ALBUTEROL SULFATE 1 DOSE: .5; 2.5 SOLUTION RESPIRATORY (INHALATION) at 08:38

## 2024-03-22 RX ADMIN — IPRATROPIUM BROMIDE AND ALBUTEROL SULFATE 1 DOSE: .5; 2.5 SOLUTION RESPIRATORY (INHALATION) at 15:41

## 2024-03-22 RX ADMIN — SACUBITRIL AND VALSARTAN 1 TABLET: 24; 26 TABLET, FILM COATED ORAL at 08:52

## 2024-03-22 RX ADMIN — METOPROLOL SUCCINATE 50 MG: 50 TABLET, EXTENDED RELEASE ORAL at 08:52

## 2024-03-22 RX ADMIN — IPRATROPIUM BROMIDE AND ALBUTEROL SULFATE 1 DOSE: .5; 2.5 SOLUTION RESPIRATORY (INHALATION) at 13:04

## 2024-03-22 RX ADMIN — ENOXAPARIN SODIUM 40 MG: 100 INJECTION SUBCUTANEOUS at 08:52

## 2024-03-22 RX ADMIN — LEVOTHYROXINE SODIUM 88 MCG: 0.09 TABLET ORAL at 05:59

## 2024-03-22 RX ADMIN — ANTI-FUNGAL POWDER MICONAZOLE NITRATE TALC FREE: 1.42 POWDER TOPICAL at 08:52

## 2024-03-22 RX ADMIN — INSULIN LISPRO 2 UNITS: 100 INJECTION, SOLUTION INTRAVENOUS; SUBCUTANEOUS at 11:37

## 2024-03-22 NOTE — PROGRESS NOTES
[facility] Hospitalist Progress Note    Admitting Date and Time: 3/15/2024  9:06 PM  Synopsis: Patient with a left bundle branch block, hypertension, hyperlipidemia, diabetes, former smoker, obesity, dementia, CVA, hypothyroidism, low-grade follicular lymphoma status postchemotherapy in remission admitted on 3/15/2024 after presenting to the ED with chest pain.  Patient was recently admitted in January for acute stroke was evaluated by cardiology at the time after diagnosis of acute systolic heart failure, discharged with a LifeVest and plan was to get a cardiac cath after recovery from the stroke.  Cardiology is now consulted and will communicate with the patient's family to determine ischemic evaluation.  Patient was also noted to have right breast possible hematoma on CT scan of the chest, surgery consulted and no indication for acute surgical intervention at this time.  Will follow-up outpatient with surgery.  Social work discussing patient with family to evaluate for possible placement. She requires 24/7 support and this is not available to her at home at this time.  Pre-CERT is pending.    Subjective:  No overnight events, no issues  Pt feels well this morning   No chest pain   No breathing issues   Tolerating meals     ROS: denies fever, chills, cp, sob, n/v, HA unless stated above.      miconazole   Topical BID    sodium chloride flush  5-40 mL IntraVENous 2 times per day    enoxaparin  40 mg SubCUTAneous Daily    anastrozole  1 mg Oral Daily    aspirin  81 mg Oral Daily    atorvastatin  40 mg Oral Nightly    clopidogrel  75 mg Oral Daily    donepezil  5 mg Oral Lunch    levothyroxine  88 mcg Oral Daily    metoprolol succinate  50 mg Oral Daily    sacubitril-valsartan  1 tablet Oral BID    insulin lispro  0-4 Units SubCUTAneous TID WC    insulin lispro  0-4 Units SubCUTAneous Nightly    ipratropium 0.5 mg-albuterol 2.5 mg  1 Dose Inhalation Q4H While awake     benzocaine-menthol, 1 lozenge, Q2H  the last 72 hours.    Radiology:   CTA PULMONARY W CONTRAST   Final Result   No acute pulmonary embolism identified.  Note however that evaluation in the   right lower lung region is markedly limited due to artifact and cannot   exclude the possibility of pulmonary emboli here.  Could consider a repeat   examination if clinically indicated.  Alternatively would advise close   clinical follow-up.      Abnormal right breast with skin thickening, stranding/edema, and a large   masslike density in the upper breast/axilla measuring roughly 8.2 x 6 cm.   Correlate for mastitis, treatment change, venous/lymphatic obstruction,   hematoma, malignancy.      7 mm left upper lobe pulmonary nodule.  Follow-up chest CT within 6 months   advised.         XR CHEST PORTABLE   Final Result   No acute process.               Assessment and Plan:     Chest pain - Troponin no elevation, no EKG changes, Cardiology following,   HFmEF - EF 45-50%, per cardiology not a candidate for any invasice diagnostic or therapeutic cardiac intervention. 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 Every effort was made to ensure accuracy; however, inadvertent computerized transcription errors may be present.  Electronically signed by Sondra Anderson MD on 3/22/2024 at 11:33 AM

## 2024-03-22 NOTE — DISCHARGE SUMMARY
[facility] Hospitalist Physician Discharge Summary       [facility]  [address]  [facility] [id number] [phone]        Activity level: As tolerated     Dispo: Home      Condition on discharge: Stable     Patient ID:  Nika Aviles  [id number]  79 y.o.  1945    Admit date: 3/15/2024    Discharge date and time:  3/22/2024  4:40 PM    Admission Diagnoses: Principal Problem:    Rhinovirus  Active Problems:    Acute chest pain  Resolved Problems:    * No resolved hospital problems. *      Discharge Diagnoses: Principal Problem:    Rhinovirus  Active Problems:    Acute chest pain  Resolved Problems:    * No resolved hospital problems. *      Consults:  IP CONSULT TO INTERNAL MEDICINE  IP CONSULT TO CARDIOLOGY  IP CONSULT TO GENERAL SURGERY    Hospital Course:   Patient Nika Aviles is a 79 y.o. presented with Shortness of breath [R06.02]  Rhinovirus [B34.8]  Acute chest pain [R07.9]  Rhinovirus infection [B34.8]  Hypotension, unspecified hypotension type [I95.9]  Other cardiomyopathy (HCC) [I42.8]  Patient with a left bundle branch block, hypertension, hyperlipidemia, diabetes, former smoker, obesity, dementia, CVA, hypothyroidism, low-grade follicular lymphoma status postchemotherapy in remission admitted on 3/15/2024 after presenting to the ED with chest pain.  Patient was recently admitted in January for acute stroke was evaluated by cardiology at the time after diagnosis of acute systolic heart failure, discharged with a LifeVest and plan was to get a cardiac cath after recovery from the stroke.  Cardiology is now consulted and will communicate with the patient's family to determine ischemic evaluation.  Patient was also noted to have right breast possible hematoma on CT scan of the chest, surgery consulted and no indication for acute surgical intervention at this time.  Will follow-up outpatient with surgery.  Patient is to be discharged stable to a

## 2024-03-22 NOTE — PLAN OF CARE
Problem: Chronic Conditions and Co-morbidities  Goal: Patient's chronic conditions and co-morbidity symptoms are monitored and maintained or improved  3/22/2024 1944 by Lina Bergeron RN  Outcome: Adequate for Discharge  3/22/2024 1059 by Shey Vazquez RN  Outcome: Progressing     Problem: Discharge Planning  Goal: Discharge to home or other facility with appropriate resources  3/22/2024 1944 by Lina Bergeron RN  Outcome: Adequate for Discharge  3/22/2024 1059 by Shey Vazquez RN  Outcome: Progressing     Problem: Safety - Adult  Goal: Free from fall injury  3/22/2024 1944 by Lina Bergeron RN  Outcome: Adequate for Discharge  3/22/2024 1059 by Shey Vazquez RN  Outcome: Progressing     Problem: Skin/Tissue Integrity  Goal: Absence of new skin breakdown  Description: 1.  Monitor for areas of redness and/or skin breakdown  2.  Assess vascular access sites hourly  3.  Every 4-6 hours minimum:  Change oxygen saturation probe site  4.  Every 4-6 hours:  If on nasal continuous positive airway pressure, respiratory therapy assess nares and determine need for appliance change or resting period.  3/22/2024 1944 by Lina Bergeron RN  Outcome: Adequate for Discharge  3/22/2024 1059 by Shey Vazquez RN  Outcome: Progressing     Problem: ABCDS Injury Assessment  Goal: Absence of physical injury  3/22/2024 1944 by Lina Bergeron RN  Outcome: Adequate for Discharge  3/22/2024 1059 by Shey Vazquez RN  Outcome: Progressing     Problem: Chronic Conditions and Co-morbidities  Goal: Patient's chronic conditions and co-morbidity symptoms are monitored and maintained or improved  3/22/2024 1944 by Lina Bergeron RN  Outcome: Adequate for Discharge  3/22/2024 1059 by Shey Vazquez RN  Outcome: Progressing     Problem: Discharge Planning  Goal: Discharge to home or other facility with appropriate resources  3/22/2024 1944 by Lina Bergeron RN  Outcome: Adequate for

## 2024-03-22 NOTE — CARE COORDINATION
Discharge order noted. Per Luz Elena from [facility], precert has been obtained. Transportation set up via [facility] for 6:30 pm today. Charge nurse, RN, liaison, patient and grandaughter all notified. [place]/destination/pasrr completed. Completed and signed Ambulance form with envelope is on the soft chart.  Karma Simmons RN   [phone]

## 2024-03-22 NOTE — PLAN OF CARE
Problem: Discharge Planning  Goal: Discharge to home or other facility with appropriate resources  3/22/2024 0029 by Cyn Vasquez RN  Outcome: Progressing  3/21/2024 1047 by Shey Vazquez RN  Outcome: Progressing     Problem: Safety - Adult  Goal: Free from fall injury  3/22/2024 0029 by Cyn Vasquez RN  Outcome: Progressing  3/21/2024 1047 by Shey Vazquez RN  Outcome: Progressing     Problem: Skin/Tissue Integrity  Goal: Absence of new skin breakdown  Description: 1.  Monitor for areas of redness and/or skin breakdown  2.  Assess vascular access sites hourly  3.  Every 4-6 hours minimum:  Change oxygen saturation probe site  4.  Every 4-6 hours:  If on nasal continuous positive airway pressure, respiratory therapy assess nares and determine need for appliance change or resting period.  3/22/2024 0029 by Cyn Vasquez RN  Outcome: Progressing  3/21/2024 1047 by Shey Vazquez RN  Outcome: Progressing

## 2024-03-22 NOTE — PLAN OF CARE
Problem: Chronic Conditions and Co-morbidities  Goal: Patient's chronic conditions and co-morbidity symptoms are monitored and maintained or improved  3/22/2024 1944 by Lina Bergeron RN  Outcome: Completed  3/22/2024 1944 by Lina Bergeron RN  Outcome: Adequate for Discharge  3/22/2024 1059 by Shey Vazquez RN  Outcome: Progressing     Problem: Discharge Planning  Goal: Discharge to home or other facility with appropriate resources  3/22/2024 1944 by Lina Bergeron RN  Outcome: Completed  3/22/2024 1944 by Lina Bergeron RN  Outcome: Adequate for Discharge  3/22/2024 1059 by Shey Vazquez RN  Outcome: Progressing     Problem: Safety - Adult  Goal: Free from fall injury  3/22/2024 1944 by Lina Bergeron RN  Outcome: Completed  3/22/2024 1944 by Lina Bergeron RN  Outcome: Adequate for Discharge  3/22/2024 1059 by Shey Vazquez RN  Outcome: Progressing     Problem: Skin/Tissue Integrity  Goal: Absence of new skin breakdown  Description: 1.  Monitor for areas of redness and/or skin breakdown  2.  Assess vascular access sites hourly  3.  Every 4-6 hours minimum:  Change oxygen saturation probe site  4.  Every 4-6 hours:  If on nasal continuous positive airway pressure, respiratory therapy assess nares and determine need for appliance change or resting period.  3/22/2024 1944 by Lina Bergeron RN  Outcome: Completed  3/22/2024 1944 by Lina Bergeron RN  Outcome: Adequate for Discharge  3/22/2024 1059 by Shey Vazquez RN  Outcome: Progressing     Problem: ABCDS Injury Assessment  Goal: Absence of physical injury  3/22/2024 1944 by Lina Bergeron RN  Outcome: Completed  3/22/2024 1944 by Lina Bergeron RN  Outcome: Adequate for Discharge  3/22/2024 1059 by Shey Vazquez RN  Outcome: Progressing

## 2024-03-22 NOTE — PLAN OF CARE
Problem: Chronic Conditions and Co-morbidities  Goal: Patient's chronic conditions and co-morbidity symptoms are monitored and maintained or improved  3/22/2024 1059 by Shey Vazquez RN  Outcome: Progressing     Problem: Discharge Planning  Goal: Discharge to home or other facility with appropriate resources  3/22/2024 1059 by Shey Vazquez RN  Outcome: Progressing     Problem: Safety - Adult  Goal: Free from fall injury  3/22/2024 1059 by Shey Vazquez RN  Outcome: Progressing     Problem: Skin/Tissue Integrity  Goal: Absence of new skin breakdown  Description: 1.  Monitor for areas of redness and/or skin breakdown  2.  Assess vascular access sites hourly  3.  Every 4-6 hours minimum:  Change oxygen saturation probe site  4.  Every 4-6 hours:  If on nasal continuous positive airway pressure, respiratory therapy assess nares and determine need for appliance change or resting period.  3/22/2024 1059 by Shey Vazquez RN  Outcome: Progressing     Problem: ABCDS Injury Assessment  Goal: Absence of physical injury  Outcome: Progressing

## 2024-04-10 LAB
BACTERIA: ABNORMAL
BILIRUBIN URINE: NEGATIVE
COLOR: YELLOW
EPITHELIAL CELLS UA: ABNORMAL /HPF
GLUCOSE URINE: NEGATIVE MG/DL
KETONES, URINE: NEGATIVE MG/DL
LEUKOCYTE ESTERASE, URINE: ABNORMAL
NITRITE, URINE: NEGATIVE
PH UA: 6.5 (ref 5–9)
PROTEIN UA: NEGATIVE MG/DL
RBC UA: ABNORMAL /HPF
SPECIFIC GRAVITY UA: <1.005 (ref 1–1.03)
TURBIDITY: ABNORMAL
URINE HGB: ABNORMAL
UROBILINOGEN, URINE: 0.2 EU/DL (ref 0–1)
WBC UA: ABNORMAL /HPF

## 2024-04-11 ENCOUNTER — OFFICE VISIT (OUTPATIENT)
Dept: CARDIOLOGY CLINIC | Age: 79
End: 2024-04-11

## 2024-04-11 VITALS
BODY MASS INDEX: 27.69 KG/M2 | HEIGHT: 67 IN | DIASTOLIC BLOOD PRESSURE: 81 MMHG | SYSTOLIC BLOOD PRESSURE: 121 MMHG | HEART RATE: 60 BPM | RESPIRATION RATE: 16 BRPM | WEIGHT: 176.4 LBS

## 2024-04-11 DIAGNOSIS — C50.911 MALIGNANT NEOPLASM OF RIGHT BREAST, STAGE 1, ESTROGEN RECEPTOR POSITIVE (HCC): ICD-10-CM

## 2024-04-11 DIAGNOSIS — C82.01 GRADE 1 FOLLICULAR LYMPHOMA OF LYMPH NODES OF HEAD (HCC): ICD-10-CM

## 2024-04-11 DIAGNOSIS — I63.9 ACUTE CVA (CEREBROVASCULAR ACCIDENT) (HCC): ICD-10-CM

## 2024-04-11 DIAGNOSIS — I50.22 CHRONIC SYSTOLIC CONGESTIVE HEART FAILURE (HCC): Primary | ICD-10-CM

## 2024-04-11 DIAGNOSIS — E03.2 HYPOTHYROIDISM DUE TO MEDICATION: ICD-10-CM

## 2024-04-11 DIAGNOSIS — R40.2410 GLASGOW COMA SCALE TOTAL SCORE 13-15, UNSPECIFIED COMA TIMING: ICD-10-CM

## 2024-04-11 DIAGNOSIS — Z17.0 MALIGNANT NEOPLASM OF RIGHT BREAST, STAGE 1, ESTROGEN RECEPTOR POSITIVE (HCC): ICD-10-CM

## 2024-04-11 DIAGNOSIS — Z96.641 S/P TOTAL RIGHT HIP ARTHROPLASTY: ICD-10-CM

## 2024-04-11 NOTE — PROGRESS NOTES
ARTHROPLASTY Right 2022    ANNEMARIE ROBOTIC ASSISTED RIGHT KNEE TOTAL ARTHROPLASTY performed by Clarence Harris MD at [facility] OR    UPPER GASTROINTESTINAL ENDOSCOPY         Family History:  Family History   Problem Relation Age of Onset    Cancer Brother 45        brain    Cancer Paternal Aunt 65        uterine       Social History:  Social History     Socioeconomic History    Marital status:      Spouse name: Not on file    Number of children: 1    Years of education: Not on file    Highest education level: Not on file   Occupational History    Not on file   Tobacco Use    Smoking status: Former     Current packs/day: 0.00     Types: Cigarettes     Quit date: 1994     Years since quittin.3    Smokeless tobacco: Never   Vaping Use    Vaping Use: Never used   Substance and Sexual Activity    Alcohol use: No    Drug use: No    Sexual activity: Not Currently     Partners: Male   Other Topics Concern    Not on file   Social History Narrative    Not on file     Social Determinants of Health     Financial Resource Strain: Not on file   Food Insecurity: Patient Declined (3/17/2024)    Hunger Vital Sign     Worried About Running Out of Food in the Last Year: Patient declined     Ran Out of Food in the Last Year: Patient declined   Transportation Needs: Patient Declined (3/17/2024)    PRAPARE - Transportation     Lack of Transportation (Medical): Patient declined     Lack of Transportation (Non-Medical): Patient declined   Physical Activity: Not on file   Stress: Not on file   Social Connections: Not on file   Intimate Partner Violence: Not on file   Housing Stability: Patient Declined (3/17/2024)    Housing Stability Vital Sign     Unable to Pay for Housing in the Last Year: Patient declined     Number of Places Lived in the Last Year: 2     Unstable Housing in the Last Year: Patient declined       Allergies:  Allergies   Allergen Reactions    Penicillins Anaphylaxis       Home Medications:  Prior to Admission

## 2024-04-12 LAB
CULTURE: NORMAL
CULTURE: NORMAL
SPECIMEN DESCRIPTION: NORMAL

## 2024-05-13 ENCOUNTER — APPOINTMENT (OUTPATIENT)
Dept: CT IMAGING | Age: 79
End: 2024-05-13
Payer: MEDICARE

## 2024-05-13 ENCOUNTER — HOSPITAL ENCOUNTER (EMERGENCY)
Age: 79
Discharge: HOME | End: 2024-05-13
Payer: MEDICARE

## 2024-05-13 VITALS
SYSTOLIC BLOOD PRESSURE: 137 MMHG | DIASTOLIC BLOOD PRESSURE: 54 MMHG | WEIGHT: 190 LBS | TEMPERATURE: 97.4 F | RESPIRATION RATE: 16 BRPM | HEART RATE: 64 BPM | BODY MASS INDEX: 29.76 KG/M2 | OXYGEN SATURATION: 99 %

## 2024-05-13 DIAGNOSIS — M54.2 CERVICAL SPINE PAIN: ICD-10-CM

## 2024-05-13 DIAGNOSIS — S09.90XA CLOSED HEAD INJURY, INITIAL ENCOUNTER: Primary | ICD-10-CM
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
## 2024-05-13 PROCEDURE — 99284 EMERGENCY DEPT VISIT MOD MDM: CPT

## 2024-05-13 PROCEDURE — 72125 CT NECK SPINE W/O DYE: CPT

## 2024-05-13 PROCEDURE — 70450 CT HEAD/BRAIN W/O DYE: CPT

## 2024-05-13 ASSESSMENT — PAIN - FUNCTIONAL ASSESSMENT: PAIN_FUNCTIONAL_ASSESSMENT: NONE - DENIES PAIN

## 2024-05-13 ASSESSMENT — PAIN SCALES - GENERAL: PAINLEVEL_OUTOF10: 0

## 2024-05-13 NOTE — ED PROVIDER NOTES
abnormality of the cervical spine.                 ------------------------- NURSING NOTES AND VITALS REVIEWED ---------------------------   The nursing notes within the ED encounter and vital signs as below have been reviewed by myself.  BP (!) 137/54   Pulse 64   Temp 97.4 °F (36.3 °C) (Oral)   Resp 16   Wt 86.2 kg (190 lb)   LMP  (LMP Unknown)   SpO2 99%   BMI 29.76 kg/m²   Oxygen Saturation Interpretation: Normal    The patient’s available past medical records and past encounters were reviewed.        ------------------------------ ED COURSE/MEDICAL DECISION MAKING----------------------  Medications - No data to display          Medical Decision Making:         Nika Aviles is a 79 y.o. female presenting to the ED for FALL THAT JUST OCCURRED PRIOR TO ARRIVAL.  The complaint has been persistent, moderate in severity, and worsened by nothing.  Daughter at bedside who is primary care taker of patient stated that her mom has advanced dementia and stated that she was nearby when she fell. She fell because she did not have her walker and landed on the side of her head. She did not get knocked out. She sort of fell into the bathtub. Daughter was able to get her mom to stand up.    She is on plavix.  She is at baseline per daughter.   Moves all extremities and ambulates at baseline  No recent illness.  NIH 0.     ED course    Stable  Ct scan head and neck were ordered to rule out head bleed since pt fell. Pt is poor historian but is at baseline per family. She did hit her head and has a small gooseegg in her posterior head. She remained stable in the ED and was tolerating snacks. She was able to return home with her family.  She can use otc tylenol for comfort return if needed. Return if ED if needed.  Ddx: fall head and neck pain    Re-Evaluations:             Re-evaluation.  Patient’s symptoms are improving            This patient's ED course included: a personal history and physicial examination and a

## 2024-05-15 ENCOUNTER — HOSPITAL ENCOUNTER (OUTPATIENT)
Dept: INFUSION THERAPY | Age: 79
Discharge: HOME | End: 2024-05-15
Payer: MEDICARE

## 2024-05-15 ENCOUNTER — OFFICE VISIT (OUTPATIENT)
Dept: ONCOLOGY | Age: 79
End: 2024-05-15
Payer: MEDICARE

## 2024-05-15 VITALS
HEIGHT: 67 IN | DIASTOLIC BLOOD PRESSURE: 72 MMHG | BODY MASS INDEX: 27.48 KG/M2 | OXYGEN SATURATION: 98 % | HEART RATE: 61 BPM | WEIGHT: 175.1 LBS | SYSTOLIC BLOOD PRESSURE: 122 MMHG | TEMPERATURE: 97 F

## 2024-05-15 DIAGNOSIS — C50.911 MALIGNANT NEOPLASM OF RIGHT BREAST, STAGE 1, ESTROGEN RECEPTOR POSITIVE (HCC): ICD-10-CM

## 2024-05-15 DIAGNOSIS — C82.13 FOLLICULAR LYMPHOMA GRADE II OF INTRA-ABDOMINAL LYMPH NODES (HCC): ICD-10-CM

## 2024-05-15 DIAGNOSIS — Z17.0 MALIGNANT NEOPLASM OF RIGHT BREAST, STAGE 1, ESTROGEN RECEPTOR POSITIVE (HCC): ICD-10-CM

## 2024-05-15 DIAGNOSIS — C82.09 GRADE I FOLLICULAR LYMPHOMA OF EXTRANODAL SITE EXCLUDING SPLEEN AND OTHER SOLID ORGANS (HCC): Primary | ICD-10-CM

## 2024-05-15 DIAGNOSIS — C82.13 FOLLICULAR LYMPHOMA GRADE II OF INTRA-ABDOMINAL LYMPH NODES (HCC): Primary | ICD-10-CM

## 2024-05-15 LAB
ALBUMIN SERPL-MCNC: 4.4 G/DL (ref 3.5–5.2)
ALP SERPL-CCNC: 125 U/L (ref 35–104)
ALT SERPL-CCNC: 9 U/L (ref 0–32)
ANION GAP SERPL CALCULATED.3IONS-SCNC: 14 MMOL/L (ref 7–16)
AST SERPL-CCNC: 13 U/L (ref 0–31)
BASOPHILS # BLD: 0.03 K/UL (ref 0–0.2)
BASOPHILS NFR BLD: 0 % (ref 0–2)
BILIRUB SERPL-MCNC: 0.4 MG/DL (ref 0–1.2)
BUN SERPL-MCNC: 17 MG/DL (ref 6–23)
CALCIUM SERPL-MCNC: 9.7 MG/DL (ref 8.6–10.2)
CHLORIDE SERPL-SCNC: 103 MMOL/L (ref 98–107)
CO2 SERPL-SCNC: 23 MMOL/L (ref 22–29)
CREAT SERPL-MCNC: 0.9 MG/DL (ref 0.5–1)
EOSINOPHIL # BLD: 0.2 K/UL (ref 0.05–0.5)
EOSINOPHILS RELATIVE PERCENT: 3 % (ref 0–6)
ERYTHROCYTE [DISTWIDTH] IN BLOOD BY AUTOMATED COUNT: 12.8 % (ref 11.5–15)
GFR, ESTIMATED: 69 ML/MIN/1.73M2
GLUCOSE SERPL-MCNC: 140 MG/DL (ref 74–99)
HCT VFR BLD AUTO: 44.5 % (ref 34–48)
HGB BLD-MCNC: 14.8 G/DL (ref 11.5–15.5)
IMM GRANULOCYTES # BLD AUTO: <0.03 K/UL (ref 0–0.58)
IMM GRANULOCYTES NFR BLD: 0 % (ref 0–5)
LYMPHOCYTES NFR BLD: 1.33 K/UL (ref 1.5–4)
LYMPHOCYTES RELATIVE PERCENT: 17 % (ref 20–42)
MCH RBC QN AUTO: 30.2 PG (ref 26–35)
MCHC RBC AUTO-ENTMCNC: 33.3 G/DL (ref 32–34.5)
MCV RBC AUTO: 90.8 FL (ref 80–99.9)
MONOCYTES NFR BLD: 0.74 K/UL (ref 0.1–0.95)
MONOCYTES NFR BLD: 9 % (ref 2–12)
NEUTROPHILS NFR BLD: 71 % (ref 43–80)
NEUTS SEG NFR BLD: 5.71 K/UL (ref 1.8–7.3)
PLATELET # BLD AUTO: 210 K/UL (ref 130–450)
PMV BLD AUTO: 9.7 FL (ref 7–12)
POTASSIUM SERPL-SCNC: 4.3 MMOL/L (ref 3.5–5)
PROT SERPL-MCNC: 6.8 G/DL (ref 6.4–8.3)
RBC # BLD AUTO: 4.9 M/UL (ref 3.5–5.5)
SODIUM SERPL-SCNC: 140 MMOL/L (ref 132–146)
WBC OTHER # BLD: 8 K/UL (ref 4.5–11.5)

## 2024-05-15 PROCEDURE — 6360000002 HC RX W HCPCS: Performed by: INTERNAL MEDICINE

## 2024-05-15 PROCEDURE — 2580000003 HC RX 258: Performed by: INTERNAL MEDICINE

## 2024-05-15 PROCEDURE — 99214 OFFICE O/P EST MOD 30 MIN: CPT

## 2024-05-15 PROCEDURE — 80053 COMPREHEN METABOLIC PANEL: CPT

## 2024-05-15 PROCEDURE — 85025 COMPLETE CBC W/AUTO DIFF WBC: CPT

## 2024-05-15 RX ORDER — WATER 10 ML/10ML
2.2 INJECTION INTRAMUSCULAR; INTRAVENOUS; SUBCUTANEOUS ONCE
OUTPATIENT
Start: 2024-05-15 | End: 2024-05-15

## 2024-05-15 RX ORDER — HEPARIN 100 UNIT/ML
500 SYRINGE INTRAVENOUS PRN
OUTPATIENT
Start: 2024-05-15

## 2024-05-15 RX ORDER — HEPARIN 100 UNIT/ML
500 SYRINGE INTRAVENOUS PRN
Status: DISCONTINUED | OUTPATIENT
Start: 2024-05-15 | End: 2024-05-16 | Stop reason: HOSPADM

## 2024-05-15 RX ORDER — WATER 10 ML/10ML
2.2 INJECTION INTRAMUSCULAR; INTRAVENOUS; SUBCUTANEOUS ONCE
Status: CANCELLED | OUTPATIENT
Start: 2024-05-15 | End: 2024-05-15

## 2024-05-15 RX ORDER — SODIUM CHLORIDE 0.9 % (FLUSH) 0.9 %
10 SYRINGE (ML) INJECTION PRN
OUTPATIENT
Start: 2024-05-15

## 2024-05-15 RX ORDER — SODIUM CHLORIDE 0.9 % (FLUSH) 0.9 %
20 SYRINGE (ML) INJECTION PRN
OUTPATIENT
Start: 2024-05-15

## 2024-05-15 RX ORDER — SODIUM CHLORIDE 0.9 % (FLUSH) 0.9 %
20 SYRINGE (ML) INJECTION PRN
Status: CANCELLED | OUTPATIENT
Start: 2024-05-15

## 2024-05-15 RX ORDER — SODIUM CHLORIDE 0.9 % (FLUSH) 0.9 %
10 SYRINGE (ML) INJECTION PRN
Status: DISCONTINUED | OUTPATIENT
Start: 2024-05-15 | End: 2024-05-16 | Stop reason: HOSPADM

## 2024-05-15 RX ADMIN — Medication 10 ML: at 14:15

## 2024-05-15 RX ADMIN — Medication 500 UNITS: at 14:15

## 2024-05-15 NOTE — PROGRESS NOTES
Patient presents to clinic for labs today.  LEft  SQ port accessed per policy using 20g 0.75in Burrell needle for good blood return.  Aspirate for waste and specimen sent to lab.  Site flushed easily with 10 mL NSS followed by 5 mL Heparin solution 100 units/ml rinse prior to de-access.  Dry sterile dressing to area.  Tolerated procedure well.  Encouraged to schedule port flush every 4 weeks.

## 2024-05-15 NOTE — PROGRESS NOTES
on anastrozole along with calcium and vitamin D supplements and has been tolerating it fairly well.  She denies any constitutional B symptoms and she is almost 7 years out from her diagnosis of non-Hodgkin's lymphoma and remains in remission.    Labs reviewed and exam negative.  Her last mammogram from June 2022 was negative and her DEXA scan showed osteopenia.  She will complete anastrozole in December 2023.  She will be scheduled for follow-up mammogram next month.  She is doing well without evidence of disease recurrence.      11/15/23  Doing well.  In July she had a CT scan of the brain which was unremarkable.  She continues to experience arthritic pain in her hips and knees and she is status post right knee arthroplasty in June 2022 with improvement in her mobility.  She denies any constitutional B symptoms and is 7 years out from her diagnosis of non-Hodgkin's lymphoma.    She continues on anastrozole along with calcium and vitamin D supplements.  Her most recent mammogram from July 2023 was negative.  She will require a follow-up DEXA scan in July 2024.  She will be completing 5 years of adjuvant anastrozole in late December 2023 and it will be discontinued    5/15/2024  Patient has had multiple issues since her last visit.  She was supposed to complete her anastrozole in December but according to her daughter she completed it sometimes in February.  She was hospitalized in early January with CVA and she has some mild early dementia and her mobility is impaired and she is wheelchair-bound but can ambulate with the assistance of a walker.  She had been diagnosed with CHF with LVEF of 35% and she had been followed by cardiology and is on metoprolol and Entresto with improvement in her LVEF.  She is on DAPT with Plavix and aspirin with very easy bruisability and had a fall and developed a chest wall hematoma noted on the CT chest done at [facility] with plans for follow-up mammogram and ultrasound of the

## 2024-05-29 ENCOUNTER — HOSPITAL ENCOUNTER (OUTPATIENT)
Dept: GENERAL RADIOLOGY | Age: 79
Discharge: HOME | End: 2024-05-31
Payer: MEDICARE

## 2024-05-29 VITALS — WEIGHT: 175 LBS | BODY MASS INDEX: 27.41 KG/M2

## 2024-05-29 DIAGNOSIS — N63.11 UNSPECIFIED LUMP IN THE RIGHT BREAST, UPPER OUTER QUADRANT: ICD-10-CM

## 2024-05-29 PROCEDURE — 76642 ULTRASOUND BREAST LIMITED: CPT

## 2024-05-29 PROCEDURE — G0279 TOMOSYNTHESIS, MAMMO: HCPCS

## 2024-08-19 ENCOUNTER — HOSPITAL ENCOUNTER (EMERGENCY)
Age: 79
Discharge: HOME | End: 2024-08-19
Attending: EMERGENCY MEDICINE
Payer: MEDICARE

## 2024-08-19 VITALS
SYSTOLIC BLOOD PRESSURE: 148 MMHG | RESPIRATION RATE: 18 BRPM | DIASTOLIC BLOOD PRESSURE: 68 MMHG | OXYGEN SATURATION: 100 % | TEMPERATURE: 98.9 F | HEART RATE: 51 BPM

## 2024-08-19 DIAGNOSIS — N39.0 URINARY TRACT INFECTION WITHOUT HEMATURIA, SITE UNSPECIFIED: Primary | ICD-10-CM

## 2024-08-19 DIAGNOSIS — R40.4 TRANSIENT ALTERATION OF AWARENESS: ICD-10-CM

## 2024-08-19 LAB
ANION GAP SERPL CALCULATED.3IONS-SCNC: 13 MMOL/L (ref 7–16)
BASOPHILS # BLD: 0.03 K/UL (ref 0–0.2)
BASOPHILS NFR BLD: 1 % (ref 0–2)
BILIRUB UR QL STRIP: NEGATIVE
BUN SERPL-MCNC: 18 MG/DL (ref 6–23)
CALCIUM SERPL-MCNC: 8.9 MG/DL (ref 8.6–10.2)
CHLORIDE SERPL-SCNC: 102 MMOL/L (ref 98–107)
CLARITY UR: CLEAR
CO2 SERPL-SCNC: 23 MMOL/L (ref 22–29)
COLOR UR: YELLOW
CREAT SERPL-MCNC: 0.8 MG/DL (ref 0.5–1)
EOSINOPHIL # BLD: 0.14 K/UL (ref 0.05–0.5)
EOSINOPHILS RELATIVE PERCENT: 2 % (ref 0–6)
EPI CELLS #/AREA URNS HPF: ABNORMAL /HPF
ERYTHROCYTE [DISTWIDTH] IN BLOOD BY AUTOMATED COUNT: 14.1 % (ref 11.5–15)
GFR, ESTIMATED: 73 ML/MIN/1.73M2
GLUCOSE SERPL-MCNC: 100 MG/DL (ref 74–99)
GLUCOSE UR STRIP-MCNC: NEGATIVE MG/DL
HCT VFR BLD AUTO: 42.2 % (ref 34–48)
HGB BLD-MCNC: 14.1 G/DL (ref 11.5–15.5)
HGB UR QL STRIP.AUTO: NEGATIVE
IMM GRANULOCYTES # BLD AUTO: <0.03 K/UL (ref 0–0.58)
IMM GRANULOCYTES NFR BLD: 0 % (ref 0–5)
KETONES UR STRIP-MCNC: NEGATIVE MG/DL
LEUKOCYTE ESTERASE UR QL STRIP: ABNORMAL
LYMPHOCYTES NFR BLD: 1.5 K/UL (ref 1.5–4)
LYMPHOCYTES RELATIVE PERCENT: 24 % (ref 20–42)
MCH RBC QN AUTO: 29.7 PG (ref 26–35)
MCHC RBC AUTO-ENTMCNC: 33.4 G/DL (ref 32–34.5)
MCV RBC AUTO: 88.8 FL (ref 80–99.9)
MONOCYTES NFR BLD: 0.81 K/UL (ref 0.1–0.95)
MONOCYTES NFR BLD: 13 % (ref 2–12)
NEUTROPHILS NFR BLD: 61 % (ref 43–80)
NEUTS SEG NFR BLD: 3.83 K/UL (ref 1.8–7.3)
NITRITE UR QL STRIP: NEGATIVE
PH UR STRIP: 5.5 [PH] (ref 5–9)
PLATELET # BLD AUTO: 194 K/UL (ref 130–450)
PMV BLD AUTO: 9.6 FL (ref 7–12)
POTASSIUM SERPL-SCNC: 3.9 MMOL/L (ref 3.5–5)
PROT UR STRIP-MCNC: NEGATIVE MG/DL
RBC # BLD AUTO: 4.75 M/UL (ref 3.5–5.5)
RBC #/AREA URNS HPF: ABNORMAL /HPF
SODIUM SERPL-SCNC: 138 MMOL/L (ref 132–146)
SP GR UR STRIP: 1.01 (ref 1–1.03)
UROBILINOGEN UR STRIP-ACNC: 0.2 EU/DL (ref 0–1)
WBC #/AREA URNS HPF: ABNORMAL /HPF
WBC OTHER # BLD: 6.3 K/UL (ref 4.5–11.5)

## 2024-08-19 PROCEDURE — 80048 BASIC METABOLIC PNL TOTAL CA: CPT

## 2024-08-19 PROCEDURE — 6370000000 HC RX 637 (ALT 250 FOR IP): Performed by: EMERGENCY MEDICINE

## 2024-08-19 PROCEDURE — 99283 EMERGENCY DEPT VISIT LOW MDM: CPT

## 2024-08-19 PROCEDURE — 87086 URINE CULTURE/COLONY COUNT: CPT

## 2024-08-19 PROCEDURE — 81001 URINALYSIS AUTO W/SCOPE: CPT

## 2024-08-19 PROCEDURE — 87077 CULTURE AEROBIC IDENTIFY: CPT

## 2024-08-19 PROCEDURE — 85025 COMPLETE CBC W/AUTO DIFF WBC: CPT

## 2024-08-19 RX ORDER — CEFDINIR 300 MG/1
300 CAPSULE ORAL EVERY 12 HOURS SCHEDULED
Status: DISCONTINUED | OUTPATIENT
Start: 2024-08-19 | End: 2024-08-20 | Stop reason: HOSPADM

## 2024-08-19 RX ORDER — CEFDINIR 300 MG/1
300 CAPSULE ORAL 2 TIMES DAILY
Qty: 14 CAPSULE | Refills: 0 | Status: SHIPPED | OUTPATIENT
Start: 2024-08-19 | End: 2024-08-26

## 2024-08-19 RX ADMIN — CEFDINIR 300 MG: 300 CAPSULE ORAL at 22:47

## 2024-08-19 NOTE — ED TRIAGE NOTES
Department of Emergency Medicine  FIRST PROVIDER TRIAGE NOTE             Independent MLP           8/19/24  6:27 PM EDT    Date of Encounter: 8/19/24   MRN: [id number]      HPI: Nika Aviles is a 79 y.o. female who presents to the ED for Altered Mental Status (Hx dementia, increase in confusion and combative. Foul smelling urine )       ROS: Negative for cp or sob.    PE: Gen Appearance/Constitutional: confused appearing     Initial Plan of Care: All treatment areas with department are currently occupied. Plan to order/Initiate the following while awaiting opening in ED: labs.  Initiate Treatment-Testing, Proceed toTreatment Area When Bed Available for ED Attending/MLP to Continue Care    Electronically signed by Elyse Matute PA-C   DD: 8/19/24

## 2024-08-20 NOTE — ED PROVIDER NOTES
[facility] EMERGENCY DEPARTMENT  EMERGENCY DEPARTMENT ENCOUNTER        Pt Name: Nkia Aviles  MRN: [id number]  Birthdate 1945  Date of evaluation: 8/19/2024  Provider: Zana Davis DO  PCP: Zana Pandya MD  Note Started: 8:24 PM EDT 8/19/24    CHIEF COMPLAINT       Chief Complaint   Patient presents with    Altered Mental Status     Hx dementia, increase in confusion and combative. Foul smelling urine        HISTORY OF PRESENT ILLNESS: 1 or more Elements   History From: patient    Limitations to history : Altered Mental Status    Nika Aviles is a 79 y.o. female who presents to the ED for evaluation of confusion.  The daughter states that she is just mildly confused.  She states that when she has been confused Actos before she had a urinary tract infection.  Patient is incontinent at baseline and therefore she has not noticed if she is urinating more less than usual.  Patient has no complaints.  Denies discomfort urinating.  Denies blood in the urine.  Denies any abdominal pain.  No associated nausea, vomiting.  They do report that she has occasional diarrhea but that is unchanged.  Patient has not had any fevers or chills.  The daughter states that she was just mildly confused.  She was using the control of her lift chair trying to change the channel.  They have not noticed any strong odor of her urine.  No recent falls or head injuries.    Nursing Notes were all reviewed and agreed with or any disagreements were addressed in the HPI.      REVIEW OF EXTERNAL NOTE :        REVIEW OF SYSTEMS :           Positives and Pertinent negatives as per HPI.     SURGICAL HISTORY     Past Surgical History:   Procedure Laterality Date    ABDOMEN SURGERY      BACK SURGERY      neck fusion bone graft-no ROM limitations    BREAST LUMPECTOMY      BREAST SURGERY Right     tumor and lymph node resection-[facility]    BRONCHOSCOPY N/A 10/01/2019    BRONCHOSCOPY ALVEOLAR LAVAGE WITH

## 2024-08-23 LAB
MICROORGANISM SPEC CULT: ABNORMAL
MICROORGANISM SPEC CULT: ABNORMAL
SERVICE CMNT-IMP: ABNORMAL
SPECIMEN DESCRIPTION: ABNORMAL

## 2024-10-25 ENCOUNTER — APPOINTMENT (OUTPATIENT)
Dept: GENERAL RADIOLOGY | Age: 79
End: 2024-10-25
Payer: MEDICARE

## 2024-10-25 ENCOUNTER — HOSPITAL ENCOUNTER (EMERGENCY)
Age: 79
Discharge: HOME | End: 2024-10-25
Attending: EMERGENCY MEDICINE
Payer: MEDICARE

## 2024-10-25 ENCOUNTER — APPOINTMENT (OUTPATIENT)
Dept: CT IMAGING | Age: 79
End: 2024-10-25
Payer: MEDICARE

## 2024-10-25 VITALS
DIASTOLIC BLOOD PRESSURE: 69 MMHG | HEART RATE: 52 BPM | RESPIRATION RATE: 19 BRPM | TEMPERATURE: 98 F | WEIGHT: 190 LBS | BODY MASS INDEX: 29.76 KG/M2 | SYSTOLIC BLOOD PRESSURE: 146 MMHG | OXYGEN SATURATION: 99 %

## 2024-10-25 DIAGNOSIS — N30.01 ACUTE CYSTITIS WITH HEMATURIA: Primary | ICD-10-CM

## 2024-10-25 LAB
ALBUMIN SERPL-MCNC: 3.8 G/DL (ref 3.5–5.2)
ALP SERPL-CCNC: 95 U/L (ref 35–104)
ALT SERPL-CCNC: 7 U/L (ref 0–32)
ANION GAP SERPL CALCULATED.3IONS-SCNC: 11 MMOL/L (ref 7–16)
AST SERPL-CCNC: 11 U/L (ref 0–31)
BACTERIA URNS QL MICRO: ABNORMAL
BASOPHILS # BLD: 0.02 K/UL (ref 0–0.2)
BASOPHILS NFR BLD: 0 % (ref 0–2)
BILIRUB SERPL-MCNC: 0.4 MG/DL (ref 0–1.2)
BILIRUB UR QL STRIP: NEGATIVE
BUN SERPL-MCNC: 16 MG/DL (ref 6–23)
CALCIUM SERPL-MCNC: 8.9 MG/DL (ref 8.6–10.2)
CHLORIDE SERPL-SCNC: 106 MMOL/L (ref 98–107)
CLARITY UR: ABNORMAL
CO2 SERPL-SCNC: 24 MMOL/L (ref 22–29)
COLOR UR: YELLOW
CREAT SERPL-MCNC: 0.8 MG/DL (ref 0.5–1)
EOSINOPHIL # BLD: 0.12 K/UL (ref 0.05–0.5)
EOSINOPHILS RELATIVE PERCENT: 3 % (ref 0–6)
EPI CELLS #/AREA URNS HPF: ABNORMAL /HPF
ERYTHROCYTE [DISTWIDTH] IN BLOOD BY AUTOMATED COUNT: 13.2 % (ref 11.5–15)
FLUAV RNA RESP QL NAA+PROBE: NOT DETECTED
FLUBV RNA RESP QL NAA+PROBE: NOT DETECTED
GFR, ESTIMATED: 72 ML/MIN/1.73M2
GLUCOSE SERPL-MCNC: 109 MG/DL (ref 74–99)
GLUCOSE UR STRIP-MCNC: NEGATIVE MG/DL
HCT VFR BLD AUTO: 41.3 % (ref 34–48)
HGB BLD-MCNC: 13.8 G/DL (ref 11.5–15.5)
HGB UR QL STRIP.AUTO: ABNORMAL
IMM GRANULOCYTES # BLD AUTO: <0.03 K/UL (ref 0–0.58)
IMM GRANULOCYTES NFR BLD: 0 % (ref 0–5)
KETONES UR STRIP-MCNC: NEGATIVE MG/DL
LEUKOCYTE ESTERASE UR QL STRIP: ABNORMAL
LYMPHOCYTES NFR BLD: 1 K/UL (ref 1.5–4)
LYMPHOCYTES RELATIVE PERCENT: 22 % (ref 20–42)
MCH RBC QN AUTO: 30.3 PG (ref 26–35)
MCHC RBC AUTO-ENTMCNC: 33.4 G/DL (ref 32–34.5)
MCV RBC AUTO: 90.8 FL (ref 80–99.9)
MONOCYTES NFR BLD: 0.55 K/UL (ref 0.1–0.95)
MONOCYTES NFR BLD: 12 % (ref 2–12)
NEUTROPHILS NFR BLD: 63 % (ref 43–80)
NEUTS SEG NFR BLD: 2.92 K/UL (ref 1.8–7.3)
NITRITE UR QL STRIP: NEGATIVE
PH UR STRIP: 6 [PH] (ref 5–9)
PLATELET # BLD AUTO: 209 K/UL (ref 130–450)
PMV BLD AUTO: 9.3 FL (ref 7–12)
POTASSIUM SERPL-SCNC: 3.8 MMOL/L (ref 3.5–5)
PROT SERPL-MCNC: 6 G/DL (ref 6.4–8.3)
PROT UR STRIP-MCNC: NEGATIVE MG/DL
RBC # BLD AUTO: 4.55 M/UL (ref 3.5–5.5)
RBC #/AREA URNS HPF: ABNORMAL /HPF
SARS-COV-2 RNA RESP QL NAA+PROBE: NOT DETECTED
SODIUM SERPL-SCNC: 141 MMOL/L (ref 132–146)
SOURCE: NORMAL
SP GR UR STRIP: 1.01 (ref 1–1.03)
SPECIMEN DESCRIPTION: NORMAL
TROPONIN I SERPL HS-MCNC: 21 NG/L (ref 0–9)
TROPONIN I SERPL HS-MCNC: 24 NG/L (ref 0–9)
UROBILINOGEN UR STRIP-ACNC: 0.2 EU/DL (ref 0–1)
WBC #/AREA URNS HPF: ABNORMAL /HPF
WBC OTHER # BLD: 4.6 K/UL (ref 4.5–11.5)

## 2024-10-25 PROCEDURE — 84484 ASSAY OF TROPONIN QUANT: CPT

## 2024-10-25 PROCEDURE — 71046 X-RAY EXAM CHEST 2 VIEWS: CPT

## 2024-10-25 PROCEDURE — 99285 EMERGENCY DEPT VISIT HI MDM: CPT

## 2024-10-25 PROCEDURE — 87636 SARSCOV2 & INF A&B AMP PRB: CPT

## 2024-10-25 PROCEDURE — 85025 COMPLETE CBC W/AUTO DIFF WBC: CPT

## 2024-10-25 PROCEDURE — 6360000002 HC RX W HCPCS: Performed by: EMERGENCY MEDICINE

## 2024-10-25 PROCEDURE — 6370000000 HC RX 637 (ALT 250 FOR IP): Performed by: EMERGENCY MEDICINE

## 2024-10-25 PROCEDURE — 87086 URINE CULTURE/COLONY COUNT: CPT

## 2024-10-25 PROCEDURE — 80053 COMPREHEN METABOLIC PANEL: CPT

## 2024-10-25 PROCEDURE — 81001 URINALYSIS AUTO W/SCOPE: CPT

## 2024-10-25 PROCEDURE — 93005 ELECTROCARDIOGRAM TRACING: CPT

## 2024-10-25 PROCEDURE — 70450 CT HEAD/BRAIN W/O DYE: CPT

## 2024-10-25 RX ORDER — CEFDINIR 300 MG/1
300 CAPSULE ORAL ONCE
Status: COMPLETED | OUTPATIENT
Start: 2024-10-25 | End: 2024-10-25

## 2024-10-25 RX ORDER — HEPARIN 100 UNIT/ML
500 SYRINGE INTRAVENOUS ONCE
Status: COMPLETED | OUTPATIENT
Start: 2024-10-25 | End: 2024-10-25

## 2024-10-25 RX ORDER — CEFDINIR 300 MG/1
300 CAPSULE ORAL 2 TIMES DAILY
Qty: 14 CAPSULE | Refills: 0 | Status: SHIPPED | OUTPATIENT
Start: 2024-10-25 | End: 2024-11-01

## 2024-10-25 RX ADMIN — CEFDINIR 300 MG: 300 CAPSULE ORAL at 19:58

## 2024-10-25 RX ADMIN — Medication 500 UNITS: at 20:14

## 2024-10-25 NOTE — ED PROVIDER NOTES
[facility] EMERGENCY DEPARTMENT  EMERGENCY DEPARTMENT ENCOUNTER        Pt Name: Nika Aviles  MRN: [id number]  Birthdate 1945  Date of evaluation: 10/25/2024  Provider: Jenna Gannon DO  PCP: Zana Pandya MD  Note Started: 3:12 PM EDT 10/25/24    CHIEF COMPLAINT       Chief Complaint   Patient presents with    Altered Mental Status     Change in normal mentation. Dementia patient but did not want to wake up this morning, crying x2 hours. Family thinks she may have UTI        HISTORY OF PRESENT ILLNESS: 1 or more Elements   History From: Patient    Limitations to history : Altered Mental Status  Social Determinants : None    Nika Aviles is a 79 y.o. female who presents for altered mental status.  Patient lives with her daughter.  Daughter states that she does have a history of dementia but had increased altered mental status this morning.  She was not wanting to get up out of bed this morning.  She started crying for 2 hours.  She also has had some increased confusion today.  The daughter states that the patient has gotten like this in the past when she has a UTI.  Patient is incontinent with urine at baseline.  Patient also has had a cough for the past few days.  It is not productive of any sputum.  She denies any shortness of breath.  Denies any fever, chills, n/v, headache, dizziness, vision changes, neck tenderness or stiffness, weakness, cp, palpitations, leg swelling/tenderness, abd pain, dysuria, hematuria, diarrhea, constipation, bloody stools.    Nursing Notes were all reviewed and agreed with or any disagreements were addressed in the HPI.    ROS:   Pertinent positives and negatives are stated within HPI, all other systems reviewed and are negative.      --------------------------------------------- PAST HISTORY ---------------------------------------------  Past Medical History:  has a past medical history of Breast CA (HCC), Breast cancer (HCC),

## 2024-10-26 LAB
EKG ATRIAL RATE: 54 BPM
EKG P AXIS: 51 DEGREES
EKG P-R INTERVAL: 252 MS
EKG Q-T INTERVAL: 464 MS
EKG QRS DURATION: 144 MS
EKG QTC CALCULATION (BAZETT): 440 MS
EKG R AXIS: -21 DEGREES
EKG T AXIS: 155 DEGREES
EKG VENTRICULAR RATE: 54 BPM

## 2024-10-26 PROCEDURE — 93010 ELECTROCARDIOGRAM REPORT: CPT | Performed by: INTERNAL MEDICINE

## 2024-10-27 LAB
MICROORGANISM SPEC CULT: ABNORMAL
SERVICE CMNT-IMP: ABNORMAL
SPECIMEN DESCRIPTION: ABNORMAL

## 2024-10-28 ENCOUNTER — APPOINTMENT (OUTPATIENT)
Dept: GENERAL RADIOLOGY | Age: 79
DRG: 056 | End: 2024-10-28
Payer: MEDICARE

## 2024-10-28 ENCOUNTER — APPOINTMENT (OUTPATIENT)
Dept: CT IMAGING | Age: 79
DRG: 056 | End: 2024-10-28
Payer: MEDICARE

## 2024-10-28 ENCOUNTER — HOSPITAL ENCOUNTER (INPATIENT)
Age: 79
LOS: 4 days | Discharge: HOME | DRG: 056 | End: 2024-11-02
Attending: EMERGENCY MEDICINE | Admitting: INTERNAL MEDICINE
Payer: MEDICARE

## 2024-10-28 DIAGNOSIS — R53.1 RIGHT SIDED WEAKNESS: Primary | ICD-10-CM

## 2024-10-28 LAB
ALBUMIN SERPL-MCNC: 4.3 G/DL (ref 3.5–5.2)
ALP SERPL-CCNC: 118 U/L (ref 35–104)
ALT SERPL-CCNC: 11 U/L (ref 0–32)
ANION GAP SERPL CALCULATED.3IONS-SCNC: 14 MMOL/L (ref 7–16)
AST SERPL-CCNC: 15 U/L (ref 0–31)
BACTERIA URNS QL MICRO: ABNORMAL
BASOPHILS # BLD: 0.03 K/UL (ref 0–0.2)
BASOPHILS NFR BLD: 0 % (ref 0–2)
BILIRUB SERPL-MCNC: 0.5 MG/DL (ref 0–1.2)
BILIRUB UR QL STRIP: NEGATIVE
BUN SERPL-MCNC: 21 MG/DL (ref 6–23)
CALCIUM SERPL-MCNC: 9.2 MG/DL (ref 8.6–10.2)
CHLORIDE SERPL-SCNC: 101 MMOL/L (ref 98–107)
CLARITY UR: CLEAR
CO2 SERPL-SCNC: 24 MMOL/L (ref 22–29)
COLOR UR: YELLOW
CREAT SERPL-MCNC: 1 MG/DL (ref 0.5–1)
EOSINOPHIL # BLD: 0.11 K/UL (ref 0.05–0.5)
EOSINOPHILS RELATIVE PERCENT: 1 % (ref 0–6)
EPI CELLS #/AREA URNS HPF: ABNORMAL /HPF
ERYTHROCYTE [DISTWIDTH] IN BLOOD BY AUTOMATED COUNT: 13.4 % (ref 11.5–15)
FLUAV RNA RESP QL NAA+PROBE: NOT DETECTED
FLUBV RNA RESP QL NAA+PROBE: NOT DETECTED
GFR, ESTIMATED: 61 ML/MIN/1.73M2
GLUCOSE BLD-MCNC: 136 MG/DL (ref 74–99)
GLUCOSE SERPL-MCNC: 131 MG/DL (ref 74–99)
GLUCOSE UR STRIP-MCNC: NEGATIVE MG/DL
HCT VFR BLD AUTO: 45.4 % (ref 34–48)
HGB BLD-MCNC: 15.2 G/DL (ref 11.5–15.5)
HGB UR QL STRIP.AUTO: NEGATIVE
IMM GRANULOCYTES # BLD AUTO: 0.05 K/UL (ref 0–0.58)
IMM GRANULOCYTES NFR BLD: 1 % (ref 0–5)
INR PPP: 1
KETONES UR STRIP-MCNC: NEGATIVE MG/DL
LEUKOCYTE ESTERASE UR QL STRIP: NEGATIVE
LYMPHOCYTES NFR BLD: 0.73 K/UL (ref 1.5–4)
LYMPHOCYTES RELATIVE PERCENT: 8 % (ref 20–42)
MCH RBC QN AUTO: 30.1 PG (ref 26–35)
MCHC RBC AUTO-ENTMCNC: 33.5 G/DL (ref 32–34.5)
MCV RBC AUTO: 89.9 FL (ref 80–99.9)
MONOCYTES NFR BLD: 0.72 K/UL (ref 0.1–0.95)
MONOCYTES NFR BLD: 8 % (ref 2–12)
NEUTROPHILS NFR BLD: 83 % (ref 43–80)
NEUTS SEG NFR BLD: 8 K/UL (ref 1.8–7.3)
NITRITE UR QL STRIP: NEGATIVE
PH UR STRIP: 6 [PH] (ref 5–9)
PLATELET # BLD AUTO: 198 K/UL (ref 130–450)
PMV BLD AUTO: 9.5 FL (ref 7–12)
POTASSIUM SERPL-SCNC: 4.4 MMOL/L (ref 3.5–5)
PROT SERPL-MCNC: 6.9 G/DL (ref 6.4–8.3)
PROT UR STRIP-MCNC: NEGATIVE MG/DL
PROTHROMBIN TIME: 11.3 SEC (ref 9.3–12.4)
RBC # BLD AUTO: 5.05 M/UL (ref 3.5–5.5)
RBC #/AREA URNS HPF: ABNORMAL /HPF
SARS-COV-2 RNA RESP QL NAA+PROBE: NOT DETECTED
SODIUM SERPL-SCNC: 139 MMOL/L (ref 132–146)
SOURCE: NORMAL
SP GR UR STRIP: <1.005 (ref 1–1.03)
SPECIMEN DESCRIPTION: NORMAL
TROPONIN I SERPL HS-MCNC: 24 NG/L (ref 0–9)
UROBILINOGEN UR STRIP-ACNC: 0.2 EU/DL (ref 0–1)
WBC #/AREA URNS HPF: ABNORMAL /HPF
WBC OTHER # BLD: 9.6 K/UL (ref 4.5–11.5)

## 2024-10-28 PROCEDURE — 99285 EMERGENCY DEPT VISIT HI MDM: CPT

## 2024-10-28 PROCEDURE — 87636 SARSCOV2 & INF A&B AMP PRB: CPT

## 2024-10-28 PROCEDURE — 6370000000 HC RX 637 (ALT 250 FOR IP)

## 2024-10-28 PROCEDURE — 85610 PROTHROMBIN TIME: CPT

## 2024-10-28 PROCEDURE — 70498 CT ANGIOGRAPHY NECK: CPT

## 2024-10-28 PROCEDURE — 70450 CT HEAD/BRAIN W/O DYE: CPT

## 2024-10-28 PROCEDURE — 84484 ASSAY OF TROPONIN QUANT: CPT

## 2024-10-28 PROCEDURE — 80053 COMPREHEN METABOLIC PANEL: CPT

## 2024-10-28 PROCEDURE — 82962 GLUCOSE BLOOD TEST: CPT

## 2024-10-28 PROCEDURE — 70496 CT ANGIOGRAPHY HEAD: CPT

## 2024-10-28 PROCEDURE — 6360000004 HC RX CONTRAST MEDICATION: Performed by: RADIOLOGY

## 2024-10-28 PROCEDURE — 85025 COMPLETE CBC W/AUTO DIFF WBC: CPT

## 2024-10-28 PROCEDURE — 81001 URINALYSIS AUTO W/SCOPE: CPT

## 2024-10-28 PROCEDURE — 71045 X-RAY EXAM CHEST 1 VIEW: CPT

## 2024-10-28 PROCEDURE — 0202U NFCT DS 22 TRGT SARS-COV-2: CPT

## 2024-10-28 PROCEDURE — 0042T CT BRAIN PERFUSION: CPT

## 2024-10-28 RX ORDER — IOPAMIDOL 755 MG/ML
100 INJECTION, SOLUTION INTRAVASCULAR
Status: COMPLETED | OUTPATIENT
Start: 2024-10-28 | End: 2024-10-28

## 2024-10-28 RX ORDER — ASPIRIN 325 MG
325 TABLET ORAL ONCE
Status: COMPLETED | OUTPATIENT
Start: 2024-10-28 | End: 2024-10-28

## 2024-10-28 RX ORDER — LEVETIRACETAM 500 MG/5ML
1000 INJECTION, SOLUTION, CONCENTRATE INTRAVENOUS ONCE
Status: COMPLETED | OUTPATIENT
Start: 2024-10-29 | End: 2024-10-29

## 2024-10-28 RX ADMIN — ASPIRIN 325 MG: 325 TABLET ORAL at 23:11

## 2024-10-28 RX ADMIN — IOPAMIDOL 100 ML: 755 INJECTION, SOLUTION INTRAVENOUS at 22:12

## 2024-10-28 ASSESSMENT — PAIN - FUNCTIONAL ASSESSMENT: PAIN_FUNCTIONAL_ASSESSMENT: NONE - DENIES PAIN

## 2024-10-29 PROBLEM — R29.90 STROKE-LIKE SYMPTOMS: Status: ACTIVE | Noted: 2024-10-29

## 2024-10-29 PROBLEM — I63.9 ACUTE CEREBROVASCULAR ACCIDENT (CVA) (HCC): Status: ACTIVE | Noted: 2024-10-29

## 2024-10-29 LAB
AMPHET UR QL SCN: NEGATIVE
B PARAP IS1001 DNA NPH QL NAA+NON-PROBE: NOT DETECTED
B PERT DNA SPEC QL NAA+PROBE: NOT DETECTED
BARBITURATES UR QL SCN: NEGATIVE
BENZODIAZ UR QL: NEGATIVE
BUPRENORPHINE UR QL: NEGATIVE
C PNEUM DNA NPH QL NAA+NON-PROBE: NOT DETECTED
CANNABINOIDS UR QL SCN: NEGATIVE
CHOLEST SERPL-MCNC: 139 MG/DL
COCAINE UR QL SCN: NEGATIVE
FENTANYL UR QL: NEGATIVE
FLUAV RNA NPH QL NAA+NON-PROBE: NOT DETECTED
FLUBV RNA NPH QL NAA+NON-PROBE: NOT DETECTED
GLUCOSE BLD-MCNC: 92 MG/DL (ref 74–99)
HADV DNA NPH QL NAA+NON-PROBE: NOT DETECTED
HBA1C MFR BLD: 5.9 % (ref 4–5.6)
HCOV 229E RNA NPH QL NAA+NON-PROBE: NOT DETECTED
HCOV HKU1 RNA NPH QL NAA+NON-PROBE: NOT DETECTED
HCOV NL63 RNA NPH QL NAA+NON-PROBE: NOT DETECTED
HCOV OC43 RNA NPH QL NAA+NON-PROBE: NOT DETECTED
HDLC SERPL-MCNC: 39 MG/DL
HMPV RNA NPH QL NAA+NON-PROBE: NOT DETECTED
HPIV1 RNA NPH QL NAA+NON-PROBE: NOT DETECTED
HPIV2 RNA NPH QL NAA+NON-PROBE: NOT DETECTED
HPIV3 RNA NPH QL NAA+NON-PROBE: NOT DETECTED
HPIV4 RNA NPH QL NAA+NON-PROBE: NOT DETECTED
LDLC SERPL CALC-MCNC: 72 MG/DL
M PNEUMO DNA NPH QL NAA+NON-PROBE: NOT DETECTED
METHADONE UR QL: NEGATIVE
OPIATES UR QL SCN: NEGATIVE
OXYCODONE UR QL SCN: NEGATIVE
PCP UR QL SCN: NEGATIVE
RSV RNA NPH QL NAA+NON-PROBE: NOT DETECTED
RV+EV RNA NPH QL NAA+NON-PROBE: DETECTED
SARS-COV-2 RNA NPH QL NAA+NON-PROBE: NOT DETECTED
SPECIMEN DESCRIPTION: ABNORMAL
T4 FREE SERPL-MCNC: 1.2 NG/DL (ref 0.9–1.7)
TEST INFORMATION: NORMAL
TRIGL SERPL-MCNC: 142 MG/DL
TROPONIN I SERPL HS-MCNC: 48 NG/L (ref 0–9)
TSH SERPL DL<=0.05 MIU/L-ACNC: 1.39 UIU/ML (ref 0.27–4.2)
VLDLC SERPL CALC-MCNC: 28 MG/DL

## 2024-10-29 PROCEDURE — 84484 ASSAY OF TROPONIN QUANT: CPT

## 2024-10-29 PROCEDURE — 36415 COLL VENOUS BLD VENIPUNCTURE: CPT

## 2024-10-29 PROCEDURE — 83036 HEMOGLOBIN GLYCOSYLATED A1C: CPT

## 2024-10-29 PROCEDURE — 87086 URINE CULTURE/COLONY COUNT: CPT

## 2024-10-29 PROCEDURE — 80061 LIPID PANEL: CPT

## 2024-10-29 PROCEDURE — 2060000000 HC ICU INTERMEDIATE R&B

## 2024-10-29 PROCEDURE — 6370000000 HC RX 637 (ALT 250 FOR IP): Performed by: INTERNAL MEDICINE

## 2024-10-29 PROCEDURE — 6360000002 HC RX W HCPCS: Performed by: INTERNAL MEDICINE

## 2024-10-29 PROCEDURE — 80307 DRUG TEST PRSMV CHEM ANLYZR: CPT

## 2024-10-29 PROCEDURE — 99222 1ST HOSP IP/OBS MODERATE 55: CPT | Performed by: INTERNAL MEDICINE

## 2024-10-29 PROCEDURE — 84443 ASSAY THYROID STIM HORMONE: CPT

## 2024-10-29 PROCEDURE — 6360000002 HC RX W HCPCS

## 2024-10-29 PROCEDURE — 84439 ASSAY OF FREE THYROXINE: CPT

## 2024-10-29 PROCEDURE — 2580000003 HC RX 258: Performed by: INTERNAL MEDICINE

## 2024-10-29 PROCEDURE — 82962 GLUCOSE BLOOD TEST: CPT

## 2024-10-29 RX ORDER — SODIUM CHLORIDE 9 MG/ML
INJECTION, SOLUTION INTRAVENOUS PRN
Status: DISCONTINUED | OUTPATIENT
Start: 2024-10-29 | End: 2024-11-02 | Stop reason: HOSPADM

## 2024-10-29 RX ORDER — CLOPIDOGREL BISULFATE 75 MG/1
75 TABLET ORAL DAILY
Status: DISCONTINUED | OUTPATIENT
Start: 2024-10-29 | End: 2024-11-02 | Stop reason: HOSPADM

## 2024-10-29 RX ORDER — DONEPEZIL HYDROCHLORIDE 5 MG/1
10 TABLET, FILM COATED ORAL DAILY
Status: DISCONTINUED | OUTPATIENT
Start: 2024-10-29 | End: 2024-11-02 | Stop reason: HOSPADM

## 2024-10-29 RX ORDER — ONDANSETRON 4 MG/1
4 TABLET, ORALLY DISINTEGRATING ORAL EVERY 8 HOURS PRN
Status: DISCONTINUED | OUTPATIENT
Start: 2024-10-29 | End: 2024-11-02 | Stop reason: HOSPADM

## 2024-10-29 RX ORDER — ENOXAPARIN SODIUM 100 MG/ML
40 INJECTION SUBCUTANEOUS DAILY
Status: DISCONTINUED | OUTPATIENT
Start: 2024-10-29 | End: 2024-11-02 | Stop reason: HOSPADM

## 2024-10-29 RX ORDER — LEVOTHYROXINE SODIUM 88 UG/1
88 TABLET ORAL DAILY
Status: DISCONTINUED | OUTPATIENT
Start: 2024-10-29 | End: 2024-11-02 | Stop reason: HOSPADM

## 2024-10-29 RX ORDER — CEFDINIR 300 MG/1
300 CAPSULE ORAL 2 TIMES DAILY
Status: ON HOLD | COMMUNITY
Start: 2024-10-26 | End: 2024-11-01 | Stop reason: HOSPADM

## 2024-10-29 RX ORDER — METFORMIN HYDROCHLORIDE 500 MG/1
500 TABLET, EXTENDED RELEASE ORAL 2 TIMES DAILY
Status: DISCONTINUED | OUTPATIENT
Start: 2024-10-29 | End: 2024-10-29

## 2024-10-29 RX ORDER — METFORMIN HYDROCHLORIDE 500 MG/1
1000 TABLET, EXTENDED RELEASE ORAL
Status: DISCONTINUED | OUTPATIENT
Start: 2024-10-29 | End: 2024-10-29

## 2024-10-29 RX ORDER — ATORVASTATIN CALCIUM 40 MG/1
40 TABLET, FILM COATED ORAL NIGHTLY
Status: DISCONTINUED | OUTPATIENT
Start: 2024-10-29 | End: 2024-11-02 | Stop reason: HOSPADM

## 2024-10-29 RX ORDER — METFORMIN HYDROCHLORIDE 500 MG/1
500 TABLET, EXTENDED RELEASE ORAL 2 TIMES DAILY
Status: DISCONTINUED | OUTPATIENT
Start: 2024-10-31 | End: 2024-11-02 | Stop reason: HOSPADM

## 2024-10-29 RX ORDER — ONDANSETRON 2 MG/ML
4 INJECTION INTRAMUSCULAR; INTRAVENOUS EVERY 6 HOURS PRN
Status: DISCONTINUED | OUTPATIENT
Start: 2024-10-29 | End: 2024-11-02 | Stop reason: HOSPADM

## 2024-10-29 RX ORDER — METOPROLOL SUCCINATE 50 MG/1
50 TABLET, EXTENDED RELEASE ORAL DAILY
Status: DISCONTINUED | OUTPATIENT
Start: 2024-10-29 | End: 2024-11-02 | Stop reason: HOSPADM

## 2024-10-29 RX ORDER — POLYETHYLENE GLYCOL 3350 17 G/17G
17 POWDER, FOR SOLUTION ORAL DAILY PRN
Status: DISCONTINUED | OUTPATIENT
Start: 2024-10-29 | End: 2024-11-02 | Stop reason: HOSPADM

## 2024-10-29 RX ORDER — SODIUM CHLORIDE 0.9 % (FLUSH) 0.9 %
5-40 SYRINGE (ML) INJECTION EVERY 12 HOURS SCHEDULED
Status: DISCONTINUED | OUTPATIENT
Start: 2024-10-29 | End: 2024-11-02 | Stop reason: HOSPADM

## 2024-10-29 RX ORDER — AMANTADINE HYDROCHLORIDE 100 MG/1
100 CAPSULE, GELATIN COATED ORAL 2 TIMES DAILY
Status: DISCONTINUED | OUTPATIENT
Start: 2024-10-29 | End: 2024-11-02 | Stop reason: HOSPADM

## 2024-10-29 RX ORDER — SODIUM CHLORIDE 0.9 % (FLUSH) 0.9 %
5-40 SYRINGE (ML) INJECTION PRN
Status: DISCONTINUED | OUTPATIENT
Start: 2024-10-29 | End: 2024-11-02 | Stop reason: HOSPADM

## 2024-10-29 RX ORDER — LORAZEPAM 2 MG/ML
1 INJECTION INTRAMUSCULAR EVERY 6 HOURS PRN
Status: DISCONTINUED | OUTPATIENT
Start: 2024-10-29 | End: 2024-11-02 | Stop reason: HOSPADM

## 2024-10-29 RX ORDER — ASPIRIN 81 MG/1
81 TABLET ORAL DAILY
Status: DISCONTINUED | OUTPATIENT
Start: 2024-10-29 | End: 2024-11-02 | Stop reason: HOSPADM

## 2024-10-29 RX ADMIN — SACUBITRIL AND VALSARTAN 1 TABLET: 24; 26 TABLET, FILM COATED ORAL at 10:03

## 2024-10-29 RX ADMIN — ENOXAPARIN SODIUM 40 MG: 100 INJECTION SUBCUTANEOUS at 10:02

## 2024-10-29 RX ADMIN — ASPIRIN 81 MG: 81 TABLET, COATED ORAL at 10:03

## 2024-10-29 RX ADMIN — LEVETIRACETAM 1000 MG: 100 INJECTION INTRAVENOUS at 04:09

## 2024-10-29 RX ADMIN — DONEPEZIL HYDROCHLORIDE 10 MG: 5 TABLET, FILM COATED ORAL at 10:03

## 2024-10-29 RX ADMIN — AMANTADINE HYDROCHLORIDE 100 MG: 100 CAPSULE ORAL at 13:30

## 2024-10-29 RX ADMIN — CLOPIDOGREL BISULFATE 75 MG: 75 TABLET ORAL at 10:03

## 2024-10-29 RX ADMIN — METFORMIN HYDROCHLORIDE 500 MG: 500 TABLET, EXTENDED RELEASE ORAL at 13:30

## 2024-10-29 RX ADMIN — SODIUM CHLORIDE, PRESERVATIVE FREE 10 ML: 5 INJECTION INTRAVENOUS at 23:28

## 2024-10-29 RX ADMIN — LEVOTHYROXINE SODIUM 88 MCG: 0.09 TABLET ORAL at 13:30

## 2024-10-29 NOTE — ED PROVIDER NOTES
[facility]URO  EMERGENCY DEPARTMENT ENCOUNTER        Pt Name: Nika Aviles  MRN: [id number]  Birthdate 1945  Date of evaluation: 10/28/2024  Provider: Marya Downs MD  Attending Provider: Nathaniel Chi MD  PCP: Zana Pandya MD  Note Started: 9:37 PM EDT 10/28/24    CHIEF COMPLAINT       Chief Complaint   Patient presents with    Fatigue     Per EMS, pt from home, LWK 11pm 10/27/24. Daughter called EMS was called for weakness.        HISTORY OF PRESENT ILLNESS: 1 or more Elements   History From: EMS report        Nika Aviles is a 79 y.o. female with a past medical history of prior CVA, possible right-sided deficits, non-Hodgkin's insulinoma, follicular lymphoma, breast neoplasm, hypertension, diabetes, hyperlipidemia, diverticulitis, chronic kidney disease, dementia presenting with concern for stroke.  Patient last known well 11 PM on 10/27/2024.  Original call was for weakness.  Patient was reportedly unable to get out of bed since her last known well.  EMS noted the patient is having right-sided deficits.  Family reports that he possibly has some residual right-sided deficits from prior stroke    Nursing Notes were all reviewed and agreed with or any disagreements were addressed in the HPI.      REVIEW OF EXTERNAL NOTE :       On chart review, patient had an ER visit on 5/13/2024.  NIH was documented at 0 at that time.    PDMP Monitoring:    Last PDMP Charlie as Reviewed:  Review User Review Instant Review Result            Urine Drug Screenings (1 yr)    No resulted procedures found.       Medication Contract and Consent for Opioid Use Documents Filed        No documents found                      REVIEW OF SYSTEMS :           Positives and Pertinent negatives as per HPI.     SURGICAL HISTORY     Past Surgical History:   Procedure Laterality Date    ABDOMEN SURGERY      BACK SURGERY      neck fusion bone graft-no ROM limitations    BREAST LUMPECTOMY      BREAST SURGERY Right     tumor and  made to edit the dictations but occasionally words are mis-transcribed.)    Marya Downs MD (electronically signed)

## 2024-10-29 NOTE — ED NOTES
Stroke/ Yudi Alert Time: Dawn 2139      Time Neurologist called: Francisco 2141  :    Time CT Notified: 2139      BRAIN alert time: (If applicable)

## 2024-10-29 NOTE — H&P
[facility] Hospitalist Group History and Physical      CHIEF COMPLAINT: Strokelike symptoms    History of Present Illness: This is 79-year-old female with past medical history of stroke with right-sided deficit, chronic systolic heart failure, hypothyroidism, hypertension, hyperlipidemia, non-Hodgkin's lymphoma in remission, brought in here by daughter due to patient was not acting normal.  She was unable to take food and some weakness on the right side.  Daughter mention she had similar things in last January when she had another stroke.  It may be mentioned here that she had a stroke 30 years back with some residual weakness of right hand and again she had another stroke in January this year but did not impact much.  She uses walker and home and wheelchair outside.  She lives with her daughter.  She denies any chest pain, shortness of breath or any abdominal pain.  ER physician talked to Dr. Singh and recommended to start Keppra.  Her blood pressure 130/70, pulse 63, respirations 16 and temperature 98.5 °F.  Blood chemistry normal troponin 24 liver function test normal and cell count normal.  Informant(s) for H&P:    REVIEW OF SYSTEMS:  A comprehensive review of systems was negative except for: what is in the HPI      PMH:  Past Medical History:   Diagnosis Date    Breast CA (HCC) 2018    Right    Breast cancer (HCC)     2017  right ILC    Cerebral artery occlusion with cerebral infarction (HCC) 1994     rt arm leg weakness    Chronic kidney disease     Dementia (HCC)     Diabetes mellitus (HCC)     Disease of blood and blood forming organ     Diverticulitis     History of therapeutic radiation     Hyperlipidemia     Hypertension     Hypothyroidism     Lymphoma (HCC) 12/2016    nonhogkins, previous chemo, immunotherapy-no current issues (6/24/22)    Osteoarthritis     Prolonged emergence from general anesthesia     Thyroid disease        Surgical History:  Past Surgical History:   Procedure Laterality Date

## 2024-10-29 NOTE — PROGRESS NOTES
4 Eyes Skin Assessment     NAME:  Nika Aviles  YOB: 1945  MEDICAL RECORD NUMBER:  [id number]    The patient is being assessed for  Admission    I agree that at least one RN has performed a thorough Head to Toe Skin Assessment on the patient. ALL assessment sites listed below have been assessed.      Areas assessed by both nurses:    Head, Face, Ears, Shoulders, Back, Chest, Arms, Elbows, Hands, Sacrum. Buttock, Coccyx, Ischium, Legs. Feet and Heels, and Under Medical Devices         Does the Patient have a Wound? No noted wound(s)       Bob Prevention initiated by RN: Yes  Wound Care Orders initiated by RN: No    Pressure Injury (Stage 3,4, Unstageable, DTI, NWPT, and Complex wounds) if present, place Wound referral order by RN under : No    New Ostomies, if present place, Ostomy referral order under : No     Nurse 1 eSignature: Electronically signed by Hugh Healy RN on 10/29/24 at 7:36 PM EDT    **SHARE this note so that the co-signing nurse can place an eSignature**    Nurse 2 eSignature: Electronically signed by Alanna Waldrop RN on 10/29/24 at 10:28 PM EDT

## 2024-10-29 NOTE — PROGRESS NOTES
HOSPITALIST PROGRESS NOTES     ADMITTING DATE AND TIME: 10/28/2024  9:48 PM  had concerns including Fatigue (Per EMS, pt from home, LWK 11pm 10/27/24. Daughter called EMS was called for weakness. ).    ADMIT DX: Stroke-like symptoms [R29.90]  Acute cerebrovascular accident (CVA) (HCC) [I63.9]      SUBJECTIVE:  Patient is being followed for Stroke-like symptoms [R29.90]  Acute cerebrovascular accident (CVA) (HCC) [I63.9]     Patient was seen examined and evaluated  Recent lab results, charts and pertinent diagnostic imaging reviewed   Ancillary service notes reviewed   NAD  Discussed with her daughters     Care reviewed with nursing staff, medical and surgical specialty care, primary care and the patient's family as available.   ROS: denies fever, chills, cp, sob, n/v, HA unless stated above.       OBJECTIVE:    /65   Pulse 61   Temp 98.5 °F (36.9 °C) (Oral)   Resp 15   Ht 1.676 m (5' 6\")   Wt 76.3 kg (168 lb 3.4 oz)   LMP  (LMP Unknown)   SpO2 94%   BMI 27.15 kg/m²     General Appearance: alert and oriented to person, place and time and in no acute distress  Skin: warm and dry  Head: normocephalic and atraumatic  Eyes: pupils equal, round, and reactive to light, extraocular eye movements intact, conjunctivae normal  Neck: neck supple and non tender without mass   Pulmonary/Chest: clear to auscultation bilaterally- no wheezes, rales or rhonchi, normal air movement, no respiratory distress  Cardiovascular: normal rate, normal S1 and S2 and no carotid bruits  Abdomen: soft, non-tender, non-distended, normal bowel sounds, no masses or organomegaly  Extremities: no cyanosis, no clubbing and no edema  Neurologic: no cranial nerve deficit and speech normal    Recent Labs     10/28/24  2145      K 4.4      CO2 24   BUN 21   CREATININE 1.0   GLUCOSE 131*   CALCIUM 9.2        Recent Labs     10/28/24  2145   WBC 9.6   RBC 5.05   HGB 15.2   HCT 45.4   MCV 89.9   MCH 30.1   MCHC 33.5   RDW 13.4      MPV 9.5       No intake/output data recorded.  No intake/output data recorded.    03/15/24    ECHO (TTE) LIMITED (PRN CONTRAST/BUBBLE/STRAIN/3D) 03/18/2024  7:01 PM (Final)    Interpretation Summary    Left Ventricle: Mildly reduced left ventricular systolic function with a visually estimated EF of 45 - 50%. Left ventricle size is normal. Normal wall thickness.  There is septal motion consistent with conduction abnormality.  There is hypokinesis of the mid to basal inferolateral wall and mid to basal lateral wall.  There is Doppler evidence of stage I diastolic dysfunction    Normal right ventricular size and function    Normal size atria    Moderate posterior mitral annular calcification without mitral stenosis and with trace physiologic mitral regurgitation    No other valvular abnormalities noted    No evidence of pulmonary hypertension    Image quality was good    Signed by: Jv Veliz MD on 3/18/2024  7:01 PM      SYNOPSIS:  79-year-old female with past medical history of stroke with right-sided deficit, chronic systolic heart failure, hypothyroidism, hypertension, hyperlipidemia, non-Hodgkin's lymphoma in remission, brought in here by daughter due to patient was not acting normal.  She was unable to take food and some weakness on the right side. She uses walker and home and wheelchair outside. She lives with her daughter. She denies any chest pain, shortness of breath or any abdominal pain.  ER physician talked to Dr. Singh and recommended to start Keppra. Her blood pressure 130/70, pulse 63, respirations 16 and temperature 98.5 °F.  Blood chemistry normal troponin 24 liver function test normal and cell count normal. Admitted to the hospital for further care.                                                  ASSESSMENT AND PLAN:    Principal Problem:    Stroke-like  symptoms  Active Problems:    Acute cerebrovascular accident (CVA) (HCC)  Resolved Problems:    * No resolved hospital problems. *    Acute Stroke/TIA  Hx of stroke with residual right sided weakness  No TNK per Francisco Pedroza  Continue aspirin Plavix and Lipitor  MRI brain ordered  Neurology to see    Hypothyroidism  Continue levothyroxine    Acute hypoxic resp insuffiencey   Rhino virus positive   Supportive care  Breathing treatment     Chronic systolic heart failure  Continue Entresto    Dementia  High risk of delirium  Reorient    Continue donepezil    DISPOSITION: Pending the above                                                                 This note was generated by the epic EMR system/Dragon speech recognition and may contain inherent errors or omissions not intended by the user. Grammatical errors, random word insertions, deletions, pronoun errors and incomplete sentences are occasional consequences of this technology due to software limitations. Not all errors are caught and corrected. If there are questions or concerns about the content of this note or information contained within the body of this dictation they should be addressed directly with the author for clarification.    Electronically signed by Nathaniel Chi MD on 10/29/2024 at 8:32 AM

## 2024-10-30 ENCOUNTER — APPOINTMENT (OUTPATIENT)
Dept: NEUROLOGY | Age: 79
DRG: 056 | End: 2024-10-30
Payer: MEDICARE

## 2024-10-30 PROBLEM — F03.90 DEMENTIA (HCC): Status: ACTIVE | Noted: 2024-10-30

## 2024-10-30 PROBLEM — R41.0 DISORIENTATION: Status: ACTIVE | Noted: 2024-10-30

## 2024-10-30 PROBLEM — I49.9 CARDIAC ARRHYTHMIA: Status: ACTIVE | Noted: 2024-10-30

## 2024-10-30 PROBLEM — I67.9 CEREBROVASCULAR DISEASE: Status: ACTIVE | Noted: 2024-10-30

## 2024-10-30 PROBLEM — R53.1 RIGHT SIDED WEAKNESS: Status: ACTIVE | Noted: 2024-10-30

## 2024-10-30 PROBLEM — R56.9 SEIZURE (HCC): Status: ACTIVE | Noted: 2024-10-30

## 2024-10-30 PROBLEM — R91.1 LUNG NODULE: Status: ACTIVE | Noted: 2024-10-30

## 2024-10-30 PROBLEM — N39.0 UTI (URINARY TRACT INFECTION): Status: ACTIVE | Noted: 2024-10-30

## 2024-10-30 PROBLEM — Z85.3 HISTORY OF BREAST CANCER: Status: ACTIVE | Noted: 2024-10-30

## 2024-10-30 PROBLEM — D64.9 ANEMIA: Status: ACTIVE | Noted: 2024-10-30

## 2024-10-30 LAB
25(OH)D3 SERPL-MCNC: 33.1 NG/ML (ref 30–100)
ANION GAP SERPL CALCULATED.3IONS-SCNC: 16 MMOL/L (ref 7–16)
BUN SERPL-MCNC: 14 MG/DL (ref 6–23)
CALCIUM SERPL-MCNC: 9.2 MG/DL (ref 8.6–10.2)
CHLORIDE SERPL-SCNC: 99 MMOL/L (ref 98–107)
CK SERPL-CCNC: 222 U/L (ref 20–180)
CO2 SERPL-SCNC: 20 MMOL/L (ref 22–29)
CREAT SERPL-MCNC: 0.8 MG/DL (ref 0.5–1)
CRP SERPL HS-MCNC: 71 MG/L (ref 0–5)
ERYTHROCYTE [DISTWIDTH] IN BLOOD BY AUTOMATED COUNT: 13.4 % (ref 11.5–15)
ERYTHROCYTE [SEDIMENTATION RATE] IN BLOOD BY WESTERGREN METHOD: 20 MM/HR (ref 0–20)
GFR, ESTIMATED: 77 ML/MIN/1.73M2
GLUCOSE BLD-MCNC: 119 MG/DL (ref 74–99)
GLUCOSE BLD-MCNC: 133 MG/DL (ref 74–99)
GLUCOSE SERPL-MCNC: 129 MG/DL (ref 74–99)
HCT VFR BLD AUTO: 47.2 % (ref 34–48)
HGB BLD-MCNC: 15.5 G/DL (ref 11.5–15.5)
IRON SATN MFR SERPL: 7 % (ref 15–50)
IRON SERPL-MCNC: 15 UG/DL (ref 37–145)
MCH RBC QN AUTO: 30.3 PG (ref 26–35)
MCHC RBC AUTO-ENTMCNC: 32.8 G/DL (ref 32–34.5)
MCV RBC AUTO: 92.2 FL (ref 80–99.9)
PLATELET # BLD AUTO: 179 K/UL (ref 130–450)
PMV BLD AUTO: 9.5 FL (ref 7–12)
POTASSIUM SERPL-SCNC: 4.1 MMOL/L (ref 3.5–5)
RBC # BLD AUTO: 5.12 M/UL (ref 3.5–5.5)
RHEUMATOID FACT SER NEPH-ACNC: 24 IU/ML (ref 0–13)
SODIUM SERPL-SCNC: 135 MMOL/L (ref 132–146)
T3FREE SERPL-MCNC: 1.46 PG/ML (ref 2–4.4)
TIBC SERPL-MCNC: 222 UG/DL (ref 250–450)
WBC OTHER # BLD: 8.8 K/UL (ref 4.5–11.5)

## 2024-10-30 PROCEDURE — 84182 PROTEIN WESTERN BLOT TEST: CPT

## 2024-10-30 PROCEDURE — 2060000000 HC ICU INTERMEDIATE R&B

## 2024-10-30 PROCEDURE — 6360000002 HC RX W HCPCS: Performed by: INTERNAL MEDICINE

## 2024-10-30 PROCEDURE — 83540 ASSAY OF IRON: CPT

## 2024-10-30 PROCEDURE — 2580000003 HC RX 258: Performed by: INTERNAL MEDICINE

## 2024-10-30 PROCEDURE — 92610 EVALUATE SWALLOWING FUNCTION: CPT

## 2024-10-30 PROCEDURE — 80048 BASIC METABOLIC PNL TOTAL CA: CPT

## 2024-10-30 PROCEDURE — 99222 1ST HOSP IP/OBS MODERATE 55: CPT | Performed by: PSYCHIATRY & NEUROLOGY

## 2024-10-30 PROCEDURE — 84481 FREE ASSAY (FT-3): CPT

## 2024-10-30 PROCEDURE — 95819 EEG AWAKE AND ASLEEP: CPT

## 2024-10-30 PROCEDURE — 86592 SYPHILIS TEST NON-TREP QUAL: CPT

## 2024-10-30 PROCEDURE — 83605 ASSAY OF LACTIC ACID: CPT

## 2024-10-30 PROCEDURE — 99232 SBSQ HOSP IP/OBS MODERATE 35: CPT | Performed by: INTERNAL MEDICINE

## 2024-10-30 PROCEDURE — 36415 COLL VENOUS BLD VENIPUNCTURE: CPT

## 2024-10-30 PROCEDURE — 85652 RBC SED RATE AUTOMATED: CPT

## 2024-10-30 PROCEDURE — 85027 COMPLETE CBC AUTOMATED: CPT

## 2024-10-30 PROCEDURE — 97535 SELF CARE MNGMENT TRAINING: CPT

## 2024-10-30 PROCEDURE — 84425 ASSAY OF VITAMIN B-1: CPT

## 2024-10-30 PROCEDURE — 84207 ASSAY OF VITAMIN B-6: CPT

## 2024-10-30 PROCEDURE — 97167 OT EVAL HIGH COMPLEX 60 MIN: CPT

## 2024-10-30 PROCEDURE — 2580000003 HC RX 258: Performed by: PSYCHIATRY & NEUROLOGY

## 2024-10-30 PROCEDURE — 86431 RHEUMATOID FACTOR QUANT: CPT

## 2024-10-30 PROCEDURE — 92523 SPEECH SOUND LANG COMPREHEN: CPT

## 2024-10-30 PROCEDURE — 97161 PT EVAL LOW COMPLEX 20 MIN: CPT

## 2024-10-30 PROCEDURE — 6370000000 HC RX 637 (ALT 250 FOR IP): Performed by: INTERNAL MEDICINE

## 2024-10-30 PROCEDURE — 86140 C-REACTIVE PROTEIN: CPT

## 2024-10-30 PROCEDURE — 82306 VITAMIN D 25 HYDROXY: CPT

## 2024-10-30 PROCEDURE — 97530 THERAPEUTIC ACTIVITIES: CPT

## 2024-10-30 PROCEDURE — 2700000000 HC OXYGEN THERAPY PER DAY

## 2024-10-30 PROCEDURE — 82962 GLUCOSE BLOOD TEST: CPT

## 2024-10-30 PROCEDURE — 86255 FLUORESCENT ANTIBODY SCREEN: CPT

## 2024-10-30 PROCEDURE — 83550 IRON BINDING TEST: CPT

## 2024-10-30 PROCEDURE — 82550 ASSAY OF CK (CPK): CPT

## 2024-10-30 RX ORDER — SODIUM CHLORIDE 9 MG/ML
INJECTION, SOLUTION INTRAVENOUS CONTINUOUS
Status: ACTIVE | OUTPATIENT
Start: 2024-10-30 | End: 2024-10-31

## 2024-10-30 RX ADMIN — DONEPEZIL HYDROCHLORIDE 10 MG: 5 TABLET, FILM COATED ORAL at 08:02

## 2024-10-30 RX ADMIN — AMANTADINE HYDROCHLORIDE 100 MG: 100 CAPSULE ORAL at 20:42

## 2024-10-30 RX ADMIN — CLOPIDOGREL BISULFATE 75 MG: 75 TABLET ORAL at 08:02

## 2024-10-30 RX ADMIN — ENOXAPARIN SODIUM 40 MG: 100 INJECTION SUBCUTANEOUS at 08:02

## 2024-10-30 RX ADMIN — PETROLATUM: 420 OINTMENT TOPICAL at 05:44

## 2024-10-30 RX ADMIN — ATORVASTATIN CALCIUM 40 MG: 40 TABLET, FILM COATED ORAL at 20:42

## 2024-10-30 RX ADMIN — AMANTADINE HYDROCHLORIDE 100 MG: 100 CAPSULE ORAL at 08:02

## 2024-10-30 RX ADMIN — SACUBITRIL AND VALSARTAN 1 TABLET: 24; 26 TABLET, FILM COATED ORAL at 20:43

## 2024-10-30 RX ADMIN — SODIUM CHLORIDE: 9 INJECTION, SOLUTION INTRAVENOUS at 17:42

## 2024-10-30 RX ADMIN — PETROLATUM: 420 OINTMENT TOPICAL at 20:54

## 2024-10-30 RX ADMIN — SODIUM CHLORIDE, PRESERVATIVE FREE 10 ML: 5 INJECTION INTRAVENOUS at 08:03

## 2024-10-30 RX ADMIN — SACUBITRIL AND VALSARTAN 1 TABLET: 24; 26 TABLET, FILM COATED ORAL at 08:02

## 2024-10-30 RX ADMIN — SODIUM CHLORIDE, PRESERVATIVE FREE 10 ML: 5 INJECTION INTRAVENOUS at 20:49

## 2024-10-30 RX ADMIN — LEVOTHYROXINE SODIUM 88 MCG: 0.09 TABLET ORAL at 05:26

## 2024-10-30 RX ADMIN — ASPIRIN 81 MG: 81 TABLET, COATED ORAL at 08:02

## 2024-10-30 RX ADMIN — METOPROLOL SUCCINATE 50 MG: 50 TABLET, EXTENDED RELEASE ORAL at 08:02

## 2024-10-30 RX ADMIN — PETROLATUM: 420 OINTMENT TOPICAL at 08:03

## 2024-10-30 NOTE — PROGRESS NOTES
SPEECH/LANGUAGE PATHOLOGY  CLINICAL ASSESSMENT OF SWALLOWING FUNCTION   and PLAN OF CARE      PATIENT NAME:  Nika Aviles  (female)     MRN:  [id number]    :  1945  (79 y.o.)  STATUS:  Inpatient: Room 8510/8510-A    TODAY'S DATE:  10/30/2024  ORDER DATE, DESCRIPTION AND REFERRING PROVIDER: Dr. Blunt  REASON FOR REFERRAL: Assess swallow function    EVALUATING THERAPIST: Samreen Nice, SLP                 RESULTS:    DYSPHAGIA DIAGNOSIS:   functional oropharyngeal swallow for age/premorbid functioning and unable to rule out silent aspiration bedside    Per chart review, patient with hx of dysphagia. Most recent MBSS completed on 24 revealed moderate oropharyngeal phase dysphagia including trace silent aspiration. At that time patient was recommended for nectar thick liquids.Patient declined continued use of thickener prior to this admission. No blatant overt s/s of aspiration noted with thin liquids or solids. However, silent aspiration can not be ruled out at the bedside, and if silent aspiration is suspected an MBSS is needed.          DIET RECOMMENDATIONS:  Easy to chew consistency solids (IDDSI level 7, transitional) with  thin liquids (IDDSI level 0)     FEEDING RECOMMENDATIONS:     Assistance level:  Set-up is required for all oral intake      Compensatory strategies recommended: Thorough oral care to prevent colonization of oral bacteria   Upright in bed/ chair as tolerated  Fully alert for all PO  Slow rate of intake       Discussed recommendations with:  Patient     SPEECH THERAPY  PLAN OF CARE   The dysphagia POC is established based on physician order, dysphagia diagnosis and results of clinical assessment     Meal time assessment for 1-2 sessions to provide diet modification and compensatory strategy implementation due to Underlying moist cough decreases ability to rely on presence or absence of clinical indicators of dysphagia    Conditions Requiring Skilled Therapeutic Intervention for

## 2024-10-30 NOTE — ACP (ADVANCE CARE PLANNING)
Advance Care Planning   Healthcare Decision Maker:    Primary Decision Maker: Rosangela Gabriel - Child - [phone]    Secondary Decision Maker: Leah Aviles - Grandchild - [phone]    Click here to complete Healthcare Decision Makers including selection of the Healthcare Decision Maker Relationship (ie \"Primary\").

## 2024-10-30 NOTE — CONSULTS
Consult Note            Date:10/30/2024        Patient Name:Nika Aviles     YOB: 1945     Age:79 y.o.    Consult to Neurology  Consult performed by: Isatu Lyn MD  Consult ordered by: Zulema Blunt MD  Reason for consult: altered mental status, stroke          Chief Complaint     Chief Complaint   Patient presents with    Fatigue     Per EMS, pt from home, LWK 11pm 10/27/24. Daughter called EMS was called for weakness.       Worsening weakness on the right side, difficulty with ambulation, falls, mild confusion    History Obtained From     Medical chart    History of Present Illness     The patient is a 79-year-old right-handed white female with history of multiple medical problems including ischemic stroke who was brought to the hospital because of worsening right-sided weakness slurred speech some confusion and in general decline in her condition.  The patient lives with her daughter.  The information was obtained mainly from the medical chart since her daughter was not present at the time of my evaluation.  The patient was alert awake she was able to respond to few questions but she could not give detailed history.    According to the medical chart the patient was at home with her daughter and she started falling.  No major injuries were reported.  It was mainly just her legs give out and slightly without major injuries.  She is on chronic anticoagulation for atrial fibrillation and she has significant bruising all over her skin.  According to the patient's daughter the patient became somewhat more confused generally weak her had difficulty with ambulation even using the walker and had some difficulty with fluid and food intake.    The patient's last ischemic stroke was in January 2024.  She also had stroke about 30 years ago according to the medical chart.    No mention about any fever or chills no nausea no vomiting no headache no pain no abdominal pain or distention no urinary or bowel  foreign body    OVARY REMOVAL      TOTAL KNEE ARTHROPLASTY Right 06/29/2022    ANNEMARIE ROBOTIC ASSISTED RIGHT KNEE TOTAL ARTHROPLASTY performed by Clarence Harris MD at [facility] OR    UPPER GASTROINTESTINAL ENDOSCOPY          Medications     Prior to Admission medications    Medication Sig Start Date End Date Taking? Authorizing Provider   cefdinir (OMNICEF) 300 MG capsule Take 1 capsule by mouth 2 times daily 10/26/24 11/2/24  Didier Prasad MD   amantadine (SYMMETREL) 100 MG capsule Take 1 capsule by mouth 2 times daily    Didier Prasad MD   aspirin 81 MG EC tablet Take 1 tablet by mouth daily 1/13/24   Christofer Good DO   clopidogrel (PLAVIX) 75 MG tablet Take 1 tablet by mouth daily 1/13/24   Christofer Good DO   levothyroxine (SYNTHROID) 88 MCG tablet Take 1 tablet by mouth Daily 1/14/24   Christofer Good DO   metoprolol succinate (TOPROL XL) 50 MG extended release tablet Take 1 tablet by mouth daily 1/13/24   Christofer Good DO   sacubitril-valsartan (ENTRESTO) 24-26 MG per tablet Take 1 tablet by mouth 2 times daily 1/13/24   Christofer Good DO   metFORMIN (GLUCOPHAGE-XR) 500 MG extended release tablet Take 1 tablet by mouth 2 times daily    Didier Prasad MD   donepezil (ARICEPT) 10 MG tablet Take 1 tablet by mouth daily    Didier Prasad MD   atorvastatin (LIPITOR) 40 MG tablet Take 1 tablet by mouth nightly    Didier Prasad MD        white petrolatum ointment, BID  sodium chloride flush 0.9 % injection 5-40 mL, 2 times per day  sodium chloride flush 0.9 % injection 5-40 mL, PRN  0.9 % sodium chloride infusion, PRN  ondansetron (ZOFRAN-ODT) disintegrating tablet 4 mg, Q8H PRN   Or  ondansetron (ZOFRAN) injection 4 mg, Q6H PRN  polyethylene glycol (GLYCOLAX) packet 17 g, Daily PRN  enoxaparin (LOVENOX) injection 40 mg, Daily  amantadine (SYMMETREL) capsule 100 mg, BID  atorvastatin (LIPITOR) tablet 40 mg, Nightly  sacubitril-valsartan (ENTRESTO) 24-26 MG per tablet 1 tablet, BID  metoprolol  underlying infections dehydration's respiratory problems.  Check for lung nodules, possible metastases.  Check CT chest abdomen and pelvis, OP PET scan.    I requested speech-language evaluation and also swallow evaluation.  The patient will need extensive physical therapy Occupational Therapy fall precautions and seizure precautions.    Adjust the patient's medication according to the laboratory test results.    Neurology will follow the patient and based on the findings and the results make further recommendations.    Thank you for the referral.      Electronically signed by Isatu Lyn MD on 10/30/24 at 4:30 PM EDT

## 2024-10-30 NOTE — PROGRESS NOTES
HOSPITALIST PROGRESS NOTES     ADMITTING DATE AND TIME: 10/28/2024  9:48 PM  had concerns including Fatigue (Per EMS, pt from home, LWK 11pm 10/27/24. Daughter called EMS was called for weakness. ).    ADMIT DX: Right sided weakness [R53.1]  Stroke-like symptoms [R29.90]  Acute cerebrovascular accident (CVA) (HCC) [I63.9]      SUBJECTIVE:  Patient is being followed for Right sided weakness [R53.1]  Stroke-like symptoms [R29.90]  Acute cerebrovascular accident (CVA) (HCC) [I63.9]     Patient was seen examined and evaluated  Recent lab results, charts and pertinent diagnostic imaging reviewed   Ancillary service notes reviewed   NAD    Care reviewed with nursing staff, medical and surgical specialty care, primary care and the patient's family as available.   ROS: denies fever, chills, cp, sob, n/v, HA unless stated above.       OBJECTIVE:    BP (!) 145/65   Pulse 90   Temp 99 °F (37.2 °C) (Oral)   Resp 16   Ht 1.676 m (5' 6\")   Wt 74.9 kg (165 lb 3.2 oz)   LMP  (LMP Unknown)   SpO2 92%   BMI 26.66 kg/m²     General Appearance: alert and oriented to person, place and time and in no acute distress  Skin: warm and dry  Head: normocephalic and atraumatic  Eyes: pupils equal, round, and reactive to light, extraocular eye movements intact, conjunctivae normal  Neck: neck supple and non tender without mass   Pulmonary/Chest: clear to auscultation bilaterally- no wheezes, rales or rhonchi, normal air movement, no respiratory distress  Cardiovascular: normal rate, normal S1 and S2 and no carotid bruits  Abdomen: soft, non-tender, non-distended, normal bowel sounds, no masses or organomegaly  Extremities: no cyanosis, no clubbing and no edema  Neurologic: grossly intact. Residual right sided weakness     Recent Labs     10/28/24  2145 10/30/24  0516    135   K 4.4 4.1    99   CO2 24  20*   BUN 21 14   CREATININE 1.0 0.8   GLUCOSE 131* 129*   CALCIUM 9.2 9.2       Recent Labs     10/28/24  2145 10/30/24  0516   WBC 9.6 8.8   RBC 5.05 5.12   HGB 15.2 15.5   HCT 45.4 47.2   MCV 89.9 92.2   MCH 30.1 30.3   MCHC 33.5 32.8   RDW 13.4 13.4    179   MPV 9.5 9.5       I/O last 3 completed shifts:  In: -   Out: 750 [Urine:750]  No intake/output data recorded.    03/15/24    ECHO (TTE) LIMITED (PRN CONTRAST/BUBBLE/STRAIN/3D) 03/18/2024  7:01 PM (Final)    Interpretation Summary    Left Ventricle: Mildly reduced left ventricular systolic function with a visually estimated EF of 45 - 50%. Left ventricle size is normal. Normal wall thickness.  There is septal motion consistent with conduction abnormality.  There is hypokinesis of the mid to basal inferolateral wall and mid to basal lateral wall.  There is Doppler evidence of stage I diastolic dysfunction    Normal right ventricular size and function    Normal size atria    Moderate posterior mitral annular calcification without mitral stenosis and with trace physiologic mitral regurgitation    No other valvular abnormalities noted    No evidence of pulmonary hypertension    Image quality was good    Signed by: Jv Veliz MD on 3/18/2024  7:01 PM      SYNOPSIS:  79-year-old female with past medical history of stroke with right-sided deficit, chronic systolic heart failure, hypothyroidism, hypertension, hyperlipidemia, non-Hodgkin's lymphoma in remission, brought in here by daughter due to patient was not acting normal.  She was unable to take food and some weakness on the right side. She uses walker and home and wheelchair outside. She lives with her daughter. She denies any chest pain, shortness of breath or any abdominal pain.  ER physician talked to Dr. Singh and recommended to start Keppra. Her blood pressure 130/70, pulse 63, respirations 16 and temperature 98.5 °F.  Blood chemistry normal troponin 24 liver function test normal and cell count

## 2024-10-30 NOTE — CARE COORDINATION
Here for strokelike symptoms. Neurology consult MRI PT OT EEG  Speech. Currently on  2 liters oxygen Met with pt who is alert to person only, Call placed to  daughter to discuss role/transition of care.The patient lives with her and her family I story home.  Her PCP is Dr Pandya abd she uses [email or web address]. She has a walker, wc, tub seat, rails for toilet.  Has had hhc with [facility] and has  h/o snf @ [facility].Someone is with her 24 /7 They have a home health aid with her during the day and they are with her @ night. Await testing and pt/ot Plan remains to be determined. Electronically signed by Renée Ewing RN on 10/30/2024 at 2:09 PM      Case Management Assessment  Initial Evaluation    Date/Time of Evaluation: 10/30/2024 2:11 PM  Assessment Completed by: Renée Ewing RN    If patient is discharged prior to next notation, then this note serves as note for discharge by case management.    Patient Name: Nika Aviles                   YOB: 1945  Diagnosis: Right sided weakness [R53.1]  Stroke-like symptoms [R29.90]  Acute cerebrovascular accident (CVA) (HCC) [I63.9]                   Date / Time: 10/28/2024  9:48 PM    Patient Admission Status: Inpatient   Readmission Risk (Low < 19, Mod (19-27), High > 27): Readmission Risk Score: 17.6    Current PCP: Znaa Pandya MD  PCP verified by CM? Yes    Chart Reviewed: Yes      History Provided by: Child/Family  Patient Orientation: Person    Patient Cognition: Dementia / Early Alzheimer's    Hospitalization in the last 30 days (Readmission):  No    If yes, Readmission Assessment in  Navigator will be completed.    Advance Directives:      Code Status: Full Code   Patient's Primary Decision Maker is: Legal Next of Kin    Primary Decision Maker: Rosangela Gabriel - Child - [phone]    Secondary Decision Maker: Leah Aviles - Grandchild - [phone]    Discharge Planning:    Patient lives with: Children Type of Home:

## 2024-10-30 NOTE — PROGRESS NOTES
Physical Therapy  Physical Therapy Initial Assessment     Name: Nika Aviles  : 1945  MRN: [id number]      Date of Service: 10/30/2024    Evaluating PT:  Jennifer Zepeda PT, DPT [id number]    Room #:  8510/8510-A  Diagnosis:  Right sided weakness [R53.1]  Stroke-like symptoms [R29.90]  Acute cerebrovascular accident (CVA) (HCC) [I63.9]  PMHx/PSHx:   has a past medical history of Breast CA (HCC), Breast cancer (HCC), Cerebral artery occlusion with cerebral infarction (HCC), Chronic kidney disease, Dementia (HCC), Diabetes mellitus (HCC), Disease of blood and blood forming organ, Diverticulitis, History of therapeutic radiation, Hyperlipidemia, Hypertension, Hypothyroidism, Lymphoma (HCC), Osteoarthritis, Prolonged emergence from general anesthesia, and Thyroid disease.  Procedure/Surgery:  None this admission  Precautions:  Fall risk, alarms, incontinent, cognition, Hx of dementia, Hx of CVA with residual R-sided weakness, O2, continuous pulse ox, droplet isolation (rhinovirus)  Equipment Needs:  TBD    SUBJECTIVE:    Pt lives with her daughter in a 1 story home with ramp to enter. Pt ambulated with a FWW (in her home) and uses a WC (outside her home) PTA.    OBJECTIVE:   Initial Evaluation  Date: 10/30/24 Treatment Date:  Short Term/ Long Term   Goals   AM-PAC 6 Clicks      Was pt agreeable to Eval/treatment? Yes     Does pt have pain? Denied     Bed Mobility  Rolling: ModA (to the R), MaxA (to the L)  Supine to sit: ModA  Sit to supine: ModA  Scooting: ModA  Rolling: Independent  Supine to sit: Independent  Sit to supine: Independent  Scooting: Independent   Transfers Sit to stand: ModA  Stand to sit: ModA  Stand pivot: NT  Sit to stand: Independent  Stand to sit: Independent  Stand pivot: SBA with AAD   Ambulation    NT  >100 feet with AAD and SBA   Stair negotiation: ascended and descended  NT  TBD   ROM BUE:  Refer to OT  BLE:  WFL     Strength BUE:  Refer to OT  BLE: Grossly 4/5     Balance Sitting EOB:

## 2024-10-30 NOTE — PROGRESS NOTES
OCCUPATIONAL THERAPY INITIAL EVALUATION    [facility] [address] [place], [place]       Date:10/30/2024                                                  Patient Name: Nika Aviles  MRN: [id number]  : 1945  Room: [address] [place] [address]    Evaluating OT: Austin Britton OTR/L [id number]    Referring Provider: Zulema Blunt MD      Specific Provider Orders/Date: OT evaluation and treatment 10/29/24 0845    Diagnosis:  Right sided weakness [R53.1]  Stroke-like symptoms [R29.90]  Acute cerebrovascular accident (CVA) (HCC) [I63.9]      Pertinent Medical History:  has a past medical history of Breast CA (HCC), Breast cancer (HCC), Cerebral artery occlusion with cerebral infarction (HCC), Chronic kidney disease, Dementia (HCC), Diabetes mellitus (HCC), Disease of blood and blood forming organ, Diverticulitis, History of therapeutic radiation, Hyperlipidemia, Hypertension, Hypothyroidism, Lymphoma (HCC), Osteoarthritis, Prolonged emergence from general anesthesia, and Thyroid disease.   Past Surgical History:   Procedure Laterality Date    ABDOMEN SURGERY      BACK SURGERY      neck fusion bone graft-no ROM limitations    BREAST LUMPECTOMY      BREAST SURGERY Right     tumor and lymph node resection-CA    BRONCHOSCOPY N/A 10/01/2019    BRONCHOSCOPY ALVEOLAR LAVAGE WITH BRUSHINGS performed by Arley Delgado MD at [facility] ENDOSCOPY    COLONOSCOPY      ENDOSCOPY, COLON, DIAGNOSTIC      FRACTURE SURGERY      elbow radius    HYSTERECTOMY (CERVIX STATUS UNKNOWN)      KNEE ARTHROSCOPY      MEDIPORT INSERTION, SINGLE Left 2016    chest    OTHER SURGICAL HISTORY  2017    mediport insertion    OTHER SURGICAL HISTORY  2017    removal foreign body    OVARY REMOVAL      TOTAL KNEE ARTHROPLASTY Right 2022    [facility] ROBOTIC ASSISTED RIGHT KNEE TOTAL ARTHROPLASTY performed by Clarence Harris MD at [facility] OR    UPPER GASTROINTESTINAL ENDOSCOPY         Pt admitted to the      Hearing: needs increased volume   Sensation: no c/o numbness or tingling   Tone: WFL   Edema: unremarkable     Comments:   RN cleared patient for OT.  Upon arrival, patient was semi-supine in bed  and agreeable to OT session. family present  at the beginning of the session, but left prior to functional mobility/ADLs . At end of session, patient long sitting in bed  and alarm activated ; with call light and phone within reach; all lines and tubes intact.   Patient presents with decreased safety awareness, activity tolerance , balance , coordination, strength , ROM , and cognition. Pt demonstrated decreased independence during ADLs, bed mobility , functional transfers, and functional mobility. Pt would benefit from continued skilled OT to increase safety and independence with completion of ADL tasks and functional mobility for improved quality of life and return to OF.       Treatment: OT treatment provided this date includes:  OT edu pt/family on role of OT in the acute care setting. Family  verbalized understanding   Therapist facilitated and instructed pt on adapted techniques & compensatory strategies to improve safety and independence with ADLs, bed mobility , functional transfers, and functional mobility as noted above to allow pt to achieve highest level of independence and safely. Pt provided  min cuing , verbal instructions , extended time , and encouragement  in order to promote maximum level of independence.   Pt edu on use of call light and waiting until staff present to attempt any functional mobility. Pt verbalized understanding and demonstrated ability to locate and press call button.     Rehab Potential: Good  for established goals     Patient / Family Goal: to get better     Patient and/or family were instructed on functional diagnosis, prognosis/goals and OT plan of care. Pt/family demonstrated understanding.      Eval Complexity:      Description  Performance deficits  Clinical decision making

## 2024-10-30 NOTE — PROGRESS NOTES
SPEECH/LANGUAGE PATHOLOGY  SPEECH/LANGUAGE/COGNITIVE EVALUATION   and PLAN OF CARE      PATIENT NAME:  Nika Aviles  (female)     MRN:  [id number]    :  1945  (79 y.o.)  STATUS:  Inpatient: Room 8510/8510-A    TODAY'S DATE:  10/30/2024  ORDER DATE, DESCRIPTION AND REFERRING PROVIDER : Dr. Blunt  REASON FOR REFERRAL:  Assess speech/language/cognition   EVALUATING THERAPIST: Samreen Nice, SLP    ADMITTING DIAGNOSIS: Right sided weakness [R53.1]  Stroke-like symptoms [R29.90]  Acute cerebrovascular accident (CVA) ([facility]) [I63.9]    VISIT DIAGNOSIS:   Visit Diagnoses         Codes    Right sided weakness    -  Primary R53.1               SPEECH THERAPY  PLAN OF CARE   The speech therapy  POC is established based on physician order, speech pathology diagnosis and results of clinical assessment     SPEECH PATHOLOGY DIAGNOSIS:    Mild-moderate cognitive linguistic impairment    Per chart review, patient with hx of cognitive linguistic deficits. Unclear of patient's baseline PTA.     Speech Pathology intervention is recommended 3-6 times per week for LOS or when goals are met with emphasis on the following:      Conditions Requiring Skilled Therapeutic Intervention for speech, language and/or cognition    Cognitive linguistic impairment  Decreased short term memory  Decreased problem solving skills   Decreased thought organization    Specific Speech Therapy Interventions to Include:   Therapeutic tasks for Cognition  Therapeutic exercises for dysarthria    Specific instructions for next treatment:     To initiate POC    SHORT/LONG TERM GOALS  Pt will improve orientation to spatial and temporal surroundings with use of external memory aides.  Pt will improve immediate, short term, recent memory during structured and unstructured tasks with 80% accuracy   Pt will improve problem solving/thought organization during structured and unstructured tasks with 80% accuracy   Pt will improve speech intelligibility and  increased articulatory precision via compensatory strategies and oral motor exercises     Patient goals: Patient/family involved in developing goals and treatment plan:   Treatment goals discussed with Patient    The Patient understand(s) the diagnosis, prognosis and plan of care   The patient/family Agreed with above,     This plan may be re-evaluated and revised as warranted.        Rehabilitation Potential/Prognosis: good                CLINICAL ASSESSMENT:  MOTOR SPEECH       Oral Peripheral Examination   Left labiobuccal weakness    Parameters of Speech Production  Respiration:  Adequate for speech production  Articulation:  Distortion  Resonance:  Within functional limits  Quality:   Within functional limits  Pitch:    Within functional limits  Intensity: Within functional limits  Fluency:  Intact  Prosody Intact    Speech Intelligibility      Mild to moderate impairment, patient slurs at least some words and at worst, can be understood with some difficulty            RECEPTIVE LANGUAGE    Comprehension of Yes/No Questions:   Inconsistent    Process  Simple Verbal Commands:   Within functional limits  Process Intermediate Verbal Commands:   Within functional limits  Process Complex Verbal Commands:     Impaired    Comprehension of Conversation:      Inconsistent      EXPRESSIVE LANGUAGE     Serials: Impaired    Imitation:  Words   Functional   Sentences Functional    Naming:  (Modality used:  Verbal)  Confrontation Naming  Functional  Functional Description  Functional  Response Naming: Functional    Conversation:      Confusion was noted during conversation    COGNITION     Attention/Orientation  Attention: Sustained attention   Orientation:  Oriented to Person    Memory   Immediate Recall: Repeated 3/3    Delayed Recall:   Recalled 2/3 with cues    Long Term Recall:   Recalled Birthdate and Age    Organization/Problem Solving/Reasoning   Verbal Sequencing:   Impaired        Verbal Problem solving:    Impaired          CLINICAL OBSERVATIONS NOTED DURING THE EVALUATION  Inconsistent responses                  EDUCATION:   The Speech Language Pathologist (SLP) completed education regarding results of evaluation and that intervention is warranted at this time.  Learner: Patient  Education: Reviewed results and recommendations of this evaluation  Evaluation of Education:  Needs further instruction    Evaluation Time includes thorough review of current medical information, gathering information on past medical history/social history and prior level of function, completion of standardized testing/informal observation of tasks, assessment of data and education on plan of care and goals.      CPT code:    61972  eval speech sound lang comprehension      The admitting diagnosis and active problem list, as listed below have been reviewed prior to initiation of this evaluation.        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 CCC-SLP/L  Speech Language Pathologist  [id number]

## 2024-10-31 ENCOUNTER — APPOINTMENT (OUTPATIENT)
Dept: MRI IMAGING | Age: 79
DRG: 056 | End: 2024-10-31
Payer: MEDICARE

## 2024-10-31 ENCOUNTER — APPOINTMENT (OUTPATIENT)
Dept: CT IMAGING | Age: 79
DRG: 056 | End: 2024-10-31
Attending: PSYCHIATRY & NEUROLOGY
Payer: MEDICARE

## 2024-10-31 LAB
ANION GAP SERPL CALCULATED.3IONS-SCNC: 13 MMOL/L (ref 7–16)
BASOPHILS # BLD: 0.02 K/UL (ref 0–0.2)
BASOPHILS NFR BLD: 0 % (ref 0–2)
BUN SERPL-MCNC: 13 MG/DL (ref 6–23)
CALCIUM SERPL-MCNC: 8.9 MG/DL (ref 8.6–10.2)
CHLORIDE SERPL-SCNC: 101 MMOL/L (ref 98–107)
CO2 SERPL-SCNC: 22 MMOL/L (ref 22–29)
CREAT SERPL-MCNC: 0.7 MG/DL (ref 0.5–1)
CRP SERPL HS-MCNC: 202 MG/L (ref 0–5)
CRP SERPL HS-MCNC: 235 MG/L (ref 0–3)
EOSINOPHIL # BLD: 0.07 K/UL (ref 0.05–0.5)
EOSINOPHILS RELATIVE PERCENT: 1 % (ref 0–6)
ERYTHROCYTE [DISTWIDTH] IN BLOOD BY AUTOMATED COUNT: 13.6 % (ref 11.5–15)
ERYTHROCYTE [SEDIMENTATION RATE] IN BLOOD BY WESTERGREN METHOD: 36 MM/HR (ref 0–20)
FOLATE SERPL-MCNC: 4.3 NG/ML (ref 4.8–24.2)
GFR, ESTIMATED: 89 ML/MIN/1.73M2
GLUCOSE SERPL-MCNC: 110 MG/DL (ref 74–99)
HCT VFR BLD AUTO: 43.6 % (ref 34–48)
HGB BLD-MCNC: 14.1 G/DL (ref 11.5–15.5)
IMM GRANULOCYTES # BLD AUTO: 0.04 K/UL (ref 0–0.58)
IMM GRANULOCYTES NFR BLD: 1 % (ref 0–5)
LACTATE BLDV-SCNC: NORMAL MMOL/L
LACTIC ACID, WHOLE BLOOD: NORMAL MMOL/L (ref 0.7–2.1)
LYMPHOCYTES NFR BLD: 1.05 K/UL (ref 1.5–4)
LYMPHOCYTES RELATIVE PERCENT: 13 % (ref 20–42)
MCH RBC QN AUTO: 29.8 PG (ref 26–35)
MCHC RBC AUTO-ENTMCNC: 32.3 G/DL (ref 32–34.5)
MCV RBC AUTO: 92.2 FL (ref 80–99.9)
MICROORGANISM SPEC CULT: ABNORMAL
MICROORGANISM SPEC CULT: ABNORMAL
MONOCYTES NFR BLD: 0.94 K/UL (ref 0.1–0.95)
MONOCYTES NFR BLD: 12 % (ref 2–12)
NEUTROPHILS NFR BLD: 74 % (ref 43–80)
NEUTS SEG NFR BLD: 6.01 K/UL (ref 1.8–7.3)
PLATELET # BLD AUTO: 166 K/UL (ref 130–450)
PMV BLD AUTO: 9.9 FL (ref 7–12)
POTASSIUM SERPL-SCNC: 4.1 MMOL/L (ref 3.5–5)
PROCALCITONIN SERPL-MCNC: 0.14 NG/ML (ref 0–0.08)
RBC # BLD AUTO: 4.73 M/UL (ref 3.5–5.5)
SEND OUT REPORT: NORMAL
SODIUM SERPL-SCNC: 136 MMOL/L (ref 132–146)
SPECIMEN DESCRIPTION: ABNORMAL
TEST NAME: NORMAL
VIT B12 SERPL-MCNC: 1095 PG/ML (ref 211–946)
WBC OTHER # BLD: 8.1 K/UL (ref 4.5–11.5)

## 2024-10-31 PROCEDURE — 6360000002 HC RX W HCPCS: Performed by: INTERNAL MEDICINE

## 2024-10-31 PROCEDURE — A9577 INJ MULTIHANCE: HCPCS | Performed by: RADIOLOGY

## 2024-10-31 PROCEDURE — 97530 THERAPEUTIC ACTIVITIES: CPT

## 2024-10-31 PROCEDURE — 86141 C-REACTIVE PROTEIN HS: CPT

## 2024-10-31 PROCEDURE — 2500000003 HC RX 250 WO HCPCS: Performed by: INTERNAL MEDICINE

## 2024-10-31 PROCEDURE — 80048 BASIC METABOLIC PNL TOTAL CA: CPT

## 2024-10-31 PROCEDURE — 71260 CT THORAX DX C+: CPT

## 2024-10-31 PROCEDURE — 85025 COMPLETE CBC W/AUTO DIFF WBC: CPT

## 2024-10-31 PROCEDURE — 2060000000 HC ICU INTERMEDIATE R&B

## 2024-10-31 PROCEDURE — 2700000000 HC OXYGEN THERAPY PER DAY

## 2024-10-31 PROCEDURE — 84207 ASSAY OF VITAMIN B-6: CPT

## 2024-10-31 PROCEDURE — 99232 SBSQ HOSP IP/OBS MODERATE 35: CPT | Performed by: NURSE PRACTITIONER

## 2024-10-31 PROCEDURE — 2580000003 HC RX 258: Performed by: INTERNAL MEDICINE

## 2024-10-31 PROCEDURE — 6360000004 HC RX CONTRAST MEDICATION: Performed by: RADIOLOGY

## 2024-10-31 PROCEDURE — 85652 RBC SED RATE AUTOMATED: CPT

## 2024-10-31 PROCEDURE — 36415 COLL VENOUS BLD VENIPUNCTURE: CPT

## 2024-10-31 PROCEDURE — 70553 MRI BRAIN STEM W/O & W/DYE: CPT

## 2024-10-31 PROCEDURE — 86140 C-REACTIVE PROTEIN: CPT

## 2024-10-31 PROCEDURE — 84145 PROCALCITONIN (PCT): CPT

## 2024-10-31 PROCEDURE — 99232 SBSQ HOSP IP/OBS MODERATE 35: CPT | Performed by: INTERNAL MEDICINE

## 2024-10-31 PROCEDURE — 74177 CT ABD & PELVIS W/CONTRAST: CPT

## 2024-10-31 PROCEDURE — 82607 VITAMIN B-12: CPT

## 2024-10-31 PROCEDURE — 82746 ASSAY OF FOLIC ACID SERUM: CPT

## 2024-10-31 PROCEDURE — 6370000000 HC RX 637 (ALT 250 FOR IP): Performed by: INTERNAL MEDICINE

## 2024-10-31 PROCEDURE — 97535 SELF CARE MNGMENT TRAINING: CPT

## 2024-10-31 RX ORDER — IOPAMIDOL 755 MG/ML
75 INJECTION, SOLUTION INTRAVASCULAR
Status: COMPLETED | OUTPATIENT
Start: 2024-10-31 | End: 2024-10-31

## 2024-10-31 RX ORDER — CIPROFLOXACIN 250 MG/1
500 TABLET, FILM COATED ORAL EVERY 12 HOURS SCHEDULED
Status: DISCONTINUED | OUTPATIENT
Start: 2024-10-31 | End: 2024-11-02 | Stop reason: HOSPADM

## 2024-10-31 RX ORDER — FOLIC ACID 5 MG/ML
1 INJECTION, SOLUTION INTRAMUSCULAR; INTRAVENOUS; SUBCUTANEOUS DAILY
Status: DISCONTINUED | OUTPATIENT
Start: 2024-10-31 | End: 2024-11-02 | Stop reason: HOSPADM

## 2024-10-31 RX ORDER — SODIUM CHLORIDE 0.9 % (FLUSH) 0.9 %
10 SYRINGE (ML) INJECTION
Status: ACTIVE | OUTPATIENT
Start: 2024-10-31 | End: 2024-11-01

## 2024-10-31 RX ADMIN — MICONAZOLE NITRATE: 20.6 POWDER TOPICAL at 21:36

## 2024-10-31 RX ADMIN — PETROLATUM: 420 OINTMENT TOPICAL at 21:27

## 2024-10-31 RX ADMIN — AMANTADINE HYDROCHLORIDE 100 MG: 100 CAPSULE ORAL at 21:27

## 2024-10-31 RX ADMIN — DONEPEZIL HYDROCHLORIDE 10 MG: 5 TABLET, FILM COATED ORAL at 09:08

## 2024-10-31 RX ADMIN — ASPIRIN 81 MG: 81 TABLET, COATED ORAL at 09:08

## 2024-10-31 RX ADMIN — MICONAZOLE NITRATE: 20.6 POWDER TOPICAL at 11:56

## 2024-10-31 RX ADMIN — SACUBITRIL AND VALSARTAN 1 TABLET: 24; 26 TABLET, FILM COATED ORAL at 09:08

## 2024-10-31 RX ADMIN — ENOXAPARIN SODIUM 40 MG: 100 INJECTION SUBCUTANEOUS at 09:08

## 2024-10-31 RX ADMIN — SACUBITRIL AND VALSARTAN 1 TABLET: 24; 26 TABLET, FILM COATED ORAL at 21:27

## 2024-10-31 RX ADMIN — PETROLATUM: 420 OINTMENT TOPICAL at 09:09

## 2024-10-31 RX ADMIN — ATORVASTATIN CALCIUM 40 MG: 40 TABLET, FILM COATED ORAL at 21:27

## 2024-10-31 RX ADMIN — FOLIC ACID 1 MG: 5 INJECTION, SOLUTION INTRAMUSCULAR; INTRAVENOUS; SUBCUTANEOUS at 11:56

## 2024-10-31 RX ADMIN — METOPROLOL SUCCINATE 50 MG: 50 TABLET, EXTENDED RELEASE ORAL at 09:08

## 2024-10-31 RX ADMIN — GADOBENATE DIMEGLUMINE 15 ML: 529 INJECTION, SOLUTION INTRAVENOUS at 21:19

## 2024-10-31 RX ADMIN — CIPROFLOXACIN HYDROCHLORIDE 500 MG: 250 TABLET, FILM COATED ORAL at 11:55

## 2024-10-31 RX ADMIN — CIPROFLOXACIN HYDROCHLORIDE 500 MG: 250 TABLET, FILM COATED ORAL at 23:32

## 2024-10-31 RX ADMIN — SODIUM CHLORIDE, PRESERVATIVE FREE 10 ML: 5 INJECTION INTRAVENOUS at 09:09

## 2024-10-31 RX ADMIN — SODIUM CHLORIDE, PRESERVATIVE FREE 10 ML: 5 INJECTION INTRAVENOUS at 21:28

## 2024-10-31 RX ADMIN — AMANTADINE HYDROCHLORIDE 100 MG: 100 CAPSULE ORAL at 09:09

## 2024-10-31 RX ADMIN — CLOPIDOGREL BISULFATE 75 MG: 75 TABLET ORAL at 09:08

## 2024-10-31 RX ADMIN — IOPAMIDOL 75 ML: 755 INJECTION, SOLUTION INTRAVENOUS at 06:46

## 2024-10-31 NOTE — PROGRESS NOTES
[facility] Neurology follow up      Nika Avlies is a 79 y.o. right handed female     Neurology following for AMS, stroke    PMH of previous strokes, CHF, hypothyroidism, HTN, HLD, non Hodgkin's lymphoma in remission, breast cancer, dementia, gait dysfunction due to previous strokes    Presented to ED with worsening right sided weakness, slurred speech, confusion.  Pt has been falling at home with no injuries reported.  On DAPT and statin therapy. Pt has been becoming more confused per daughter with difficulty ambulating even with walker.  Previous strokes in Jan 2024 and 30 years ago.  She was given Keppra in ED and started on Keppra 1000 mg BID.      Patient lying in bed with no family at bedside.  Only said a few words followed very simple commands for me today.  Advised awaiting MRI brain and EEG.      Objective:     /60   Pulse 64   Temp 97.1 °F (36.2 °C) (Temporal)   Resp 18   Ht 1.676 m (5' 6\")   Wt 74.9 kg (165 lb 3.2 oz)   LMP  (LMP Unknown)   SpO2 94%   BMI 26.66 kg/m²     General appearance: alert, appears stated age, somewhat cooperative and no distress  Head: normocephalic, without obvious abnormality, atraumatic  Eyes: conjunctivae/corneas clear  Neck: supple, symmetrical, trachea midline   Lungs: respirations non labored   Extremities:  normal, atraumatic, no cyanosis   Skin: color, texture, turgor normal      Mental Status: Awake but only speaks a few words.  Unable to tell me where she is located, current year or president.  Followed very simple commands.      Poor attention/concentration  Impaired fundus of knowledge  Repetition impaired   Impaired memories      Speech: few words spoken no dysarthria   Language: no aphasias     Cranial Nerves:  I: smell NA   II: visual acuity  NA   II: visual fields Full to confrontation   II: pupils HUSSEIN   III,VII: ptosis None   III,IV,VI: extraocular muscles  Full ROM    V: mastication Normal   V: facial light touch sensation  Normal   V,VII:

## 2024-10-31 NOTE — DISCHARGE INSTR - COC
Continuity of Care Form    Patient Name: Nika Aviles   :  1945  MRN:  [id number]    Admit date:  10/28/2024  Discharge date:  ***    Code Status Order: Full Code   Advance Directives:   Advance Care Flowsheet Documentation             Admitting Physician:  Zulema Blunt MD  PCP: Zana Pandya MD    Discharging Nurse: ***  Discharging Hospital Unit/Room#: 8510/8510-A  Discharging Unit Phone Number: ***    Emergency Contact:   Extended Emergency Contact Information  Primary Emergency Contact: NeryRosangela  Address: [address] [place] [address]  Home Phone: [phone]  Mobile Phone: [phone]  Relation: Child  Preferred language: English   needed? No  Secondary Emergency Contact: Leah Aviles  Home Phone: [phone]  Mobile Phone: [phone]  Relation: Grandchild  Preferred language: English   needed? No    Past Surgical History:  Past Surgical History:   Procedure Laterality Date    ABDOMEN SURGERY      BACK SURGERY      neck fusion bone graft-no ROM limitations    BREAST LUMPECTOMY      BREAST SURGERY Right     tumor and lymph node resection-CA    BRONCHOSCOPY N/A 10/01/2019    BRONCHOSCOPY ALVEOLAR LAVAGE WITH BRUSHINGS performed by Arley Delgado MD at [facility] ENDOSCOPY    COLONOSCOPY      ENDOSCOPY, COLON, DIAGNOSTIC      FRACTURE SURGERY      elbow radius    HYSTERECTOMY (CERVIX STATUS UNKNOWN)      KNEE ARTHROSCOPY      MEDIPORT INSERTION, SINGLE Left 2016    chest    OTHER SURGICAL HISTORY  2017    mediport insertion    OTHER SURGICAL HISTORY  2017    removal foreign body    OVARY REMOVAL      TOTAL KNEE ARTHROPLASTY Right 2022    [facility] ROBOTIC ASSISTED RIGHT KNEE TOTAL ARTHROPLASTY performed by Clarence Harris MD at [facility] OR    UPPER GASTROINTESTINAL ENDOSCOPY         Immunization History:   Immunization History   Administered Date(s) Administered    COVID-19, PFIZER PURPLE top, DILUTE for use, (age 12 y+), 30mcg/0.3mL

## 2024-10-31 NOTE — PROGRESS NOTES
Physical Therapy  Physical Therapy Treatment     Name: Nika Aviles  : 1945  MRN: [id number]      Date of Service: 10/31/2024    Evaluating PT:  Jennifer Zepeda PT, DPRAMIRO [id number]    Room #:  8510/8510-A  Diagnosis:  Right sided weakness [R53.1]  Stroke-like symptoms [R29.90]  Acute cerebrovascular accident (CVA) (HCC) [I63.9]  PMHx/PSHx:   has a past medical history of Breast CA (HCC), Breast cancer (HCC), Cerebral artery occlusion with cerebral infarction (HCC), Chronic kidney disease, Dementia (HCC), Diabetes mellitus (HCC), Disease of blood and blood forming organ, Diverticulitis, History of therapeutic radiation, Hyperlipidemia, Hypertension, Hypothyroidism, Lymphoma (HCC), Osteoarthritis, Prolonged emergence from general anesthesia, and Thyroid disease.  Procedure/Surgery:  None this admission  Precautions:  Fall risk, alarms, incontinent, cognition, Hx of dementia, Hx of CVA with residual R-sided weakness, O2, continuous pulse ox, droplet isolation (rhinovirus), R inattention  Equipment Needs:  TBD    SUBJECTIVE:    Pt lives with her daughter in a 1 story home with ramp to enter. Pt ambulated with a FWW (in her home) and uses a WC (outside her home) PTA.    OBJECTIVE:   Initial Evaluation  Date: 10/30/24 Treatment Date:   10/31/24 Short Term/ Long Term   Goals   AM-PAC 6 Clicks     Was pt agreeable to Eval/treatment? Yes yes    Does pt have pain? Denied No c/o pain    Bed Mobility  Rolling: ModA (to the R), MaxA (to the L)  Supine to sit: ModA  Sit to supine: ModA  Scooting: ModA Rolling: ModA  ModA  Sit to supine: ModA  Scooting: ModA Rolling: Independent  Supine to sit: Independent  Sit to supine: Independent  Scooting: Independent   Transfers Sit to stand: ModA  Stand to sit: ModA  Stand pivot: NT Sit to stand: ModA  Stand to sit: ModA  Stand pivot: ModA with WW Sit to stand: Independent  Stand to sit: Independent  Stand pivot: SBA with AAD   Ambulation    NT 10 feet with WW ModA >100 feet  with light assist for static and dynamic sitting balance.     PLAN:    Patient is making good progress towards established goals.  Will continue with current POC.      Time in  1305  Time out  1330    Total Treatment Time  25 minutes     CPT codes:  [] Gait training 31072 - minutes  [] Manual therapy 30407 - minutes  [x] Therapeutic activities 69025 25 minutes  [] Therapeutic exercises 75878 - minutes  [] Neuromuscular reeducation 52726 - minutes    Anny Gaytan PT, DPT  [id number]

## 2024-10-31 NOTE — PROGRESS NOTES
OCCUPATIONAL THERAPY TREATMENT NOTE    [facility]OM[facility]      OT BEDSIDE TREATMENT NOTE      Date:10/31/2024  Patient Name: Nika Aviles  MRN: [id number]  : 1945  Room: [address] [place], [place] [address]A     Evaluating OT: Austin Britton OTR/L [id number]     Referring Provider: Zulema Blunt MD                                      Specific Provider Orders/Date: OT evaluation and treatment 10/29/24 0845     Diagnosis:  Right sided weakness [R53.1]  Stroke-like symptoms [R29.90]  Acute cerebrovascular accident (CVA) (HCC) [I63.9]       Pertinent Medical History:  has a past medical history of Breast CA (HCC), Breast cancer (HCC), Cerebral artery occlusion with cerebral infarction (HCC), Chronic kidney disease, Dementia (HCC), Diabetes mellitus (HCC), Disease of blood and blood forming organ, Diverticulitis, History of therapeutic radiation, Hyperlipidemia, Hypertension, Hypothyroidism, Lymphoma (HCC), Osteoarthritis, Prolonged emergence from general anesthesia, and Thyroid disease.   Past Surgical History         Past Surgical History:   Procedure Laterality Date    ABDOMEN SURGERY        BACK SURGERY         neck fusion bone graft-no ROM limitations    BREAST LUMPECTOMY        BREAST SURGERY Right       tumor and lymph node resection-CA    BRONCHOSCOPY N/A 10/01/2019     BRONCHOSCOPY ALVEOLAR LAVAGE WITH BRUSHINGS performed by Arley Delgado MD at [facility] ENDOSCOPY    COLONOSCOPY        ENDOSCOPY, COLON, DIAGNOSTIC        FRACTURE SURGERY         elbow radius    HYSTERECTOMY (CERVIX STATUS UNKNOWN)        KNEE ARTHROSCOPY        MEDIPORT INSERTION, SINGLE Left 2016     chest    OTHER SURGICAL HISTORY   2017     mediport insertion    OTHER SURGICAL HISTORY   2017     removal foreign body    OVARY REMOVAL        TOTAL KNEE ARTHROPLASTY Right 2022     ANNEMARIE ROBOTIC ASSISTED RIGHT KNEE TOTAL ARTHROPLASTY performed by Clarence Harris MD at [facility] OR    UPPER GASTROINTESTINAL ENDOSCOPY                Pt  in the community   Falls: no   Driving: no   Occupation/leisure: did not state      Pleasant and cooperative throughout session       Pain Level: denies pain   Location: n/a  OT provided: n/a     Cognition: A&O: person only   (Dtr reports, pt is typically alert to place as well)               Follows single step directions: Fair + with cues               Memory: Poor              Sequencing: Poor +              Problem solving: Poor              Judgement/safety: Poor +              Attention:   Much improved 10-31, increased alertness, able to follow 1-2 step commands with min. Increased time for processing     Functional Assessment: AM-PAC Inpatient Daily Activity Raw Score: 13 /24       Initial Eval Status  Date: 10/30/2024   Treatment Status  Date: 10-31-24 STG=LTG  Time frame: 10-14 days   Feeding Min A      Min A Supervision    Grooming Mod A      Min A utilizing B hands to wash face & hands seated EOB with Min/SBA Min A    UB Dressing Max A  [place] and donning gown bed level   Max A Min A    LB Dressing Total/Dependent  Socks  Max A to don brief seated EOB, pull past hips in standing Mod A    Bathing Max A  Simulated   Max  sim. task Mod A    Toileting Total/Dependent  Incontinent of bowel and bladder. Total A for annmarie care and clothing management bed level via rolling   NT Mod A    Bed Mobility  Rolling L/R: mod to the L  Min to the R    Supine to sit: Mod A    Sit to supine: Mod A    Logroll: Mod A  Sup > Sit: Mod A   Supine to sit: Stand by assist   Sit to supine: Stand by assist    Functional Transfers Sit to stand: Mod A    Stand to sit: Mod A    Stand pivot: NT   Mod A   Sit <> stand, SPT with ww Stand by assist    Functional Mobility NT attempted side steps to sit higher up in bed, Pt unable to fully weight shift and progress feet   Mod A with ww short in room distance, ~10', with cues Min A   with AAD    Balance Sitting: Min A       Standing: Max A   Sitting balance at EOB Min/ SBA during functional  ax. & ADLs Sitting: Stand by assist       Standing: Min A    Activity Tolerance Fair -  fatigue   F/+ Fair +   Visual/  Perceptual no glasses present           Cues to attend to R side         BUE  ROM/Strength/  Fine motor Coordination Hand dominance: left  since her first stroke 30 years ago      RUE: ROM WFL  however limited at shoulder      Strength: grossly 3-/5      Strength: impaired     Coordination:  impaired     LUE: ROM WFL      Strength: grossly 4/5      Strength: WFL      Coordination: WFL        Safety   Poor           Fair- Fair + during functional activity     Comments: Upon arrival pt supine in bed, agreeable to OT, cleared by RN.  Therapist facilitated bed bed mobility/ADLs/functional transfers/mobility training with focus on safety, technique, precautions.  Pt. Instructed RE: safe transfers/mobility, ADLs, role of OT, treatment plan, recs., prec.  At end of session seated in bedside chair with alarm intact, RN notified, all lines and tubes intact, call light within reach.     Pt has made F+ progress towards set goals.   Continue with current plan of care    Treatment Time In: 13:00            Treatment Time Out: 13:30                Treatment Charges: Mins Units   Ther Ex  [id number]     Manual Therapy 84865     Thera Activities 38886 15 1   ADL/Home Mgt 09285 15 1   Neuro Re-ed [id number]     Group Therapy      Orthotic manage/training  80737     Non-Billable Time     Total Timed Treatment 30 2     SU Garcia/L   License #  [id number]

## 2024-10-31 NOTE — PROGRESS NOTES
HOSPITALIST PROGRESS NOTES     ADMITTING DATE AND TIME: 10/28/2024  9:48 PM  had concerns including Fatigue (Per EMS, pt from home, [facility] 11pm 10/27/24. Daughter called EMS was called for weakness. ).    ADMIT DX: Right sided weakness [R53.1]  Stroke-like symptoms [R29.90]  Acute cerebrovascular accident (CVA) (HCC) [I63.9]      SUBJECTIVE:  Patient is being followed for Right sided weakness [R53.1]  Stroke-like symptoms [R29.90]  Acute cerebrovascular accident (CVA) (HCC) [I63.9]     Patient was seen examined and evaluated  Recent lab results, charts and pertinent diagnostic imaging reviewed   Ancillary service notes reviewed   NAD    Care reviewed with nursing staff, medical and surgical specialty care, primary care and the patient's family as available.   ROS: denies fever, chills, cp, sob, n/v, HA unless stated above.       OBJECTIVE:    BP (!) 122/56   Pulse 65   Temp 97.5 °F (36.4 °C) (Temporal)   Resp 18   Ht 1.676 m (5' 6\")   Wt 74.9 kg (165 lb 3.2 oz)   LMP  (LMP Unknown)   SpO2 97%   BMI 26.66 kg/m²     General Appearance: alert and oriented to person, place and time and in no acute distress  Skin: warm and dry  Head: normocephalic and atraumatic  Eyes: pupils equal, round, and reactive to light, extraocular eye movements intact, conjunctivae normal  Neck: neck supple and non tender without mass   Pulmonary/Chest: clear to auscultation bilaterally- no wheezes, rales or rhonchi, normal air movement, no respiratory distress  Cardiovascular: normal rate, normal S1 and S2 and no carotid bruits  Abdomen: soft, non-tender, non-distended, normal bowel sounds, no masses or organomegaly  Extremities: no cyanosis, no clubbing and no edema  Neurologic: grossly intact. Residual right sided weakness     Recent Labs     10/28/24  2145 10/30/24  0516    135   K 4.4 4.1    99  function test normal and cell count normal. Admitted to the hospital for further care.                                                  ASSESSMENT AND PLAN:    Principal Problem:    Stroke-like symptoms  Active Problems:    Acute cerebrovascular accident (CVA) (HCC)    Cerebrovascular disease    Cardiac arrhythmia    Anemia    Dementia (HCC)    History of breast cancer    Seizure (HCC)    Disorientation    UTI (urinary tract infection)    Lung nodule    Right sided weakness  Resolved Problems:    * No resolved hospital problems. *    Acute Stroke/TIA  Hx of stroke with residual right sided weakness  No TNK per Francisco Pedroza  Continue aspirin Plavix and Lipitor  MRI brain ordered  Neurology to see    Hypothyroidism  Continue levothyroxine    Acute hypoxic resp insuffiencey   Rhino virus positive   Supportive care  Breathing treatment     Chronic systolic heart failure  Euvolemic on exam   Continue Entresto    Recurrent UTI  Allergic to penicillin   Cipro for 7 days   Personal hygiene     Acute metabolic encephalopathy   Altered mentation from baseline her daughters    Underling Dementia  High risk of delirium  Reorient, replace folate   Continue donepezil    DISPOSITION: Pending the above                                                                 This note was generated by the epic EMR system/Dragon speech recognition and may contain inherent errors or omissions not intended by the user. Grammatical errors, random word insertions, deletions, pronoun errors and incomplete sentences are occasional consequences of this technology due to software limitations. Not all errors are caught and corrected. If there are questions or concerns about the content of this note or information contained within the body of this dictation they should be addressed directly with the author for clarification.    Electronically signed by Nathaniel Chi MD on 10/31/2024 at 10:53 AM

## 2024-10-31 NOTE — CARE COORDINATION
Here for strokelike symptoms. Neurology consult.PT 9 OT 11  EEG completed awaiting MRI Speech -Easy to chew consistency solids with  thin liquids --silent aspiration can not be ruled out at the bedside, and if silent aspiration is suspected an MBSS is needed.  await MRI Spoke with daughter Rosangela today to discuss discharge plan/ PT eval. Asking PT to see again to day as did not attempt to ambulate yesterday and she is more alert today.A&0 X2  [facility]- await determination. Electronically signed by Renée Ewing RN on 10/31/2024 at 11:49 AM    [facility]  [facility] will start precert when updated notes go in.Electronically signed by Renée Ewing RN on 10/31/2024 at 2:07 PM    PASRR, envelope and ambulance form in soft chart.Electronically signed by Renée Ewing RN on 10/31/2024 at 2:40 PM

## 2024-11-01 LAB
ANION GAP SERPL CALCULATED.3IONS-SCNC: 14 MMOL/L (ref 7–16)
BASOPHILS # BLD: 0.02 K/UL (ref 0–0.2)
BASOPHILS NFR BLD: 0 % (ref 0–2)
BUN SERPL-MCNC: 16 MG/DL (ref 6–23)
CALCIUM SERPL-MCNC: 9.2 MG/DL (ref 8.6–10.2)
CHLORIDE SERPL-SCNC: 102 MMOL/L (ref 98–107)
CO2 SERPL-SCNC: 22 MMOL/L (ref 22–29)
CREAT SERPL-MCNC: 0.7 MG/DL (ref 0.5–1)
EOSINOPHIL # BLD: 0.17 K/UL (ref 0.05–0.5)
EOSINOPHILS RELATIVE PERCENT: 4 % (ref 0–6)
ERYTHROCYTE [DISTWIDTH] IN BLOOD BY AUTOMATED COUNT: 13.2 % (ref 11.5–15)
GFR, ESTIMATED: 85 ML/MIN/1.73M2
GLUCOSE SERPL-MCNC: 111 MG/DL (ref 74–99)
HCT VFR BLD AUTO: 39.3 % (ref 34–48)
HGB BLD-MCNC: 13.2 G/DL (ref 11.5–15.5)
IMM GRANULOCYTES # BLD AUTO: 0.03 K/UL (ref 0–0.58)
IMM GRANULOCYTES NFR BLD: 1 % (ref 0–5)
LYMPHOCYTES NFR BLD: 0.93 K/UL (ref 1.5–4)
LYMPHOCYTES RELATIVE PERCENT: 20 % (ref 20–42)
MCH RBC QN AUTO: 30.1 PG (ref 26–35)
MCHC RBC AUTO-ENTMCNC: 33.6 G/DL (ref 32–34.5)
MCV RBC AUTO: 89.5 FL (ref 80–99.9)
MONOCYTES NFR BLD: 0.62 K/UL (ref 0.1–0.95)
MONOCYTES NFR BLD: 13 % (ref 2–12)
NEUTROPHILS NFR BLD: 62 % (ref 43–80)
NEUTS SEG NFR BLD: 2.86 K/UL (ref 1.8–7.3)
PLATELET # BLD AUTO: 194 K/UL (ref 130–450)
PMV BLD AUTO: 9.6 FL (ref 7–12)
POTASSIUM SERPL-SCNC: 3.3 MMOL/L (ref 3.5–5)
RBC # BLD AUTO: 4.39 M/UL (ref 3.5–5.5)
RPR SER QL: NONREACTIVE
SODIUM SERPL-SCNC: 138 MMOL/L (ref 132–146)
WBC OTHER # BLD: 4.6 K/UL (ref 4.5–11.5)

## 2024-11-01 PROCEDURE — 2060000000 HC ICU INTERMEDIATE R&B

## 2024-11-01 PROCEDURE — 2580000003 HC RX 258: Performed by: INTERNAL MEDICINE

## 2024-11-01 PROCEDURE — 99232 SBSQ HOSP IP/OBS MODERATE 35: CPT | Performed by: INTERNAL MEDICINE

## 2024-11-01 PROCEDURE — 95816 EEG AWAKE AND DROWSY: CPT | Performed by: PSYCHIATRY & NEUROLOGY

## 2024-11-01 PROCEDURE — 6370000000 HC RX 637 (ALT 250 FOR IP): Performed by: INTERNAL MEDICINE

## 2024-11-01 PROCEDURE — 2500000003 HC RX 250 WO HCPCS: Performed by: INTERNAL MEDICINE

## 2024-11-01 PROCEDURE — 36415 COLL VENOUS BLD VENIPUNCTURE: CPT

## 2024-11-01 PROCEDURE — 97530 THERAPEUTIC ACTIVITIES: CPT

## 2024-11-01 PROCEDURE — 97535 SELF CARE MNGMENT TRAINING: CPT

## 2024-11-01 PROCEDURE — 92507 TX SP LANG VOICE COMM INDIV: CPT

## 2024-11-01 PROCEDURE — 6360000002 HC RX W HCPCS: Performed by: INTERNAL MEDICINE

## 2024-11-01 PROCEDURE — 80048 BASIC METABOLIC PNL TOTAL CA: CPT

## 2024-11-01 PROCEDURE — 92526 ORAL FUNCTION THERAPY: CPT

## 2024-11-01 PROCEDURE — 85025 COMPLETE CBC W/AUTO DIFF WBC: CPT

## 2024-11-01 RX ORDER — MAGNESIUM SULFATE IN WATER 40 MG/ML
2000 INJECTION, SOLUTION INTRAVENOUS PRN
Status: DISCONTINUED | OUTPATIENT
Start: 2024-11-01 | End: 2024-11-02 | Stop reason: HOSPADM

## 2024-11-01 RX ORDER — POTASSIUM CHLORIDE 7.45 MG/ML
10 INJECTION INTRAVENOUS PRN
Status: DISCONTINUED | OUTPATIENT
Start: 2024-11-01 | End: 2024-11-02 | Stop reason: HOSPADM

## 2024-11-01 RX ORDER — CIPROFLOXACIN 500 MG/1
500 TABLET, FILM COATED ORAL EVERY 12 HOURS SCHEDULED
Qty: 11 TABLET | Refills: 0 | Status: SHIPPED | OUTPATIENT
Start: 2024-11-02 | End: 2024-11-08

## 2024-11-01 RX ORDER — POTASSIUM CHLORIDE 1500 MG/1
40 TABLET, EXTENDED RELEASE ORAL PRN
Status: DISCONTINUED | OUTPATIENT
Start: 2024-11-01 | End: 2024-11-02 | Stop reason: HOSPADM

## 2024-11-01 RX ADMIN — MICONAZOLE NITRATE: 20.6 POWDER TOPICAL at 23:53

## 2024-11-01 RX ADMIN — ATORVASTATIN CALCIUM 40 MG: 40 TABLET, FILM COATED ORAL at 20:48

## 2024-11-01 RX ADMIN — DONEPEZIL HYDROCHLORIDE 10 MG: 5 TABLET, FILM COATED ORAL at 09:07

## 2024-11-01 RX ADMIN — ASPIRIN 81 MG: 81 TABLET, COATED ORAL at 09:07

## 2024-11-01 RX ADMIN — AMANTADINE HYDROCHLORIDE 100 MG: 100 CAPSULE ORAL at 20:48

## 2024-11-01 RX ADMIN — SODIUM CHLORIDE, PRESERVATIVE FREE 10 ML: 5 INJECTION INTRAVENOUS at 20:49

## 2024-11-01 RX ADMIN — CLOPIDOGREL BISULFATE 75 MG: 75 TABLET ORAL at 09:07

## 2024-11-01 RX ADMIN — SODIUM CHLORIDE, PRESERVATIVE FREE 10 ML: 5 INJECTION INTRAVENOUS at 09:07

## 2024-11-01 RX ADMIN — SACUBITRIL AND VALSARTAN 1 TABLET: 24; 26 TABLET, FILM COATED ORAL at 20:48

## 2024-11-01 RX ADMIN — LEVOTHYROXINE SODIUM 88 MCG: 0.09 TABLET ORAL at 05:25

## 2024-11-01 RX ADMIN — PETROLATUM: 420 OINTMENT TOPICAL at 23:53

## 2024-11-01 RX ADMIN — METOPROLOL SUCCINATE 50 MG: 50 TABLET, EXTENDED RELEASE ORAL at 09:07

## 2024-11-01 RX ADMIN — ENOXAPARIN SODIUM 40 MG: 100 INJECTION SUBCUTANEOUS at 09:07

## 2024-11-01 RX ADMIN — SACUBITRIL AND VALSARTAN 1 TABLET: 24; 26 TABLET, FILM COATED ORAL at 09:07

## 2024-11-01 RX ADMIN — CIPROFLOXACIN HYDROCHLORIDE 500 MG: 250 TABLET, FILM COATED ORAL at 23:52

## 2024-11-01 RX ADMIN — MICONAZOLE NITRATE: 20.6 POWDER TOPICAL at 09:06

## 2024-11-01 RX ADMIN — AMANTADINE HYDROCHLORIDE 100 MG: 100 CAPSULE ORAL at 09:07

## 2024-11-01 RX ADMIN — FOLIC ACID 1 MG: 5 INJECTION, SOLUTION INTRAMUSCULAR; INTRAVENOUS; SUBCUTANEOUS at 10:34

## 2024-11-01 RX ADMIN — PETROLATUM: 420 OINTMENT TOPICAL at 09:06

## 2024-11-01 RX ADMIN — CIPROFLOXACIN HYDROCHLORIDE 500 MG: 250 TABLET, FILM COATED ORAL at 11:47

## 2024-11-01 RX ADMIN — POTASSIUM CHLORIDE 40 MEQ: 1500 TABLET, EXTENDED RELEASE ORAL at 09:07

## 2024-11-01 NOTE — PROGRESS NOTES
Discharge Note  Short Stay      SUMMARY     Admit Date: 12/8/2022    Attending Physician: Tavares Monet MD        Discharge Physician: Tavares Monet MD        Discharge Date: 12/8/2022 7:46 AM    Procedure(s) (LRB):  diagnostic Bilateral C5-7 MBB with RN IV sedation (Bilateral)    Final Diagnosis: Cervical spondylosis [M47.812]    Disposition: Home or self care    Patient Instructions:   Discharge Medication List as of 12/8/2022  6:22 AM        CONTINUE these medications which have NOT CHANGED    Details   !! oxyCODONE-acetaminophen (PERCOCET) 5-325 mg per tablet Take 1 tablet Q8-12H PRN pain.  *30 DAY SUPPLY*, Normal      acetaminophen (TYLENOL) 500 MG tablet Take 1 tablet (500 mg total) by mouth every 8 (eight) hours as needed for Pain., Starting Wed 2/19/2020, Normal      fluticasone propionate (FLONASE) 50 mcg/actuation nasal spray 2 PUFFS EACH NOSTRIL ONCE DAILY, Normal      gabapentin (NEURONTIN) 300 MG capsule Multiple Dosages:Starting Tue 11/29/2022Take 1 capsule (300 mg total) by mouth every morning AND 1 capsule (300 mg total) with lunch AND 2 capsules (600 mg total) every evening., Normal      nabumetone (RELAFEN) 500 MG tablet Take 1 tablet (500 mg total) by mouth 2 (two) times daily as needed for Pain. take with food., Starting Tue 11/29/2022, Normal      !! oxyCODONE-acetaminophen (PERCOCET) 5-325 mg per tablet Take 1 tablet Q8-12H PRN pain. *30 DAY SUPPLY*, Normal      tiZANidine (ZANAFLEX) 4 MG tablet Take 0.5-1 tablets (2-4 mg total) by mouth 2 (two) times daily as needed (muscle spasms). May cause drowsiness., Starting Tue 11/29/2022, Normal       !! - Potential duplicate medications found. Please discuss with provider.              Discharge Diagnosis: Cervical spondylosis [M47.812]  Condition on Discharge: Stable with no complications to procedure   Diet on Discharge: Same as before.  Activity: as per instruction sheet.  Discharge to: Home with a responsible adult.  Follow up: 2-4  Message sent to Dr. Chi regarding pt K of 3.3    weeks       Please call the office at [phone] if you experience any weakness or loss of sensation, fever > 101.5, pain uncontrolled with oral medications, persistent nausea/vomiting/or diarrhea, redness or drainage from the incisions, or any other worrisome concerns. If physician on call was not reached or could not communicate with our office for any reason please go to the nearest emergency department

## 2024-11-01 NOTE — PROGRESS NOTES
OCCUPATIONAL THERAPY TREATMENT NOTE    NOMAN [facility]      OT BEDSIDE TREATMENT NOTE      Date:2024  Patient Name: Nika Aviles  MRN: [id number]  : 1945  Room: [address] [place] [address]-A     Evaluating OT: Austin Britton OTR/L [id number]     Referring Provider: Zulema Blunt MD                                      Specific Provider Orders/Date: OT evaluation and treatment 10/29/24 0845     Diagnosis:  Right sided weakness [R53.1]  Stroke-like symptoms [R29.90]  Acute cerebrovascular accident (CVA) (HCC) [I63.9]       Pertinent Medical History:  has a past medical history of Breast CA (HCC), Breast cancer (HCC), Cerebral artery occlusion with cerebral infarction (HCC), Chronic kidney disease, Dementia (HCC), Diabetes mellitus (HCC), Disease of blood and blood forming organ, Diverticulitis, History of therapeutic radiation, Hyperlipidemia, Hypertension, Hypothyroidism, Lymphoma (HCC), Osteoarthritis, Prolonged emergence from general anesthesia, and Thyroid disease.   Past Surgical History         Past Surgical History:   Procedure Laterality Date    ABDOMEN SURGERY        BACK SURGERY         neck fusion bone graft-no ROM limitations    BREAST LUMPECTOMY        BREAST SURGERY Right       tumor and lymph node resection-CA    BRONCHOSCOPY N/A 10/01/2019     BRONCHOSCOPY ALVEOLAR LAVAGE WITH BRUSHINGS performed by Arley Delgado MD at [facility]    COLONOSCOPY        ENDOSCOPY, COLON, DIAGNOSTIC        FRACTURE SURGERY         elbow radius    HYSTERECTOMY (CERVIX STATUS UNKNOWN)        KNEE ARTHROSCOPY        MEDIPORT INSERTION, SINGLE Left 2016     chest    OTHER SURGICAL HISTORY   2017     mediport insertion    OTHER SURGICAL HISTORY   2017     removal foreign body    OVARY REMOVAL        TOTAL KNEE ARTHROPLASTY Right 2022     ANNEMARIE ROBOTIC ASSISTED RIGHT KNEE TOTAL ARTHROPLASTY performed by Clarence Harris MD at [facility] OR    UPPER GASTROINTESTINAL ENDOSCOPY                [facility]   Falls: no   Driving: no   Occupation/leisure: did not state      Pleasant and cooperative throughout session       Pain Level: denies pain   Location: n/a  OT provided: n/a     Cognition: A&O: person & place but not able to state name of hospital, unable to recall month or year, baseline   (Dtr reports, pt is typically alert to place as well)               Follows single step directions: Fair + with cues               Memory: Poor              Sequencing: Fair-              Problem solving: Poor+              Judgement/safety: Poor +              Attention: Fair      Functional Assessment: AM-PAC Inpatient Daily Activity Raw Score: 13 /24       Initial Eval Status  Date: 10/30/2024   Treatment Status  Date: 11-1-24 STG=LTG  Time frame: 10-14 days   Feeding Min A     SBA  Up in chair for lunch this date  Supervision    Grooming Mod A      Min A  Overall to wash face & hands seated this date, cues to incorporate R UE  Min A    UB Dressing Max A  [place] and donning gown bed level   Mod A  Doff/cheng gown with cues to attend to R UE Min A    LB Dressing Total/Dependent  Socks  Max A   to don brief seated EOB, pull past hips in standing Mod A    Bathing Max A  Simulated   Max  sim. task Mod A    Toileting Total/Dependent  Incontinent of bowel and bladder. Total A for annmarie care and clothing management bed level via rolling   NT Mod A    Bed Mobility  Rolling L/R: mod to the L  Min to the R    Supine to sit: Mod A    Sit to supine: Mod A    Logroll: Min A  Sup > Sit: Min A   Supine to sit: Stand by assist   Sit to supine: Stand by assist    Functional Transfers Sit to stand: Mod A    Stand to sit: Mod A    Stand pivot: NT   Min/Mod A   Sit <> stand  Depends upon height of surface, for assistance, cues for hand placement & technique     Mod A  SPT with ww Stand by assist    Functional Mobility NT attempted side steps to sit higher up in bed, Pt unable to fully weight shift and progress feet   Mod A   with ww

## 2024-11-01 NOTE — DISCHARGE SUMMARY
[facility] Hospitalist Physician Discharge Summary       Zana Pandya MD  [address]  [facility] [address] [phone]    Schedule an appointment as soon as possible for a visit in 1 week(s)  Hospital followup      Activity level: As tolerated     Dispo: Home      Condition on discharge: Stable     Patient ID:  Nika Aviles  [id number]  79 y.o.  1945    Admit date: 10/28/2024    Discharge date and time:  11/1/2024  3:47 PM    Admission Diagnoses: Principal Problem:    Stroke-like symptoms  Active Problems:    Acute cerebrovascular accident (CVA) (HCC)    Cerebrovascular disease    Cardiac arrhythmia    Anemia    Dementia (HCC)    History of breast cancer    Seizure (HCC)    Disorientation    UTI (urinary tract infection)    Lung nodule    Right sided weakness  Resolved Problems:    * No resolved hospital problems. *      Discharge Diagnoses: Principal Problem:    Stroke-like symptoms  Active Problems:    Acute cerebrovascular accident (CVA) (HCC)    Cerebrovascular disease    Cardiac arrhythmia    Anemia    Dementia (HCC)    History of breast cancer    Seizure (HCC)    Disorientation    UTI (urinary tract infection)    Lung nodule    Right sided weakness  Resolved Problems:    * No resolved hospital problems. *      Consults:  IP CONSULT TO INTERNAL MEDICINE  IP CONSULT TO NEUROLOGY  IP CONSULT TO CASE MANAGEMENT  IP CONSULT TO STROKE TEAM    Hospital Course:   Patient Nika Aviles is a 79 y.o. presented with Right sided weakness [R53.1]  Stroke-like symptoms [R29.90]  Acute cerebrovascular accident (CVA) (HCC) [I63.9]    79-year-old female with past medical history of stroke with right-sided deficit, chronic systolic heart failure, hypothyroidism, hypertension, hyperlipidemia, non-Hodgkin's lymphoma in remission, brought in here by daughter due to patient was not acting normal.  She was unable to take food and some weakness on the right side. She uses walker and home  is high risk, consider an additional noncontrast CT of the chest at 18-24 months.       Patient Instructions:      Medication List        START taking these medications      ciprofloxacin 500 MG tablet  Commonly known as: CIPRO  Take 1 tablet by mouth every 12 hours for 11 doses  Start taking on: November 2, 2024            CONTINUE taking these medications      amantadine 100 MG capsule  Commonly known as: SYMMETREL     aspirin 81 MG EC tablet  Take 1 tablet by mouth daily     atorvastatin 40 MG tablet  Commonly known as: LIPITOR     clopidogrel 75 MG tablet  Commonly known as: PLAVIX  Take 1 tablet by mouth daily     donepezil 10 MG tablet  Commonly known as: ARICEPT     levothyroxine 88 MCG tablet  Commonly known as: SYNTHROID  Take 1 tablet by mouth Daily     metFORMIN 500 MG extended release tablet  Commonly known as: GLUCOPHAGE-XR     metoprolol succinate 50 MG extended release tablet  Commonly known as: TOPROL XL  Take 1 tablet by mouth daily     sacubitril-valsartan 24-26 MG per tablet  Commonly known as: ENTRESTO  Take 1 tablet by mouth 2 times daily            STOP taking these medications      cefdinir 300 MG capsule  Commonly known as: OMNICEF               Where to Get Your Medications        These medications were sent to [facility] - [place], [place] - [address] - P [phone] - F [phone]  [facility] [place] [address]      Phone: [phone]   ciprofloxacin 500 MG tablet         INTERNAL MEDICINE FOLLOW UP/INSTRUCTIONS:  Follow-up with primary care physician within 1 week of discharge from hospital  Please review changes to pre-hospital admission medications and prescriptions for new medications upon discharge from the hospital with PCP  Please review results of labs and imaging studies with PCP  Follow-up with consultants as directed by them   If recurrence or worsening of symptoms please call primary care physician or return to the ER immediately  Diet: No diet orders on file      39

## 2024-11-01 NOTE — PROGRESS NOTES
SPEECH LANGUAGE PATHOLOGY  DAILY PROGRESS NOTE        PATIENT NAME:  Nika Aviles      :  1945          TODAY'S DATE:  2024 ROOM:  8510/85-A    Patient seen for skilled speech/lang tx. Patient alert and oriented x 1 only. Patient able to complete simple verbal problem solving tasks related to current environment with 75% acc w/ min verbal cues required. Patient able to answer simple WH questions related to orientation and recent events with 50% acc w/ frequent repetition of information required.        48439  speech/language tx-10 min      Patient seen for skilled dysphagia tx. Patient received easy to chew solids and thin liquids via cup. Patient oral stage exhibited min oral stasis after 1st swl that cleared w/ 2nd swl and/or liquid wash, fair bolus formation;manipulation, adequate mastication time, and fair AP tongue propulsion. Patient pharyngeal stage exhibited no overt clinical indicators of pen/aspiration. Patient to continue with east to chew solids and thin liquids.   95996  dysphagia tx- 10 min      Total minutes :  20 minutes     Chari Guzman M.S., CCC-SLP  Speech-Language Pathologist  SP. [id number]

## 2024-11-01 NOTE — CARE COORDINATION
Here for -strokelike symptoms MRI negative for acute infarct . Potassium today 3.3 Per Devi keenan assistant- insurance approval for [facility] 11/1- 11/7. Ntfd [facility] with facility if not ready today can come over weekend. PASRR, envelope and ambulance form in soft chart. Electronically signed by Renée Ewing RN on 11/1/2024 at 9:14 AM    Email to Toy Cowan / Jennifer WEEKS  manager to see if EEG can be read.Electronically signed by Renée Ewing RN on 11/1/2024 at 2:44 PM    Spoke with Dr Lyn (neurology) who was on unit and she does not know who reads EEGS  as she does not. Electronically signed by Renée Ewing RN on 11/1/2024 at 3:05 PM    Message to attending that EEG has been read. Also ntfd daughter Rosangela that she has been accepted to [facility] and they have precert.Electronically signed by Renée Ewing RN on 11/1/2024 at 3:39 PM

## 2024-11-01 NOTE — PROGRESS NOTES
Physical Therapy  Physical Therapy Treatment    Name: Nika Aviles  : 1945  MRN: [id number]      Date of Service: 2024    Evaluating PT:  Jennifer Zepeda PT, SONJA [id number]    Room #:  8510/8510-A  Diagnosis:  Right sided weakness [R53.1]  Stroke-like symptoms [R29.90]  Acute cerebrovascular accident (CVA) (HCC) [I63.9]  PMHx/PSHx:   has a past medical history of Breast CA (HCC), Breast cancer (HCC), Cerebral artery occlusion with cerebral infarction (HCC), Chronic kidney disease, Dementia (HCC), Diabetes mellitus (HCC), Disease of blood and blood forming organ, Diverticulitis, History of therapeutic radiation, Hyperlipidemia, Hypertension, Hypothyroidism, Lymphoma (HCC), Osteoarthritis, Prolonged emergence from general anesthesia, and Thyroid disease.   has a past surgical history that includes Hysterectomy; Knee arthroscopy; Colonoscopy; Upper gastrointestinal endoscopy; fracture surgery; Endoscopy, colon, diagnostic; other surgical history (); other surgical history (2017); Abdomen surgery; bronchoscopy (N/A, 10/01/2019); back surgery; Breast surgery (Right); Mediport insertion, single (Left, ); Total knee arthroplasty (Right, 2022); Ovary removal; and Breast lumpectomy.  Procedure/Surgery:  None  Precautions:  Fall risk, alarms, incontinent, cognition, Hx of dementia, Hx of CVA with residual R-sided weakness, droplet isolation (rhinovirus), R inattention  Equipment Needs:  TBD    SUBJECTIVE:    Pt lives with her daughter in a 1 story home with ramp to enter. Pt ambulated with a FWW (in her home) and uses a WC (outside her home) PTA.    OBJECTIVE:   Initial Evaluation  Date: 10/30/24 Treatment Date:   24 Short Term/ Long Term   Goals   AM-PAC 6 Clicks     Was pt agreeable to Eval/treatment? Yes yes    Does pt have pain? Denied None reported    Bed Mobility  Rolling: ModA (to the R), MaxA (to the L)  Supine to sit: ModA  Sit to supine: ModA  Scooting: ModA Rolling: SBA

## 2024-11-01 NOTE — PROGRESS NOTES
HOSPITALIST PROGRESS NOTES     ADMITTING DATE AND TIME: 10/28/2024  9:48 PM  had concerns including Fatigue (Per EMS, pt from home, [facility] 11pm 10/27/24. Daughter called EMS was called for weakness. ).    ADMIT DX: Right sided weakness [R53.1]  Stroke-like symptoms [R29.90]  Acute cerebrovascular accident (CVA) (HCC) [I63.9]      SUBJECTIVE:  Patient is being followed for Right sided weakness [R53.1]  Stroke-like symptoms [R29.90]  Acute cerebrovascular accident (CVA) (HCC) [I63.9]     Patient was seen examined and evaluated  Recent lab results, charts and pertinent diagnostic imaging reviewed   Ancillary service notes reviewed   NAD    Care reviewed with nursing staff, medical and surgical specialty care, primary care and the patient's family as available.   ROS: denies fever, chills, cp, sob, n/v, HA unless stated above.       OBJECTIVE:    /69   Pulse 66   Temp 99 °F (37.2 °C) (Oral)   Resp 18   Ht 1.676 m (5' 6\")   Wt 74.9 kg (165 lb 3.2 oz)   LMP  (LMP Unknown)   SpO2 93%   BMI 26.66 kg/m²     General Appearance: alert and oriented to person, place and time and in no acute distress  Skin: warm and dry  Head: normocephalic and atraumatic  Eyes: pupils equal, round, and reactive to light, extraocular eye movements intact, conjunctivae normal  Neck: neck supple and non tender without mass   Pulmonary/Chest: clear to auscultation bilaterally- no wheezes, rales or rhonchi, normal air movement, no respiratory distress  Cardiovascular: normal rate, normal S1 and S2 and no carotid bruits  Abdomen: soft, non-tender, non-distended, normal bowel sounds, no masses or organomegaly  Extremities: no cyanosis, no clubbing and no edema  Neurologic: grossly intact. Residual right sided weakness     Recent Labs     10/30/24  0516 10/31/24  0610 11/01/24  0622    136 138   K 4.1 4.1 3.3*  respirations 16 and temperature 98.5 °F.  Blood chemistry normal troponin 24 liver function test normal and cell count normal. Admitted to the hospital for further care.                                                  ASSESSMENT AND PLAN:    Principal Problem:    Stroke-like symptoms  Active Problems:    Acute cerebrovascular accident (CVA) (HCC)    Cerebrovascular disease    Cardiac arrhythmia    Anemia    Dementia (HCC)    History of breast cancer    Seizure (HCC)    Disorientation    UTI (urinary tract infection)    Lung nodule    Right sided weakness  Resolved Problems:    * No resolved hospital problems. *    Acute Stroke/TIA  Hx of stroke with residual right sided weakness  No TNK per Francisco Pedroza  Continue aspirin Plavix and Lipitor  MRI brain shows stable old infracts   Neurology to see    Hypothyroidism  Continue levothyroxine    Acute hypoxic resp insuffiencey   Rhino virus positive   Supportive care  Breathing treatment     Chronic systolic heart failure  Euvolemic on exam   Continue Entresto    Recurrent UTI  Allergic to penicillin   Cipro for 7 days   Personal hygiene     Acute metabolic encephalopathy   Altered mentation from baseline her daughters    Underling Dementia  High risk of delirium  Reorient, replace folate   Continue donepezil  Replace K    DISPOSITION: Pending final neurology  recommendation                                                                This note was generated by the epic EMR system/Dragon speech recognition and may contain inherent errors or omissions not intended by the user. Grammatical errors, random word insertions, deletions, pronoun errors and incomplete sentences are occasional consequences of this technology due to software limitations. Not all errors are caught and corrected. If there are questions or concerns about the content of this note or information contained within the body of this dictation they should be addressed directly with the author for

## 2024-11-01 NOTE — PROCEDURES
PROCEDURE NOTE  Date: 11/1/2024   Name: Nika Aviles  YOB: 1945    EEG report    This 79-year-old woman, on ondansetron, amantadine, metformin, lorazepam, aspirin, clopidogrel, donepezil, levothyroxine, metoprolol, atorvastatin, sacubitril/valsartan, displayed the following underlying rhythm---very well-organized, synchronous, 10 to 12 Hz, 30 µV alpha rhythms in both posterior regions 18 Hz, 10 µV beta rhythms were noted in both precentral regions.  There was attenuation of the posterior alpha rhythm with eye opening.  Hyperventilation was not performed.  There was fair driving the photostimulation, without abnormalities.  The patient did become drowsy, progressing uneventfully through stage I of somnolence.  Throughout this tracing, there were no focal abnormalities or epileptiform discharges.    Impression---normal awake and drowsy EEG

## 2024-11-01 NOTE — PLAN OF CARE
Patient sitting up in chair at bedside.  Discussed MRI brain results negative for acute findings.  EEG pending read.    Once EEG read and with no seizures reported pt can d/c.    Continue DAPT and statin  Continue Keppra 1000 mg BID

## 2024-11-02 VITALS
SYSTOLIC BLOOD PRESSURE: 128 MMHG | OXYGEN SATURATION: 96 % | HEART RATE: 71 BPM | DIASTOLIC BLOOD PRESSURE: 72 MMHG | TEMPERATURE: 97.3 F | HEIGHT: 66 IN | RESPIRATION RATE: 18 BRPM | WEIGHT: 165.2 LBS | BODY MASS INDEX: 26.55 KG/M2

## 2024-11-02 LAB
ANION GAP SERPL CALCULATED.3IONS-SCNC: 12 MMOL/L (ref 7–16)
BASOPHILS # BLD: 0.03 K/UL (ref 0–0.2)
BASOPHILS NFR BLD: 1 % (ref 0–2)
BUN SERPL-MCNC: 17 MG/DL (ref 6–23)
CALCIUM SERPL-MCNC: 9 MG/DL (ref 8.6–10.2)
CHLORIDE SERPL-SCNC: 104 MMOL/L (ref 98–107)
CO2 SERPL-SCNC: 23 MMOL/L (ref 22–29)
CREAT SERPL-MCNC: 0.8 MG/DL (ref 0.5–1)
EOSINOPHIL # BLD: 0.21 K/UL (ref 0.05–0.5)
EOSINOPHILS RELATIVE PERCENT: 5 % (ref 0–6)
ERYTHROCYTE [DISTWIDTH] IN BLOOD BY AUTOMATED COUNT: 13.2 % (ref 11.5–15)
GFR, ESTIMATED: 82 ML/MIN/1.73M2
GLUCOSE SERPL-MCNC: 125 MG/DL (ref 74–99)
HCT VFR BLD AUTO: 40.5 % (ref 34–48)
HGB BLD-MCNC: 13.7 G/DL (ref 11.5–15.5)
IMM GRANULOCYTES # BLD AUTO: <0.03 K/UL (ref 0–0.58)
IMM GRANULOCYTES NFR BLD: 1 % (ref 0–5)
LYMPHOCYTES NFR BLD: 1 K/UL (ref 1.5–4)
LYMPHOCYTES RELATIVE PERCENT: 24 % (ref 20–42)
MCH RBC QN AUTO: 30.2 PG (ref 26–35)
MCHC RBC AUTO-ENTMCNC: 33.8 G/DL (ref 32–34.5)
MCV RBC AUTO: 89.2 FL (ref 80–99.9)
MONOCYTES NFR BLD: 0.62 K/UL (ref 0.1–0.95)
MONOCYTES NFR BLD: 15 % (ref 2–12)
NEUTROPHILS NFR BLD: 55 % (ref 43–80)
NEUTS SEG NFR BLD: 2.34 K/UL (ref 1.8–7.3)
PLATELET # BLD AUTO: 207 K/UL (ref 130–450)
PMV BLD AUTO: 9.6 FL (ref 7–12)
POTASSIUM SERPL-SCNC: 3.5 MMOL/L (ref 3.5–5)
RBC # BLD AUTO: 4.54 M/UL (ref 3.5–5.5)
SODIUM SERPL-SCNC: 139 MMOL/L (ref 132–146)
WBC OTHER # BLD: 4.2 K/UL (ref 4.5–11.5)

## 2024-11-02 PROCEDURE — 6360000002 HC RX W HCPCS: Performed by: INTERNAL MEDICINE

## 2024-11-02 PROCEDURE — 2580000003 HC RX 258: Performed by: INTERNAL MEDICINE

## 2024-11-02 PROCEDURE — 36415 COLL VENOUS BLD VENIPUNCTURE: CPT

## 2024-11-02 PROCEDURE — 2500000003 HC RX 250 WO HCPCS: Performed by: INTERNAL MEDICINE

## 2024-11-02 PROCEDURE — 6370000000 HC RX 637 (ALT 250 FOR IP): Performed by: INTERNAL MEDICINE

## 2024-11-02 PROCEDURE — 99239 HOSP IP/OBS DSCHRG MGMT >30: CPT | Performed by: INTERNAL MEDICINE

## 2024-11-02 PROCEDURE — 80048 BASIC METABOLIC PNL TOTAL CA: CPT

## 2024-11-02 PROCEDURE — 85025 COMPLETE CBC W/AUTO DIFF WBC: CPT

## 2024-11-02 RX ADMIN — ENOXAPARIN SODIUM 40 MG: 100 INJECTION SUBCUTANEOUS at 09:38

## 2024-11-02 RX ADMIN — CIPROFLOXACIN HYDROCHLORIDE 500 MG: 250 TABLET, FILM COATED ORAL at 13:00

## 2024-11-02 RX ADMIN — METOPROLOL SUCCINATE 50 MG: 50 TABLET, EXTENDED RELEASE ORAL at 09:40

## 2024-11-02 RX ADMIN — AMANTADINE HYDROCHLORIDE 100 MG: 100 CAPSULE ORAL at 09:38

## 2024-11-02 RX ADMIN — FOLIC ACID 1 MG: 5 INJECTION, SOLUTION INTRAMUSCULAR; INTRAVENOUS; SUBCUTANEOUS at 13:00

## 2024-11-02 RX ADMIN — SACUBITRIL AND VALSARTAN 1 TABLET: 24; 26 TABLET, FILM COATED ORAL at 09:39

## 2024-11-02 RX ADMIN — DONEPEZIL HYDROCHLORIDE 10 MG: 5 TABLET, FILM COATED ORAL at 09:38

## 2024-11-02 RX ADMIN — MICONAZOLE NITRATE: 20.6 POWDER TOPICAL at 09:47

## 2024-11-02 RX ADMIN — ASPIRIN 81 MG: 81 TABLET, COATED ORAL at 09:40

## 2024-11-02 RX ADMIN — LEVOTHYROXINE SODIUM 88 MCG: 0.09 TABLET ORAL at 05:57

## 2024-11-02 RX ADMIN — CLOPIDOGREL BISULFATE 75 MG: 75 TABLET ORAL at 09:38

## 2024-11-02 RX ADMIN — PETROLATUM: 420 OINTMENT TOPICAL at 09:48

## 2024-11-02 RX ADMIN — SODIUM CHLORIDE, PRESERVATIVE FREE 10 ML: 5 INJECTION INTRAVENOUS at 09:41

## 2024-11-02 ASSESSMENT — PAIN SCALES - GENERAL
PAINLEVEL_OUTOF10: 0
PAINLEVEL_OUTOF10: 0

## 2024-11-02 NOTE — DISCHARGE SUMMARY
[facility] Hospitalist Physician Discharge Summary       Zana Pandya MD  [address]  [place] [address] [phone]    Schedule an appointment as soon as possible for a visit in 1 week(s)  Hospital followup      Activity level: As tolerated     Dispo: Home      Condition on discharge: Stable     Patient ID:  Nika Aviles  [id number]  79 y.o.  1945    Admit date: 10/28/2024    Discharge date and time:  11/2/2024  11:53 AM    Admission Diagnoses: Principal Problem:    Stroke-like symptoms  Active Problems:    Acute cerebrovascular accident (CVA) (HCC)    Cerebrovascular disease    Cardiac arrhythmia    Anemia    Dementia (HCC)    History of breast cancer    Seizure (HCC)    Disorientation    UTI (urinary tract infection)    Lung nodule    Right sided weakness  Resolved Problems:    * No resolved hospital problems. *      Discharge Diagnoses: Principal Problem:    Stroke-like symptoms  Active Problems:    Acute cerebrovascular accident (CVA) (HCC)    Cerebrovascular disease    Cardiac arrhythmia    Anemia    Dementia (HCC)    History of breast cancer    Seizure (HCC)    Disorientation    UTI (urinary tract infection)    Lung nodule    Right sided weakness  Resolved Problems:    * No resolved hospital problems. *      Consults:  IP CONSULT TO INTERNAL MEDICINE  IP CONSULT TO NEUROLOGY  IP CONSULT TO CASE MANAGEMENT  IP CONSULT TO STROKE TEAM    Hospital Course:   Patient Nika Aviles is a 79 y.o. presented with Right sided weakness [R53.1]  Stroke-like symptoms [R29.90]  Acute cerebrovascular accident (CVA) (HCC) [I63.9]    79-year-old female with past medical history of stroke with right-sided deficit, chronic systolic heart failure, hypothyroidism, hypertension, hyperlipidemia, non-Hodgkin's lymphoma in remission, brought in here by daughter due to patient was not acting normal.  She was unable to take food and some weakness on the right side. She uses walker and home  discharge papers, discussing discharge with patient, medication review, etc.    This note was generated by the epic EMR system/Dragon speech recognition and may contain inherent errors or omissions not intended by the user. Grammatical errors, random word insertions, deletions, pronoun errors and incomplete sentences are occasional consequences of this technology due to software limitations. Not all errors are caught and corrected. If there are questions or concerns about the content of this note or information contained within the body of this dictation they should be addressed directly with the author for clarification.     Signed:  Electronically signed by Nathaniel Chi MD on 11/2/2024 at 11:53 AM

## 2024-11-02 NOTE — CARE COORDINATION
11/2/2024discharge order noted  Sw set up pas ambulance for 1:30 pm to [facility] [place]. Sw notified floor and facility liaison. Rn to notify family.  Electronically signed by JIA Sanchez on 11/2/2024 at 8:49 AM

## 2024-11-02 NOTE — PLAN OF CARE
Problem: Safety - Adult  Goal: Free from fall injury  Outcome: Completed     Problem: Discharge Planning  Goal: Discharge to home or other facility with appropriate resources  Outcome: Completed     Problem: Skin/Tissue Integrity  Goal: Absence of new skin breakdown  Description: 1.  Monitor for areas of redness and/or skin breakdown  2.  Assess vascular access sites hourly  3.  Every 4-6 hours minimum:  Change oxygen saturation probe site  4.  Every 4-6 hours:  If on nasal continuous positive airway pressure, respiratory therapy assess nares and determine need for appliance change or resting period.  Outcome: Completed     Problem: ABCDS Injury Assessment  Goal: Absence of physical injury  Outcome: Completed

## 2024-11-02 NOTE — PLAN OF CARE
Problem: Safety - Adult  Goal: Free from fall injury  Outcome: Progressing  Flowsheets (Taken 11/1/2024 2150)  Free From Fall Injury: Instruct family/caregiver on patient safety     Problem: Discharge Planning  Goal: Discharge to home or other facility with appropriate resources  Outcome: Progressing  Flowsheets (Taken 11/1/2024 2000)  Discharge to home or other facility with appropriate resources: Identify barriers to discharge with patient and caregiver     Problem: Skin/Tissue Integrity  Goal: Absence of new skin breakdown  Description: 1.  Monitor for areas of redness and/or skin breakdown  2.  Assess vascular access sites hourly  3.  Every 4-6 hours minimum:  Change oxygen saturation probe site  4.  Every 4-6 hours:  If on nasal continuous positive airway pressure, respiratory therapy assess nares and determine need for appliance change or resting period.  Outcome: Progressing     Problem: ABCDS Injury Assessment  Goal: Absence of physical injury  Outcome: Progressing

## 2024-11-04 ENCOUNTER — TELEPHONE (OUTPATIENT)
Dept: CARDIOLOGY CLINIC | Age: 79
End: 2024-11-04

## 2024-11-04 LAB — PYRIDOXAL PHOS SERPL-SCNC: 7.4 NMOL/L (ref 20–125)

## 2024-11-05 LAB — VIT B1 PYROPHOSHATE BLD-SCNC: 143 NMOL/L (ref 70–180)

## 2024-11-06 LAB
SEND OUT REPORT: NORMAL
TEST NAME: NORMAL

## 2024-11-07 NOTE — PROGRESS NOTES
Physician Progress Note      PATIENT:               ANDREIA SUE  CSN #:                  [id number]  :                       1945  ADMIT DATE:       10/28/2024 9:48 PM  DISCH DATE:        2024 1:51 PM  RESPONDING  PROVIDER #:        Nathaniel Chi MD          QUERY TEXT:    Pt admitted with Right sided weakness. Pt noted to have  Hx of stroke with   residual right sided weakness in  Discharge Summary note on . If   possible, please document in progress notes and discharge summary the   relationship, if any, between Right sided weakness and Acute Stroke, TIA, Hx   of stroke.    The medical record reflects the following:  Risk Factors: Acute metabolic encephalopathy, 79 yr old female, Hx of stroke.  Clinical Indicators: Discharge Summary note on  Acute Stroke/TIA, Hx of   stroke with residual right sided weakness, No TNK per Francisco Pedroza  Neurology PN on 10/31 Stroke recrudescence vs new acute stroke vs seizure    MRI of brain 10/30 No acute intracranial abnormality. No sign of acute   infarct. Stable appearance of old infarction involving the left caudate head,   the  anterior caudate body, and the anterior left basal ganglia. Stable appearance   of old lacunar infarction in the anterior right centrum semiovale.    Treatment: aspirin Plavix and Lipitor, Neurology consult, MRI of brain,    Thank you,  CHRIS Medrano CDS  Options provided:  -- Right sided weakness due to Hx of stroke  -- Right sided weakness due to TIA  -- Other - I will add my own diagnosis  -- Disagree - Not applicable / Not valid  -- Disagree - Clinically unable to determine / Unknown  -- Refer to Clinical Documentation Reviewer    PROVIDER RESPONSE TEXT:    This patient has Right sided weakness due to Hx of stroke.    Query created by: Khushboo Martinez on 2024 3:03 AM      QUERY TEXT:    Patient admitted with Right sided weakness. Noted documentation of Acute   metabolic encephalopathy in Discharge Summary note on

## 2024-11-27 ENCOUNTER — HOSPITAL ENCOUNTER (OUTPATIENT)
Dept: INFUSION THERAPY | Age: 79
End: 2024-11-27

## 2024-11-29 PROBLEM — N39.0 UTI (URINARY TRACT INFECTION): Status: RESOLVED | Noted: 2024-10-30 | Resolved: 2024-11-29

## 2024-12-03 ENCOUNTER — TELEPHONE (OUTPATIENT)
Dept: CARDIOLOGY CLINIC | Age: 79
End: 2024-12-03

## 2024-12-03 NOTE — TELEPHONE ENCOUNTER
Received incoming phone call from patients daughter Rosangela requesting to schedule an appointment patient missed last appointment scheduled on 10/28/24 due to UTI. She also reported that her mother had a low HR of 57. Last office visit was in 4/2024. Please advise if ok to schedule next week? Or next available.

## 2024-12-20 ENCOUNTER — TELEPHONE (OUTPATIENT)
Dept: INFUSION THERAPY | Age: 79
End: 2024-12-20

## 2024-12-30 ENCOUNTER — HOSPITAL ENCOUNTER (OUTPATIENT)
Dept: INFUSION THERAPY | Age: 79
End: 2024-12-30

## 2025-01-02 NOTE — PROGRESS NOTES
chemotherapy with Rituxan and Bendeka completed 2019   R breast carcinoma s/p XRT s/p R lumpectomy. Adjuvant Arimidex 2018  Osteopenia  Recurrent UTIs  History of diverticulitis  Gait instability, ambulates with rollator  Recurrent falls  Former smoker quit 1994    PLAN:  Continue aspirin, Plavix, metoprolol, Entresto, Norvasc, Lipitor.  Daughter educated to watch for weight gain of 2-3 pounds in 2-3 days with signs of shortness of breath or swelling concerning for CHF.  Instructed to call office to appoint with CHF clinic if this is the case.  Daughter reassured that bradycardia is normal with medications that patient is prescribed.  Advised to watch for more significant bradycardia 45 bpm or less with symptoms and if so to call the office.  Patient instructed to take 30 seconds between position changes to avoid orthostatic hypotension or falls.  Bleeding risks discussed including watching for dark tarry stool, recurrent nosebleeds, hematuria, or acute change in mental status.  Follow-up with Dr Michael in 6 months, sooner if problems should arise.          Electronically signed by TERRY Leiva CNP on 1/6/2025 at 2:35 PM

## 2025-01-06 ENCOUNTER — OFFICE VISIT (OUTPATIENT)
Dept: CARDIOLOGY CLINIC | Age: 80
End: 2025-01-06
Payer: MEDICARE

## 2025-01-06 VITALS
TEMPERATURE: 97.5 F | BODY MASS INDEX: 24.14 KG/M2 | SYSTOLIC BLOOD PRESSURE: 138 MMHG | WEIGHT: 153.8 LBS | HEART RATE: 55 BPM | HEIGHT: 67 IN | DIASTOLIC BLOOD PRESSURE: 62 MMHG | RESPIRATION RATE: 18 BRPM

## 2025-01-06 DIAGNOSIS — I44.7 LBBB (LEFT BUNDLE BRANCH BLOCK): ICD-10-CM

## 2025-01-06 DIAGNOSIS — R00.1 BRADYCARDIA: Primary | ICD-10-CM

## 2025-01-06 DIAGNOSIS — I50.32 HEART FAILURE WITH IMPROVED EJECTION FRACTION (HFIMPEF) (HCC): ICD-10-CM

## 2025-01-06 DIAGNOSIS — E78.5 HYPERLIPIDEMIA, UNSPECIFIED HYPERLIPIDEMIA TYPE: ICD-10-CM

## 2025-01-06 DIAGNOSIS — I10 HYPERTENSION, UNSPECIFIED TYPE: ICD-10-CM

## 2025-01-06 PROCEDURE — 3075F SYST BP GE 130 - 139MM HG: CPT

## 2025-01-06 PROCEDURE — 3078F DIAST BP <80 MM HG: CPT

## 2025-01-06 PROCEDURE — 93000 ELECTROCARDIOGRAM COMPLETE: CPT | Performed by: INTERNAL MEDICINE

## 2025-01-06 PROCEDURE — 99214 OFFICE O/P EST MOD 30 MIN: CPT

## 2025-01-06 PROCEDURE — 1159F MED LIST DOCD IN RCRD: CPT

## 2025-01-06 PROCEDURE — 1160F RVW MEDS BY RX/DR IN RCRD: CPT

## 2025-01-06 PROCEDURE — 1123F ACP DISCUSS/DSCN MKR DOCD: CPT

## 2025-01-06 NOTE — PATIENT INSTRUCTIONS
Continue medications as prescribed.     Monitor weight and heart rate. Watch for increased weight gain of 2-3 in 2 days with symptoms of shortness of breath or leg/ abdominal swelling.    If concern for CHF call the office to be seen by CHF clinic.    Follow up with Dr Michael in 6 months, sooner if needed.

## 2025-01-13 ENCOUNTER — OFFICE VISIT (OUTPATIENT)
Dept: ONCOLOGY | Age: 80
End: 2025-01-13
Payer: MEDICARE

## 2025-01-13 ENCOUNTER — HOSPITAL ENCOUNTER (OUTPATIENT)
Dept: INFUSION THERAPY | Age: 80
Discharge: HOME | End: 2025-01-13
Payer: MEDICARE

## 2025-01-13 VITALS
HEART RATE: 84 BPM | HEIGHT: 67 IN | SYSTOLIC BLOOD PRESSURE: 147 MMHG | BODY MASS INDEX: 25.22 KG/M2 | DIASTOLIC BLOOD PRESSURE: 56 MMHG | WEIGHT: 160.7 LBS | TEMPERATURE: 97.6 F | OXYGEN SATURATION: 100 %

## 2025-01-13 DIAGNOSIS — C82.13 FOLLICULAR LYMPHOMA GRADE II OF INTRA-ABDOMINAL LYMPH NODES (HCC): ICD-10-CM

## 2025-01-13 DIAGNOSIS — Z17.0 STAGE 1 BREAST CANCER, ER+, RIGHT (HCC): ICD-10-CM

## 2025-01-13 DIAGNOSIS — C50.911 MALIGNANT NEOPLASM OF RIGHT BREAST, STAGE 1, ESTROGEN RECEPTOR POSITIVE (HCC): ICD-10-CM

## 2025-01-13 DIAGNOSIS — C50.911 STAGE 1 BREAST CANCER, ER+, RIGHT (HCC): ICD-10-CM

## 2025-01-13 DIAGNOSIS — Z17.0 MALIGNANT NEOPLASM OF RIGHT BREAST, STAGE 1, ESTROGEN RECEPTOR POSITIVE (HCC): ICD-10-CM

## 2025-01-13 DIAGNOSIS — C82.13 FOLLICULAR LYMPHOMA GRADE II OF INTRA-ABDOMINAL LYMPH NODES (HCC): Primary | ICD-10-CM

## 2025-01-13 DIAGNOSIS — C82.09 GRADE I FOLLICULAR LYMPHOMA OF EXTRANODAL SITE EXCLUDING SPLEEN AND OTHER SOLID ORGANS (HCC): Primary | ICD-10-CM

## 2025-01-13 LAB
ALBUMIN SERPL-MCNC: 3.8 G/DL (ref 3.5–5.2)
ALP SERPL-CCNC: 118 U/L (ref 35–104)
ALT SERPL-CCNC: 7 U/L (ref 0–32)
ANION GAP SERPL CALCULATED.3IONS-SCNC: 11 MMOL/L (ref 7–16)
AST SERPL-CCNC: 9 U/L (ref 0–31)
BASOPHILS # BLD: 0.02 K/UL (ref 0–0.2)
BASOPHILS NFR BLD: 0 % (ref 0–2)
BILIRUB SERPL-MCNC: 0.5 MG/DL (ref 0–1.2)
BUN SERPL-MCNC: 16 MG/DL (ref 6–23)
CALCIUM SERPL-MCNC: 9.2 MG/DL (ref 8.6–10.2)
CHLORIDE SERPL-SCNC: 104 MMOL/L (ref 98–107)
CO2 SERPL-SCNC: 26 MMOL/L (ref 22–29)
CREAT SERPL-MCNC: 0.9 MG/DL (ref 0.5–1)
EOSINOPHIL # BLD: 0.11 K/UL (ref 0.05–0.5)
EOSINOPHILS RELATIVE PERCENT: 2 % (ref 0–6)
ERYTHROCYTE [DISTWIDTH] IN BLOOD BY AUTOMATED COUNT: 14 % (ref 11.5–15)
GFR, ESTIMATED: 62 ML/MIN/1.73M2
GLUCOSE SERPL-MCNC: 131 MG/DL (ref 74–99)
HCT VFR BLD AUTO: 38.5 % (ref 34–48)
HGB BLD-MCNC: 13.2 G/DL (ref 11.5–15.5)
IMM GRANULOCYTES # BLD AUTO: <0.03 K/UL (ref 0–0.58)
IMM GRANULOCYTES NFR BLD: 0 % (ref 0–5)
LYMPHOCYTES NFR BLD: 1.07 K/UL (ref 1.5–4)
LYMPHOCYTES RELATIVE PERCENT: 16 % (ref 20–42)
MCH RBC QN AUTO: 30.7 PG (ref 26–35)
MCHC RBC AUTO-ENTMCNC: 34.3 G/DL (ref 32–34.5)
MCV RBC AUTO: 89.5 FL (ref 80–99.9)
MONOCYTES NFR BLD: 0.7 K/UL (ref 0.1–0.95)
MONOCYTES NFR BLD: 11 % (ref 2–12)
NEUTROPHILS NFR BLD: 71 % (ref 43–80)
NEUTS SEG NFR BLD: 4.76 K/UL (ref 1.8–7.3)
PLATELET # BLD AUTO: 203 K/UL (ref 130–450)
PMV BLD AUTO: 9.8 FL (ref 7–12)
POTASSIUM SERPL-SCNC: 4 MMOL/L (ref 3.5–5)
PROT SERPL-MCNC: 6.1 G/DL (ref 6.4–8.3)
RBC # BLD AUTO: 4.3 M/UL (ref 3.5–5.5)
SODIUM SERPL-SCNC: 141 MMOL/L (ref 132–146)
WBC OTHER # BLD: 6.7 K/UL (ref 4.5–11.5)

## 2025-01-13 PROCEDURE — 2500000003 HC RX 250 WO HCPCS: Performed by: INTERNAL MEDICINE

## 2025-01-13 PROCEDURE — 85025 COMPLETE CBC W/AUTO DIFF WBC: CPT

## 2025-01-13 PROCEDURE — 99214 OFFICE O/P EST MOD 30 MIN: CPT

## 2025-01-13 PROCEDURE — 6360000002 HC RX W HCPCS: Performed by: INTERNAL MEDICINE

## 2025-01-13 PROCEDURE — 80053 COMPREHEN METABOLIC PANEL: CPT

## 2025-01-13 PROCEDURE — 96523 IRRIG DRUG DELIVERY DEVICE: CPT

## 2025-01-13 RX ORDER — HEPARIN 100 UNIT/ML
500 SYRINGE INTRAVENOUS PRN
Status: DISCONTINUED | OUTPATIENT
Start: 2025-01-13 | End: 2025-01-14 | Stop reason: HOSPADM

## 2025-01-13 RX ORDER — SODIUM CHLORIDE 0.9 % (FLUSH) 0.9 %
10 SYRINGE (ML) INJECTION PRN
Status: DISCONTINUED | OUTPATIENT
Start: 2025-01-13 | End: 2025-01-14 | Stop reason: HOSPADM

## 2025-01-13 RX ORDER — WATER 10 ML/10ML
2.2 INJECTION INTRAMUSCULAR; INTRAVENOUS; SUBCUTANEOUS ONCE
OUTPATIENT
Start: 2025-01-13 | End: 2025-01-13

## 2025-01-13 RX ORDER — HEPARIN 100 UNIT/ML
500 SYRINGE INTRAVENOUS PRN
OUTPATIENT
Start: 2025-01-13

## 2025-01-13 RX ORDER — SODIUM CHLORIDE 0.9 % (FLUSH) 0.9 %
10 SYRINGE (ML) INJECTION PRN
OUTPATIENT
Start: 2025-01-13

## 2025-01-13 RX ORDER — SODIUM CHLORIDE 0.9 % (FLUSH) 0.9 %
20 SYRINGE (ML) INJECTION PRN
OUTPATIENT
Start: 2025-01-13

## 2025-01-13 RX ADMIN — SODIUM CHLORIDE, PRESERVATIVE FREE 10 ML: 5 INJECTION INTRAVENOUS at 12:05

## 2025-01-13 RX ADMIN — HEPARIN 500 UNITS: 100 SYRINGE at 12:05

## 2025-01-13 NOTE — PROGRESS NOTES
Melatonin 2.5 MG CHEW Take by mouth nightly    Didier Prasad MD   amLODIPine (NORVASC) 5 MG tablet Take 1 tablet by mouth nightly    Didier Prasad MD   amantadine (SYMMETREL) 100 MG capsule Take 1 capsule by mouth 2 times daily    Didier Prasad MD   aspirin 81 MG EC tablet Take 1 tablet by mouth daily 1/13/24   Christofer Good DO   clopidogrel (PLAVIX) 75 MG tablet Take 1 tablet by mouth daily 1/13/24   Christofer Good DO   levothyroxine (SYNTHROID) 88 MCG tablet Take 1 tablet by mouth Daily 1/14/24   Christofer Good DO   metoprolol succinate (TOPROL XL) 50 MG extended release tablet Take 1 tablet by mouth daily 1/13/24   Christofer Good DO   sacubitril-valsartan (ENTRESTO) 24-26 MG per tablet Take 1 tablet by mouth 2 times daily 1/13/24   Christofer Good DO   metFORMIN (GLUCOPHAGE-XR) 500 MG extended release tablet Take 1 tablet by mouth 2 times daily    Didier Prasad MD   donepezil (ARICEPT) 10 MG tablet Take 1 tablet by mouth daily    Didier Prasad MD   atorvastatin (LIPITOR) 40 MG tablet Take 1 tablet by mouth nightly    Didier Prasad MD    Scheduled Meds:  Continuous Infusions:  PRN Meds:.        Recent Laboratory Data-     Lab Results   Component Value Date    WBC 4.2 (L) 11/02/2024    HGB 13.7 11/02/2024    HCT 40.5 11/02/2024    MCV 89.2 11/02/2024     11/02/2024    LYMPHOPCT 24 11/02/2024    RBC 4.54 11/02/2024    MCH 30.2 11/02/2024    MCHC 33.8 11/02/2024    RDW 13.2 11/02/2024    NEUTOPHILPCT 55 11/02/2024    MONOPCT 15 (H) 11/02/2024    EOSPCT 5 11/02/2024    BASOPCT 1 11/02/2024    NEUTROABS 2.34 11/02/2024    LYMPHSABS 1.00 (L) 11/02/2024    MONOSABS 0.62 11/02/2024    EOSABS 0.21 11/02/2024    BASOSABS 0.03 11/02/2024       Lab Results   Component Value Date     11/02/2024    K 3.5 11/02/2024     11/02/2024    CO2 23 11/02/2024    BUN 17 11/02/2024    CREATININE 0.8 11/02/2024    GLUCOSE 125 (H) 11/02/2024    CALCIUM 9.0 11/02/2024    BILITOT

## 2025-01-13 NOTE — PROGRESS NOTES
Patient presents to clinic for labs today.  Left chest  SQ port accessed per policy using 20 G, 0.75 inch Burrell needle for good blood return.  Aspirate for waste and specimen sent to lab.  Site flushed easily with 10 mL NSS followed by 5 mL Heparin solution 100 units/ml rinse prior to de-access.  Dry sterile dressing to area.  Tolerated procedure well.  Encouraged to schedule port flush every 4 weeks.

## 2025-01-26 ENCOUNTER — HOSPITAL ENCOUNTER (INPATIENT)
Age: 80
LOS: 2 days | Discharge: HOME HEALTH | DRG: 690 | End: 2025-01-29
Attending: STUDENT IN AN ORGANIZED HEALTH CARE EDUCATION/TRAINING PROGRAM | Admitting: INTERNAL MEDICINE
Payer: MEDICARE

## 2025-01-26 DIAGNOSIS — R41.82 ALTERED MENTAL STATUS, UNSPECIFIED ALTERED MENTAL STATUS TYPE: Primary | ICD-10-CM

## 2025-01-26 DIAGNOSIS — N30.01 ACUTE CYSTITIS WITH HEMATURIA: ICD-10-CM

## 2025-01-26 DIAGNOSIS — D64.9 ANEMIA, UNSPECIFIED TYPE: ICD-10-CM

## 2025-01-26 DIAGNOSIS — T83.511A URINARY TRACT INFECTION ASSOCIATED WITH CATHETERIZATION OF URINARY TRACT, UNSPECIFIED INDWELLING URINARY CATHETER TYPE, INITIAL ENCOUNTER (HCC): ICD-10-CM

## 2025-01-26 DIAGNOSIS — N39.0 URINARY TRACT INFECTION ASSOCIATED WITH CATHETERIZATION OF URINARY TRACT, UNSPECIFIED INDWELLING URINARY CATHETER TYPE, INITIAL ENCOUNTER (HCC): ICD-10-CM

## 2025-01-26 LAB
BASOPHILS # BLD: 0.02 K/UL (ref 0–0.2)
BASOPHILS NFR BLD: 0 % (ref 0–2)
EOSINOPHIL # BLD: 0.07 K/UL (ref 0.05–0.5)
EOSINOPHILS RELATIVE PERCENT: 1 % (ref 0–6)
ERYTHROCYTE [DISTWIDTH] IN BLOOD BY AUTOMATED COUNT: 14 % (ref 11.5–15)
HCT VFR BLD AUTO: 43.8 % (ref 34–48)
HGB BLD-MCNC: 14.5 G/DL (ref 11.5–15.5)
IMM GRANULOCYTES # BLD AUTO: 0.03 K/UL (ref 0–0.58)
IMM GRANULOCYTES NFR BLD: 0 % (ref 0–5)
LYMPHOCYTES NFR BLD: 0.7 K/UL (ref 1.5–4)
LYMPHOCYTES RELATIVE PERCENT: 9 % (ref 20–42)
MCH RBC QN AUTO: 29.5 PG (ref 26–35)
MCHC RBC AUTO-ENTMCNC: 33.1 G/DL (ref 32–34.5)
MCV RBC AUTO: 89 FL (ref 80–99.9)
MONOCYTES NFR BLD: 0.78 K/UL (ref 0.1–0.95)
MONOCYTES NFR BLD: 10 % (ref 2–12)
NEUTROPHILS NFR BLD: 79 % (ref 43–80)
NEUTS SEG NFR BLD: 6.02 K/UL (ref 1.8–7.3)
PLATELET # BLD AUTO: 180 K/UL (ref 130–450)
PMV BLD AUTO: 9.6 FL (ref 7–12)
RBC # BLD AUTO: 4.92 M/UL (ref 3.5–5.5)
WBC OTHER # BLD: 7.6 K/UL (ref 4.5–11.5)

## 2025-01-26 PROCEDURE — 81001 URINALYSIS AUTO W/SCOPE: CPT

## 2025-01-26 PROCEDURE — 99285 EMERGENCY DEPT VISIT HI MDM: CPT

## 2025-01-26 PROCEDURE — 93005 ELECTROCARDIOGRAM TRACING: CPT

## 2025-01-26 PROCEDURE — 87088 URINE BACTERIA CULTURE: CPT

## 2025-01-26 PROCEDURE — 87086 URINE CULTURE/COLONY COUNT: CPT

## 2025-01-26 ASSESSMENT — LIFESTYLE VARIABLES
HOW OFTEN DO YOU HAVE A DRINK CONTAINING ALCOHOL: NEVER
HOW MANY STANDARD DRINKS CONTAINING ALCOHOL DO YOU HAVE ON A TYPICAL DAY: PATIENT DOES NOT DRINK

## 2025-01-26 ASSESSMENT — PAIN - FUNCTIONAL ASSESSMENT: PAIN_FUNCTIONAL_ASSESSMENT: NONE - DENIES PAIN

## 2025-01-27 ENCOUNTER — APPOINTMENT (OUTPATIENT)
Dept: CT IMAGING | Age: 80
DRG: 690 | End: 2025-01-27
Attending: STUDENT IN AN ORGANIZED HEALTH CARE EDUCATION/TRAINING PROGRAM
Payer: MEDICARE

## 2025-01-27 ENCOUNTER — APPOINTMENT (OUTPATIENT)
Dept: GENERAL RADIOLOGY | Age: 80
DRG: 690 | End: 2025-01-27
Payer: MEDICARE

## 2025-01-27 PROBLEM — N39.0 UTI (URINARY TRACT INFECTION): Status: ACTIVE | Noted: 2025-01-27

## 2025-01-27 LAB
ALBUMIN SERPL-MCNC: 4.2 G/DL (ref 3.5–5.2)
ALP SERPL-CCNC: 117 U/L (ref 35–104)
ALT SERPL-CCNC: 9 U/L (ref 0–32)
ANION GAP SERPL CALCULATED.3IONS-SCNC: 13 MMOL/L (ref 7–16)
AST SERPL-CCNC: 11 U/L (ref 0–31)
B PARAP IS1001 DNA NPH QL NAA+NON-PROBE: NOT DETECTED
B PERT DNA SPEC QL NAA+PROBE: NOT DETECTED
BACTERIA URNS QL MICRO: ABNORMAL
BILIRUB SERPL-MCNC: 0.5 MG/DL (ref 0–1.2)
BILIRUB UR QL STRIP: NEGATIVE
BNP SERPL-MCNC: 4634 PG/ML (ref 0–450)
BUN SERPL-MCNC: 14 MG/DL (ref 6–23)
C PNEUM DNA NPH QL NAA+NON-PROBE: NOT DETECTED
CALCIUM SERPL-MCNC: 9.4 MG/DL (ref 8.6–10.2)
CHLORIDE SERPL-SCNC: 99 MMOL/L (ref 98–107)
CLARITY UR: CLEAR
CO2 SERPL-SCNC: 25 MMOL/L (ref 22–29)
COLOR UR: YELLOW
CREAT SERPL-MCNC: 0.9 MG/DL (ref 0.5–1)
FLUAV RNA NPH QL NAA+NON-PROBE: NOT DETECTED
FLUBV RNA NPH QL NAA+NON-PROBE: NOT DETECTED
GFR, ESTIMATED: 65 ML/MIN/1.73M2
GLUCOSE SERPL-MCNC: 120 MG/DL (ref 74–99)
GLUCOSE UR STRIP-MCNC: NEGATIVE MG/DL
HADV DNA NPH QL NAA+NON-PROBE: NOT DETECTED
HCOV 229E RNA NPH QL NAA+NON-PROBE: NOT DETECTED
HCOV HKU1 RNA NPH QL NAA+NON-PROBE: NOT DETECTED
HCOV NL63 RNA NPH QL NAA+NON-PROBE: NOT DETECTED
HCOV OC43 RNA NPH QL NAA+NON-PROBE: NOT DETECTED
HGB UR QL STRIP.AUTO: NEGATIVE
HMPV RNA NPH QL NAA+NON-PROBE: NOT DETECTED
HPIV1 RNA NPH QL NAA+NON-PROBE: NOT DETECTED
HPIV2 RNA NPH QL NAA+NON-PROBE: NOT DETECTED
HPIV3 RNA NPH QL NAA+NON-PROBE: NOT DETECTED
HPIV4 RNA NPH QL NAA+NON-PROBE: NOT DETECTED
KETONES UR STRIP-MCNC: ABNORMAL MG/DL
LEUKOCYTE ESTERASE UR QL STRIP: ABNORMAL
M PNEUMO DNA NPH QL NAA+NON-PROBE: NOT DETECTED
NITRITE UR QL STRIP: NEGATIVE
PH UR STRIP: 5.5 [PH] (ref 5–9)
POTASSIUM SERPL-SCNC: 3.9 MMOL/L (ref 3.5–5)
PROT SERPL-MCNC: 6.5 G/DL (ref 6.4–8.3)
PROT UR STRIP-MCNC: NEGATIVE MG/DL
RBC #/AREA URNS HPF: ABNORMAL /HPF
RSV RNA NPH QL NAA+NON-PROBE: NOT DETECTED
RV+EV RNA NPH QL NAA+NON-PROBE: NOT DETECTED
SARS-COV-2 RNA NPH QL NAA+NON-PROBE: NOT DETECTED
SODIUM SERPL-SCNC: 137 MMOL/L (ref 132–146)
SP GR UR STRIP: 1.02 (ref 1–1.03)
SPECIMEN DESCRIPTION: NORMAL
TROPONIN I SERPL HS-MCNC: 22 NG/L (ref 0–9)
UROBILINOGEN UR STRIP-ACNC: 0.2 EU/DL (ref 0–1)
WBC #/AREA URNS HPF: ABNORMAL /HPF

## 2025-01-27 PROCEDURE — 1200000000 HC SEMI PRIVATE

## 2025-01-27 PROCEDURE — 2500000003 HC RX 250 WO HCPCS: Performed by: STUDENT IN AN ORGANIZED HEALTH CARE EDUCATION/TRAINING PROGRAM

## 2025-01-27 PROCEDURE — 2500000003 HC RX 250 WO HCPCS: Performed by: NURSE PRACTITIONER

## 2025-01-27 PROCEDURE — 6370000000 HC RX 637 (ALT 250 FOR IP): Performed by: NURSE PRACTITIONER

## 2025-01-27 PROCEDURE — 6360000002 HC RX W HCPCS: Performed by: STUDENT IN AN ORGANIZED HEALTH CARE EDUCATION/TRAINING PROGRAM

## 2025-01-27 PROCEDURE — 71045 X-RAY EXAM CHEST 1 VIEW: CPT

## 2025-01-27 PROCEDURE — 70450 CT HEAD/BRAIN W/O DYE: CPT

## 2025-01-27 PROCEDURE — 96374 THER/PROPH/DIAG INJ IV PUSH: CPT

## 2025-01-27 PROCEDURE — 6370000000 HC RX 637 (ALT 250 FOR IP): Performed by: STUDENT IN AN ORGANIZED HEALTH CARE EDUCATION/TRAINING PROGRAM

## 2025-01-27 PROCEDURE — 99222 1ST HOSP IP/OBS MODERATE 55: CPT | Performed by: NURSE PRACTITIONER

## 2025-01-27 RX ORDER — CLOPIDOGREL BISULFATE 75 MG/1
75 TABLET ORAL DAILY
Status: DISCONTINUED | OUTPATIENT
Start: 2025-01-27 | End: 2025-01-29 | Stop reason: HOSPADM

## 2025-01-27 RX ORDER — ONDANSETRON 4 MG/1
4 TABLET, ORALLY DISINTEGRATING ORAL EVERY 8 HOURS PRN
Status: DISCONTINUED | OUTPATIENT
Start: 2025-01-27 | End: 2025-01-29 | Stop reason: HOSPADM

## 2025-01-27 RX ORDER — POLYETHYLENE GLYCOL 3350 17 G/17G
17 POWDER, FOR SOLUTION ORAL DAILY PRN
Status: DISCONTINUED | OUTPATIENT
Start: 2025-01-27 | End: 2025-01-29 | Stop reason: HOSPADM

## 2025-01-27 RX ORDER — ATORVASTATIN CALCIUM 40 MG/1
40 TABLET, FILM COATED ORAL NIGHTLY
Status: DISCONTINUED | OUTPATIENT
Start: 2025-01-27 | End: 2025-01-29 | Stop reason: HOSPADM

## 2025-01-27 RX ORDER — SODIUM CHLORIDE 9 MG/ML
INJECTION, SOLUTION INTRAVENOUS PRN
Status: DISCONTINUED | OUTPATIENT
Start: 2025-01-27 | End: 2025-01-29 | Stop reason: HOSPADM

## 2025-01-27 RX ORDER — POTASSIUM CHLORIDE 1500 MG/1
40 TABLET, EXTENDED RELEASE ORAL PRN
Status: DISCONTINUED | OUTPATIENT
Start: 2025-01-27 | End: 2025-01-29 | Stop reason: HOSPADM

## 2025-01-27 RX ORDER — LEVOTHYROXINE SODIUM 88 UG/1
88 TABLET ORAL DAILY
Status: DISCONTINUED | OUTPATIENT
Start: 2025-01-27 | End: 2025-01-29 | Stop reason: HOSPADM

## 2025-01-27 RX ORDER — METFORMIN HYDROCHLORIDE 500 MG/1
500 TABLET, EXTENDED RELEASE ORAL 2 TIMES DAILY
Status: DISCONTINUED | OUTPATIENT
Start: 2025-01-27 | End: 2025-01-29 | Stop reason: HOSPADM

## 2025-01-27 RX ORDER — ACETAMINOPHEN 650 MG/1
650 SUPPOSITORY RECTAL EVERY 6 HOURS PRN
Status: DISCONTINUED | OUTPATIENT
Start: 2025-01-27 | End: 2025-01-29 | Stop reason: HOSPADM

## 2025-01-27 RX ORDER — FUROSEMIDE 20 MG/1
20 TABLET ORAL DAILY
Status: DISCONTINUED | OUTPATIENT
Start: 2025-01-27 | End: 2025-01-27

## 2025-01-27 RX ORDER — DONEPEZIL HYDROCHLORIDE 5 MG/1
10 TABLET, FILM COATED ORAL DAILY
Status: DISCONTINUED | OUTPATIENT
Start: 2025-01-27 | End: 2025-01-29 | Stop reason: HOSPADM

## 2025-01-27 RX ORDER — ASPIRIN 81 MG/1
81 TABLET ORAL DAILY
Status: DISCONTINUED | OUTPATIENT
Start: 2025-01-27 | End: 2025-01-29 | Stop reason: HOSPADM

## 2025-01-27 RX ORDER — ONDANSETRON 2 MG/ML
4 INJECTION INTRAMUSCULAR; INTRAVENOUS EVERY 6 HOURS PRN
Status: DISCONTINUED | OUTPATIENT
Start: 2025-01-27 | End: 2025-01-29 | Stop reason: HOSPADM

## 2025-01-27 RX ORDER — AMANTADINE HYDROCHLORIDE 100 MG/1
100 CAPSULE, GELATIN COATED ORAL 2 TIMES DAILY
Status: DISCONTINUED | OUTPATIENT
Start: 2025-01-27 | End: 2025-01-29 | Stop reason: HOSPADM

## 2025-01-27 RX ORDER — ACETAMINOPHEN 325 MG/1
TABLET ORAL
Status: DISPENSED
Start: 2025-01-27 | End: 2025-01-27

## 2025-01-27 RX ORDER — POTASSIUM CHLORIDE 7.45 MG/ML
10 INJECTION INTRAVENOUS PRN
Status: DISCONTINUED | OUTPATIENT
Start: 2025-01-27 | End: 2025-01-29 | Stop reason: HOSPADM

## 2025-01-27 RX ORDER — MAGNESIUM HYDROXIDE/ALUMINUM HYDROXICE/SIMETHICONE 120; 1200; 1200 MG/30ML; MG/30ML; MG/30ML
30 SUSPENSION ORAL EVERY 6 HOURS PRN
Status: DISCONTINUED | OUTPATIENT
Start: 2025-01-27 | End: 2025-01-29 | Stop reason: HOSPADM

## 2025-01-27 RX ORDER — ACETAMINOPHEN 325 MG/1
650 TABLET ORAL ONCE
Status: COMPLETED | OUTPATIENT
Start: 2025-01-27 | End: 2025-01-27

## 2025-01-27 RX ORDER — MAGNESIUM SULFATE IN WATER 40 MG/ML
2000 INJECTION, SOLUTION INTRAVENOUS PRN
Status: DISCONTINUED | OUTPATIENT
Start: 2025-01-27 | End: 2025-01-29 | Stop reason: HOSPADM

## 2025-01-27 RX ORDER — ACETAMINOPHEN 325 MG/1
650 TABLET ORAL EVERY 6 HOURS PRN
Status: DISCONTINUED | OUTPATIENT
Start: 2025-01-27 | End: 2025-01-29 | Stop reason: HOSPADM

## 2025-01-27 RX ORDER — SODIUM CHLORIDE 0.9 % (FLUSH) 0.9 %
5-40 SYRINGE (ML) INJECTION PRN
Status: DISCONTINUED | OUTPATIENT
Start: 2025-01-27 | End: 2025-01-29 | Stop reason: HOSPADM

## 2025-01-27 RX ORDER — SODIUM CHLORIDE 0.9 % (FLUSH) 0.9 %
5-40 SYRINGE (ML) INJECTION EVERY 12 HOURS SCHEDULED
Status: DISCONTINUED | OUTPATIENT
Start: 2025-01-27 | End: 2025-01-29 | Stop reason: HOSPADM

## 2025-01-27 RX ORDER — METOPROLOL SUCCINATE 50 MG/1
50 TABLET, EXTENDED RELEASE ORAL DAILY
Status: DISCONTINUED | OUTPATIENT
Start: 2025-01-27 | End: 2025-01-29 | Stop reason: HOSPADM

## 2025-01-27 RX ADMIN — LEVOTHYROXINE SODIUM 88 MCG: 0.09 TABLET ORAL at 08:32

## 2025-01-27 RX ADMIN — CLOPIDOGREL BISULFATE 75 MG: 75 TABLET ORAL at 07:37

## 2025-01-27 RX ADMIN — AMANTADINE HYDROCHLORIDE 100 MG: 100 CAPSULE, LIQUID FILLED ORAL at 21:42

## 2025-01-27 RX ADMIN — ATORVASTATIN CALCIUM 40 MG: 40 TABLET, FILM COATED ORAL at 21:42

## 2025-01-27 RX ADMIN — ASPIRIN 81 MG: 81 TABLET, COATED ORAL at 07:37

## 2025-01-27 RX ADMIN — ACETAMINOPHEN 650 MG: 325 TABLET ORAL at 00:17

## 2025-01-27 RX ADMIN — SODIUM CHLORIDE, PRESERVATIVE FREE 10 ML: 5 INJECTION INTRAVENOUS at 07:37

## 2025-01-27 RX ADMIN — SACUBITRIL AND VALSARTAN 1 TABLET: 24; 26 TABLET, FILM COATED ORAL at 07:37

## 2025-01-27 RX ADMIN — AMANTADINE HYDROCHLORIDE 100 MG: 100 CAPSULE, LIQUID FILLED ORAL at 08:32

## 2025-01-27 RX ADMIN — METFORMIN HYDROCHLORIDE 500 MG: 500 TABLET, EXTENDED RELEASE ORAL at 10:00

## 2025-01-27 RX ADMIN — SODIUM CHLORIDE, PRESERVATIVE FREE 10 ML: 5 INJECTION INTRAVENOUS at 21:46

## 2025-01-27 RX ADMIN — SACUBITRIL AND VALSARTAN 1 TABLET: 24; 26 TABLET, FILM COATED ORAL at 21:42

## 2025-01-27 RX ADMIN — CEFTRIAXONE SODIUM 1000 MG: 1 INJECTION, POWDER, FOR SOLUTION INTRAMUSCULAR; INTRAVENOUS at 05:40

## 2025-01-27 RX ADMIN — DONEPEZIL HYDROCHLORIDE 10 MG: 5 TABLET, FILM COATED ORAL at 07:37

## 2025-01-27 RX ADMIN — METFORMIN HYDROCHLORIDE 500 MG: 500 TABLET, EXTENDED RELEASE ORAL at 21:42

## 2025-01-27 ASSESSMENT — PAIN - FUNCTIONAL ASSESSMENT: PAIN_FUNCTIONAL_ASSESSMENT: NONE - DENIES PAIN

## 2025-01-27 NOTE — ED PROVIDER NOTES
correctly  Best Gaze (2): Normal  Visual (3): No visual loss  Facial Palsy (4): Normal symmetrical movement  Motor Arm, Left (5a): No drift  Motor Arm, Right (5b): No drift  Motor Leg, Left (6a): No drift  Motor Leg, Right (6b): Some effort against gravity  Limb Ataxia (7): (!) Present in one limb  Sensory (8): Normal  Best Language (9): No aphasia  Dysarthria (10): Normal  Extinction and Inattention (11): No abnormality  Total: 5    [place] Coma Scale  Eye Opening: Spontaneous  Best Verbal Response: Oriented  Best Motor Response: Obeys commands  Laury Coma Scale Score: 15                CIWA Assessment  BP: (!) 142/72  Pulse: 70           PHYSICAL EXAM  1 or more Elements     ED Triage Vitals [01/26/25 2208]   BP Systolic BP Percentile Diastolic BP Percentile Temp Temp Source Pulse Respirations SpO2   (!) 160/65 -- -- 99 °F (37.2 °C) Oral 77 18 95 %      Height Weight - Scale         1.803 m (5' 11\") 79.4 kg (175 lb)             Physical Exam    Constitutional/General: Alert and oriented x3, patient chronically ill-appearing sitting up and in no acute distress  Head: Normocephalic and atraumatic  Eyes:  EOMI, conjunctiva normal, sclera non icteric  ENT:  Oropharynx clear, handling secretions, no trismus, no asymmetry of the posterior oropharynx or uvular edema  Neck: Supple, full ROM, no stridor, no meningeal signs  Respiratory: Lungs clear to auscultation bilaterally, no wheezes, rales, or rhonchi. Not in respiratory distress  Cardiovascular:  Regular rate. Regular rhythm. No murmurs, no gallops, no rubs. 2+ distal pulses. Equal extremity pulses.   Chest: No chest wall tenderness  GI:  Abdomen Soft, Non tender, Non distended.  +BS.   No rebound, guarding, or rigidity. No pulsatile masses.  Musculoskeletal: Moves all extremities x 4. Warm and well perfused, no clubbing, no cyanosis, no edema. Capillary refill <3 seconds  Integument: skin warm and dry. No rashes.   Neurologic: Generalized weakness to the bilateral  in time range)   metFORMIN (GLUCOPHAGE-XR) extended release tablet 500 mg (has no administration in time range)   metoprolol succinate (TOPROL XL) extended release tablet 50 mg (has no administration in time range)   sacubitril-valsartan (ENTRESTO) 24-26 MG per tablet 1 tablet (has no administration in time range)   acetaminophen (TYLENOL) tablet 650 mg ( Oral Canceled Entry 1/27/25 0018)   cefTRIAXone (ROCEPHIN) 1,000 mg in sterile water 10 mL IV syringe (1,000 mg IntraVENous Given 1/27/25 0540)       ED Course as of 01/27/25 0705   Elizabeth Jan 26, 2025   2320 EKG:  This EKG is signed and interpreted by me.    Rate: 74  Rhythm: Sinus  Interpretation: Normal sinus rhythm with first-degree AV block, left axis, ventricular prophy with wide QRS, QTc is 472  Comparison: no previous EKG available    [KG]   Mon Jan 27, 2025   0545 Spoke with OLGA Johnson for [facility] Hospitalist who accepted for admission.  [KG]      ED Course User Index  [KG] Cathy Cabral,         Medical Decision Making/Differential Diagnosis:    CC/HPI Summary, Social Determinants of health, Records Reviewed, DDx, testing done/not done, ED Course, Reassessment, disposition considerations/shared decision making with patient, consults, disposition:        The patient is 79-year-old female presenting with altered mental status and extremity weakness.  Patient slightly hypertensive otherwise hemodynamically stable.  Patient is nontoxic and in no acute distress.  NIH is 0.     Patient is a former smoker was a known social detriment to her health.  Prior records were reviewed and patient did follow-up with hematology/oncology in the office on 1- for lymphoma, Dr. Soto and records reviewed from this visit.     Differential diagnosis includes but is not limited to UTI versus electrolyte abnormality versus JAX versus pneumonia versus intracranial hemorrhage.  No strokelike symptoms at this time and no focal deficits present.  She has generalized

## 2025-01-27 NOTE — ED NOTES
Pt straight cath for urine due to incontinence at 0009. Urine sample labeled and sent to lab. This RN attempted to call about results. No answer

## 2025-01-27 NOTE — PROGRESS NOTES
Patient seen and examined at bedside today a.m., H&P from today and from [facility]    Patient pleasantly confused, poor historian      On examination  General Condition fair, elderly frail lady alert oriented x 1  HEENT: Conjunctivae/corneas clear. Mucous membranes moist.  Neck: Supple. No JVD.  Respiratory:  Clear to auscultation bilaterally.  Normal respiratory effort.   Cardiovascular:  RRR. S1, S2 without MRG.  PV: Pulses palpable. No edema.   Abdomen: Soft, non-tender, non-distended. +BS  Musculoskeletal: No obvious deformities.   Skin: Normal skin color.  No rashes or lesions. Good turgor.       Assessment and plan  Delirium with dementia  UTI  Hypothyroidism  Physical deconditioning      Plan  Continue IV Rocephin 1 g every 24 hours, awaiting urine culture  Continue her home medication  TSH normal, vitamin B12 normal  CT head without contrast normal  PT OT evaluation

## 2025-01-27 NOTE — ED NOTES
Pt has a Hx of breast cancer (right limb alert), dementia, and prior CVA (1994,2024) with right sided deficits. Pt resides at home with daughter. Pt ambulates with walker and uses wheelchair. Pt reportedly has cold symptoms (runny nose, cough) for 3 days. Tonight, daughter reports that the Pt had increased difficulty ambulating and lethargic. Daughter reports that Pt speech and deficits appear to be baseline with the exception of her right leg exhibiting increased weakness. Pt is alert to self and place.

## 2025-01-27 NOTE — H&P
Hospitalist History & Physical      PCP: Zana Pandya MD    Date of Service: Pt seen/examined on 1/27/2025 and is admitted to Inpatient with expected LOS greater than two midnights due to medical therapy.          Chief Complaint:  had concerns including Altered Mental Status and Extremity Weakness.    History of Present Illness:    Ms. Nika Aviles, a 80 y.o. year old female  who  has a past medical history of Breast CA (HCC), Breast cancer (HCC), Cerebral artery occlusion with cerebral infarction (HCC), Chronic kidney disease, Dementia (HCC), Diabetes mellitus (HCC), Disease of blood and blood forming organ, Diverticulitis, History of therapeutic radiation, Hyperlipidemia, Hypertension, Hypothyroidism, Lymphoma (HCC), Osteoarthritis, Prolonged emergence from general anesthesia, and Thyroid disease.     ER COURSE:    Patient came to ED with complaints of AMS, per her daughter this started last night, she states that she was slightly more confused than her baseline and having increased weakness. She thought that her right side was slightly more weaker than before. She was found to have a UTI and given IV rocephin     Past Medical History:        Diagnosis Date    Breast CA (HCC) 2018    Right    Breast cancer (HCC)     2017  right ILC    Cerebral artery occlusion with cerebral infarction (HCC) 1994     rt arm leg weakness    Chronic kidney disease     Dementia (HCC)     Diabetes mellitus (HCC)     Disease of blood and blood forming organ     Diverticulitis     History of therapeutic radiation     Hyperlipidemia     Hypertension     Hypothyroidism     Lymphoma (HCC) 12/2016    nonhogkins, previous chemo, immunotherapy-no current issues (6/24/22)    Osteoarthritis     Prolonged emergence from general anesthesia     Thyroid disease        Past Surgical History:        Procedure Laterality Date    ABDOMEN SURGERY      BACK SURGERY      neck fusion bone graft-no ROM limitations    BREAST LUMPECTOMY      BREAST

## 2025-01-28 LAB
ANION GAP SERPL CALCULATED.3IONS-SCNC: 14 MMOL/L (ref 7–16)
BASOPHILS # BLD: 0.02 K/UL (ref 0–0.2)
BASOPHILS NFR BLD: 0 % (ref 0–2)
BUN SERPL-MCNC: 17 MG/DL (ref 6–23)
CALCIUM SERPL-MCNC: 9.3 MG/DL (ref 8.6–10.2)
CHLORIDE SERPL-SCNC: 103 MMOL/L (ref 98–107)
CO2 SERPL-SCNC: 24 MMOL/L (ref 22–29)
CREAT SERPL-MCNC: 0.9 MG/DL (ref 0.5–1)
EOSINOPHIL # BLD: 0.07 K/UL (ref 0.05–0.5)
EOSINOPHILS RELATIVE PERCENT: 1 % (ref 0–6)
ERYTHROCYTE [DISTWIDTH] IN BLOOD BY AUTOMATED COUNT: 14.1 % (ref 11.5–15)
GFR, ESTIMATED: 65 ML/MIN/1.73M2
GLUCOSE BLD-MCNC: 147 MG/DL (ref 74–99)
GLUCOSE BLD-MCNC: 61 MG/DL (ref 74–99)
GLUCOSE SERPL-MCNC: 97 MG/DL (ref 74–99)
HCT VFR BLD AUTO: 42.4 % (ref 34–48)
HGB BLD-MCNC: 14 G/DL (ref 11.5–15.5)
IMM GRANULOCYTES # BLD AUTO: <0.03 K/UL (ref 0–0.58)
IMM GRANULOCYTES NFR BLD: 0 % (ref 0–5)
LYMPHOCYTES NFR BLD: 1.03 K/UL (ref 1.5–4)
LYMPHOCYTES RELATIVE PERCENT: 20 % (ref 20–42)
MAGNESIUM SERPL-MCNC: 1.9 MG/DL (ref 1.6–2.6)
MCH RBC QN AUTO: 29.8 PG (ref 26–35)
MCHC RBC AUTO-ENTMCNC: 33 G/DL (ref 32–34.5)
MCV RBC AUTO: 90.2 FL (ref 80–99.9)
MONOCYTES NFR BLD: 0.58 K/UL (ref 0.1–0.95)
MONOCYTES NFR BLD: 11 % (ref 2–12)
NEUTROPHILS NFR BLD: 67 % (ref 43–80)
NEUTS SEG NFR BLD: 3.41 K/UL (ref 1.8–7.3)
PLATELET # BLD AUTO: 170 K/UL (ref 130–450)
PMV BLD AUTO: 9.8 FL (ref 7–12)
POTASSIUM SERPL-SCNC: 3.6 MMOL/L (ref 3.5–5)
RBC # BLD AUTO: 4.7 M/UL (ref 3.5–5.5)
SODIUM SERPL-SCNC: 141 MMOL/L (ref 132–146)
WBC OTHER # BLD: 5.1 K/UL (ref 4.5–11.5)

## 2025-01-28 PROCEDURE — 2500000003 HC RX 250 WO HCPCS: Performed by: NURSE PRACTITIONER

## 2025-01-28 PROCEDURE — 36415 COLL VENOUS BLD VENIPUNCTURE: CPT

## 2025-01-28 PROCEDURE — 99232 SBSQ HOSP IP/OBS MODERATE 35: CPT | Performed by: STUDENT IN AN ORGANIZED HEALTH CARE EDUCATION/TRAINING PROGRAM

## 2025-01-28 PROCEDURE — 6370000000 HC RX 637 (ALT 250 FOR IP): Performed by: NURSE PRACTITIONER

## 2025-01-28 PROCEDURE — 97162 PT EVAL MOD COMPLEX 30 MIN: CPT

## 2025-01-28 PROCEDURE — 80048 BASIC METABOLIC PNL TOTAL CA: CPT

## 2025-01-28 PROCEDURE — 85025 COMPLETE CBC W/AUTO DIFF WBC: CPT

## 2025-01-28 PROCEDURE — 2500000003 HC RX 250 WO HCPCS: Performed by: STUDENT IN AN ORGANIZED HEALTH CARE EDUCATION/TRAINING PROGRAM

## 2025-01-28 PROCEDURE — 97530 THERAPEUTIC ACTIVITIES: CPT

## 2025-01-28 PROCEDURE — 6360000002 HC RX W HCPCS: Performed by: NURSE PRACTITIONER

## 2025-01-28 PROCEDURE — 6370000000 HC RX 637 (ALT 250 FOR IP): Performed by: STUDENT IN AN ORGANIZED HEALTH CARE EDUCATION/TRAINING PROGRAM

## 2025-01-28 PROCEDURE — 97165 OT EVAL LOW COMPLEX 30 MIN: CPT

## 2025-01-28 PROCEDURE — 1200000000 HC SEMI PRIVATE

## 2025-01-28 PROCEDURE — 82962 GLUCOSE BLOOD TEST: CPT

## 2025-01-28 PROCEDURE — 97535 SELF CARE MNGMENT TRAINING: CPT

## 2025-01-28 PROCEDURE — 83735 ASSAY OF MAGNESIUM: CPT

## 2025-01-28 RX ORDER — POTASSIUM CHLORIDE 1500 MG/1
40 TABLET, EXTENDED RELEASE ORAL ONCE
Status: COMPLETED | OUTPATIENT
Start: 2025-01-28 | End: 2025-01-28

## 2025-01-28 RX ADMIN — WATER 1000 MG: 1 INJECTION INTRAMUSCULAR; INTRAVENOUS; SUBCUTANEOUS at 09:09

## 2025-01-28 RX ADMIN — MICONAZOLE NITRATE: 20 POWDER TOPICAL at 21:35

## 2025-01-28 RX ADMIN — METFORMIN HYDROCHLORIDE 500 MG: 500 TABLET, EXTENDED RELEASE ORAL at 21:35

## 2025-01-28 RX ADMIN — SODIUM CHLORIDE, PRESERVATIVE FREE 10 ML: 5 INJECTION INTRAVENOUS at 09:11

## 2025-01-28 RX ADMIN — SACUBITRIL AND VALSARTAN 1 TABLET: 24; 26 TABLET, FILM COATED ORAL at 09:09

## 2025-01-28 RX ADMIN — ATORVASTATIN CALCIUM 40 MG: 40 TABLET, FILM COATED ORAL at 21:34

## 2025-01-28 RX ADMIN — SODIUM CHLORIDE, PRESERVATIVE FREE 10 ML: 5 INJECTION INTRAVENOUS at 21:35

## 2025-01-28 RX ADMIN — METFORMIN HYDROCHLORIDE 500 MG: 500 TABLET, EXTENDED RELEASE ORAL at 09:08

## 2025-01-28 RX ADMIN — DONEPEZIL HYDROCHLORIDE 10 MG: 5 TABLET, FILM COATED ORAL at 09:09

## 2025-01-28 RX ADMIN — MICONAZOLE NITRATE: 20 POWDER TOPICAL at 09:11

## 2025-01-28 RX ADMIN — POTASSIUM CHLORIDE 40 MEQ: 1500 TABLET, EXTENDED RELEASE ORAL at 12:23

## 2025-01-28 RX ADMIN — METOPROLOL SUCCINATE 50 MG: 50 TABLET, EXTENDED RELEASE ORAL at 09:08

## 2025-01-28 RX ADMIN — LEVOTHYROXINE SODIUM 88 MCG: 0.09 TABLET ORAL at 06:08

## 2025-01-28 RX ADMIN — AMANTADINE HYDROCHLORIDE 100 MG: 100 CAPSULE, LIQUID FILLED ORAL at 21:35

## 2025-01-28 RX ADMIN — ASPIRIN 81 MG: 81 TABLET, COATED ORAL at 09:09

## 2025-01-28 RX ADMIN — AMANTADINE HYDROCHLORIDE 100 MG: 100 CAPSULE, LIQUID FILLED ORAL at 09:09

## 2025-01-28 RX ADMIN — CLOPIDOGREL BISULFATE 75 MG: 75 TABLET ORAL at 09:09

## 2025-01-28 RX ADMIN — SACUBITRIL AND VALSARTAN 1 TABLET: 24; 26 TABLET, FILM COATED ORAL at 21:35

## 2025-01-28 NOTE — PROGRESS NOTES
[facility] Hospitalist Progress Note    SYNOPSIS: Patient admitted on 2025 for UTI (urinary tract infection)      SUBJECTIVE:  Stable overnight. No other overnight issues reported.   Patient seen and examined  Records reviewed.       Temp (24hrs), Av.3 °F (36.3 °C), Min:97 °F (36.1 °C), Max:97.5 °F (36.4 °C)    DIET: ADULT DIET; Regular  CODE: Full Code    Intake/Output Summary (Last 24 hours) at 2025 1531  Last data filed at 2025 0545  Gross per 24 hour   Intake --   Output 600 ml   Net -600 ml       Review of Systems  All bolded are positive; please see HPI  General:  Fever, chills, diaphoresis, fatigue, malaise, night sweats, weight loss  Psychological:  Anxiety, disorientation, hallucinations.  ENT:  Epistaxis, headaches, vertigo, visual changes.  Cardiovascular:  Chest pain, irregular heartbeats, palpitations, paroxysmal nocturnal dyspnea.  Respiratory:  Shortness of breath, coughing, sputum production, hemoptysis, wheezing, orthopnea.  Gastrointestinal:  Nausea, vomiting, diarrhea, heartburn, constipation, abdominal pain, hematemesis, hematochezia, melena, acholic stools  Genito-Urinary:  Dysuria, urgency, frequency, hematuria  Musculoskeletal:  Joint pain, joint stiffness, joint swelling, muscle pain  Neurology:  Headache, focal neurological deficits, weakness, numbness, paresthesia  Derm:  Rashes, ulcers, excoriations, bruising  Extremities:  Decreased ROM, peripheral edema, mottling      OBJECTIVE:    BP (!) 145/62   Pulse 70   Temp 97.2 °F (36.2 °C) (Temporal)   Resp 18   Ht 1.803 m (5' 11\")   Wt 79.4 kg (175 lb)   LMP  (LMP Unknown)   SpO2 99%   BMI 24.41 kg/m²     General appearance:  awake, alert, and oriented to self only(baseline); appears stated age and cooperative; no apparent distress no labored breathing  HEENT:  Conjunctivae/corneas clear.   Neck: Supple. No jugular venous distention.   Respiratory: symmetrical; clear to auscultation bilaterally; no wheezes; no

## 2025-01-28 NOTE — PROGRESS NOTES
Physical Therapy    Physical Therapy Initial Assessment     Name: Nika Aviles  : 1945  MRN: [id number]      Date of Service: 2025    Evaluating PT:  Camacho Henriquez PT, DPT  [id number]     Room #:  8414/8414-A  Diagnosis:  UTI (urinary tract infection) [N39.0]  Acute cystitis with hematuria [N30.01]  Altered mental status, unspecified altered mental status type [R41.82]  PMHx/PSHx:   has a past medical history of Breast CA (HCC), Breast cancer (HCC), Cerebral artery occlusion with cerebral infarction (HCC), Chronic kidney disease, Dementia (HCC), Diabetes mellitus (HCC), Disease of blood and blood forming organ, Diverticulitis, History of therapeutic radiation, Hyperlipidemia, Hypertension, Hypothyroidism, Lymphoma (HCC), Osteoarthritis, Prolonged emergence from general anesthesia, and Thyroid disease.   Procedure/Surgery:  None   Precautions:  Falls, Cognition, Hx dementia, Hx R side weakness, R inattention, VSC, Up as tolerated, Alarms   Equipment Needs:  TBD    SUBJECTIVE:    Pt lives with her daughter in a 1 story home with ramp to enter. Pt ambulated with a FWW (in her home) and uses a WC (outside her home) PTA.     OBJECTIVE:   Initial Evaluation  Date: 25 Treatment Short Term/ Long Term   Goals   AM-PAC 6 Clicks      Was pt agreeable to Eval/treatment? Yes      Does pt have pain? No c/o pain     Bed Mobility  Rolling: NT  Supine to sit: Mod A  Sit to supine: NT  Scooting: Mod A to EOB   Rolling: SBA   Supine to sit: SBA   Sit to supine: SBA  Scooting: SBA   Transfers Sit to stand: Min A  Stand to sit: Min A  Stand pivot: Min A with FWW  Sit to stand: SBA  Stand to sit: SBA  Stand pivot: SBA with FWW   Ambulation    20 feet with FWW Mod A  >75 feet with FWW SBA   Stair negotiation: ascended and descended  NT  >1 steps with B rail Min A   ROM BUE:  Per OT eval  BLE:  WFL     Strength BUE:  Per OT eval   LLE:  Grossly 4+/5  RLE:  Grossly 4/5      Balance Sitting EOB:  SBA  Dynamic Standing:

## 2025-01-28 NOTE — PROGRESS NOTES
OCCUPATIONAL THERAPY INITIAL EVALUATION    [facility]  [address] [place], [place]          Date:2025                                                   Patient Name: Nika Aviles     MRN: [id number]     : 1945     Room: [address] [place] [address]       Evaluating OT: Do Naranjo OTD, OTR/L, [id number]      Referring Provider: Deng Thompson MD     Specific Provider Orders/Date: OT eval and treat (25)    Diagnosis: UTI (urinary tract infection) [N39.0]  Acute cystitis with hematuria [N30.01]  Altered mental status, unspecified altered mental status type [R41.82]    Surgeries/Procedures: None this admission       Pt admitted with UTI, hx of dementia.      Pertinent Medical History:       has a past medical history of Breast CA (HCC), Breast cancer (HCC), Cerebral artery occlusion with cerebral infarction (HCC), Chronic kidney disease, Dementia (HCC), Diabetes mellitus (HCC), Disease of blood and blood forming organ, Diverticulitis, History of therapeutic radiation, Hyperlipidemia, Hypertension, Hypothyroidism, Lymphoma (HCC), Osteoarthritis, Prolonged emergence from general anesthesia, and Thyroid disease.         Precautions:  Fall Risk, R inattention, TSM, cognition, incontinence, alarms+     Assessment of current deficits    [x] Functional mobility  [x]ADLs  [x] Strength               [x]Cognition    [x] Functional transfers   [x] IADLs         [x] Safety Awareness   [x]Endurance    [x] Fine Coordination              [x] Balance      [] Vision/perception   [x]Sensation     [x]Gross Motor Coordination  [] ROM  [] Delirium                   [x] Motor Control     OT PLAN OF CARE   OT POC based on physician orders, patient diagnosis and results of clinical assessment    Frequency/Duration 1-3 days/wk for 2 weeks PRN   Specific OT Treatment Interventions to include:   * Instruction/training on adapted ADL techniques and AE recommendations to increase

## 2025-01-28 NOTE — PROGRESS NOTES
4 Eyes Skin Assessment     NAME:  Nika Aviles  YOB: 1945  MEDICAL RECORD NUMBER:  [id number]    The patient is being assessed for  Admission    I agree that at least one RN has performed a thorough Head to Toe Skin Assessment on the patient. ALL assessment sites listed below have been assessed.      Areas assessed by both nurses:    Head, Face, Ears, Shoulders, Back, Chest, Arms, Elbows, Hands, Sacrum. Buttock, Coccyx, Ischium, and Legs. Feet and Heels    Ecchymosis on bilateral arm  Ecchymosis on R leg (shin area)  Redness/rash over abdominal folds and groin.  Blanching redness on the buttocks and both heels        Does the Patient have a Wound? No noted wound(s)       Bob Prevention initiated by RN: Yes  Wound Care Orders initiated by RN: No    Pressure Injury (Stage 3,4, Unstageable, DTI, NWPT, and Complex wounds) if present, place Wound referral order by RN under : No    New Ostomies, if present place, Ostomy referral order under : No     Nurse 1 eSignature: Electronically signed by Audra Mirza RN on 1/28/25 at 3:18 AM EST    **SHARE this note so that the co-signing nurse can place an eSignature**    Nurse 2 eSignature: Electronically signed by Rosalva Gilbert RN on 1/28/25 at 7:21 AM EST

## 2025-01-28 NOTE — CARE COORDINATION
TARA transition of care.  Patient presented to [facility] ER secondary to AMS and extremity weakness.  Admitted inpatient with UTI.  IV rocephin q 24.  Urine culture pending.  Pt/Ot am-pac is 14 and 15.  Spoke with patient daughter via phone to discuss discharge planning.  Patient lives with her daughter, her son in law and grandchild.  The family provides care for her in the home.  [facility] are at [facility].  She is active with [facility] for SN and Pt/Ot but believed the therapy may have been completed.  Patient ambulates with a walker inside the home with assistance and uses a wheelchair outside of the home.  Went over therapy evals with her.  This seems to be baseline for her.  Discharge plan at this time is to return to home with [facility].  Her family will provide transport home on day of discharge.  Left VM message with Jossie from [facility] to check on status.  CM/SW to follow.  Benito Hardy RN  [phone].

## 2025-01-29 VITALS
HEIGHT: 71 IN | HEART RATE: 73 BPM | BODY MASS INDEX: 24.5 KG/M2 | OXYGEN SATURATION: 98 % | DIASTOLIC BLOOD PRESSURE: 64 MMHG | SYSTOLIC BLOOD PRESSURE: 146 MMHG | TEMPERATURE: 97 F | WEIGHT: 175 LBS | RESPIRATION RATE: 19 BRPM

## 2025-01-29 LAB
MICROORGANISM SPEC CULT: ABNORMAL
SERVICE CMNT-IMP: ABNORMAL
SPECIMEN DESCRIPTION: ABNORMAL

## 2025-01-29 PROCEDURE — 99239 HOSP IP/OBS DSCHRG MGMT >30: CPT | Performed by: STUDENT IN AN ORGANIZED HEALTH CARE EDUCATION/TRAINING PROGRAM

## 2025-01-29 PROCEDURE — 6370000000 HC RX 637 (ALT 250 FOR IP): Performed by: NURSE PRACTITIONER

## 2025-01-29 PROCEDURE — 2500000003 HC RX 250 WO HCPCS: Performed by: NURSE PRACTITIONER

## 2025-01-29 PROCEDURE — 6360000002 HC RX W HCPCS: Performed by: NURSE PRACTITIONER

## 2025-01-29 RX ORDER — MAGNESIUM HYDROXIDE/ALUMINUM HYDROXICE/SIMETHICONE 120; 1200; 1200 MG/30ML; MG/30ML; MG/30ML
30 SUSPENSION ORAL EVERY 6 HOURS PRN
Qty: 769 ML | Refills: 0 | Status: SHIPPED | OUTPATIENT
Start: 2025-01-29

## 2025-01-29 RX ORDER — CIPROFLOXACIN 500 MG/1
500 TABLET, FILM COATED ORAL 2 TIMES DAILY
Qty: 10 TABLET | Refills: 0 | Status: SHIPPED | OUTPATIENT
Start: 2025-01-29 | End: 2025-02-03

## 2025-01-29 RX ADMIN — ASPIRIN 81 MG: 81 TABLET, COATED ORAL at 08:09

## 2025-01-29 RX ADMIN — METFORMIN HYDROCHLORIDE 500 MG: 500 TABLET, EXTENDED RELEASE ORAL at 08:08

## 2025-01-29 RX ADMIN — DONEPEZIL HYDROCHLORIDE 10 MG: 5 TABLET, FILM COATED ORAL at 08:09

## 2025-01-29 RX ADMIN — AMANTADINE HYDROCHLORIDE 100 MG: 100 CAPSULE, LIQUID FILLED ORAL at 08:09

## 2025-01-29 RX ADMIN — SODIUM CHLORIDE, PRESERVATIVE FREE 10 ML: 5 INJECTION INTRAVENOUS at 08:08

## 2025-01-29 RX ADMIN — METOPROLOL SUCCINATE 50 MG: 50 TABLET, EXTENDED RELEASE ORAL at 08:08

## 2025-01-29 RX ADMIN — LEVOTHYROXINE SODIUM 88 MCG: 0.09 TABLET ORAL at 06:20

## 2025-01-29 RX ADMIN — WATER 1000 MG: 1 INJECTION INTRAMUSCULAR; INTRAVENOUS; SUBCUTANEOUS at 08:08

## 2025-01-29 RX ADMIN — SACUBITRIL AND VALSARTAN 1 TABLET: 24; 26 TABLET, FILM COATED ORAL at 08:08

## 2025-01-29 RX ADMIN — MICONAZOLE NITRATE: 20 POWDER TOPICAL at 08:09

## 2025-01-29 RX ADMIN — CLOPIDOGREL BISULFATE 75 MG: 75 TABLET ORAL at 08:08

## 2025-01-29 NOTE — PROGRESS NOTES
CLINICAL PHARMACY NOTE: MEDS TO BEDS    Total # of Prescriptions Filled: 2   The following medications were delivered to the patient:  Cipro 500mg tabs  Mylanta antacid     Additional Documentation:  To pt's room

## 2025-01-29 NOTE — PROGRESS NOTES
Comprehensive Nutrition Assessment    Type and Reason for Visit:  Initial, Positive nutrition screen    Nutrition Recommendations/Plan:   Continue regular diet as tolerated  Added Glucerna ONS BID     Malnutrition Assessment:  Malnutrition Status:  At risk for malnutrition (01/29/25 1121)    Context:  Acute Illness     Findings of the 6 clinical characteristics of malnutrition:  Energy Intake:  Unable to assess  Weight Loss:  Mild weight loss (Weight hx endorses weight loss 90.7kg > 79.4kg within 10 months; -11.3kg (-12%) body weight within 10 months and non significant for malnutrition.)     Body Fat Loss:  No body fat loss     Muscle Mass Loss:  Unable to assess    Fluid Accumulation:  Unable to assess     Strength:  Not Performed    Nutrition Assessment:    Pt admit for AMS with UTI and continues inpatient for dementia with delirium, hypothyroidism and + abx therapy. PMH breast CA, cerebral artery occlusion with infarction, CKD, dementia, DM, diverticulitis, radiation, HLD, HTN, hypothyroidism, lymphoma, OAA, and thyroid disease. Documentation endorses >75% intake at meals, tolerating regular diet. 2/2 to advanced age RD added Glucerna ONS BID for nutritional optimization.    Nutrition Related Findings:    -I&O; AAOx3; GCS 14; Glasses; dentures; active bowel sounds with LBM 1/28 Wound Type: None       Current Nutrition Intake & Therapies:    Average Meal Intake: %  Average Supplements Intake: None Ordered  ADULT DIET; Regular    Anthropometric Measures:  Height: 180.3 cm (5' 10.98\")  Ideal Body Weight (IBW): 155 lbs (70 kg)    Admission Body Weight: 79.4 kg (175 lb 0.7 oz)  Current Body Weight: 79.4 kg (175 lb 0.7 oz), 112.9 % IBW. Weight Source: Not specified  Current BMI (kg/m2): 24.4  Usual Body Weight:  (90.7 kg per 03/2024 doucumented weights)  Weight Adjustment For: No Adjustment  BMI Categories: Normal Weight (BMI 22.0 to 24.9) age over 65    Estimated Daily Nutrient Needs:  Energy Requirements

## 2025-01-29 NOTE — CARE COORDINATION
CM  update note.  Discharge order noted.  Discharge plan is home with [facility].  Spoke with Jossie from [facility].  Patient is active with SN and PT.  Ramiro orders placed.  Patient daughter will pick her up today after work around [phone].  Benito Hardy RN -[phone]

## 2025-01-29 NOTE — CARE COORDINATION
reached out to patients PCP office Dr Zana Pandya at [phone] to schedule follow up D/C appointment.  spoke to office staff and scheduled an appointment on 2/12 at 10:40 AM in office.     Please bring your discharge paperwork, new medications, ID, insurance card and co-pay if you have one.    Information added to D/C summary.

## 2025-01-29 NOTE — PLAN OF CARE
Problem: Chronic Conditions and Co-morbidities  Goal: Patient's chronic conditions and co-morbidity symptoms are monitored and maintained or improved  1/28/2025 2304 by Audra Mirza RN  Outcome: Progressing     Problem: Safety - Adult  Goal: Free from fall injury  1/28/2025 2304 by Audra Mirza RN  Outcome: Progressing     Problem: Skin/Tissue Integrity  Goal: Skin integrity remains intact  Description: 1.  Monitor for areas of redness and/or skin breakdown  2.  Assess vascular access sites hourly  3.  Every 4-6 hours minimum:  Change oxygen saturation probe site  4.  Every 4-6 hours:  If on nasal continuous positive airway pressure, respiratory therapy assess nares and determine need for appliance change or resting period  1/28/2025 2304 by Audra Mirza RN  Outcome: Progressing     
  Problem: Chronic Conditions and Co-morbidities  Goal: Patient's chronic conditions and co-morbidity symptoms are monitored and maintained or improved  Outcome: Completed     Problem: Safety - Adult  Goal: Free from fall injury  Outcome: Completed     Problem: Skin/Tissue Integrity  Goal: Skin integrity remains intact  Description: 1.  Monitor for areas of redness and/or skin breakdown  2.  Assess vascular access sites hourly  3.  Every 4-6 hours minimum:  Change oxygen saturation probe site  4.  Every 4-6 hours:  If on nasal continuous positive airway pressure, respiratory therapy assess nares and determine need for appliance change or resting period  Outcome: Completed     
  Problem: Chronic Conditions and Co-morbidities  Goal: Patient's chronic conditions and co-morbidity symptoms are monitored and maintained or improved  Outcome: Progressing     Problem: Safety - Adult  Goal: Free from fall injury  Outcome: Progressing     Problem: Skin/Tissue Integrity  Goal: Skin integrity remains intact  Description: 1.  Monitor for areas of redness and/or skin breakdown  2.  Assess vascular access sites hourly  3.  Every 4-6 hours minimum:  Change oxygen saturation probe site  4.  Every 4-6 hours:  If on nasal continuous positive airway pressure, respiratory therapy assess nares and determine need for appliance change or resting period  Outcome: Progressing     
28

## 2025-01-29 NOTE — DISCHARGE SUMMARY
BILITOT 0.5     PT/INR: No results for input(s): \"INR\", \"APTT\" in the last 72 hours.  BNP: No results for input(s): \"BNP\" in the last 72 hours.  Hgb A1C:   Lab Results   Component Value Date    LABA1C 5.9 (H) 10/29/2024     Folate and B12:   Lab Results   Component Value Date    FQDZZCEO37 1095 (H) 10/31/2024   ,   Lab Results   Component Value Date    FOLATE 4.3 (L) 10/31/2024     Thyroid Studies:   Lab Results   Component Value Date    TSH 1.39 10/29/2024       Urinalysis:    Lab Results   Component Value Date/Time    NITRU NEGATIVE 01/26/2025 11:01 PM    WBCUA 10 TO 20 01/26/2025 11:01 PM    BACTERIA 1+ 01/26/2025 11:01 PM    RBCUA 0 TO 2 01/26/2025 11:01 PM    BLOODU TRACE 03/22/2017 10:00 PM    GLUCOSEU NEGATIVE 01/26/2025 11:01 PM       Imaging:  CT HEAD WO CONTRAST    Result Date: 1/27/2025  EXAMINATION: CT OF THE HEAD WITHOUT CONTRAST  1/27/2025 2:23 am TECHNIQUE: CT of the head was performed without the administration of intravenous contrast. Automated exposure control, iterative reconstruction, and/or weight based adjustment of the mA/kV was utilized to reduce the radiation dose to as low as reasonably achievable. COMPARISON: None. HISTORY: ORDERING SYSTEM PROVIDED HISTORY: altered TECHNOLOGIST PROVIDED HISTORY: Has a \"code stroke\" or \"stroke alert\" been called?->No Reason for exam:->altered Decision Support Exception - unselect if not a suspected or confirmed emergency medical condition->Emergency Medical Condition (MA) What reading provider will be dictating this exam?->CRC FINDINGS: BRAIN: Prior insult in the left greater than right frontal lobes. Mild-to-moderate white matter hypoattenuation which can be seen with chronic small vessel ischemic change. CSF SPACES: Ex vacuo dilatation left frontal horn.  Diffuse cerebral volume loss. HEMORRHAGE: No acute intracranial hemorrhage is identified. MASS EFFECT: No midline shift. SINUS/MASTOIDS: Clear. SCALP: Grossly unremarkable. CALVARIUM: Intact.     No  acute intracranial abnormality.     XR CHEST PORTABLE    Result Date: 1/27/2025  EXAMINATION: ONE XRAY VIEW OF THE CHEST 1/27/2025 1:07 am COMPARISON: 10/28/2024 HISTORY: ORDERING SYSTEM PROVIDED HISTORY: altered TECHNOLOGIST PROVIDED HISTORY: Reason for exam:->altered FINDINGS: The lungs are without acute focal process.  There is no effusion or pneumothorax. The cardiomediastinal silhouette is without acute process. The osseous structures are without acute process.  Stable positioning of left-sided port a catheter.     No acute process.       Discharge Medications:      Medication List        START taking these medications      aluminum & magnesium hydroxide-simethicone 200-200-20 MG/5ML Susp suspension  Commonly known as: MAALOX  Take 30 mLs by mouth every 6 hours as needed for Indigestion     ciprofloxacin 500 MG tablet  Commonly known as: CIPRO  Take 1 tablet by mouth 2 times daily for 5 days            CONTINUE taking these medications      amantadine 100 MG capsule  Commonly known as: SYMMETREL     aspirin 81 MG EC tablet  Take 1 tablet by mouth daily     atorvastatin 40 MG tablet  Commonly known as: LIPITOR     clopidogrel 75 MG tablet  Commonly known as: PLAVIX  Take 1 tablet by mouth daily     donepezil 10 MG tablet  Commonly known as: ARICEPT     levothyroxine 88 MCG tablet  Commonly known as: SYNTHROID  Take 1 tablet by mouth Daily     metFORMIN 500 MG extended release tablet  Commonly known as: GLUCOPHAGE-XR     metoprolol succinate 50 MG extended release tablet  Commonly known as: TOPROL XL  Take 1 tablet by mouth daily     sacubitril-valsartan 24-26 MG per tablet  Commonly known as: ENTRESTO  Take 1 tablet by mouth 2 times daily               Where to Get Your Medications        These medications were sent to [facility] [place] [address] - P [phone] - F [phone]  [facility] [address], [facility] [address]      Phone: [phone]   aluminum & magnesium

## 2025-01-30 LAB
EKG ATRIAL RATE: 74 BPM
EKG P-R INTERVAL: 244 MS
EKG Q-T INTERVAL: 426 MS
EKG QRS DURATION: 146 MS
EKG QTC CALCULATION (BAZETT): 472 MS
EKG R AXIS: -50 DEGREES
EKG T AXIS: 115 DEGREES
EKG VENTRICULAR RATE: 74 BPM

## 2025-02-11 NOTE — PROGRESS NOTES
Met with patient and her friend in the treatment room prior her maintenance Rituxan chemotherapy treatment for follow up. Patient appears well and in good spirits. Upon inquiring, states that she is doing good with the treatments and ready to finish. Still denies any side effects from the treatments. Reports eating and sleeping well. Patient is scheduled for a follow up appointment with Ana Renteria NP on 10/25/18 at [facility] for her breast cancer follow up. States that she received a notice from [facility] that she is due for her mammogram.  Informed her that we do not recommend mammogram until 6 months after her radiation therapy treatments are completed due to the inflammation following the treatment and the clarity to read the images. Leon Barrientos will review with patient at her next radiation therapy appointment next month. Her last radiation therapy treatment was on 6/21/18. Verbalizes understanding. Provided support and encouragement. Denies any current needs for assistance. Patient appreciative of visit. Will continue to follow as needed. Nani Corbin, GIA, RN, OCN  Oncology Nurse Navigator
Patient took premeds at 10 am at home.
yes

## 2025-02-26 PROBLEM — N39.0 UTI (URINARY TRACT INFECTION): Status: RESOLVED | Noted: 2025-01-27 | Resolved: 2025-02-26

## 2025-03-31 ENCOUNTER — APPOINTMENT (OUTPATIENT)
Dept: GENERAL RADIOLOGY | Age: 80
DRG: 884 | End: 2025-03-31
Payer: MEDICARE

## 2025-03-31 ENCOUNTER — HOSPITAL ENCOUNTER (INPATIENT)
Age: 80
LOS: 2 days | Discharge: NURSING, REHAB OR LONG-TERM CARE | DRG: 884 | End: 2025-04-04
Attending: EMERGENCY MEDICINE | Admitting: INTERNAL MEDICINE
Payer: MEDICARE

## 2025-03-31 ENCOUNTER — APPOINTMENT (OUTPATIENT)
Dept: CT IMAGING | Age: 80
DRG: 884 | End: 2025-03-31
Payer: MEDICARE

## 2025-03-31 DIAGNOSIS — R41.82 ALTERED MENTAL STATUS, UNSPECIFIED ALTERED MENTAL STATUS TYPE: Primary | ICD-10-CM

## 2025-03-31 DIAGNOSIS — R00.1 BRADYCARDIA: ICD-10-CM

## 2025-03-31 DIAGNOSIS — R26.2 AMBULATORY DYSFUNCTION: ICD-10-CM

## 2025-03-31 LAB
ALBUMIN SERPL-MCNC: 4.1 G/DL (ref 3.5–5.2)
ALP SERPL-CCNC: 95 U/L (ref 35–104)
ALT SERPL-CCNC: 9 U/L (ref 0–32)
AMMONIA PLAS-SCNC: <10 UMOL/L (ref 11–51)
ANION GAP SERPL CALCULATED.3IONS-SCNC: 13 MMOL/L (ref 7–16)
APAP SERPL-MCNC: <5 UG/ML (ref 10–30)
AST SERPL-CCNC: 12 U/L (ref 0–31)
B PARAP IS1001 DNA NPH QL NAA+NON-PROBE: NOT DETECTED
B PERT DNA SPEC QL NAA+PROBE: NOT DETECTED
BASOPHILS # BLD: 0.02 K/UL (ref 0–0.2)
BASOPHILS NFR BLD: 0 % (ref 0–2)
BILIRUB SERPL-MCNC: 0.6 MG/DL (ref 0–1.2)
BILIRUB UR QL STRIP: NEGATIVE
BUN SERPL-MCNC: 17 MG/DL (ref 6–23)
C PNEUM DNA NPH QL NAA+NON-PROBE: NOT DETECTED
CALCIUM SERPL-MCNC: 9.2 MG/DL (ref 8.6–10.2)
CHLORIDE SERPL-SCNC: 99 MMOL/L (ref 98–107)
CLARITY UR: CLEAR
CO2 SERPL-SCNC: 22 MMOL/L (ref 22–29)
COLOR UR: YELLOW
COMMENT: ABNORMAL
CREAT SERPL-MCNC: 0.9 MG/DL (ref 0.5–1)
EKG ATRIAL RATE: 50 BPM
EKG P AXIS: 81 DEGREES
EKG P-R INTERVAL: 214 MS
EKG Q-T INTERVAL: 452 MS
EKG QRS DURATION: 146 MS
EKG QTC CALCULATION (BAZETT): 412 MS
EKG R AXIS: -33 DEGREES
EKG T AXIS: 178 DEGREES
EKG VENTRICULAR RATE: 50 BPM
EOSINOPHIL # BLD: 0.07 K/UL (ref 0.05–0.5)
EOSINOPHILS RELATIVE PERCENT: 1 % (ref 0–6)
ERYTHROCYTE [DISTWIDTH] IN BLOOD BY AUTOMATED COUNT: 14.4 % (ref 11.5–15)
ETHANOLAMINE SERPL-MCNC: <10 MG/DL (ref 0–0.08)
FLUAV RNA NPH QL NAA+NON-PROBE: NOT DETECTED
FLUBV RNA NPH QL NAA+NON-PROBE: NOT DETECTED
GFR, ESTIMATED: 62 ML/MIN/1.73M2
GLUCOSE BLD-MCNC: 112 MG/DL (ref 74–99)
GLUCOSE BLD-MCNC: 123 MG/DL
GLUCOSE BLD-MCNC: 123 MG/DL (ref 74–99)
GLUCOSE BLD-MCNC: 142 MG/DL (ref 74–99)
GLUCOSE SERPL-MCNC: 119 MG/DL (ref 74–99)
GLUCOSE UR STRIP-MCNC: NEGATIVE MG/DL
HADV DNA NPH QL NAA+NON-PROBE: NOT DETECTED
HCOV 229E RNA NPH QL NAA+NON-PROBE: NOT DETECTED
HCOV HKU1 RNA NPH QL NAA+NON-PROBE: NOT DETECTED
HCOV NL63 RNA NPH QL NAA+NON-PROBE: NOT DETECTED
HCOV OC43 RNA NPH QL NAA+NON-PROBE: NOT DETECTED
HCT VFR BLD AUTO: 39.2 % (ref 34–48)
HGB BLD-MCNC: 13.1 G/DL (ref 11.5–15.5)
HGB UR QL STRIP.AUTO: NEGATIVE
HMPV RNA NPH QL NAA+NON-PROBE: NOT DETECTED
HPIV1 RNA NPH QL NAA+NON-PROBE: NOT DETECTED
HPIV2 RNA NPH QL NAA+NON-PROBE: NOT DETECTED
HPIV3 RNA NPH QL NAA+NON-PROBE: NOT DETECTED
HPIV4 RNA NPH QL NAA+NON-PROBE: NOT DETECTED
IMM GRANULOCYTES # BLD AUTO: 0.03 K/UL (ref 0–0.58)
IMM GRANULOCYTES NFR BLD: 0 % (ref 0–5)
KETONES UR STRIP-MCNC: NEGATIVE MG/DL
LACTATE BLDV-SCNC: 1.8 MMOL/L (ref 0.5–1.9)
LEUKOCYTE ESTERASE UR QL STRIP: NEGATIVE
LYMPHOCYTES NFR BLD: 1.11 K/UL (ref 1.5–4)
LYMPHOCYTES RELATIVE PERCENT: 16 % (ref 20–42)
M PNEUMO DNA NPH QL NAA+NON-PROBE: NOT DETECTED
MCH RBC QN AUTO: 30 PG (ref 26–35)
MCHC RBC AUTO-ENTMCNC: 33.4 G/DL (ref 32–34.5)
MCV RBC AUTO: 89.7 FL (ref 80–99.9)
MONOCYTES NFR BLD: 0.75 K/UL (ref 0.1–0.95)
MONOCYTES NFR BLD: 11 % (ref 2–12)
NEUTROPHILS NFR BLD: 72 % (ref 43–80)
NEUTS SEG NFR BLD: 4.95 K/UL (ref 1.8–7.3)
NITRITE UR QL STRIP: NEGATIVE
PH UR STRIP: 6 [PH] (ref 5–8)
PLATELET # BLD AUTO: 187 K/UL (ref 130–450)
PMV BLD AUTO: 9.8 FL (ref 7–12)
POTASSIUM SERPL-SCNC: 4 MMOL/L (ref 3.5–5)
PROT SERPL-MCNC: 6.2 G/DL (ref 6.4–8.3)
PROT UR STRIP-MCNC: NEGATIVE MG/DL
RBC # BLD AUTO: 4.37 M/UL (ref 3.5–5.5)
RSV RNA NPH QL NAA+NON-PROBE: NOT DETECTED
RV+EV RNA NPH QL NAA+NON-PROBE: NOT DETECTED
SALICYLATES SERPL-MCNC: <0.3 MG/DL (ref 0–30)
SARS-COV-2 RNA NPH QL NAA+NON-PROBE: NOT DETECTED
SODIUM SERPL-SCNC: 134 MMOL/L (ref 132–146)
SP GR UR STRIP: <1.005 (ref 1–1.03)
SPECIMEN DESCRIPTION: NORMAL
TOXIC TRICYCLIC SC,BLOOD: NEGATIVE
TROPONIN I SERPL HS-MCNC: 20 NG/L (ref 0–9)
TROPONIN I SERPL HS-MCNC: 21 NG/L (ref 0–9)
UROBILINOGEN UR STRIP-ACNC: 0.2 EU/DL (ref 0–1)
WBC OTHER # BLD: 6.9 K/UL (ref 4.5–11.5)

## 2025-03-31 PROCEDURE — 80053 COMPREHEN METABOLIC PANEL: CPT

## 2025-03-31 PROCEDURE — G0480 DRUG TEST DEF 1-7 CLASSES: HCPCS

## 2025-03-31 PROCEDURE — 80307 DRUG TEST PRSMV CHEM ANLYZR: CPT

## 2025-03-31 PROCEDURE — 71045 X-RAY EXAM CHEST 1 VIEW: CPT

## 2025-03-31 PROCEDURE — 80143 DRUG ASSAY ACETAMINOPHEN: CPT

## 2025-03-31 PROCEDURE — 84484 ASSAY OF TROPONIN QUANT: CPT

## 2025-03-31 PROCEDURE — 6370000000 HC RX 637 (ALT 250 FOR IP): Performed by: INTERNAL MEDICINE

## 2025-03-31 PROCEDURE — 70450 CT HEAD/BRAIN W/O DYE: CPT

## 2025-03-31 PROCEDURE — 0202U NFCT DS 22 TRGT SARS-COV-2: CPT

## 2025-03-31 PROCEDURE — 72125 CT NECK SPINE W/O DYE: CPT

## 2025-03-31 PROCEDURE — 80179 DRUG ASSAY SALICYLATE: CPT

## 2025-03-31 PROCEDURE — 99223 1ST HOSP IP/OBS HIGH 75: CPT | Performed by: INTERNAL MEDICINE

## 2025-03-31 PROCEDURE — 93005 ELECTROCARDIOGRAM TRACING: CPT | Performed by: EMERGENCY MEDICINE

## 2025-03-31 PROCEDURE — 2500000003 HC RX 250 WO HCPCS: Performed by: INTERNAL MEDICINE

## 2025-03-31 PROCEDURE — 74176 CT ABD & PELVIS W/O CONTRAST: CPT

## 2025-03-31 PROCEDURE — 71250 CT THORAX DX C-: CPT

## 2025-03-31 PROCEDURE — 93010 ELECTROCARDIOGRAM REPORT: CPT | Performed by: INTERNAL MEDICINE

## 2025-03-31 PROCEDURE — 83605 ASSAY OF LACTIC ACID: CPT

## 2025-03-31 PROCEDURE — G0378 HOSPITAL OBSERVATION PER HR: HCPCS

## 2025-03-31 PROCEDURE — 87086 URINE CULTURE/COLONY COUNT: CPT

## 2025-03-31 PROCEDURE — 81003 URINALYSIS AUTO W/O SCOPE: CPT

## 2025-03-31 PROCEDURE — 85025 COMPLETE CBC W/AUTO DIFF WBC: CPT

## 2025-03-31 PROCEDURE — 82140 ASSAY OF AMMONIA: CPT

## 2025-03-31 PROCEDURE — 82962 GLUCOSE BLOOD TEST: CPT

## 2025-03-31 PROCEDURE — 99285 EMERGENCY DEPT VISIT HI MDM: CPT

## 2025-03-31 RX ORDER — CLOPIDOGREL BISULFATE 75 MG/1
75 TABLET ORAL DAILY
Status: DISCONTINUED | OUTPATIENT
Start: 2025-04-01 | End: 2025-04-05 | Stop reason: HOSPADM

## 2025-03-31 RX ORDER — INSULIN LISPRO 100 [IU]/ML
0-4 INJECTION, SOLUTION INTRAVENOUS; SUBCUTANEOUS
Status: DISCONTINUED | OUTPATIENT
Start: 2025-03-31 | End: 2025-04-05 | Stop reason: HOSPADM

## 2025-03-31 RX ORDER — ACETAMINOPHEN 650 MG/1
650 SUPPOSITORY RECTAL EVERY 6 HOURS PRN
Status: DISCONTINUED | OUTPATIENT
Start: 2025-03-31 | End: 2025-04-05 | Stop reason: HOSPADM

## 2025-03-31 RX ORDER — DONEPEZIL HYDROCHLORIDE 5 MG/1
10 TABLET, FILM COATED ORAL DAILY
Status: DISCONTINUED | OUTPATIENT
Start: 2025-04-01 | End: 2025-04-05 | Stop reason: HOSPADM

## 2025-03-31 RX ORDER — HYDRALAZINE HYDROCHLORIDE 20 MG/ML
10 INJECTION INTRAMUSCULAR; INTRAVENOUS EVERY 6 HOURS PRN
Status: DISCONTINUED | OUTPATIENT
Start: 2025-03-31 | End: 2025-04-05 | Stop reason: HOSPADM

## 2025-03-31 RX ORDER — MAGNESIUM SULFATE IN WATER 40 MG/ML
2000 INJECTION, SOLUTION INTRAVENOUS PRN
Status: DISCONTINUED | OUTPATIENT
Start: 2025-03-31 | End: 2025-04-05 | Stop reason: HOSPADM

## 2025-03-31 RX ORDER — CALCIUM CARBONATE 500 MG/1
500 TABLET, CHEWABLE ORAL 3 TIMES DAILY PRN
Status: DISCONTINUED | OUTPATIENT
Start: 2025-03-31 | End: 2025-04-05 | Stop reason: HOSPADM

## 2025-03-31 RX ORDER — METOPROLOL SUCCINATE 50 MG/1
50 TABLET, EXTENDED RELEASE ORAL DAILY
Status: DISCONTINUED | OUTPATIENT
Start: 2025-04-01 | End: 2025-04-02

## 2025-03-31 RX ORDER — SODIUM CHLORIDE 0.9 % (FLUSH) 0.9 %
5-40 SYRINGE (ML) INJECTION EVERY 12 HOURS SCHEDULED
Status: DISCONTINUED | OUTPATIENT
Start: 2025-03-31 | End: 2025-04-05 | Stop reason: HOSPADM

## 2025-03-31 RX ORDER — ATORVASTATIN CALCIUM 40 MG/1
40 TABLET, FILM COATED ORAL NIGHTLY
Status: DISCONTINUED | OUTPATIENT
Start: 2025-03-31 | End: 2025-04-05 | Stop reason: HOSPADM

## 2025-03-31 RX ORDER — ENOXAPARIN SODIUM 100 MG/ML
40 INJECTION SUBCUTANEOUS DAILY
Status: DISCONTINUED | OUTPATIENT
Start: 2025-04-01 | End: 2025-04-05 | Stop reason: HOSPADM

## 2025-03-31 RX ORDER — ONDANSETRON 4 MG/1
4 TABLET, ORALLY DISINTEGRATING ORAL EVERY 8 HOURS PRN
Status: DISCONTINUED | OUTPATIENT
Start: 2025-03-31 | End: 2025-04-05 | Stop reason: HOSPADM

## 2025-03-31 RX ORDER — POTASSIUM CHLORIDE 7.45 MG/ML
10 INJECTION INTRAVENOUS PRN
Status: DISCONTINUED | OUTPATIENT
Start: 2025-03-31 | End: 2025-04-05 | Stop reason: HOSPADM

## 2025-03-31 RX ORDER — GLUCAGON 1 MG/ML
1 KIT INJECTION PRN
Status: DISCONTINUED | OUTPATIENT
Start: 2025-03-31 | End: 2025-04-05 | Stop reason: HOSPADM

## 2025-03-31 RX ORDER — ASPIRIN 81 MG/1
81 TABLET ORAL DAILY
Status: DISCONTINUED | OUTPATIENT
Start: 2025-04-01 | End: 2025-04-05 | Stop reason: HOSPADM

## 2025-03-31 RX ORDER — SODIUM CHLORIDE 0.9 % (FLUSH) 0.9 %
5-40 SYRINGE (ML) INJECTION PRN
Status: DISCONTINUED | OUTPATIENT
Start: 2025-03-31 | End: 2025-04-05 | Stop reason: HOSPADM

## 2025-03-31 RX ORDER — ACETAMINOPHEN 325 MG/1
650 TABLET ORAL EVERY 6 HOURS PRN
Status: DISCONTINUED | OUTPATIENT
Start: 2025-03-31 | End: 2025-04-05 | Stop reason: HOSPADM

## 2025-03-31 RX ORDER — DEXTROSE MONOHYDRATE 100 MG/ML
INJECTION, SOLUTION INTRAVENOUS CONTINUOUS PRN
Status: DISCONTINUED | OUTPATIENT
Start: 2025-03-31 | End: 2025-04-05 | Stop reason: HOSPADM

## 2025-03-31 RX ORDER — ONDANSETRON 2 MG/ML
4 INJECTION INTRAMUSCULAR; INTRAVENOUS EVERY 6 HOURS PRN
Status: DISCONTINUED | OUTPATIENT
Start: 2025-03-31 | End: 2025-04-05 | Stop reason: HOSPADM

## 2025-03-31 RX ORDER — POTASSIUM CHLORIDE 1500 MG/1
40 TABLET, EXTENDED RELEASE ORAL PRN
Status: DISCONTINUED | OUTPATIENT
Start: 2025-03-31 | End: 2025-04-05 | Stop reason: HOSPADM

## 2025-03-31 RX ORDER — BENZONATATE 100 MG/1
100 CAPSULE ORAL 3 TIMES DAILY PRN
Status: DISCONTINUED | OUTPATIENT
Start: 2025-03-31 | End: 2025-04-05 | Stop reason: HOSPADM

## 2025-03-31 RX ORDER — AMANTADINE HYDROCHLORIDE 100 MG/1
100 CAPSULE, GELATIN COATED ORAL 2 TIMES DAILY
Status: DISCONTINUED | OUTPATIENT
Start: 2025-03-31 | End: 2025-04-05 | Stop reason: HOSPADM

## 2025-03-31 RX ORDER — MAGNESIUM HYDROXIDE/ALUMINUM HYDROXICE/SIMETHICONE 120; 1200; 1200 MG/30ML; MG/30ML; MG/30ML
30 SUSPENSION ORAL EVERY 6 HOURS PRN
Status: DISCONTINUED | OUTPATIENT
Start: 2025-03-31 | End: 2025-04-05 | Stop reason: HOSPADM

## 2025-03-31 RX ORDER — SODIUM CHLORIDE 9 MG/ML
INJECTION, SOLUTION INTRAVENOUS PRN
Status: DISCONTINUED | OUTPATIENT
Start: 2025-03-31 | End: 2025-04-05 | Stop reason: HOSPADM

## 2025-03-31 RX ORDER — LEVOTHYROXINE SODIUM 88 UG/1
88 TABLET ORAL DAILY
Status: DISCONTINUED | OUTPATIENT
Start: 2025-04-01 | End: 2025-04-05 | Stop reason: HOSPADM

## 2025-03-31 RX ORDER — POLYETHYLENE GLYCOL 3350 17 G/17G
17 POWDER, FOR SOLUTION ORAL DAILY PRN
Status: DISCONTINUED | OUTPATIENT
Start: 2025-03-31 | End: 2025-04-05 | Stop reason: HOSPADM

## 2025-03-31 RX ADMIN — ATORVASTATIN CALCIUM 40 MG: 40 TABLET, FILM COATED ORAL at 21:49

## 2025-03-31 RX ADMIN — SACUBITRIL AND VALSARTAN 1 TABLET: 24; 26 TABLET, FILM COATED ORAL at 21:49

## 2025-03-31 RX ADMIN — Medication 3 MG: at 21:49

## 2025-03-31 RX ADMIN — SODIUM CHLORIDE, PRESERVATIVE FREE 10 ML: 5 INJECTION INTRAVENOUS at 21:49

## 2025-03-31 NOTE — H&P
Inpatient H&P      PCP:  Zana Pandya MD  Admitting Physician:  No admitting provider for patient encounter.  Consultants:  None at this time   Chief Complaint:    Chief Complaint   Patient presents with    Altered Mental Status     Hx dementia, pt has a previous stroke years ago, pts daughter wanted her checked out since she seems more confused that normal    Fall     Pt fell on Friday and has been having increased weakness since, pts daughter wanted her checked out.       History of Present Illness  Nika Aviles is a 80 y.o. female who presents to [facility] ER complaining of altered mental status.    Nika Aviles has a past medical history that includes history of breast cancer status postradiation, CVA, CKD, dementia, diabetes, diverticulitis, hyperlipidemia, hypertension, hypothyroidism, lymphoma.    Nika presents to the ER with altered mental status.  She had a fall on Friday and then has had increasing AMS since then.  Family states she has fallen three times in the past week. She does have a history of previous stroke and weakness from previous stroke.  She does report she hit her head when she fell and she has a mild headache.  She admits to right flank/posterior rib pain.  CT head no acute intracranial abnormality.  There are stable prominent old infarcts.  Stable prominent gliosis.  CT chest showed pulmonary nodule, moderate distal colonic stool burden. Denies any fever, chills, UTI symptoms. Admits to cough.  Will admit for MRI and possible placement  Discussed patient's case with ED physician.    ER Course  Upon presentation to the ER, routine labwork was performed which revealed glucose 123, troponin 21, 20.  Imaging results are as outlined below in the Imaging section of this note.    Upon arrival to the ER, patient was 128/60.  The patient received no medications in the emergency room and was admitted to [facility].    Last Hospital Admission -I personally reviewed previous admission

## 2025-03-31 NOTE — ED NOTES
Pts daughter basim updated on pts plan of care and is aware pt is going to 8202, pts daughter also provided information for MRI screening.

## 2025-03-31 NOTE — ED PROVIDER NOTES
succinate (TOPROL XL) extended release tablet 50 mg (has no administration in time range)   sacubitril-valsartan (ENTRESTO) 24-26 MG per tablet 1 tablet (has no administration in time range)   glucose chewable tablet 16 g (has no administration in time range)   dextrose bolus 10% 125 mL (has no administration in time range)     Or   dextrose bolus 10% 250 mL (has no administration in time range)   glucagon injection 1 mg (has no administration in time range)   dextrose 10 % infusion (has no administration in time range)   insulin lispro (HUMALOG,ADMELOG) injection vial 0-4 Units ( SubCUTAneous Not Given 3/31/25 1658)                      MEDICAL DECISION MAKING:      The following labs are interpreted by me:    Recent Results (from the past 24 hours)   Ammonia    Collection Time: 03/31/25 12:59 PM   Result Value Ref Range    Ammonia <10 (L) 11.0 - 51.0 umol/L   Urinalysis    Collection Time: 03/31/25 12:59 PM   Result Value Ref Range    Color, UA Yellow Yellow    Turbidity UA Clear Clear    Glucose, Ur NEGATIVE NEGATIVE mg/dL    Bilirubin, Urine NEGATIVE NEGATIVE    Ketones, Urine NEGATIVE NEGATIVE mg/dL    Specific Gravity, UA <1.005 (L) 1.005 - 1.030    Urine Hgb NEGATIVE NEGATIVE    pH, Urine 6.0 5.0 - 8.0    Protein, UA NEGATIVE NEGATIVE mg/dL    Urobilinogen, Urine 0.2 0.0 - 1.0 EU/dL    Nitrite, Urine NEGATIVE NEGATIVE    Leukocyte Esterase, Urine NEGATIVE NEGATIVE    Comment       Microscopic exam not performed based on chemical results unless requested in original order.   CBC with Auto Differential    Collection Time: 03/31/25  1:00 PM   Result Value Ref Range    WBC 6.9 4.5 - 11.5 k/uL    RBC 4.37 3.50 - 5.50 m/uL    Hemoglobin 13.1 11.5 - 15.5 g/dL    Hematocrit 39.2 34.0 - 48.0 %    MCV 89.7 80.0 - 99.9 fL    MCH 30.0 26.0 - 35.0 pg    MCHC 33.4 32.0 - 34.5 g/dL    RDW 14.4 11.5 - 15.0 %    Platelets 187 130 - 450 k/uL    MPV 9.8 7.0 - 12.0 fL    Neutrophils % 72 43.0 - 80.0 %    Lymphocytes % 16 (L)

## 2025-04-01 ENCOUNTER — APPOINTMENT (OUTPATIENT)
Dept: MRI IMAGING | Age: 80
DRG: 884 | End: 2025-04-01
Payer: MEDICARE

## 2025-04-01 LAB
ALBUMIN SERPL-MCNC: 4 G/DL (ref 3.5–5.2)
ALP SERPL-CCNC: 92 U/L (ref 35–104)
ALT SERPL-CCNC: 10 U/L (ref 0–32)
AMPHET UR QL SCN: NEGATIVE
ANION GAP SERPL CALCULATED.3IONS-SCNC: 15 MMOL/L (ref 7–16)
AST SERPL-CCNC: 13 U/L (ref 0–31)
BARBITURATES UR QL SCN: NEGATIVE
BENZODIAZ UR QL: NEGATIVE
BILIRUB SERPL-MCNC: 0.6 MG/DL (ref 0–1.2)
BUN SERPL-MCNC: 14 MG/DL (ref 6–23)
BUPRENORPHINE UR QL: NEGATIVE
CALCIUM SERPL-MCNC: 9.1 MG/DL (ref 8.6–10.2)
CANNABINOIDS UR QL SCN: NEGATIVE
CHLORIDE SERPL-SCNC: 102 MMOL/L (ref 98–107)
CHOLEST SERPL-MCNC: 136 MG/DL
CO2 SERPL-SCNC: 19 MMOL/L (ref 22–29)
COCAINE UR QL SCN: NEGATIVE
CREAT SERPL-MCNC: 0.8 MG/DL (ref 0.5–1)
ERYTHROCYTE [DISTWIDTH] IN BLOOD BY AUTOMATED COUNT: 14.5 % (ref 11.5–15)
FENTANYL UR QL: NEGATIVE
GFR, ESTIMATED: 71 ML/MIN/1.73M2
GLUCOSE BLD-MCNC: 162 MG/DL (ref 74–99)
GLUCOSE BLD-MCNC: 79 MG/DL (ref 74–99)
GLUCOSE BLD-MCNC: 96 MG/DL (ref 74–99)
GLUCOSE BLD-MCNC: 98 MG/DL (ref 74–99)
GLUCOSE SERPL-MCNC: 90 MG/DL (ref 74–99)
HBA1C MFR BLD: 5.6 % (ref 4–5.6)
HCT VFR BLD AUTO: 41.6 % (ref 34–48)
HDLC SERPL-MCNC: 50 MG/DL
HGB BLD-MCNC: 13.5 G/DL (ref 11.5–15.5)
LDLC SERPL CALC-MCNC: 69 MG/DL
MAGNESIUM SERPL-MCNC: 1.5 MG/DL (ref 1.6–2.6)
MCH RBC QN AUTO: 29.9 PG (ref 26–35)
MCHC RBC AUTO-ENTMCNC: 32.5 G/DL (ref 32–34.5)
MCV RBC AUTO: 92 FL (ref 80–99.9)
METHADONE UR QL: NEGATIVE
MICROORGANISM SPEC CULT: ABNORMAL
MICROORGANISM SPEC CULT: ABNORMAL
OPIATES UR QL SCN: NEGATIVE
OXYCODONE UR QL SCN: NEGATIVE
PCP UR QL SCN: NEGATIVE
PHOSPHATE SERPL-MCNC: 3.5 MG/DL (ref 2.5–4.5)
PLATELET # BLD AUTO: 194 K/UL (ref 130–450)
PMV BLD AUTO: 10.2 FL (ref 7–12)
POTASSIUM SERPL-SCNC: 4.1 MMOL/L (ref 3.5–5)
PROT SERPL-MCNC: 6 G/DL (ref 6.4–8.3)
RBC # BLD AUTO: 4.52 M/UL (ref 3.5–5.5)
SODIUM SERPL-SCNC: 136 MMOL/L (ref 132–146)
SPECIMEN DESCRIPTION: ABNORMAL
TEST INFORMATION: NORMAL
TRIGL SERPL-MCNC: 84 MG/DL
TSH SERPL DL<=0.05 MIU/L-ACNC: 3.31 UIU/ML (ref 0.27–4.2)
VLDLC SERPL CALC-MCNC: 17 MG/DL
WBC OTHER # BLD: 4.8 K/UL (ref 4.5–11.5)

## 2025-04-01 PROCEDURE — A9579 GAD-BASE MR CONTRAST NOS,1ML: HCPCS | Performed by: RADIOLOGY

## 2025-04-01 PROCEDURE — 97165 OT EVAL LOW COMPLEX 30 MIN: CPT

## 2025-04-01 PROCEDURE — 82962 GLUCOSE BLOOD TEST: CPT

## 2025-04-01 PROCEDURE — 80053 COMPREHEN METABOLIC PANEL: CPT

## 2025-04-01 PROCEDURE — 83735 ASSAY OF MAGNESIUM: CPT

## 2025-04-01 PROCEDURE — 84100 ASSAY OF PHOSPHORUS: CPT

## 2025-04-01 PROCEDURE — 97535 SELF CARE MNGMENT TRAINING: CPT

## 2025-04-01 PROCEDURE — 85027 COMPLETE CBC AUTOMATED: CPT

## 2025-04-01 PROCEDURE — 36415 COLL VENOUS BLD VENIPUNCTURE: CPT

## 2025-04-01 PROCEDURE — 6360000002 HC RX W HCPCS: Performed by: INTERNAL MEDICINE

## 2025-04-01 PROCEDURE — 70553 MRI BRAIN STEM W/O & W/DYE: CPT

## 2025-04-01 PROCEDURE — G0378 HOSPITAL OBSERVATION PER HR: HCPCS

## 2025-04-01 PROCEDURE — 99232 SBSQ HOSP IP/OBS MODERATE 35: CPT | Performed by: INTERNAL MEDICINE

## 2025-04-01 PROCEDURE — 6360000004 HC RX CONTRAST MEDICATION: Performed by: RADIOLOGY

## 2025-04-01 PROCEDURE — 84443 ASSAY THYROID STIM HORMONE: CPT

## 2025-04-01 PROCEDURE — 80061 LIPID PANEL: CPT

## 2025-04-01 PROCEDURE — 97530 THERAPEUTIC ACTIVITIES: CPT

## 2025-04-01 PROCEDURE — 97161 PT EVAL LOW COMPLEX 20 MIN: CPT

## 2025-04-01 PROCEDURE — 6370000000 HC RX 637 (ALT 250 FOR IP): Performed by: INTERNAL MEDICINE

## 2025-04-01 PROCEDURE — 83036 HEMOGLOBIN GLYCOSYLATED A1C: CPT

## 2025-04-01 PROCEDURE — 2500000003 HC RX 250 WO HCPCS: Performed by: INTERNAL MEDICINE

## 2025-04-01 RX ORDER — MAGNESIUM SULFATE IN WATER 40 MG/ML
2000 INJECTION, SOLUTION INTRAVENOUS ONCE
Status: DISCONTINUED | OUTPATIENT
Start: 2025-04-01 | End: 2025-04-05 | Stop reason: HOSPADM

## 2025-04-01 RX ADMIN — AMANTADINE HYDROCHLORIDE 100 MG: 100 CAPSULE ORAL at 08:29

## 2025-04-01 RX ADMIN — ATORVASTATIN CALCIUM 40 MG: 40 TABLET, FILM COATED ORAL at 23:32

## 2025-04-01 RX ADMIN — DONEPEZIL HYDROCHLORIDE 10 MG: 5 TABLET, FILM COATED ORAL at 08:29

## 2025-04-01 RX ADMIN — METOPROLOL SUCCINATE 50 MG: 50 TABLET, EXTENDED RELEASE ORAL at 08:29

## 2025-04-01 RX ADMIN — SACUBITRIL AND VALSARTAN 1 TABLET: 24; 26 TABLET, FILM COATED ORAL at 08:29

## 2025-04-01 RX ADMIN — SODIUM CHLORIDE, PRESERVATIVE FREE 10 ML: 5 INJECTION INTRAVENOUS at 23:33

## 2025-04-01 RX ADMIN — CLOPIDOGREL BISULFATE 75 MG: 75 TABLET, FILM COATED ORAL at 08:29

## 2025-04-01 RX ADMIN — Medication 3 MG: at 23:33

## 2025-04-01 RX ADMIN — MAGNESIUM SULFATE HEPTAHYDRATE 2000 MG: 40 INJECTION, SOLUTION INTRAVENOUS at 08:27

## 2025-04-01 RX ADMIN — AMANTADINE HYDROCHLORIDE 100 MG: 100 CAPSULE ORAL at 23:32

## 2025-04-01 RX ADMIN — SACUBITRIL AND VALSARTAN 1 TABLET: 24; 26 TABLET, FILM COATED ORAL at 23:33

## 2025-04-01 RX ADMIN — ENOXAPARIN SODIUM 40 MG: 100 INJECTION SUBCUTANEOUS at 08:29

## 2025-04-01 RX ADMIN — SODIUM CHLORIDE, PRESERVATIVE FREE 10 ML: 5 INJECTION INTRAVENOUS at 08:29

## 2025-04-01 RX ADMIN — GADOTERIDOL 14 ML: 279.3 INJECTION, SOLUTION INTRAVENOUS at 22:31

## 2025-04-01 RX ADMIN — LEVOTHYROXINE SODIUM 88 MCG: 0.09 TABLET ORAL at 08:29

## 2025-04-01 RX ADMIN — ASPIRIN 81 MG: 81 TABLET, COATED ORAL at 08:29

## 2025-04-01 NOTE — CARE COORDINATION
04/01/2025  EVAL:  pt in observation status as of 3/31/25 DX: AMS.  Spoke with daughter, Rosangela for eval.  She states that pt lives with her diana 1 story home-no steps-ramp to entrance. DME on hand is FWW, WC, lift cahir, shower transfer seat.  Pt mainly uses FWW in home. They had [facility] for nursing and therapy coming to home as well as [employer] providing private duty care on daily basis and also on weekends occasionally.  Pt fell 2 x recently in home, once while daughter was there and once when aide was with her.  Daughter feels that pt may need rehab in SNF prior to return to home. Requested referral to [facility]-gave it to [facility].  Ivet Gallegos RN  [phone]    Case Management Assessment  Initial Evaluation    Date/Time of Evaluation: 4/1/2025 2:34 PM  Assessment Completed by: Ivet Gallegos RN    If patient is discharged prior to next notation, then this note serves as note for discharge by case management.    Patient Name: Nika Aviles                   YOB: 1945  Diagnosis: Altered mental status [R41.82]  Altered mental status, unspecified altered mental status type [R41.82]                   Date / Time: 3/31/2025 12:34 PM    Patient Admission Status: Observation   Readmission Risk (Low < 19, Mod (19-27), High > 27): Readmission Risk Score: 16.2    Current PCP: Zana Pandya MD  PCP verified by ?      Chart Reviewed: Yes      History Provided by:  daughter  Patient Orientation:    confused  Patient Cognition:      Hospitalization in the last 30 days (Readmission):  No    If yes, Readmission Assessment in  Navigator will be completed.    Advance Directives:      Code Status: Full Code   Patient's Primary Decision Maker is:      Primary Decision Maker: Rosangela Gabriel - Child - [phone]    Discharge Planning:    Patient lives with: Children, Family Members Type of Home: House  Primary Care Giver:    Patient Support Systems include: Friends/Neighbors, Family Members   Current

## 2025-04-01 NOTE — ACP (ADVANCE CARE PLANNING)
Advance Care Planning   Healthcare Decision Maker:    Primary Decision Maker: Rosangela Gabriel - Child - [phone]    Click here to complete Healthcare Decision Makers including selection of the Healthcare Decision Maker Relationship (ie \"Primary\").

## 2025-04-01 NOTE — PLAN OF CARE
Problem: Chronic Conditions and Co-morbidities  Goal: Patient's chronic conditions and co-morbidity symptoms are monitored and maintained or improved  Outcome: Progressing     Problem: Skin/Tissue Integrity  Goal: Skin integrity remains intact  Description: 1.  Monitor for areas of redness and/or skin breakdown  2.  Assess vascular access sites hourly  3.  Every 4-6 hours minimum:  Change oxygen saturation probe site  4.  Every 4-6 hours:  If on nasal continuous positive airway pressure, respiratory therapy assess nares and determine need for appliance change or resting period  Outcome: Progressing  Flowsheets (Taken 3/31/2025 2110)  Skin Integrity Remains Intact: Monitor for areas of redness and/or skin breakdown     Problem: Safety - Adult  Goal: Free from fall injury  Outcome: Progressing  Flowsheets (Taken 3/31/2025 2113)  Free From Fall Injury: Instruct family/caregiver on patient safety

## 2025-04-02 PROBLEM — R00.1 BRADYCARDIA: Status: ACTIVE | Noted: 2025-04-02

## 2025-04-02 LAB
ANION GAP SERPL CALCULATED.3IONS-SCNC: 16 MMOL/L (ref 7–16)
BUN SERPL-MCNC: 10 MG/DL (ref 6–23)
CALCIUM SERPL-MCNC: 8.9 MG/DL (ref 8.6–10.2)
CHLORIDE SERPL-SCNC: 105 MMOL/L (ref 98–107)
CO2 SERPL-SCNC: 19 MMOL/L (ref 22–29)
CREAT SERPL-MCNC: 0.7 MG/DL (ref 0.5–1)
EKG ATRIAL RATE: 54 BPM
EKG P AXIS: -17 DEGREES
EKG P-R INTERVAL: 220 MS
EKG Q-T INTERVAL: 448 MS
EKG QRS DURATION: 140 MS
EKG QTC CALCULATION (BAZETT): 424 MS
EKG R AXIS: -22 DEGREES
EKG T AXIS: 162 DEGREES
EKG VENTRICULAR RATE: 54 BPM
GFR, ESTIMATED: 89 ML/MIN/1.73M2
GLUCOSE BLD-MCNC: 115 MG/DL (ref 74–99)
GLUCOSE BLD-MCNC: 120 MG/DL (ref 74–99)
GLUCOSE BLD-MCNC: 127 MG/DL (ref 74–99)
GLUCOSE BLD-MCNC: 145 MG/DL (ref 74–99)
GLUCOSE BLD-MCNC: 52 MG/DL (ref 74–99)
GLUCOSE SERPL-MCNC: 158 MG/DL (ref 74–99)
MAGNESIUM SERPL-MCNC: 1.9 MG/DL (ref 1.6–2.6)
POTASSIUM SERPL-SCNC: 3.7 MMOL/L (ref 3.5–5)
SODIUM SERPL-SCNC: 140 MMOL/L (ref 132–146)

## 2025-04-02 PROCEDURE — 6370000000 HC RX 637 (ALT 250 FOR IP): Performed by: INTERNAL MEDICINE

## 2025-04-02 PROCEDURE — 2500000003 HC RX 250 WO HCPCS: Performed by: INTERNAL MEDICINE

## 2025-04-02 PROCEDURE — 99232 SBSQ HOSP IP/OBS MODERATE 35: CPT | Performed by: INTERNAL MEDICINE

## 2025-04-02 PROCEDURE — 93005 ELECTROCARDIOGRAM TRACING: CPT | Performed by: CLINICAL NURSE SPECIALIST

## 2025-04-02 PROCEDURE — 99222 1ST HOSP IP/OBS MODERATE 55: CPT | Performed by: INTERNAL MEDICINE

## 2025-04-02 PROCEDURE — 80048 BASIC METABOLIC PNL TOTAL CA: CPT

## 2025-04-02 PROCEDURE — APPSS60 APP SPLIT SHARED TIME 46-60 MINUTES: Performed by: CLINICAL NURSE SPECIALIST

## 2025-04-02 PROCEDURE — 2060000000 HC ICU INTERMEDIATE R&B

## 2025-04-02 PROCEDURE — 82962 GLUCOSE BLOOD TEST: CPT

## 2025-04-02 PROCEDURE — 36415 COLL VENOUS BLD VENIPUNCTURE: CPT

## 2025-04-02 PROCEDURE — 83735 ASSAY OF MAGNESIUM: CPT

## 2025-04-02 PROCEDURE — 6360000002 HC RX W HCPCS: Performed by: INTERNAL MEDICINE

## 2025-04-02 RX ORDER — METOPROLOL SUCCINATE 25 MG/1
25 TABLET, EXTENDED RELEASE ORAL DAILY
Status: DISCONTINUED | OUTPATIENT
Start: 2025-04-03 | End: 2025-04-05 | Stop reason: HOSPADM

## 2025-04-02 RX ADMIN — Medication 16 G: at 06:24

## 2025-04-02 RX ADMIN — SACUBITRIL AND VALSARTAN 1 TABLET: 24; 26 TABLET, FILM COATED ORAL at 08:21

## 2025-04-02 RX ADMIN — Medication 3 MG: at 20:11

## 2025-04-02 RX ADMIN — SODIUM CHLORIDE, PRESERVATIVE FREE 10 ML: 5 INJECTION INTRAVENOUS at 20:11

## 2025-04-02 RX ADMIN — ENOXAPARIN SODIUM 40 MG: 100 INJECTION SUBCUTANEOUS at 08:20

## 2025-04-02 RX ADMIN — SACUBITRIL AND VALSARTAN 1 TABLET: 24; 26 TABLET, FILM COATED ORAL at 20:11

## 2025-04-02 RX ADMIN — ATORVASTATIN CALCIUM 40 MG: 40 TABLET, FILM COATED ORAL at 20:11

## 2025-04-02 RX ADMIN — SODIUM CHLORIDE, PRESERVATIVE FREE 10 ML: 5 INJECTION INTRAVENOUS at 08:22

## 2025-04-02 RX ADMIN — LEVOTHYROXINE SODIUM 88 MCG: 0.09 TABLET ORAL at 06:23

## 2025-04-02 RX ADMIN — DONEPEZIL HYDROCHLORIDE 10 MG: 5 TABLET, FILM COATED ORAL at 08:21

## 2025-04-02 RX ADMIN — AMANTADINE HYDROCHLORIDE 100 MG: 100 CAPSULE ORAL at 08:29

## 2025-04-02 RX ADMIN — CLOPIDOGREL BISULFATE 75 MG: 75 TABLET, FILM COATED ORAL at 08:21

## 2025-04-02 RX ADMIN — AMANTADINE HYDROCHLORIDE 100 MG: 100 CAPSULE ORAL at 22:41

## 2025-04-02 RX ADMIN — ASPIRIN 81 MG: 81 TABLET, COATED ORAL at 08:21

## 2025-04-02 NOTE — CARE COORDINATION
04/02/2025 CM Note:  pt in observation status as of 3/31/25 DX: AMS.  [facility] accepted. Traci started precert.  Once precert is back then DC can move forward.  Spoke with patient and she was confused as to who was in room with her.  Aox1. Ivet Gallegos RN -[phone]

## 2025-04-02 NOTE — PLAN OF CARE
Problem: Chronic Conditions and Co-morbidities  Goal: Patient's chronic conditions and co-morbidity symptoms are monitored and maintained or improved  4/2/2025 0910 by Krysta Hewitt RN  Outcome: Progressing  Flowsheets  Taken 4/2/2025 0910  Care Plan - Patient's Chronic Conditions and Co-Morbidity Symptoms are Monitored and Maintained or Improved:   Collaborate with multidisciplinary team to address chronic and comorbid conditions and prevent exacerbation or deterioration   Monitor and assess patient's chronic conditions and comorbid symptoms for stability, deterioration, or improvement  Taken 4/2/2025 0800  Care Plan - Patient's Chronic Conditions and Co-Morbidity Symptoms are Monitored and Maintained or Improved:   Collaborate with multidisciplinary team to address chronic and comorbid conditions and prevent exacerbation or deterioration   Monitor and assess patient's chronic conditions and comorbid symptoms for stability, deterioration, or improvement     Problem: Skin/Tissue Integrity  Goal: Skin integrity remains intact  Description: 1.  Monitor for areas of redness and/or skin breakdown  2.  Assess vascular access sites hourly  3.  Every 4-6 hours minimum:  Change oxygen saturation probe site  4.  Every 4-6 hours:  If on nasal continuous positive airway pressure, respiratory therapy assess nares and determine need for appliance change or resting period  4/2/2025 0910 by Krysta Hewitt, RN  Outcome: Progressing  Flowsheets  Taken 4/2/2025 0910  Skin Integrity Remains Intact:   Monitor for areas of redness and/or skin breakdown   Positioning devices   Monitor skin under medical devices  Taken 4/2/2025 0800  Skin Integrity Remains Intact: Monitor for areas of redness and/or skin breakdown  4/2/2025 0251 by Lea Ortiz RN  Outcome: Progressing     Problem: Safety - Adult  Goal: Free from fall injury  4/2/2025 0910 by Krysta Hewitt, RN  Outcome: Progressing  Flowsheets (Taken 4/2/2025

## 2025-04-02 NOTE — PLAN OF CARE
Problem: Chronic Conditions and Co-morbidities  Goal: Patient's chronic conditions and co-morbidity symptoms are monitored and maintained or improved  Outcome: Progressing     Problem: Skin/Tissue Integrity  Goal: Skin integrity remains intact  Description: 1.  Monitor for areas of redness and/or skin breakdown  2.  Assess vascular access sites hourly  3.  Every 4-6 hours minimum:  Change oxygen saturation probe site  4.  Every 4-6 hours:  If on nasal continuous positive airway pressure, respiratory therapy assess nares and determine need for appliance change or resting period  Outcome: Progressing     Problem: Safety - Adult  Goal: Free from fall injury  Outcome: Progressing     Problem: Discharge Planning  Goal: Discharge to home or other facility with appropriate resources  Outcome: Progressing     Problem: Musculoskeletal - Adult  Goal: Return mobility to safest level of function  Outcome: Progressing  Goal: Maintain proper alignment of affected body part  Outcome: Progressing  Goal: Return ADL status to a safe level of function  Outcome: Progressing     Problem: Pain  Goal: Verbalizes/displays adequate comfort level or baseline comfort level  Outcome: Progressing

## 2025-04-02 NOTE — CONSULTS
Inpatient Cardiology Consultation      Reason for Consult: Bradycardia    Consulting Physician: Dr. Fuentes    Requesting Physician:  Elizabeth Crane     Date of Consultation: 4/2/2025    HISTORY OF PRESENT ILLNESS:   Nika Aviles  is a 80 y.o.  female known to [facility] cardiology and followed by Dr. Michael.  Seen most recently in office by TERRY Ponce on 1/6/2025 at that time for bradycardia.  Family had noted heart rates in the low 50s.  Asymptomatic.  Patient was currently on metoprolol, Aricept and amantadine --advised to notify office if heart rate was  40s as patient was asymptomatic with heart rate in the 50s.  Family and patient were deferring ischemic evaluation or interested in invasive cardiac angiography to evaluate CAD due to current medical state.  Admit 1/27/25 - 1/29/25 for AMS & weakness UTI + e coli -- Tx and discharged      PMH: see below    ED 3/31/2025 at 1233 via EMS for increased weakness and AMS more confused than normal.  Patient also fell last week.  Arrival vitals: /60 HR 50 SpO2 95% on room air afebrile  Significant Labs: Troponin 21-20.  Lactic acid 1.8 ammonia less than 10 BUN 10 CR 0.7 K3.7 mag 1.5 albumin 4.0 LFT WNL A1c 5.6 lipid profile cholesterol total 136 HDL 50 LDL 69 triglycerides 84 VLDL 17 TSH 3.31 WBC 4.8 Hgb 13.5   Urine drug screen negative  Full respiratory panel negative  UA unremarkable; urine culture positive for gram-negative rods  RAD: Initial portable chest x-ray no acute process.    Initial CT of the head no acute finding.  Old changes noted.    MRI of the brain with and without contrast: No acute abnormality and no sign of acute infarct.Stable old infarction involving the left caudate head, the anterior caudate body, and the anterior left basal ganglia. 3. Stable old lacunar infarction in the anterior right centrum semiovale. 4. Stable old small vessel ischemic change in the aamir bilaterally. 5. Stable moderate age-appropriate atrophy and

## 2025-04-03 ENCOUNTER — APPOINTMENT (OUTPATIENT)
Age: 80
DRG: 884 | End: 2025-04-03
Payer: MEDICARE

## 2025-04-03 PROBLEM — Z51.5 PALLIATIVE CARE ENCOUNTER: Status: ACTIVE | Noted: 2025-04-03

## 2025-04-03 PROBLEM — Z71.89 GOALS OF CARE, COUNSELING/DISCUSSION: Status: ACTIVE | Noted: 2025-04-03

## 2025-04-03 LAB
ANION GAP SERPL CALCULATED.3IONS-SCNC: 17 MMOL/L (ref 7–16)
BASOPHILS # BLD: 0.03 K/UL (ref 0–0.2)
BASOPHILS NFR BLD: 1 % (ref 0–2)
BUN SERPL-MCNC: 14 MG/DL (ref 6–23)
CALCIUM SERPL-MCNC: 8.8 MG/DL (ref 8.6–10.2)
CHLORIDE SERPL-SCNC: 101 MMOL/L (ref 98–107)
CO2 SERPL-SCNC: 15 MMOL/L (ref 22–29)
CREAT SERPL-MCNC: 0.8 MG/DL (ref 0.5–1)
ECHO BSA: 1.83 M2
ECHO EST RA PRESSURE: 3 MMHG
ECHO LA VOL A-L A2C: 34 ML (ref 22–52)
ECHO LA VOL A-L A4C: 33 ML (ref 22–52)
ECHO LA VOL BP: 34 ML (ref 22–52)
ECHO LA VOL MOD A2C: 33 ML (ref 22–52)
ECHO LA VOL MOD A4C: 32 ML (ref 22–52)
ECHO LA VOL/BSA BIPLANE: 19 ML/M2 (ref 16–34)
ECHO LA VOLUME AREA LENGTH: 36 ML
ECHO LA VOLUME INDEX A-L A2C: 19 ML/M2 (ref 16–34)
ECHO LA VOLUME INDEX A-L A4C: 18 ML/M2 (ref 16–34)
ECHO LA VOLUME INDEX AREA LENGTH: 20 ML/M2 (ref 16–34)
ECHO LA VOLUME INDEX MOD A2C: 18 ML/M2 (ref 16–34)
ECHO LA VOLUME INDEX MOD A4C: 17 ML/M2 (ref 16–34)
ECHO LV EF PHYSICIAN: 48 %
ECHO MV A VELOCITY: 0.96 M/S
ECHO MV E DECELERATION TIME (DT): 291.6 MS
ECHO MV E VELOCITY: 0.64 M/S
ECHO MV E/A RATIO: 0.67
ECHO RV TAPSE: 3.2 CM (ref 1.7–?)
EOSINOPHIL # BLD: 0.14 K/UL (ref 0.05–0.5)
EOSINOPHILS RELATIVE PERCENT: 2 % (ref 0–6)
ERYTHROCYTE [DISTWIDTH] IN BLOOD BY AUTOMATED COUNT: 14.6 % (ref 11.5–15)
GFR, ESTIMATED: 78 ML/MIN/1.73M2
GLUCOSE BLD-MCNC: 100 MG/DL (ref 74–99)
GLUCOSE BLD-MCNC: 159 MG/DL (ref 74–99)
GLUCOSE BLD-MCNC: 97 MG/DL (ref 74–99)
GLUCOSE SERPL-MCNC: 102 MG/DL (ref 74–99)
HCT VFR BLD AUTO: 43.8 % (ref 34–48)
HGB BLD-MCNC: 13.6 G/DL (ref 11.5–15.5)
IMM GRANULOCYTES # BLD AUTO: <0.03 K/UL (ref 0–0.58)
IMM GRANULOCYTES NFR BLD: 0 % (ref 0–5)
LYMPHOCYTES NFR BLD: 1.31 K/UL (ref 1.5–4)
LYMPHOCYTES RELATIVE PERCENT: 22 % (ref 20–42)
MCH RBC QN AUTO: 29.8 PG (ref 26–35)
MCHC RBC AUTO-ENTMCNC: 31.1 G/DL (ref 32–34.5)
MCV RBC AUTO: 95.8 FL (ref 80–99.9)
MONOCYTES NFR BLD: 0.63 K/UL (ref 0.1–0.95)
MONOCYTES NFR BLD: 11 % (ref 2–12)
NEUTROPHILS NFR BLD: 64 % (ref 43–80)
NEUTS SEG NFR BLD: 3.73 K/UL (ref 1.8–7.3)
PLATELET # BLD AUTO: 122 K/UL (ref 130–450)
PMV BLD AUTO: 10.7 FL (ref 7–12)
POTASSIUM SERPL-SCNC: 4.4 MMOL/L (ref 3.5–5)
RBC # BLD AUTO: 4.57 M/UL (ref 3.5–5.5)
SODIUM SERPL-SCNC: 133 MMOL/L (ref 132–146)
WBC OTHER # BLD: 5.9 K/UL (ref 4.5–11.5)

## 2025-04-03 PROCEDURE — 6360000002 HC RX W HCPCS: Performed by: INTERNAL MEDICINE

## 2025-04-03 PROCEDURE — 85025 COMPLETE CBC W/AUTO DIFF WBC: CPT

## 2025-04-03 PROCEDURE — 80048 BASIC METABOLIC PNL TOTAL CA: CPT

## 2025-04-03 PROCEDURE — 99232 SBSQ HOSP IP/OBS MODERATE 35: CPT | Performed by: INTERNAL MEDICINE

## 2025-04-03 PROCEDURE — 36415 COLL VENOUS BLD VENIPUNCTURE: CPT

## 2025-04-03 PROCEDURE — 6370000000 HC RX 637 (ALT 250 FOR IP): Performed by: INTERNAL MEDICINE

## 2025-04-03 PROCEDURE — 82962 GLUCOSE BLOOD TEST: CPT

## 2025-04-03 PROCEDURE — 2500000003 HC RX 250 WO HCPCS: Performed by: INTERNAL MEDICINE

## 2025-04-03 PROCEDURE — 6370000000 HC RX 637 (ALT 250 FOR IP): Performed by: CLINICAL NURSE SPECIALIST

## 2025-04-03 PROCEDURE — 93308 TTE F-UP OR LMTD: CPT

## 2025-04-03 PROCEDURE — 99233 SBSQ HOSP IP/OBS HIGH 50: CPT | Performed by: INTERNAL MEDICINE

## 2025-04-03 PROCEDURE — 97530 THERAPEUTIC ACTIVITIES: CPT

## 2025-04-03 PROCEDURE — 2060000000 HC ICU INTERMEDIATE R&B

## 2025-04-03 RX ADMIN — ASPIRIN 81 MG: 81 TABLET, COATED ORAL at 09:58

## 2025-04-03 RX ADMIN — SACUBITRIL AND VALSARTAN 1 TABLET: 49; 51 TABLET, FILM COATED ORAL at 20:47

## 2025-04-03 RX ADMIN — ATORVASTATIN CALCIUM 40 MG: 40 TABLET, FILM COATED ORAL at 20:48

## 2025-04-03 RX ADMIN — ENOXAPARIN SODIUM 40 MG: 100 INJECTION SUBCUTANEOUS at 09:58

## 2025-04-03 RX ADMIN — CLOPIDOGREL BISULFATE 75 MG: 75 TABLET, FILM COATED ORAL at 09:58

## 2025-04-03 RX ADMIN — SODIUM CHLORIDE, PRESERVATIVE FREE 10 ML: 5 INJECTION INTRAVENOUS at 09:58

## 2025-04-03 RX ADMIN — SODIUM CHLORIDE, PRESERVATIVE FREE 10 ML: 5 INJECTION INTRAVENOUS at 20:48

## 2025-04-03 RX ADMIN — AMANTADINE HYDROCHLORIDE 100 MG: 100 CAPSULE ORAL at 09:58

## 2025-04-03 RX ADMIN — LEVOTHYROXINE SODIUM 88 MCG: 0.09 TABLET ORAL at 06:25

## 2025-04-03 RX ADMIN — Medication 3 MG: at 20:48

## 2025-04-03 RX ADMIN — EMPAGLIFLOZIN 10 MG: 10 TABLET, FILM COATED ORAL at 22:15

## 2025-04-03 RX ADMIN — SACUBITRIL AND VALSARTAN 1 TABLET: 24; 26 TABLET, FILM COATED ORAL at 09:58

## 2025-04-03 RX ADMIN — DONEPEZIL HYDROCHLORIDE 10 MG: 5 TABLET, FILM COATED ORAL at 09:58

## 2025-04-03 RX ADMIN — AMANTADINE HYDROCHLORIDE 100 MG: 100 CAPSULE ORAL at 20:48

## 2025-04-03 NOTE — PLAN OF CARE
Problem: Chronic Conditions and Co-morbidities  Goal: Patient's chronic conditions and co-morbidity symptoms are monitored and maintained or improved  4/3/2025 0905 by Lea Stephenson RN  Outcome: Progressing  4/2/2025 2130 by Opal Gold RN  Outcome: Progressing     Problem: Skin/Tissue Integrity  Goal: Skin integrity remains intact  Description: 1.  Monitor for areas of redness and/or skin breakdown  2.  Assess vascular access sites hourly  3.  Every 4-6 hours minimum:  Change oxygen saturation probe site  4.  Every 4-6 hours:  If on nasal continuous positive airway pressure, respiratory therapy assess nares and determine need for appliance change or resting period  4/3/2025 0905 by Lea Stephenson RN  Outcome: Progressing  4/2/2025 2130 by Opal Gold RN  Outcome: Progressing  Flowsheets (Taken 4/2/2025 0911 by Krysta Hewitt RN)  Skin Integrity Remains Intact: Monitor for areas of redness and/or skin breakdown     Problem: Safety - Adult  Goal: Free from fall injury  4/3/2025 0905 by Lea Stephenson RN  Outcome: Progressing  4/2/2025 2130 by Opal Gold RN  Outcome: Progressing     Problem: Discharge Planning  Goal: Discharge to home or other facility with appropriate resources  4/3/2025 0905 by Lea Stephenson RN  Outcome: Progressing  4/2/2025 2130 by Opal Gold RN  Outcome: Progressing     Problem: Musculoskeletal - Adult  Goal: Return mobility to safest level of function  4/3/2025 0905 by Lea Stephenson RN  Outcome: Progressing  4/2/2025 2130 by Opal Gold RN  Outcome: Progressing  Goal: Maintain proper alignment of affected body part  4/3/2025 0905 by Lea Stephenson RN  Outcome: Progressing  4/2/2025 2130 by Opal Gold RN  Outcome: Progressing  Goal: Return ADL status to a safe level of function  4/3/2025 0905 by Lea Stephenson RN  Outcome: Progressing  4/2/2025 2130 by Opal Gold RN  Outcome: Progressing     Problem: Pain  Goal: Verbalizes/displays adequate comfort level

## 2025-04-03 NOTE — CARE COORDINATION
CM Update: Met with patient at bedside to discuss transition of care plan. Discharge plan is [facility] the [facility]. Patient accepted, Precert started 04/02 - no discharge until precert obtained. Destination and RODRIGUE updated. PASRR, Face Sheet, Ambulance Form, and Envelope in soft chart. CM/SW to follow. Zana Pereyra [phone]

## 2025-04-03 NOTE — DISCHARGE INSTR - COC
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 ([facility]) C82.13    Foreign body in pharynx T17.208A    Malignant neoplasm of right breast, stage 1, estrogen receptor positive C50.911, Z17.0    S/P total right hip arthroplasty Z96.641    Primary osteoarthritis of right knee M17.11    Osteoarthritis of right knee M17.11    Leukocytosis D72.829    Acidosis E87.20    Hypothyroidism E03.9    Ambulatory dysfunction R26.2    Acute metabolic encephalopathy G93.41    Acute CVA (cerebrovascular accident) ([facility]) I63.9    Altered mental status R41.82    Rhinovirus B34.8    Acute chest pain R07.9    Stroke-like symptoms R29.90    Acute cerebrovascular accident (CVA) ([facility]) I63.9    Cerebrovascular disease I67.9    Cardiac arrhythmia I49.9    Anemia D64.9    Dementia ([facility]) F03.90    History of breast cancer Z85.3    Seizure ([facility]) R56.9    Disorientation R41.0    Lung nodule R91.1    Right sided weakness R53.1    Bradycardia R00.1       Isolation/Infection:   Isolation            No Isolation          Patient Infection Status    No active infections.   Resolved       Infection Onset Added Last Indicated Last Indicated By Resolved Resolved By    Rhinovirus 10/28/24 10/29/24 10/28/24 Respiratory Panel, Molecular, with COVID-19 (Restricted: peds pts or suitable admitted adults) 24 history Infection     Rhinovirus 03/15/24 03/15/24 03/15/24 Respiratory Panel, Molecular, with COVID-19 (Restricted: peds pts or suitable admitted adults) 24 Infection                          Nurse Assessment:  Last Vital Signs: BP (!) 142/73   Pulse 63   Temp 97.5 °F (36.4 °C) (Infrared)   Resp 16   Ht 1.753 m (5' 9\")   Wt 68 kg (149 lb 14.6 oz)   LMP  (LMP Unknown)   SpO2 100%   BMI 22.14 kg/m²     Last documented pain score (0-10 scale):    Last Weight:   Wt Readings from Last 1

## 2025-04-03 NOTE — CONSULTS
Palliative Care Department  [phone]  Palliative Care Initial Consult  Provider Iesha Alexander PA-C     Nika Aviles  [id number]  Hospital Day: 4  Date of Initial Consult: 04/02/2025  Referring Provider: Siri Marvin APRN - CNS   Palliative Medicine was consulted for assistance with: CODE STATUS discussion    HPI:   Nika Aviles is a 80 y.o. with a medical history of previous CVA, dementia, diabetes, hypertension, CKD, hypothyroidism who was admitted on 3/31/2025 from home with a CHIEF COMPLAINT of fall and altered mental status..  She does have residual weakness from previous CVA.  In the ED, CT of head showed no acute intracranial abnormality and stable old infarcts.  CT cervical spine negative for acute injury.  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  [place] [facility] [place].   Urine culture this admission growing less than 10,000 gram-negative rods.  4/1, MRI of the brain without contrast showing no acute intracranial abnormality stable old infarctions and ischemic changes.    ASSESSMENT/PLAN:     Pertinent Hospital Diagnoses     Altered mental status, fall at home  History of CVA  Dementia      Palliative Care Encounter / Counseling Regarding Goals of Care  Please see detailed goals of care discussion as below  At this time, Nika Aviles, Does Not have capacity for medical decision-making.  Capacity is time limited and situation/question specific  During encounter Rosangela Gabriel was surrogate medical decision-maker  Outcome of goals of care meeting:   Full code  Rosangela wants to review her mother's living will before changing code status  She

## 2025-04-03 NOTE — PLAN OF CARE
Problem: Chronic Conditions and Co-morbidities  Goal: Patient's chronic conditions and co-morbidity symptoms are monitored and maintained or improved  4/2/2025 2130 by Opal Gold RN  Outcome: Progressing  4/2/2025 0910 by Krysta Hewitt RN  Outcome: Progressing  Flowsheets  Taken 4/2/2025 0910  Care Plan - Patient's Chronic Conditions and Co-Morbidity Symptoms are Monitored and Maintained or Improved:   Collaborate with multidisciplinary team to address chronic and comorbid conditions and prevent exacerbation or deterioration   Monitor and assess patient's chronic conditions and comorbid symptoms for stability, deterioration, or improvement  Taken 4/2/2025 0800  Care Plan - Patient's Chronic Conditions and Co-Morbidity Symptoms are Monitored and Maintained or Improved:   Collaborate with multidisciplinary team to address chronic and comorbid conditions and prevent exacerbation or deterioration   Monitor and assess patient's chronic conditions and comorbid symptoms for stability, deterioration, or improvement     Problem: Skin/Tissue Integrity  Goal: Skin integrity remains intact  Description: 1.  Monitor for areas of redness and/or skin breakdown  2.  Assess vascular access sites hourly  3.  Every 4-6 hours minimum:  Change oxygen saturation probe site  4.  Every 4-6 hours:  If on nasal continuous positive airway pressure, respiratory therapy assess nares and determine need for appliance change or resting period  4/2/2025 2130 by Opal Gold RN  Outcome: Progressing  Flowsheets (Taken 4/2/2025 0911 by Krysta Hewitt, RN)  Skin Integrity Remains Intact: Monitor for areas of redness and/or skin breakdown  4/2/2025 0910 by Krysta Hewitt, CORRINE  Outcome: Progressing  Flowsheets  Taken 4/2/2025 0910  Skin Integrity Remains Intact:   Monitor for areas of redness and/or skin breakdown   Positioning devices   Monitor skin under medical devices  Taken 4/2/2025 0800  Skin Integrity Remains Intact:

## 2025-04-04 VITALS
TEMPERATURE: 97.9 F | WEIGHT: 149.91 LBS | HEART RATE: 67 BPM | BODY MASS INDEX: 22.2 KG/M2 | OXYGEN SATURATION: 95 % | HEIGHT: 69 IN | RESPIRATION RATE: 18 BRPM | SYSTOLIC BLOOD PRESSURE: 111 MMHG | DIASTOLIC BLOOD PRESSURE: 53 MMHG

## 2025-04-04 LAB
ANION GAP SERPL CALCULATED.3IONS-SCNC: 16 MMOL/L (ref 7–16)
BASOPHILS # BLD: 0.03 K/UL (ref 0–0.2)
BASOPHILS NFR BLD: 1 % (ref 0–2)
BUN SERPL-MCNC: 17 MG/DL (ref 6–23)
CALCIUM SERPL-MCNC: 9.5 MG/DL (ref 8.6–10.2)
CHLORIDE SERPL-SCNC: 102 MMOL/L (ref 98–107)
CO2 SERPL-SCNC: 22 MMOL/L (ref 22–29)
CREAT SERPL-MCNC: 0.9 MG/DL (ref 0.5–1)
EOSINOPHIL # BLD: 0.12 K/UL (ref 0.05–0.5)
EOSINOPHILS RELATIVE PERCENT: 2 % (ref 0–6)
ERYTHROCYTE [DISTWIDTH] IN BLOOD BY AUTOMATED COUNT: 14.2 % (ref 11.5–15)
GFR, ESTIMATED: 68 ML/MIN/1.73M2
GLUCOSE BLD-MCNC: 105 MG/DL (ref 74–99)
GLUCOSE BLD-MCNC: 217 MG/DL (ref 74–99)
GLUCOSE BLD-MCNC: 227 MG/DL (ref 74–99)
GLUCOSE BLD-MCNC: 98 MG/DL (ref 74–99)
GLUCOSE SERPL-MCNC: 107 MG/DL (ref 74–99)
HCT VFR BLD AUTO: 42.1 % (ref 34–48)
HGB BLD-MCNC: 14.3 G/DL (ref 11.5–15.5)
IMM GRANULOCYTES # BLD AUTO: <0.03 K/UL (ref 0–0.58)
IMM GRANULOCYTES NFR BLD: 0 % (ref 0–5)
LYMPHOCYTES NFR BLD: 1.26 K/UL (ref 1.5–4)
LYMPHOCYTES RELATIVE PERCENT: 25 % (ref 20–42)
MCH RBC QN AUTO: 29.9 PG (ref 26–35)
MCHC RBC AUTO-ENTMCNC: 34 G/DL (ref 32–34.5)
MCV RBC AUTO: 87.9 FL (ref 80–99.9)
MONOCYTES NFR BLD: 0.66 K/UL (ref 0.1–0.95)
MONOCYTES NFR BLD: 13 % (ref 2–12)
NEUTROPHILS NFR BLD: 59 % (ref 43–80)
NEUTS SEG NFR BLD: 2.97 K/UL (ref 1.8–7.3)
PLATELET # BLD AUTO: 214 K/UL (ref 130–450)
PMV BLD AUTO: 9.4 FL (ref 7–12)
POTASSIUM SERPL-SCNC: 3.6 MMOL/L (ref 3.5–5)
RBC # BLD AUTO: 4.79 M/UL (ref 3.5–5.5)
SODIUM SERPL-SCNC: 140 MMOL/L (ref 132–146)
WBC OTHER # BLD: 5.1 K/UL (ref 4.5–11.5)

## 2025-04-04 PROCEDURE — 99231 SBSQ HOSP IP/OBS SF/LOW 25: CPT

## 2025-04-04 PROCEDURE — 6370000000 HC RX 637 (ALT 250 FOR IP): Performed by: INTERNAL MEDICINE

## 2025-04-04 PROCEDURE — 99239 HOSP IP/OBS DSCHRG MGMT >30: CPT | Performed by: INTERNAL MEDICINE

## 2025-04-04 PROCEDURE — 2500000003 HC RX 250 WO HCPCS: Performed by: INTERNAL MEDICINE

## 2025-04-04 PROCEDURE — 6370000000 HC RX 637 (ALT 250 FOR IP): Performed by: CLINICAL NURSE SPECIALIST

## 2025-04-04 PROCEDURE — 80048 BASIC METABOLIC PNL TOTAL CA: CPT

## 2025-04-04 PROCEDURE — 36415 COLL VENOUS BLD VENIPUNCTURE: CPT

## 2025-04-04 PROCEDURE — 85025 COMPLETE CBC W/AUTO DIFF WBC: CPT

## 2025-04-04 PROCEDURE — 6360000002 HC RX W HCPCS: Performed by: INTERNAL MEDICINE

## 2025-04-04 PROCEDURE — 97535 SELF CARE MNGMENT TRAINING: CPT

## 2025-04-04 PROCEDURE — 82962 GLUCOSE BLOOD TEST: CPT

## 2025-04-04 RX ORDER — METOPROLOL SUCCINATE 25 MG/1
25 TABLET, EXTENDED RELEASE ORAL DAILY
Qty: 90 TABLET | Refills: 0 | Status: SHIPPED | OUTPATIENT
Start: 2025-04-05

## 2025-04-04 RX ADMIN — CLOPIDOGREL BISULFATE 75 MG: 75 TABLET, FILM COATED ORAL at 10:18

## 2025-04-04 RX ADMIN — METOPROLOL SUCCINATE 25 MG: 25 TABLET, EXTENDED RELEASE ORAL at 10:18

## 2025-04-04 RX ADMIN — AMANTADINE HYDROCHLORIDE 100 MG: 100 CAPSULE ORAL at 10:17

## 2025-04-04 RX ADMIN — AMANTADINE HYDROCHLORIDE 100 MG: 100 CAPSULE ORAL at 21:17

## 2025-04-04 RX ADMIN — SACUBITRIL AND VALSARTAN 1 TABLET: 49; 51 TABLET, FILM COATED ORAL at 10:18

## 2025-04-04 RX ADMIN — SACUBITRIL AND VALSARTAN 1 TABLET: 49; 51 TABLET, FILM COATED ORAL at 21:04

## 2025-04-04 RX ADMIN — LEVOTHYROXINE SODIUM 88 MCG: 0.09 TABLET ORAL at 06:05

## 2025-04-04 RX ADMIN — EMPAGLIFLOZIN 10 MG: 10 TABLET, FILM COATED ORAL at 10:17

## 2025-04-04 RX ADMIN — DONEPEZIL HYDROCHLORIDE 10 MG: 5 TABLET, FILM COATED ORAL at 10:17

## 2025-04-04 RX ADMIN — INSULIN LISPRO 1 UNITS: 100 INJECTION, SOLUTION INTRAVENOUS; SUBCUTANEOUS at 12:04

## 2025-04-04 RX ADMIN — ATORVASTATIN CALCIUM 40 MG: 40 TABLET, FILM COATED ORAL at 21:04

## 2025-04-04 RX ADMIN — ENOXAPARIN SODIUM 40 MG: 100 INJECTION SUBCUTANEOUS at 10:18

## 2025-04-04 RX ADMIN — SODIUM CHLORIDE, PRESERVATIVE FREE 5 ML: 5 INJECTION INTRAVENOUS at 15:31

## 2025-04-04 RX ADMIN — ASPIRIN 81 MG: 81 TABLET, COATED ORAL at 10:18

## 2025-04-04 NOTE — PLAN OF CARE
Problem: Chronic Conditions and Co-morbidities  Goal: Patient's chronic conditions and co-morbidity symptoms are monitored and maintained or improved  4/4/2025 1137 by Azeb Cowan RN  Outcome: Progressing  4/3/2025 2147 by Opal Gold RN  Outcome: Progressing     Problem: Skin/Tissue Integrity  Goal: Skin integrity remains intact  Description: 1.  Monitor for areas of redness and/or skin breakdown  2.  Assess vascular access sites hourly  3.  Every 4-6 hours minimum:  Change oxygen saturation probe site  4.  Every 4-6 hours:  If on nasal continuous positive airway pressure, respiratory therapy assess nares and determine need for appliance change or resting period  4/4/2025 1137 by Azeb Cowan RN  Outcome: Progressing  4/3/2025 2147 by Opal Gold RN  Outcome: Progressing     Problem: Safety - Adult  Goal: Free from fall injury  4/4/2025 1137 by Azeb Cowan RN  Outcome: Progressing  4/3/2025 2147 by Opal Gold RN  Outcome: Progressing     Problem: Discharge Planning  Goal: Discharge to home or other facility with appropriate resources  4/4/2025 1137 by zAeb Cowan RN  Outcome: Progressing  4/3/2025 2147 by Opal Gold RN  Outcome: Progressing     Problem: Musculoskeletal - Adult  Goal: Return mobility to safest level of function  4/4/2025 1137 by Azeb Cowan RN  Outcome: Progressing  4/3/2025 2147 by Opal Gold RN  Outcome: Progressing  Goal: Maintain proper alignment of affected body part  4/4/2025 1137 by Azeb Cowan RN  Outcome: Progressing  4/3/2025 2147 by Opal Gold RN  Outcome: Progressing  Goal: Return ADL status to a safe level of function  4/4/2025 1137 by Azeb Cowan RN  Outcome: Progressing  4/3/2025 2147 by Opal Gold RN  Outcome: Progressing     Problem: Pain  Goal: Verbalizes/displays adequate comfort level or baseline comfort level  4/4/2025 1137 by Azeb Cowan RN  Outcome: Progressing  4/3/2025 2147 by Opal Gold RN  Outcome: Progressing

## 2025-04-04 NOTE — CARE COORDINATION
CM Update: Met with patient at bedside to discuss transition of care plan. Patient confused. Spoke with Rosangela (daughter) and verified discharge plan. Also, notified her of the transfer order. Discharge plan is [facility]. Patient accepted, Precert started 04/02 - no discharge until precert obtained. Destination and RODRIGUE updated. PASRR, Face Sheet, Ambulance Form, and Envelope in soft chart. CM/SW to follow. Zana Pereyra [phone]     1300-Discharge Order Noted: Auth obtained. Transport set with [facility] for 1630. Nursing Notified, Rosangela Notified, Facility Notified. CM/SW to follow. Zana Pereyra [phone]

## 2025-04-04 NOTE — PROGRESS NOTES
[facility] Hospitalist Progress Note    Admitting Date and Time: 3/31/2025 12:34 PM  Admit Dx: Altered mental status [R41.82]  Altered mental status, unspecified altered mental status type [R41.82]    Synopsis:    Nika Aviles is a 80 y.o. female who presents to [facility] ER complaining of altered mental status.     Nika Aviles has a past medical history that includes history of breast cancer status postradiation, CVA, CKD, dementia, diabetes, diverticulitis, hyperlipidemia, hypertension, hypothyroidism, lymphoma.     Nika presents to the ER with altered mental status.  She had a fall on Friday and then has had increasing AMS since then.  Family states she has fallen three times in the past week. She does have a history of previous stroke and weakness from previous stroke.  She does report she hit her head when she fell and she has a mild headache.  She admits to right flank/posterior rib pain.  CT head no acute intracranial abnormality.  There are stable prominent old infarcts.  Stable prominent gliosis.  CT chest showed pulmonary nodule, moderate distal colonic stool burden. Denies any fever, chills, UTI symptoms. Admits to cough.  Will admit for MRI and possible placement    Upon presentation to the ER, routine labwork was performed which revealed glucose 123, troponin 21, 20.  Imaging results are as outlined below in the Imaging section of this note.    Upon arrival to the ER, patient was 128/60.  The patient received no medications in the emergency room and was admitted to [facility].    Subjective:  Patient is being followed for Altered mental status [R41.82]  Altered mental status, unspecified altered mental status type [R41.82]     Patient seen and examined at bedside this morning.  No complaints aside from leg weakness.    ROS: denies fever, chills, cp, sob, n/v, HA unless stated above.      magnesium sulfate  2,000 mg IntraVENous Once    sodium chloride flush  5-40 mL IntraVENous 2 times 
       [place] [facility] Hospitalist Progress Note    Admitting Date and Time: 3/31/2025 12:34 PM  Admit Dx: Altered mental status [R41.82]  Altered mental status, unspecified altered mental status type [R41.82]    Synopsis:    Nika Aviles is a 80 y.o. female who presents to [facility] ER complaining of altered mental status.     Nika Aviles has a past medical history that includes history of breast cancer status postradiation, CVA, CKD, dementia, diabetes, diverticulitis, hyperlipidemia, hypertension, hypothyroidism, lymphoma.     Nika presents to the ER with altered mental status.  She had a fall on Friday and then has had increasing AMS since then.  Family states she has fallen three times in the past week. She does have a history of previous stroke and weakness from previous stroke.  She does report she hit her head when she fell and she has a mild headache.  She admits to right flank/posterior rib pain.  CT head no acute intracranial abnormality.  There are stable prominent old infarcts.  Stable prominent gliosis.  CT chest showed pulmonary nodule, moderate distal colonic stool burden. Denies any fever, chills, UTI symptoms. Admits to cough.  Will admit for MRI and possible placement    Upon presentation to the ER, routine labwork was performed which revealed glucose 123, troponin 21, 20.  Imaging results are as outlined below in the Imaging section of this note.    Upon arrival to the ER, patient was 128/60.  The patient received no medications in the emergency room and was admitted to [facility].    4/1: MRI brain negative    4/2: Updated daughter regarding urine culture results.  No need to treat the CFU's are less than 100,000.  Pending placement.    Subjective:  Patient is being followed for Altered mental status [R41.82]  Altered mental status, unspecified altered mental status type [R41.82]     Patient seen and examined at bedside this morning.  No complaints.    ROS: denies fever, chills, cp, sob, 
       [place] [facility] Hospitalist Progress Note    Admitting Date and Time: 3/31/2025 12:34 PM  Admit Dx: Altered mental status [R41.82]  Altered mental status, unspecified altered mental status type [R41.82]    Synopsis:    Nika Aviles is a 80 y.o. female who presents to [facility] ER complaining of altered mental status.     Nika Aviles has a past medical history that includes history of breast cancer status postradiation, CVA, CKD, dementia, diabetes, diverticulitis, hyperlipidemia, hypertension, hypothyroidism, lymphoma.     Nika presents to the ER with altered mental status.  She had a fall on Friday and then has had increasing AMS since then.  Family states she has fallen three times in the past week. She does have a history of previous stroke and weakness from previous stroke.  She does report she hit her head when she fell and she has a mild headache.  She admits to right flank/posterior rib pain.  CT head no acute intracranial abnormality.  There are stable prominent old infarcts.  Stable prominent gliosis.  CT chest showed pulmonary nodule, moderate distal colonic stool burden. Denies any fever, chills, UTI symptoms. Admits to cough.  Will admit for MRI and possible placement    Upon presentation to the ER, routine labwork was performed which revealed glucose 123, troponin 21, 20.  Imaging results are as outlined below in the Imaging section of this note.    Upon arrival to the ER, patient was 128/60.  The patient received no medications in the emergency room and was admitted to [facility].    4/1: MRI brain negative    4/2: Updated daughter regarding urine culture results.  No need to treat the CFU's are less than 100,000.  Pending placement.  Cardiology was consulted by overnight team given bradycardia.    4/3: Pending echo    Subjective:  Patient is being followed for Altered mental status [R41.82]  Altered mental status, unspecified altered mental status type [R41.82]     Patient seen and 
  Occupational Therapy  OT BEDSIDE TREATMENT NOTE   [facility]  [address], [place]        Date:2025  Patient Name: Nika Aviles  MRN: [id number]  : 1945  Room: [address] [place] [address]     Per OT Eval:        Evaluating OT: Marcelle Lazaro, OTR/L, [id number]     Referring Provider:Sandra Grissom DO   Specific Provider Orders/Date: OT Eval and Treat 3/31/25     Diagnosis: Altered mental status [R41.82]  Altered mental status, unspecified altered mental status type [R41.82]   Surgery: NA     Pertinent Medical History:      Past Medical History        Past Medical History:   Diagnosis Date    Breast CA 2018     Right    Breast cancer       2017  right ILC    Cerebral artery occlusion with cerebral infarction (HCC)       rt arm leg weakness    Chronic kidney disease      Dementia (HCC)      Diabetes mellitus (HCC)      Disease of blood and blood forming organ      Diverticulitis      History of therapeutic radiation      Hyperlipidemia      Hypertension      Hypothyroidism      Lymphoma 2016     nonhogkins, previous chemo, immunotherapy-no current issues (22)    Osteoarthritis      Prolonged emergence from general anesthesia      Thyroid disease              Past Surgical History         Past Surgical History:   Procedure Laterality Date    ABDOMEN SURGERY        BACK SURGERY         neck fusion bone graft-no ROM limitations    BREAST LUMPECTOMY        BREAST SURGERY Right       tumor and lymph node resection-CA    BRONCHOSCOPY N/A 10/01/2019     BRONCHOSCOPY ALVEOLAR LAVAGE WITH BRUSHINGS performed by Arley Delgado MD at [facility] ENDOSCOPY    COLONOSCOPY        ENDOSCOPY, COLON, DIAGNOSTIC        FRACTURE SURGERY         elbow radius    HYSTERECTOMY (CERVIX STATUS UNKNOWN)        KNEE ARTHROSCOPY        MEDIPORT INSERTION, SINGLE Left      chest    OTHER SURGICAL HISTORY   2017     mediport insertion    OTHER SURGICAL HISTORY   
  Palliative Care Department  [phone]  Palliative Care Progress Note  Provider Trinh Cobb, APRN - CNP     Nika Aviles  [id number]  Hospital Day: 5  Date of Initial Consult: 04/02/2025  Referring Provider: Siri Marvin, APRN - CNS   Palliative Medicine was consulted for assistance with: CODE STATUS discussion    HPI:   Nika Aviles is a 80 y.o. with a medical history of previous CVA, dementia, diabetes, hypertension, CKD, hypothyroidism who was admitted on 3/31/2025 from home with a CHIEF COMPLAINT of fall and altered mental status..  She does have residual weakness from previous CVA.  In the ED, CT of head showed no acute intracranial abnormality and stable old infarcts.  CT cervical spine negative for acute injury.  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  [place] [facility] [place].   Urine culture this admission growing less than 10,000 gram-negative rods.  4/1, MRI of the brain without contrast showing no acute intracranial abnormality stable old infarctions and ischemic changes.    ASSESSMENT/PLAN:     Pertinent Hospital Diagnoses     Altered mental status, fall at home  History of CVA  Dementia      Palliative Care Encounter / Counseling Regarding Goals of Care  Please see detailed goals of care discussion as below  At this time, Nika Aviles, Does Not have capacity for medical decision-making.  Capacity is time limited and situation/question specific  During encounter Rosangela Gabriel was surrogate medical decision-maker  Outcome of goals of care meeting:   Full code, daughter still wants to review her mother's living will before changing code 
  Physician Progress Note      PATIENT:               ANDREIA SUE  CSN #:                  [id number]  :                       1945  ADMIT DATE:       3/31/2025 12:34 PM  DISCH DATE:  RESPONDING  PROVIDER #:        Vickey Ramos MD          QUERY TEXT:    A mental status change and bilateral lower extremity weakness is documented in   the medical record in H&P 3/31 and Progress Notes -.  Please specify   the underlying cause:    The clinical indicators include:  -\"fall on Friday and then has had increasing AMS since then. Altered mental   status\" (per H&P 3/31 by Dr. Grissom)  -\"Bilateral lower extremity weakness. Previous strokes. Dementia. Lab work and   imaging currently unremarkable.\" (per Progress Notes - by Dr. Ramos)  -\"Alzheimer's dementia\" (per Cardiology Consult Note  by Dr. Fuentes)  -CT Head 3/31 \"1. No acute intracranial abnormality. 2.Stable moderate   age-appropriate atrophy and moderate small vessel ischemia.\"  -MRI 3/31/2025 \"No acute intracranial abnormality. No sign of acute infarct.\"  -Aricept (- MAR)  -3/31-4/3 WBC 6.9-4.8-5.9 (Labs)  - Tmax 98.3F (VS Flowsheet)  -PT/OT eval and Treat ordered 3/31 ([facility] Order Review)  Options provided:  -- Altered mental status and Bilateral lower extremity weakness related to   Alzheimer's dementia  -- Altered mental status and Bilateral lower extremity weakness related to   other, please specify.  -- Other - I will add my own diagnosis  -- Disagree - Not applicable / Not valid  -- Disagree - Clinically unable to determine / Unknown  -- Refer to Clinical Documentation Reviewer    PROVIDER RESPONSE TEXT:    Patient has altered mental status and Bilateral lower extremity weakness   related to other, dementia    Query created by: Chris Rodriguez on 4/3/2025 9:59 AM      Electronically signed by:  Vickey Ramos MD 4/3/2025 11:00 AM          
4 Eyes Skin Assessment     NAME:  Nika Aviles  YOB: 1945  MEDICAL RECORD NUMBER:  [id number]    The patient is being assessed for  Admission    I agree that at least one RN has performed a thorough Head to Toe Skin Assessment on the patient. ALL assessment sites listed below have been assessed.      Areas assessed by both nurses:    Head, Face, Ears, Shoulders, Back, Chest, Arms, Elbows, Hands, Sacrum. Buttock, Coccyx, Ischium, Legs. Feet and Heels, and Under Medical Devices         Does the Patient have a Wound? No noted wound(s)    Ecchymotic areas - L Buttock, BL Posterior Shoulders, R Flank, Upper mid back, BUE, BL Knees    BL Buttocks Red/Blanching       Bob Prevention initiated by RN: Yes  Wound Care Orders initiated by RN: No    Pressure Injury (Stage 3,4, Unstageable, DTI, NWPT, and Complex wounds) if present, place Wound referral order by RN under : No    New Ostomies, if present place, Ostomy referral order under : No     Nurse 1 eSignature: Electronically signed by STEVIE MAST RN on 3/31/25 at 10:36 PM EDT    **SHARE this note so that the co-signing nurse can place an eSignature**    Nurse 2 eSignature: Electronically signed by Amos Peter RN on 3/31/25 at 10:38 PM EDT  
4 Eyes Skin Assessment     NAME:  Nika Aviles  YOB: 1945  MEDICAL RECORD NUMBER:  [id number]    The patient is being assessed for  Admission    I agree that at least one RN has performed a thorough Head to Toe Skin Assessment on the patient. ALL assessment sites listed below have been assessed.      Areas assessed by both nurses:    Head, Face, Ears, Shoulders, Back, Chest, Arms, Elbows, Hands, Sacrum. Buttock, Coccyx, Ischium, and Legs. Feet and Heels        Does the Patient have a Wound? No noted wound(s)       Bob Prevention initiated by RN: Yes  Wound Care Orders initiated by RN: No    Pressure Injury (Stage 3,4, Unstageable, DTI, NWPT, and Complex wounds) if present, place Wound referral order by RN under : {YES/NO:06387}    New Ostomies, if present place, Ostomy referral order under : No     Nurse 1 eSignature: Electronically signed by Lea Stephenson RN on 4/2/25 at 5:56 PM EDT    **SHARE this note so that the co-signing nurse can place an eSignature**    Nurse 2 eSignature: {Esignature:[id number]}   
Lighting rounds was done on pt . Educated pt on fall risk. Provided patient with yellow gripper socks and fall risk wristband.    
MRI called to see if patient can be done today, patient will be completed today just unsure of a direct time   
MRI screening needs to be completed.    Please make sure your patient can lay flat onto their back. (not kyphotic/SOB/contracted, etc) -if not patient cannot come down to MRI.    If it is stated on screening that they need medicated for claustrophobia/pain/holding still -have the doctor put med orders in.    If patient is a prisoner- check with patients security they have the correct MRI accommodations done as in correct flex/plastic cuffs. Also let MRI know on screening patient is a prisoner.    If patient is on a IV drip that they cannot come off of for MRI- please inform MRI so we can coordinate with the RN that will come down to switch to MRI safe pump.    Thank you from your MRI team.    
Magnesium replacement protocol began due to 1.5 level   
Messaged Dr. Anderson regarding patient's daughter concerns, provider will call family.   
Mew IV site placed, wrapped in guaze so that line is not removed by patient again. Patient educated on importance of keeping line in place. Tolerated medication pass well and is set up to have breakfast  
Nurse to Nurse called Lea on PICU, daughter Rosangela also updated on patient's room change   
Nurse to Nurse called to [facility]. Spoke with CORRINE Nevarez. Electronically signed by Azeb Cowan RN on 4/4/2025 at 5:32 PM    
Nursing instructor accessed chart for student assignment and teaching.  
Palliative Medicine Social Work     Patient Name: Nika Aviles    Age: 80 y.o.    Marital Status:      Status: no    Next of Kin: daughter Rosangela Gabriel with who pt lives     Additional Support: granddaughter Leah and grandson     Minor Children: no    Advanced Directives: no    Confirm Code Status: full    Current Goals of Care:continue current care and transition to snf. Daughter did  express interest in long term placement for pt. Discussed this with care coordinator    Mental Health History: no    Substance Abuse:no    Indications of Abuse/Neglect: no    Financial Concerns: no    Living Situation: pt lives with her daughter in a 1 story home-no steps-ramp to entrance.[facility] for nursing and therapy coming to home as well as [employer] providing private duty care on daily basis and also on weekends occasionally. Daughter has been considering ltc placement for pt because it is getting more and more difficult to care for pt with dementia at home    Physical Care Needs Met: yes    Emotional Needs Met: supportive listening provided.    Assessment: 81 yo  female admitted from home complaints of generalized weakness and altered mental status. She fell 1 week prior. She has had bradycardia, Cardiomyopathy with unclear etiology. History includes Alzheimer's dementia. Pt is alert to self and location. She gets mixed up when answering other history. Her daughter Rosangela is her next of kin. papa would like to keep her mother full code until she has time to review her living well to see what her wishes are .      
Palliative consult sent via perfect serve  
Physical Therapy  Initial Assessment     Name: Nika Aviles  : 1945  MRN: [id number]      Date of Service: 2025    Evaluating PT: Stefan Lobato, PT, DPT, [id number]      Room #:  8202/8202-B  Diagnosis:  Altered mental status [R41.82]  Altered mental status, unspecified altered mental status type [R41.82]  PMHx/PSHx:   has a past medical history of Breast CA, Breast cancer, Cerebral artery occlusion with cerebral infarction (HCC), Chronic kidney disease, Dementia (HCC), Diabetes mellitus (HCC), Disease of blood and blood forming organ, Diverticulitis, History of therapeutic radiation, Hyperlipidemia, Hypertension, Hypothyroidism, Lymphoma, Osteoarthritis, Prolonged emergence from general anesthesia, and Thyroid disease.  Procedure/Surgery:  n/a  Precautions:  Fall risk, cognition, alarm, incontinent, Tele monitor  Equipment Needs:  n/a    SUBJECTIVE:    Pt a questionable historian. Pt lives with her daughter in a 2 story home with 4 stair(s) and 2 rail(s) to enter. Pt bedroom and bathroom is on the 2nd floor. Pt ambulated with a WW prior to admission.    OBJECTIVE:   Initial Evaluation  Date: 25 Treatment Date: Short Term/ Long Term   Goals   AM-PAC 6 Clicks      Was pt agreeable to Eval/treatment? yes     Does pt have pain? No pain reported     Bed Mobility  Rolling: NT  Supine to sit: Donita  Sit to supine: Donita  Scooting: Donita  Rolling: Independent  Supine to sit: Independent  Sit to supine: Independent  Scooting: Independent   Transfers Sit to stand: ModA  Stand to sit: ModA  Stand pivot: ModA with WW  Sit to stand: Independent  Stand to sit: Independent  Stand pivot: Independent   Ambulation   5 feet with ModA with WW  150 feet with Independent with LRD   Stair negotiation: ascended and descended NT  10 step(s) with 2 rail(s) with Independent   ROM BUE: Refer to OT note  BLE: WFL     Strength BUE: Refer to OT note  BLE: WFL     Balance Sitting EOB: SBA  Dynamic Standing: ModA with WW  Sitting 
Physical Therapy  Treatment     Name: Nika Aviles  : 1945  MRN: [id number]      Date of Service: 4/3/2025    Evaluating PT: Stefan Lobato, PT, DPT, [id number]      Room #:  4508/4508-B  Diagnosis:  Altered mental status [R41.82]  Altered mental status, unspecified altered mental status type [R41.82]  PMHx/PSHx:   has a past medical history of Breast CA, Breast cancer, Cerebral artery occlusion with cerebral infarction (HCC), Chronic kidney disease, Dementia (HCC), Diabetes mellitus (HCC), Disease of blood and blood forming organ, Diverticulitis, History of therapeutic radiation, Hyperlipidemia, Hypertension, Hypothyroidism, Lymphoma, Osteoarthritis, Prolonged emergence from general anesthesia, and Thyroid disease.  Procedure/Surgery:  n/a  Precautions:  Fall risk, cognition, alarm, incontinent, Tele monitor  Equipment Needs:  n/a    SUBJECTIVE:    Pt a questionable historian. Pt lives with her daughter in a 2 story home with 4 stair(s) and 2 rail(s) to enter. Pt bedroom and bathroom is on the 2nd floor. Pt ambulated with a WW prior to admission.    OBJECTIVE:   Initial Evaluation  Date: 25 Treatment Date:  4/3/25 Short Term/ Long Term   Goals   AM-PAC 6 Clicks 13/24 10/24    Was pt agreeable to Eval/treatment? yes yes    Does pt have pain? No pain reported No c/o pain    Bed Mobility  Rolling: NT  Supine to sit: Donita  Sit to supine: Donita  Scooting: Donita Rolling: ModA   Supine to sit: NT d/t significant incontinence this date  Sit to supine: NT  Scooting: NT Rolling: Independent  Supine to sit: Independent  Sit to supine: Independent  Scooting: Independent   Transfers Sit to stand: ModA  Stand to sit: ModA  Stand pivot: ModA with WW Sit to stand: NT  Stand to sit: NT  Stand pivot: NT Sit to stand: Independent  Stand to sit: Independent  Stand pivot: Independent   Ambulation   5 feet with ModA with WW  feet with Independent with LRD   Stair negotiation: ascended and descended NT NT 10 step(s) with 2 
Update provided to daughter Rosangela   
directions   Memory: Poor   Sequencing: fair   Problem solving: poor   Judgement/safety: poor     Functional Assessment: AM-PAC Daily Activity Raw Score: 14/24   Initial Eval Status  Date: 4/1/25 Treatment Status  Date: STGs = LTGs  Time frame: 10-14 days   Feeding Set up A  For cup to mouth with coffee, no spillage noted, facial droop present- declined to apply dentures        Grooming Set up A  To wash hands with bath wipe in seated, able to manipulate wipes packaging    SBA while seated/standing at bathroom sink    UB Dressing Min A  To [facility] gown in seated EOB, assistance for threading sleeve and FM to snap buttons    SBA   LB Dressing Max A  To doff incontinence brief in standing at EOB    Min A   Bathing DEP  Impaired standing tolerance and dynamic balance for shower tfrs    Mod A   Toileting DEP  Bowel incontinence upon arrival, dep A for thoroughness of annmarie hygiene, assistance for placement of bedpan; recommended use of BSC to RN    Mod A   Bed Mobility  Rolling: SBA  Supine to sit: Min A  Sit to supine: Min A  supervision   Functional Transfers Sit to stand: Min A  Stand to sit: Min A  R lateral lean  SBA   Functional Mobility NT  Pt attempting to mobilize to bathroom, functional mobility deferred at this time d/t impaired balance and bowel incontinence  Min A for short distances with AE as recommended by PT   Balance Sitting:     Static:  SBA/min A, R lateral lean , mod cues for self correction    Dynamic:Min A  Standing: Min A/CGA with ww     Endurance/Activity Tolerance Fair-  Good-   Visual/  Perceptual Glasses: yes, present in room   -unable to complete brief visual screening during evaluation, appears wfl during functional activities           Hand Dominance R   AROM (PROM) Strength Additional Info:    RUE  WFL 4-/5 fair  and fair FMC/dexterity noted during ADL tasks   LUE WFL 5/5 good  and wfl FMC/dexterity noted during ADL tasks     Hearing: wfl  Sensation:  No c/o numbness or 
Results   Component Value Date    CHOL 136 04/01/2025    CHOL 139 10/29/2024    CHOL 119 03/16/2024     Lab Results   Component Value Date    TRIG 84 04/01/2025    TRIG 142 10/29/2024    TRIG 171 (H) 03/16/2024     Lab Results   Component Value Date    HDL 50 04/01/2025    HDL 39 (L) 10/29/2024    HDL 36 (L) 03/16/2024     No components found for: \"LDLCALC\", \"LDLCHOLESTEROL\"  Lab Results   Component Value Date    VLDL 17 04/01/2025    VLDL 28 10/29/2024    VLDL 34 03/16/2024     No results found for: \"CHOLHDLRATIO\"    Cardiac Tests:  Telemetry findings reviewed: Normal sinus rhythm heart rate in the 60s with occasional premature ventricular complexes.     EKG: Sinus bradycardia, left bundle branch block, left Axis deviation, premature atrial complex, first-degree AV block, abnormal EKG.     Vitals and labs were reviewed: As above.     1/8/2024 TTE Dr Michael: Left Ventricle: The EF by visual approximation is 30 - 35%. Right Ventricle: Right ventricle size is normal. Normal systolic function.  Mild MR/TR. Mildly dilated LA.  Interatrial Septum: Agitated saline study was negative with and without provocation  1/14/2024 Discharged with Life-Vest on Entresto and Toprol XL  TTE 1/8/2024: EF 30-35%.  LA mildly dilated.  Agitated saline negative.  Normal RV size and function.  RA normal.  Mild MR.  Mild TR.  Limited TTE 3/18/2024: Dr Veliz EF 45-50%.  Normal wall thickness.  Septal motion consistent with conduction abnormality.  Hypokinesis of mid-basal inferolateral wall and mid-basal lateral wall.  Stage I DD.  Normal atria.  Normal RV size and function.  Intact interatrial septum.  Trileaflet AV.  Moderate MAC.  No effusion.  TTE-4/3/2025: LVEF 45-50% akinesis of the basal inferior and inferolateral walls grade 1 diastolic dysfunction, normal RV size and function.  No pericardial effusion.  RVSP unable to assess.     Assessment:  Sinus bradycardia with marked bradycardia while sleeping secondary to increased vagal tone no

## 2025-04-04 NOTE — DISCHARGE SUMMARY
[facility]     Patient ID:  Nika Aviles  [id number]  80 y.o.  1945    Admit date: 3/31/2025    Discharge date and time:  4/4/2025  11:12 AM    Hospital Course:   Patient Nika Aviles is a 80 y.o. presented with Altered mental status [R41.82]  Altered mental status, unspecified altered mental status type [R41.82]    Nika Aviles is a 80 y.o. female who presents to [facility] ER complaining of altered mental status.     Nika Aviles has a past medical history that includes history of breast cancer status postradiation, CVA, CKD, dementia, diabetes, diverticulitis, hyperlipidemia, hypertension, hypothyroidism, lymphoma.     Nika presents to the ER with altered mental status.  She had a fall on Friday and then has had increasing AMS since then.  Family states she has fallen three times in the past week. She does have a history of previous stroke and weakness from previous stroke.  She does report she hit her head when she fell and she has a mild headache.  She admits to right flank/posterior rib pain.  CT head no acute intracranial abnormality.  There are stable prominent old infarcts.  Stable prominent gliosis.  CT chest showed pulmonary nodule, moderate distal colonic stool burden. Denies any fever, chills, UTI symptoms. Admits to cough.  Will admit for MRI and possible placement     Upon presentation to the ER, routine labwork was performed which revealed glucose 123, troponin 21, 20.  Imaging results are as outlined below in the Imaging section of this note.    Upon arrival to the ER, patient was 128/60.  The patient received no medications in the emergency room and was admitted to [facility].     4/1: MRI brain negative     4/2: Updated daughter regarding urine culture results.  No need to treat the CFU's are less than 100,000.  Pending placement.  Cardiology was consulted by overnight team given bradycardia.     4/3: Echo showed EF 45 to 50%.

## 2025-04-04 NOTE — PLAN OF CARE
Problem: Chronic Conditions and Co-morbidities  Goal: Patient's chronic conditions and co-morbidity symptoms are monitored and maintained or improved  4/3/2025 2147 by Opal Gold RN  Outcome: Progressing  4/3/2025 0905 by Lea Stephenson RN  Outcome: Progressing     Problem: Skin/Tissue Integrity  Goal: Skin integrity remains intact  Description: 1.  Monitor for areas of redness and/or skin breakdown  2.  Assess vascular access sites hourly  3.  Every 4-6 hours minimum:  Change oxygen saturation probe site  4.  Every 4-6 hours:  If on nasal continuous positive airway pressure, respiratory therapy assess nares and determine need for appliance change or resting period  4/3/2025 2147 by Opal Gold RN  Outcome: Progressing  4/3/2025 0905 by Lea Stephenson RN  Outcome: Progressing     Problem: Safety - Adult  Goal: Free from fall injury  4/3/2025 2147 by Opal Gold RN  Outcome: Progressing  4/3/2025 0905 by Lea Stephenson RN  Outcome: Progressing     Problem: Discharge Planning  Goal: Discharge to home or other facility with appropriate resources  4/3/2025 2147 by Opal Gold RN  Outcome: Progressing  4/3/2025 0905 by Lea Stephenson RN  Outcome: Progressing     Problem: Musculoskeletal - Adult  Goal: Return mobility to safest level of function  4/3/2025 2147 by Opal Gold RN  Outcome: Progressing  4/3/2025 0905 by Lea Stephenson RN  Outcome: Progressing  Goal: Maintain proper alignment of affected body part  4/3/2025 2147 by Opal Gold RN  Outcome: Progressing  4/3/2025 0905 by Lea Stephenson RN  Outcome: Progressing  Goal: Return ADL status to a safe level of function  4/3/2025 2147 by Opal Gold RN  Outcome: Progressing  4/3/2025 0905 by Lea Stephenson RN  Outcome: Progressing     Problem: Pain  Goal: Verbalizes/displays adequate comfort level or baseline comfort level  4/3/2025 2147 by Opal Gold RN  Outcome: Progressing  4/3/2025 0905 by Lea Stephenson RN  Outcome:

## 2025-08-05 ENCOUNTER — OUTSIDE SERVICES (OUTPATIENT)
Dept: FAMILY MEDICINE CLINIC | Age: 80
End: 2025-08-05
Payer: MEDICARE

## 2025-08-05 ENCOUNTER — CLINICAL DOCUMENTATION (OUTPATIENT)
Dept: FAMILY MEDICINE CLINIC | Age: 80
End: 2025-08-05

## 2025-08-05 DIAGNOSIS — Z17.0 MALIGNANT NEOPLASM OF RIGHT BREAST, STAGE 1, ESTROGEN RECEPTOR POSITIVE (HCC): ICD-10-CM

## 2025-08-05 DIAGNOSIS — I50.20 HFREF (HEART FAILURE WITH REDUCED EJECTION FRACTION) (HCC): ICD-10-CM

## 2025-08-05 DIAGNOSIS — F02.80 LATE ONSET ALZHEIMER DEMENTIA, UNSPECIFIED DEMENTIA SEVERITY, UNSPECIFIED WHETHER BEHAVIORAL, PSYCHOTIC, OR MOOD DISTURBANCE OR ANXIETY (HCC): ICD-10-CM

## 2025-08-05 DIAGNOSIS — Z86.73 H/O: CVA (CEREBROVASCULAR ACCIDENT): ICD-10-CM

## 2025-08-05 DIAGNOSIS — C82.09 GRADE I FOLLICULAR LYMPHOMA OF EXTRANODAL SITE EXCLUDING SPLEEN AND OTHER SOLID ORGANS (HCC): Primary | ICD-10-CM

## 2025-08-05 DIAGNOSIS — C82.13 FOLLICULAR LYMPHOMA GRADE II OF INTRA-ABDOMINAL LYMPH NODES (HCC): ICD-10-CM

## 2025-08-05 DIAGNOSIS — G30.1 LATE ONSET ALZHEIMER DEMENTIA, UNSPECIFIED DEMENTIA SEVERITY, UNSPECIFIED WHETHER BEHAVIORAL, PSYCHOTIC, OR MOOD DISTURBANCE OR ANXIETY (HCC): ICD-10-CM

## 2025-08-05 DIAGNOSIS — R56.9 SEIZURE (HCC): ICD-10-CM

## 2025-08-05 DIAGNOSIS — C50.911 MALIGNANT NEOPLASM OF RIGHT BREAST, STAGE 1, ESTROGEN RECEPTOR POSITIVE (HCC): ICD-10-CM

## 2025-08-05 PROBLEM — I63.9 ACUTE CVA (CEREBROVASCULAR ACCIDENT) (HCC): Status: RESOLVED | Noted: 2024-01-09 | Resolved: 2025-08-05

## 2025-08-05 PROBLEM — I63.9 ACUTE CEREBROVASCULAR ACCIDENT (CVA) (HCC): Status: RESOLVED | Noted: 2024-10-29 | Resolved: 2025-08-05

## 2025-08-05 PROCEDURE — 99305 1ST NF CARE MODERATE MDM 35: CPT | Performed by: FAMILY MEDICINE

## 2025-08-06 ENCOUNTER — CLINICAL DOCUMENTATION (OUTPATIENT)
Dept: FAMILY MEDICINE CLINIC | Age: 80
End: 2025-08-06

## 2025-08-08 ENCOUNTER — CLINICAL DOCUMENTATION (OUTPATIENT)
Dept: FAMILY MEDICINE CLINIC | Age: 80
End: 2025-08-08

## (undated) DEVICE — ADHESIVE SKIN CLSR 0.7ML TOP DERMBND ADV

## (undated) DEVICE — PIN BNE FIX TEMP L140MM DIA4MM MAKO

## (undated) DEVICE — Z DISCONTINUED GLOVE SURG SZ 7.5 L12IN FNGR THK13MIL WHT ISOLEX

## (undated) DEVICE — INSTRUMENT SYSTEM 7 BATTERY REUSABLE

## (undated) DEVICE — SPONGE LAP W18XL18IN WHT COT 4 PLY FLD STRUNG RADPQ DISP ST

## (undated) DEVICE — ZIMMER® STERILE DISPOSABLE TOURNIQUET CUFF WITH PLC, DUAL PORT, SINGLE BLADDER, 34 IN. (86 CM)

## (undated) DEVICE — BLADE SURG SAW STD S STL OSC W/ SERR EDGE DISP

## (undated) DEVICE — KIT INT FIX FEM TIB CKPT MAKOPLASTY

## (undated) DEVICE — SINGLE USE SUCTION VALVE MAJ-209: Brand: SINGLE USE SUCTION VALVE (STERILE)

## (undated) DEVICE — SOLUTION IV IRRIG POUR BRL 0.9% SODIUM CHL 2F7124

## (undated) DEVICE — GAUZE,SPONGE,4"X4",16PLY,XRAY,STRL,LF: Brand: MEDLINE

## (undated) DEVICE — TRAY ENDO ROOM BRONCH

## (undated) DEVICE — 4-PORT MANIFOLD: Brand: NEPTUNE 2

## (undated) DEVICE — TRAY STRYKER MAKO TOTAL KNEE ARRAY

## (undated) DEVICE — BOOT POS LEG DEMAYO

## (undated) DEVICE — PADDING CAST W6INXL4YD COT LO LINTING WYTEX

## (undated) DEVICE — SOLUTION IRRIG 3000ML 0.9% SOD CHL USP UROMATIC PLAS CONT

## (undated) DEVICE — NEEDLE HYPO 22GA L1.5IN BLK POLYPR HUB S STL REG BVL STR

## (undated) DEVICE — TRAY LAMBOTS REUSABLE

## (undated) DEVICE — TUBING, SUCTION, 1/4" X 10', STRAIGHT: Brand: MEDLINE

## (undated) DEVICE — 3M™ STERI-DRAPE™ INCISE DRAPE 1050 (60CM X 45CM): Brand: STERI-DRAPE™

## (undated) DEVICE — TAPE ADH W3INXL10YD WHT COT WVN BK POWERFUL RUB BASE HIGHLY

## (undated) DEVICE — TUBING, SUCTION, 9/32" X 10', STRAIGHT: Brand: MEDLINE

## (undated) DEVICE — BLADE SAW SAG SYS 6 18X90X1.27MM

## (undated) DEVICE — CUTTER SURG OD33MM PAT KNEE DISP FOR RM SYSTEMXCELERATE

## (undated) DEVICE — KIT DRP FOR RIO ROBOTIC ARM ASST SYS

## (undated) DEVICE — COVER,LIGHT HANDLE,FLX,1/PK: Brand: MEDLINE INDUSTRIES, INC.

## (undated) DEVICE — SKIN PREP TRAY 4 COMPARTM TRAY: Brand: MEDLINE INDUSTRIES, INC.

## (undated) DEVICE — SYRINGE MED 30ML STD CLR PLAS LUERLOCK TIP N CTRL DISP

## (undated) DEVICE — Device

## (undated) DEVICE — ELECTRODE PT RET AD L9FT HI MOIST COND ADH HYDRGEL CORDED

## (undated) DEVICE — 20 ML SYRINGE REGULAR TIP: Brand: MONOJECT

## (undated) DEVICE — BRUSH CYTO L90CM DIA1MM NONRIGID DISP FOR MICRO SPEC

## (undated) DEVICE — TOWEL,OR,DSP,ST,BLUE,STD,6/PK,12PK/CS: Brand: MEDLINE

## (undated) DEVICE — 3M™ IOBAN™ 2 ANTIMICROBIAL INCISE DRAPE 6640EZ: Brand: IOBAN™ 2

## (undated) DEVICE — DRAPE,TOP,102X53,STERILE: Brand: MEDLINE

## (undated) DEVICE — SUTURE STRATAFIX SYMMETRIC PDS + SZ 1 L18IN ABSRB VLT OS-6 SXPP1A201

## (undated) DEVICE — SINGLE USE BIOPSY VALVE MAJ-210: Brand: SINGLE USE BIOPSY VALVE (STERILE)

## (undated) DEVICE — PEEL-AWAY HOOD: Brand: FLYTE, SURGICOOL

## (undated) DEVICE — NEEDLE FLTR 18GA L1.5IN MEM THK5UM BLNT DISP

## (undated) DEVICE — 6 X 9  1.75MIL 4-WALL LABGUARD: Brand: MINIGRIP COMMERCIAL LLC

## (undated) DEVICE — 3M™ IOBAN™ 2 ANTIMICROBIAL INCISE DRAPE 6651EZ: Brand: IOBAN™ 2

## (undated) DEVICE — LUBRICANT SURG JELLY ST BACTER TUBE 4.25OZ

## (undated) DEVICE — INSTRUMENT CURRETTES REUSABLE

## (undated) DEVICE — CORD RETRCT SIL

## (undated) DEVICE — GAUZE,SPONGE,4"X4",8PLY,STRL,LF,10/TRAY: Brand: MEDLINE

## (undated) DEVICE — TRAY SYSTEM 7 DRILL REUSABLE

## (undated) DEVICE — MARKER,SKIN,WI/RULER AND LABELS: Brand: MEDLINE

## (undated) DEVICE — KIT TRK KNEE PROC VIZADISC

## (undated) DEVICE — YANKAUER,BULB TIP,W/O VENT,RIGID,STERILE: Brand: MEDLINE

## (undated) DEVICE — BLOCK BITE 60FR CAREGUARD

## (undated) DEVICE — DRESSING HYDROFIBER AQUACEL AG ADVANTAGE 3.5X10 IN

## (undated) DEVICE — KIT SURG W7XL11IN 2 PKT UNTREATED NA

## (undated) DEVICE — CHLORAPREP 26ML ORANGE

## (undated) DEVICE — COVER HNDL LT DISP

## (undated) DEVICE — MASK,FACE,MAXFLUIDPROTECT,SHIELD/ERLPS: Brand: MEDLINE

## (undated) DEVICE — DRESSING,GAUZE,XEROFORM,CURAD,1"X8",ST: Brand: CURAD

## (undated) DEVICE — TRAP SPEC COLL 40CC MUCOUS CALIB UNIV CONN FOR OPN SUCT

## (undated) DEVICE — TRAY ELEVATOR COBBS REUSABLE

## (undated) DEVICE — BNDG,ELSTC,MATRIX,STRL,6"X5YD,LF,HOOK&LP: Brand: MEDLINE

## (undated) DEVICE — TUBE IRRIG HNDPC HI FLO TP INTRPULS W/SUCTION TUBE

## (undated) DEVICE — STRIP,CLOSURE,WOUND,MEDI-STRIP,1/2X4: Brand: MEDLINE

## (undated) DEVICE — DRAPE,REIN 53X77,STERILE: Brand: MEDLINE

## (undated) DEVICE — GLOVE SURG SZ 8 CRM LTX FREE POLYISOPRENE POLYMER BEAD ANTI

## (undated) DEVICE — TOTAL KNEE PK

## (undated) DEVICE — KIT BEDSIDE REVITAL OX 500ML

## (undated) DEVICE — GOWN ISOLATN REG YEL M WT MULTIPLY SIDETIE LEV 2

## (undated) DEVICE — 3M™ STERI-DRAPE™ U-DRAPE 1015: Brand: STERI-DRAPE™

## (undated) DEVICE — DOUBLE BASIN SET: Brand: MEDLINE INDUSTRIES, INC.

## (undated) DEVICE — 3M™ TEGADERM™ TRANSPARENT FILM DRESSING FRAME STYLE, 1626W, 4 IN X 4-3/4 IN (10 CM X 12 CM), 50/CT 4CT/CASE: Brand: 3M™ TEGADERM™

## (undated) DEVICE — TRAY ORTHO1 REUSABLE

## (undated) DEVICE — PACK PROCEDURE SURG GEN CUST

## (undated) DEVICE — TRAY STRYKER MAKO TOTAL KNEE POWER

## (undated) DEVICE — BANDAGE COMPR W6INXL12FT SMOOTH FOR LIMB EXSANG ESMARCH

## (undated) DEVICE — GOWN,SIRUS,POLYRNF,BRTHSLV,XL,30/CS: Brand: MEDLINE

## (undated) DEVICE — TRAY STRYKER MAKO LEG POSITIONER INSTR

## (undated) DEVICE — PIN BNE FIX TEMP L110MM DIA4MM MAKO

## (undated) DEVICE — PAD,EYE,1-5/8X2 5/8,STERILE,LF,1/PK: Brand: MEDLINE